# Patient Record
Sex: FEMALE | Race: WHITE | NOT HISPANIC OR LATINO | Employment: OTHER | URBAN - METROPOLITAN AREA
[De-identification: names, ages, dates, MRNs, and addresses within clinical notes are randomized per-mention and may not be internally consistent; named-entity substitution may affect disease eponyms.]

---

## 2017-12-26 ENCOUNTER — TELEPHONE (OUTPATIENT)
Dept: CARDIOLOGY | Facility: MEDICAL CENTER | Age: 62
End: 2017-12-26

## 2017-12-26 NOTE — TELEPHONE ENCOUNTER
Called patient regarding NP appt. W/. Patient reports she has seen Dr. Gallardo (cardiologist) @ [facility] for a surgical clearance, records request has been sent to [phone]. Patient also report she had an EKG done at the [facility], records request sent to [phone].

## 2018-01-03 ENCOUNTER — OFFICE VISIT (OUTPATIENT)
Dept: CARDIOLOGY | Facility: MEDICAL CENTER | Age: 63
End: 2018-01-03
Payer: MEDICAID

## 2018-01-03 VITALS
HEIGHT: 66 IN | BODY MASS INDEX: 35.29 KG/M2 | OXYGEN SATURATION: 92 % | DIASTOLIC BLOOD PRESSURE: 68 MMHG | WEIGHT: 219.6 LBS | HEART RATE: 88 BPM | SYSTOLIC BLOOD PRESSURE: 124 MMHG

## 2018-01-03 DIAGNOSIS — R06.02 SHORTNESS OF BREATH: ICD-10-CM

## 2018-01-03 DIAGNOSIS — I31.39 PERICARDIAL EFFUSION: ICD-10-CM

## 2018-01-03 DIAGNOSIS — R07.9 CHEST PAIN, UNSPECIFIED TYPE: ICD-10-CM

## 2018-01-03 DIAGNOSIS — R01.1 SYSTOLIC MURMUR: ICD-10-CM

## 2018-01-03 LAB — EKG IMPRESSION: NORMAL

## 2018-01-03 PROCEDURE — 93000 ELECTROCARDIOGRAM COMPLETE: CPT | Performed by: INTERNAL MEDICINE

## 2018-01-03 PROCEDURE — 99245 OFF/OP CONSLTJ NEW/EST HI 55: CPT | Mod: 25 | Performed by: INTERNAL MEDICINE

## 2018-01-03 RX ORDER — ALBUTEROL SULFATE 90 UG/1
AEROSOL, METERED RESPIRATORY (INHALATION)
Refills: 11 | COMMUNITY
Start: 2017-12-18 | End: 2018-06-28

## 2018-01-03 RX ORDER — MORPHINE SULFATE 15 MG/1
TABLET, FILM COATED, EXTENDED RELEASE ORAL
Refills: 0 | COMMUNITY
Start: 2017-12-09 | End: 2018-06-28

## 2018-01-03 RX ORDER — ROPINIROLE 4 MG/1
TABLET, FILM COATED ORAL
Refills: 3 | COMMUNITY
Start: 2017-12-10 | End: 2018-06-28

## 2018-01-03 RX ORDER — BISACODYL 5 MG
TABLET, DELAYED RELEASE (ENTERIC COATED) ORAL
Refills: 7 | COMMUNITY
Start: 2017-10-16 | End: 2018-06-28

## 2018-01-03 RX ORDER — LORAZEPAM 1 MG/1
TABLET ORAL
Refills: 3 | COMMUNITY
Start: 2017-12-20 | End: 2018-06-28

## 2018-01-03 RX ORDER — TOPIRAMATE 50 MG/1
TABLET, FILM COATED ORAL
Refills: 3 | COMMUNITY
Start: 2017-12-18 | End: 2018-06-28

## 2018-01-03 RX ORDER — MELOXICAM 15 MG/1
TABLET ORAL
Refills: 6 | COMMUNITY
Start: 2017-12-07 | End: 2018-06-28

## 2018-01-03 RX ORDER — OXYCODONE AND ACETAMINOPHEN 10; 325 MG/1; MG/1
TABLET ORAL
Refills: 0 | COMMUNITY
Start: 2017-12-12 | End: 2018-06-28

## 2018-01-03 RX ORDER — AMLODIPINE BESYLATE 10 MG/1
TABLET ORAL
Refills: 3 | COMMUNITY
Start: 2017-12-07 | End: 2018-06-28

## 2018-01-03 ASSESSMENT — ENCOUNTER SYMPTOMS
BRUISES/BLEEDS EASILY: 0
DEPRESSION: 0
BACK PAIN: 1
SHORTNESS OF BREATH: 1
FALLS: 0
ABDOMINAL PAIN: 0
ORTHOPNEA: 0
PALPITATIONS: 0
PND: 0
LOSS OF CONSCIOUSNESS: 0
DIZZINESS: 0

## 2018-01-03 NOTE — LETTER
[facility]   [address] [place] [address] [address]  [place] [address]  Phone: [phone]  Fax: [phone]              Ariella Rendon  1955    Encounter Date: 1/3/2018    Jessica Diaz M.D.          PROGRESS NOTE:  Subjective:   Ariella Rendon is a 62 y.o. Female with past medical history significant for hypertension, hyperlipidemia and COPD who was referred to cardiology clinic for management of pericardial effusion. It appears that she underwent a CT scan that showed an incidental finding of a pericardial effusion.We do not have the CT scan available for our review.    Patient reports having substernal/left sided chest pressure that usually occurs at rest and spontaneously resolves anywhere from 2 hours to 2 days later. She's had these symptoms for many years. Her last stress test was in 2016 which was unremarkable.    Reports having dyspnea mostly after trying to sit up from a supine position which she attributes to her chronic back pain.    Denies any palpitations or dizziness. No recent syncopal episodes.    Past Medical History:   Diagnosis Date   • Anemia     past history   • Arthritis     hands   • Breath shortness    • Cancer (CMS-HCC)     cervical, endometrial   • COPD (chronic obstructive pulmonary disease) (CMS-HCC)    • Dental disorder    • Heart burn     lumbar, colon   • Hiatus hernia syndrome    • Hypertension    • Indigestion    • Other specified disorder of intestines     constipation and diarrhea   • Other specified symptom associated with female genital organs 1970    cervical   • Psychiatric problem     depression     Past Surgical History:   Procedure Laterality Date   • LOW ANTERIOR RESECTION LAPAROSCOPIC  9/12/2014    Performed by Nakul Beltran M.D. at [facility] ORS   • GYN SURGERY  1970    hysterectomy   • GYN SURGERY  1970    scope x 4   • OTHER ABDOMINAL SURGERY      remove foreign object, age 7     History reviewed. No pertinent family  "history.  History   Smoking Status   • Current Every Day Smoker   • Packs/day: 0.50   • Years: 40.00   • Types: Cigarettes   Smokeless Tobacco   • Never Used     No alcohol use. Smokes marijuana daily.    Allergies   Allergen Reactions   • Other Environmental Rash     \"alloids in metal\"     Outpatient Encounter Prescriptions as of 1/3/2018   Medication Sig Dispense Refill   • BREO ELLIPTA 200-25 MCG/INH AEROSOL POWDER, BREATH ACTIVATED INL 1 PUFF PO QD  3   • vitamin D (CHOLECALCIFEROL) 1000 UNIT Tab TK 1 T PO ONCE A DAY  5   • amlodipine (NORVASC) 10 MG Tab TK 1 T PO QD  3   • bisacodyl EC (DULCOLAX) 5 MG Tablet Delayed Response TK 1 T PO  QD PRN  7   • lorazepam (ATIVAN) 1 MG Tab TK 1 T PO BID  3   • morphine ER (MS CONTIN) 15 MG Tab CR tablet TK 1 T PO Q 12 H  0   • oxycodone-acetaminophen (PERCOCET-10)  MG Tab TK 1 T PO QID  0   • ropinirole (REQUIP) 4 MG tablet TK 1 T PO QID PRN P  3   • topiramate (TOPAMAX) 50 MG tablet TK 1 T PO  QD  3   • meloxicam (MOBIC) 15 MG tablet TK 1 T PO QD  6   • VENTOLIN  (90 Base) MCG/ACT Aero Soln inhalation aerosol INL 1 PUFF PO Q 4 H PRF SOB  11   • aspirin EC (ECOTRIN) 81 MG Tablet Delayed Response Take 1 Tab by mouth every day. 30 Tab    • asa/apap/caffeine (EXCEDRIN) 250-250-65 MG TABS Take 2 Tabs by mouth every 6 hours as needed. Indications: Headache     • ALBUTEROL SULFATE INH Inhale 2 Puffs by mouth as needed.     • NS SOLN 60 mL with albuterol 2.5 mg/0.5 mL NEBU 5 mL 5 mg/hr by Nebulization route as needed.     • magnesium gluconate (MAG-G) 500 MG tablet Take 1,000 mg by mouth every day.     • fluoxetine (PROZAC) 40 MG capsule Take 40 mg by mouth every day.     • Cetirizine HCl (ZYRTEC PO) Take  by mouth every day.     • omeprazole (PRILOSEC) 40 MG capsule Take 40 mg by mouth every day.     • [DISCONTINUED] hydrocodone-acetaminophen (NORCO) 7.5-325 MG per tablet Take 1-2 Tabs by mouth every 6 hours as needed. 40 Tab 0   • [DISCONTINUED] potassium chloride " "SA (K-DUR) 20 MEQ TBCR Take 1 Tab by mouth 2 times a day. 60 Tab 11     No facility-administered encounter medications on file as of 1/3/2018.      Review of Systems   Constitutional: Negative for malaise/fatigue.   HENT: Negative.    Respiratory: Positive for shortness of breath.    Cardiovascular: Positive for chest pain. Negative for palpitations, orthopnea, leg swelling and PND.   Gastrointestinal: Negative for abdominal pain.   Musculoskeletal: Positive for back pain. Negative for falls.   Skin: Negative.    Neurological: Negative for dizziness and loss of consciousness.   Endo/Heme/Allergies: Does not bruise/bleed easily.   Psychiatric/Behavioral: Negative for depression.   All other systems reviewed and are negative.       Objective:   /68   Pulse 88   Ht 1.676 m (5' 6\")   Wt 99.6 kg (219 lb 9.6 oz)   SpO2 92%   BMI 35.44 kg/m²      Physical Exam   Constitutional: She is oriented to person, place, and time. No distress.   HENT:   Head: Normocephalic and atraumatic.   Eyes: Conjunctivae are normal.   Neck: Normal range of motion. Neck supple. No JVD present.   Cardiovascular: Normal rate and regular rhythm.  Exam reveals no gallop and no friction rub.    Murmur heard.   Crescendo decrescendo systolic murmur is present with a grade of 2/6   Pulmonary/Chest: Effort normal and breath sounds normal. No respiratory distress. She has no wheezes. She has no rales.   Abdominal: Soft. There is no tenderness.   Musculoskeletal: She exhibits no edema.   Neurological: She is alert and oriented to person, place, and time.   Skin: Skin is warm and dry. She is not diaphoretic.   Psychiatric: She has a normal mood and affect.   Nursing note and vitals reviewed.    EKG from today was reviewed and shows normal sinus rhythm, right axis deviation, sinus arrhythmia, nonspecific T-wave changes.    Myocardial perfusion study performed in April 2016 showed no significant ischemia. EF was normal.    Echocardiogram performed " in March 2016 showed a normal LV systolic function with an EF of 65-70%. Grade 1 diastolic dysfunction.    Assessment:     1. Pericardial effusion  EKG    Echocardiogram Comp w/o Cont   2. Shortness of breath     3. Systolic murmur  Echocardiogram Comp w/o Cont   4. Chest pain, unspecified type  NM-CARDIAC STRESS TEST       Medical Decision Making:  Today's Assessment / Status / Plan:     Pericardial effusion: Likely an incidental finding on a CT scan performed for abdominal pain. I do not have the CT scan results for my review. We will try to obtain the CT scan results. In the interim I have ordered an echocardiogram for further evaluation.    Systolic murmur: Likely secondary to aortic sclerosis based on exam. However we will evaluate on the echocardiogram as detailed above.    Chest discomfort: Atypical in nature. Patient appears to have a long-standing history of these symptoms. She has previously undergone stress testing which have been unremarkable. However an ongoing smoker with other cardiac risk factors, I will refer her for a repeat myocardial perfusion study for further ischemic evaluation. I have also advised the patient to start a baby aspirin every day for now.    Shortness of breath: Atypical in nature as well. Appears to be more related to her back pain. However echocardiogram and stress testing ordered as noted above.    Tobacco use: I have strongly advised the patient to consider quitting. We also discussed the benefits of quitting. Patient is not quite ready to quit at this time.    Return to clinic in 2 months or earlier if needed.    Thank you for allowing me to participate in the care of this patient. Please do not hesitate to contact me with any questions.    Jessica Diaz MD  Cardiologist  [facility]      PLEASE NOTE: This dictation was created using voice recognition software.         Brianne Marti M.D.  [address]  [address]  [facility] [address]  VIA  Facsimile: [phone]

## 2018-01-03 NOTE — PROGRESS NOTES
Subjective:   Ariella Rendon is a 62 y.o. Female with past medical history significant for hypertension, hyperlipidemia and COPD who was referred to cardiology clinic for management of pericardial effusion. It appears that she underwent a CT scan that showed an incidental finding of a pericardial effusion.We do not have the CT scan available for our review.    Patient reports having substernal/left sided chest pressure that usually occurs at rest and spontaneously resolves anywhere from 2 hours to 2 days later. She's had these symptoms for many years. Her last stress test was in 2016 which was unremarkable.    Reports having dyspnea mostly after trying to sit up from a supine position which she attributes to her chronic back pain.    Denies any palpitations or dizziness. No recent syncopal episodes.     Her blood pressure is usually well-controlled like it is today.    She is a smoker. Currently smoking up to three quarters of a pack a day and has been smoking for the past 40-45 years.     She reports being diagnosed with a murmur recently. Has never had a murmur before.    Past Medical History:   Diagnosis Date   • Anemia     past history   • Arthritis     hands   • Breath shortness    • Cancer (CMS-HCC)     cervical, endometrial   • COPD (chronic obstructive pulmonary disease) (CMS-HCC)    • Dental disorder    • Heart burn     lumbar, colon   • Hiatus hernia syndrome    • Hypertension    • Indigestion    • Other specified disorder of intestines     constipation and diarrhea   • Other specified symptom associated with female genital organs 1970    cervical   • Psychiatric problem     depression     Past Surgical History:   Procedure Laterality Date   • LOW ANTERIOR RESECTION LAPAROSCOPIC  9/12/2014    Performed by Nakul Beltran M.D. at [facility] ORS   • GYN SURGERY  1970    hysterectomy   • GYN SURGERY  1970    scope x 4   • OTHER ABDOMINAL SURGERY      remove foreign object, age 7     History reviewed.  "No pertinent family history.  History   Smoking Status   • Current Every Day Smoker   • Packs/day: 0.50   • Years: 40.00   • Types: Cigarettes   Smokeless Tobacco   • Never Used     No alcohol use. Smokes marijuana daily.    Allergies   Allergen Reactions   • Other Environmental Rash     \"alloids in metal\"     Outpatient Encounter Prescriptions as of 1/3/2018   Medication Sig Dispense Refill   • BREO ELLIPTA 200-25 MCG/INH AEROSOL POWDER, BREATH ACTIVATED INL 1 PUFF PO QD  3   • vitamin D (CHOLECALCIFEROL) 1000 UNIT Tab TK 1 T PO ONCE A DAY  5   • amlodipine (NORVASC) 10 MG Tab TK 1 T PO QD  3   • bisacodyl EC (DULCOLAX) 5 MG Tablet Delayed Response TK 1 T PO  QD PRN  7   • lorazepam (ATIVAN) 1 MG Tab TK 1 T PO BID  3   • morphine ER (MS CONTIN) 15 MG Tab CR tablet TK 1 T PO Q 12 H  0   • oxycodone-acetaminophen (PERCOCET-10)  MG Tab TK 1 T PO QID  0   • ropinirole (REQUIP) 4 MG tablet TK 1 T PO QID PRN P  3   • topiramate (TOPAMAX) 50 MG tablet TK 1 T PO  QD  3   • meloxicam (MOBIC) 15 MG tablet TK 1 T PO QD  6   • VENTOLIN  (90 Base) MCG/ACT Aero Soln inhalation aerosol INL 1 PUFF PO Q 4 H PRF SOB  11   • aspirin EC (ECOTRIN) 81 MG Tablet Delayed Response Take 1 Tab by mouth every day. 30 Tab    • asa/apap/caffeine (EXCEDRIN) 250-250-65 MG TABS Take 2 Tabs by mouth every 6 hours as needed. Indications: Headache     • ALBUTEROL SULFATE INH Inhale 2 Puffs by mouth as needed.     • NS SOLN 60 mL with albuterol 2.5 mg/0.5 mL NEBU 5 mL 5 mg/hr by Nebulization route as needed.     • magnesium gluconate (MAG-G) 500 MG tablet Take 1,000 mg by mouth every day.     • fluoxetine (PROZAC) 40 MG capsule Take 40 mg by mouth every day.     • Cetirizine HCl (ZYRTEC PO) Take  by mouth every day.     • omeprazole (PRILOSEC) 40 MG capsule Take 40 mg by mouth every day.     • [DISCONTINUED] hydrocodone-acetaminophen (NORCO) 7.5-325 MG per tablet Take 1-2 Tabs by mouth every 6 hours as needed. 40 Tab 0   • [DISCONTINUED] " "potassium chloride SA (K-DUR) 20 MEQ TBCR Take 1 Tab by mouth 2 times a day. 60 Tab 11     No facility-administered encounter medications on file as of 1/3/2018.      Review of Systems   Constitutional: Negative for malaise/fatigue.   HENT: Negative.    Respiratory: Positive for shortness of breath.    Cardiovascular: Positive for chest pain. Negative for palpitations, orthopnea, leg swelling and PND.   Gastrointestinal: Negative for abdominal pain.   Musculoskeletal: Positive for back pain. Negative for falls.   Skin: Negative.    Neurological: Negative for dizziness and loss of consciousness.   Endo/Heme/Allergies: Does not bruise/bleed easily.   Psychiatric/Behavioral: Negative for depression.   All other systems reviewed and are negative.       Objective:   /68   Pulse 88   Ht 1.676 m (5' 6\")   Wt 99.6 kg (219 lb 9.6 oz)   SpO2 92%   BMI 35.44 kg/m²     Physical Exam   Constitutional: She is oriented to person, place, and time. No distress.   HENT:   Head: Normocephalic and atraumatic.   Eyes: Conjunctivae are normal.   Neck: Normal range of motion. Neck supple. No JVD present.   Cardiovascular: Normal rate and regular rhythm.  Exam reveals no gallop and no friction rub.    Murmur heard.   Crescendo decrescendo systolic murmur is present with a grade of 2/6   Pulmonary/Chest: Effort normal and breath sounds normal. No respiratory distress. She has no wheezes. She has no rales.   Abdominal: Soft. There is no tenderness.   Musculoskeletal: She exhibits no edema.   Neurological: She is alert and oriented to person, place, and time.   Skin: Skin is warm and dry. She is not diaphoretic.   Psychiatric: She has a normal mood and affect.   Nursing note and vitals reviewed.    EKG from today was reviewed and shows normal sinus rhythm, right axis deviation, sinus arrhythmia, nonspecific T-wave changes.    Myocardial perfusion study performed in April 2016 showed no significant ischemia. EF was " normal.    Echocardiogram performed in March 2016 showed a normal LV systolic function with an EF of 65-70%. Grade 1 diastolic dysfunction.    Assessment:     1. Pericardial effusion  EKG    Echocardiogram Comp w/o Cont   2. Shortness of breath     3. Systolic murmur  Echocardiogram Comp w/o Cont   4. Chest pain, unspecified type  NM-CARDIAC STRESS TEST       Medical Decision Making:  Today's Assessment / Status / Plan:     Pericardial effusion: Likely an incidental finding on a CT scan performed for abdominal pain. I do not have the CT scan results for my review. We will try to obtain the CT scan results. In the interim I have ordered an echocardiogram for further evaluation.    Systolic murmur: Likely secondary to aortic sclerosis based on exam. However we will evaluate on the echocardiogram as detailed above.    Chest discomfort: Atypical in nature. Patient appears to have a long-standing history of these symptoms. She has previously undergone stress testing which have been unremarkable. However an ongoing smoker with other cardiac risk factors, I will refer her for a repeat myocardial perfusion study for further ischemic evaluation. I have also advised the patient to start a baby aspirin every day for now.    Shortness of breath: Atypical in nature as well. Appears to be more related to her back pain. However echocardiogram and stress testing ordered as noted above.    Tobacco use: I have strongly advised the patient to consider quitting. We also discussed the benefits of quitting. Patient is not quite ready to quit at this time.    Return to clinic in 2 months or earlier if needed.    Thank you for allowing me to participate in the care of this patient. Please do not hesitate to contact me with any questions.    Jessica Diaz MD  Cardiologist  [facility]      PLEASE NOTE: This dictation was created using voice recognition software.

## 2018-01-12 ENCOUNTER — APPOINTMENT (OUTPATIENT)
Dept: RADIOLOGY | Facility: MEDICAL CENTER | Age: 63
End: 2018-01-12
Attending: INTERNAL MEDICINE
Payer: MEDICAID

## 2018-01-19 ENCOUNTER — APPOINTMENT (OUTPATIENT)
Dept: CARDIOLOGY | Facility: MEDICAL CENTER | Age: 63
End: 2018-01-19
Attending: INTERNAL MEDICINE
Payer: MEDICAID

## 2018-02-02 ENCOUNTER — HOSPITAL ENCOUNTER (OUTPATIENT)
Dept: CARDIOLOGY | Facility: MEDICAL CENTER | Age: 63
End: 2018-02-02
Attending: INTERNAL MEDICINE
Payer: MEDICAID

## 2018-02-02 DIAGNOSIS — R01.1 SYSTOLIC MURMUR: ICD-10-CM

## 2018-02-02 DIAGNOSIS — I31.39 PERICARDIAL EFFUSION: ICD-10-CM

## 2018-02-02 PROCEDURE — 93306 TTE W/DOPPLER COMPLETE: CPT | Mod: 26 | Performed by: INTERNAL MEDICINE

## 2018-02-02 PROCEDURE — 93306 TTE W/DOPPLER COMPLETE: CPT

## 2018-02-03 LAB
LV EJECT FRACT  99904: 60
LV EJECT FRACT MOD 2C 99903: 53.76
LV EJECT FRACT MOD 4C 99902: 66.28
LV EJECT FRACT MOD BP 99901: 61.3

## 2018-02-09 ENCOUNTER — HOSPITAL ENCOUNTER (OUTPATIENT)
Dept: RADIOLOGY | Facility: MEDICAL CENTER | Age: 63
End: 2018-02-09
Attending: INTERNAL MEDICINE
Payer: MEDICAID

## 2018-02-09 DIAGNOSIS — R07.9 CHEST PAIN, UNSPECIFIED TYPE: ICD-10-CM

## 2018-02-09 PROCEDURE — A9502 TC99M TETROFOSMIN: HCPCS

## 2018-02-09 PROCEDURE — 700111 HCHG RX REV CODE 636 W/ 250 OVERRIDE (IP)

## 2018-02-09 RX ORDER — REGADENOSON 0.08 MG/ML
INJECTION, SOLUTION INTRAVENOUS
Status: COMPLETED
Start: 2018-02-09 | End: 2018-02-09

## 2018-02-09 RX ADMIN — REGADENOSON 0.4 MG: 0.08 INJECTION, SOLUTION INTRAVENOUS at 09:34

## 2018-03-09 ENCOUNTER — OFFICE VISIT (OUTPATIENT)
Dept: CARDIOLOGY | Facility: MEDICAL CENTER | Age: 63
End: 2018-03-09
Payer: MEDICAID

## 2018-03-09 VITALS
HEART RATE: 110 BPM | WEIGHT: 212 LBS | HEIGHT: 66 IN | OXYGEN SATURATION: 92 % | BODY MASS INDEX: 34.07 KG/M2 | SYSTOLIC BLOOD PRESSURE: 112 MMHG | DIASTOLIC BLOOD PRESSURE: 62 MMHG

## 2018-03-09 DIAGNOSIS — G47.33 OSA (OBSTRUCTIVE SLEEP APNEA): ICD-10-CM

## 2018-03-09 DIAGNOSIS — I10 ESSENTIAL HYPERTENSION: ICD-10-CM

## 2018-03-09 DIAGNOSIS — R07.9 CHEST PAIN, UNSPECIFIED TYPE: ICD-10-CM

## 2018-03-09 DIAGNOSIS — R01.1 SYSTOLIC MURMUR: ICD-10-CM

## 2018-03-09 DIAGNOSIS — I31.39 PERICARDIAL EFFUSION: ICD-10-CM

## 2018-03-09 DIAGNOSIS — F17.210 CIGARETTE SMOKER: ICD-10-CM

## 2018-03-09 PROCEDURE — 99406 BEHAV CHNG SMOKING 3-10 MIN: CPT | Performed by: INTERNAL MEDICINE

## 2018-03-09 PROCEDURE — 99214 OFFICE O/P EST MOD 30 MIN: CPT | Mod: 25 | Performed by: INTERNAL MEDICINE

## 2018-03-09 ASSESSMENT — ENCOUNTER SYMPTOMS
BACK PAIN: 1
ABDOMINAL PAIN: 0
FALLS: 0
DIZZINESS: 0
ORTHOPNEA: 0
SHORTNESS OF BREATH: 0
DEPRESSION: 0
BRUISES/BLEEDS EASILY: 0
PALPITATIONS: 0
PND: 0
LOSS OF CONSCIOUSNESS: 0

## 2018-03-09 NOTE — LETTER
[facility]   [address] [place] [address]  [place] [address]  Phone: [phone]  Fax: [phone]              Ariella Rendon  1955    Encounter Date: 3/9/2018    Jessica Diaz M.D.          PROGRESS NOTE:  Subjective:   Ariella Rendon is a 62 y.o. Female presenting to clinic for follow-up on pericardial effusion. Patient underwent a CT scan that showed an incidental finding of pericardial effusion. She denies any shortness of breath. No fluid overload symptoms.    She continues to have intermittent substernal chest pressure. She has a history of GERD and tried using Mylanta with one of the episodes with complete resolution of her symptoms.     Continues to smoke about half a pack a day.     Her blood pressure is usually well-controlled like it is today.    Her main concern today is fatigue. She has a history of sleep apnea for which she is only on nocturnal oxygen. She has previously been advised to be on CPAP but the patient does not like to use it.      Past Medical History:   Diagnosis Date   • Anemia     past history   • Arthritis     hands   • Breath shortness    • Cancer (CMS-HCC)     cervical, endometrial   • COPD (chronic obstructive pulmonary disease) (CMS-HCC)    • Dental disorder    • Heart burn     lumbar, colon   • Hiatus hernia syndrome    • Hypertension    • Indigestion    • Other specified disorder of intestines     constipation and diarrhea   • Other specified symptom associated with female genital organs 1970    cervical   • Psychiatric problem     depression     Past Surgical History:   Procedure Laterality Date   • LOW ANTERIOR RESECTION LAPAROSCOPIC  9/12/2014    Performed by Nakul Beltran M.D. at [facility] ORS   • GYN SURGERY  1970    hysterectomy   • GYN SURGERY  1970    scope x 4   • OTHER ABDOMINAL SURGERY      remove foreign object, age 7     History reviewed. No pertinent family history.  History   Smoking Status   • Current  "Every Day Smoker   • Packs/day: 0.50   • Years: 40.00   • Types: Cigarettes   Smokeless Tobacco   • Never Used     No alcohol use. Smokes marijuana daily.    Allergies   Allergen Reactions   • Other Environmental Rash     \"alloids in metal\"     Outpatient Encounter Prescriptions as of 3/9/2018   Medication Sig Dispense Refill   • BREO ELLIPTA 200-25 MCG/INH AEROSOL POWDER, BREATH ACTIVATED INL 1 PUFF PO QD  3   • vitamin D (CHOLECALCIFEROL) 1000 UNIT Tab TK 1 T PO ONCE A DAY  5   • amlodipine (NORVASC) 10 MG Tab TK 1 T PO QD  3   • bisacodyl EC (DULCOLAX) 5 MG Tablet Delayed Response TK 1 T PO  QD PRN  7   • morphine ER (MS CONTIN) 15 MG Tab CR tablet TK 1 T PO Q 12 H  0   • ropinirole (REQUIP) 4 MG tablet TK 1 T PO QID PRN P  3   • topiramate (TOPAMAX) 50 MG tablet TK 1 T PO  QD  3   • VENTOLIN  (90 Base) MCG/ACT Aero Soln inhalation aerosol INL 1 PUFF PO Q 4 H PRF SOB  11   • aspirin EC (ECOTRIN) 81 MG Tablet Delayed Response Take 1 Tab by mouth every day. 30 Tab    • asa/apap/caffeine (EXCEDRIN) 250-250-65 MG TABS Take 2 Tabs by mouth every 6 hours as needed. Indications: Headache     • NS SOLN 60 mL with albuterol 2.5 mg/0.5 mL NEBU 5 mL 5 mg/hr by Nebulization route as needed.     • magnesium gluconate (MAG-G) 500 MG tablet Take 1,000 mg by mouth every day.     • fluoxetine (PROZAC) 40 MG capsule Take 40 mg by mouth every day.     • Cetirizine HCl (ZYRTEC PO) Take  by mouth every day.     • omeprazole (PRILOSEC) 40 MG capsule Take 40 mg by mouth every day.     • lorazepam (ATIVAN) 1 MG Tab TK 1 T PO BID  3   • oxycodone-acetaminophen (PERCOCET-10)  MG Tab TK 1 T PO QID  0   • meloxicam (MOBIC) 15 MG tablet TK 1 T PO QD  6   • ALBUTEROL SULFATE INH Inhale 2 Puffs by mouth as needed.       No facility-administered encounter medications on file as of 3/9/2018.      Review of Systems   Constitutional: Positive for malaise/fatigue.   Respiratory: Negative for shortness of breath.    Cardiovascular: " "Positive for chest pain. Negative for palpitations, orthopnea, leg swelling and PND.   Gastrointestinal: Negative for abdominal pain.   Musculoskeletal: Positive for back pain. Negative for falls.   Neurological: Negative for dizziness and loss of consciousness.   Endo/Heme/Allergies: Does not bruise/bleed easily.   Psychiatric/Behavioral: Negative for depression.   All other systems reviewed and are negative.       Objective:   /62   Pulse (!) 110   Ht 1.676 m (5' 6\")   Wt 96.2 kg (212 lb)   SpO2 92%   BMI 34.22 kg/m²      Physical Exam   Constitutional: She is oriented to person, place, and time. No distress.   HENT:   Head: Normocephalic and atraumatic.   Eyes: Conjunctivae are normal.   Neck: Normal range of motion. Neck supple. No JVD present.   Cardiovascular: Normal rate and regular rhythm.  Exam reveals no gallop and no friction rub.    Murmur heard.   Crescendo decrescendo systolic murmur is present with a grade of 2/6   Pulmonary/Chest: Effort normal and breath sounds normal. No respiratory distress. She has no wheezes. She has no rales.   Abdominal: Soft. There is no tenderness.   Musculoskeletal: She exhibits no edema.   Neurological: She is alert and oriented to person, place, and time.   Skin: Skin is warm and dry. She is not diaphoretic.   Psychiatric: She has a normal mood and affect.   Nursing note and vitals reviewed.    Echocardiogram performed in February 2018. Images were personally reviewed and showed normal LV systolic function. No significant pericardial effusion noted. No significant valvular pathology noted.    Nuclear myocardial perfusion study performed in February 2018. Images and tracings were personally reviewed and showed no fixed or reversible defects.    Assessment:     1. Chest pain, unspecified type     2. Pericardial effusion     3. Systolic murmur     4. Essential hypertension     5. EMILY (obstructive sleep apnea)     6. Cigarette smoker         Medical Decision " Making:  Today's Assessment / Status / Plan:     Pericardial effusion: Was  an incidental finding on the CT scan. Echocardiogram did not show any significant pericardial effusion. No further workup is indicated at this time.    Systolic murmur: No significant valvular pathology noted on the echocardiogram. Patient was reassured about this.    Chest discomfort: Atypical in nature. Myocardial perfusion study as detailed above was low risk. I have extended the test results to the patient. Given her recent history, it appears that her symptoms are likely secondary to GERD. Should the patient started having increasing symptoms or symptoms of exertion, she will let us know.    Obstructive sleep apnea: Patient's only on nocturnal oxygen. Her fatigue is likely secondary to her untreated sleep apnea. I spent a few minutes during this appointment discussing the benefits of CPAP use. Patient however is not interested in using CPAP.    Tobacco use: Patient continues to smoke about half a pack a day. She takes about quitting but is not quite ready to quit at this time. I have encouraged her to consider quitting. I spent 4 minutes discussing smoking cessation.     Return to clinic in 6 months or earlier if needed.    Thank you for allowing me to participate in the care of this patient. Please do not hesitate to contact me with any questions.    Jessica Diaz MD  Cardiologist  [facility]      PLEASE NOTE: This dictation was created using voice recognition software.         Brianne Marti M.D.  [address] [address]  VIA Facsimile: [phone]

## 2018-06-28 ENCOUNTER — APPOINTMENT (OUTPATIENT)
Dept: RADIOLOGY | Facility: MEDICAL CENTER | Age: 63
DRG: 378 | End: 2018-06-28
Attending: EMERGENCY MEDICINE
Payer: MEDICAID

## 2018-06-28 ENCOUNTER — HOSPITAL ENCOUNTER (INPATIENT)
Facility: MEDICAL CENTER | Age: 63
LOS: 5 days | DRG: 378 | End: 2018-07-03
Attending: EMERGENCY MEDICINE | Admitting: FAMILY MEDICINE
Payer: MEDICAID

## 2018-06-28 DIAGNOSIS — J44.9 CHRONIC OBSTRUCTIVE PULMONARY DISEASE, UNSPECIFIED COPD TYPE (HCC): Chronic | ICD-10-CM

## 2018-06-28 DIAGNOSIS — D64.9 ANEMIA, UNSPECIFIED TYPE: ICD-10-CM

## 2018-06-28 DIAGNOSIS — K92.2 GASTROINTESTINAL HEMORRHAGE, UNSPECIFIED GASTROINTESTINAL HEMORRHAGE TYPE: ICD-10-CM

## 2018-06-28 DIAGNOSIS — G47.33 OSA (OBSTRUCTIVE SLEEP APNEA): ICD-10-CM

## 2018-06-28 PROBLEM — I51.89 DIASTOLIC DYSFUNCTION: Status: ACTIVE | Noted: 2018-06-28

## 2018-06-28 PROBLEM — D50.9 MICROCYTIC ANEMIA: Status: ACTIVE | Noted: 2018-06-28

## 2018-06-28 PROBLEM — R53.1 GENERALIZED WEAKNESS: Status: ACTIVE | Noted: 2018-06-28

## 2018-06-28 PROBLEM — R79.89 ELEVATED LFTS: Status: ACTIVE | Noted: 2018-06-28

## 2018-06-28 PROBLEM — D50.0 BLOOD LOSS ANEMIA: Status: ACTIVE | Noted: 2018-06-28

## 2018-06-28 PROBLEM — J96.10 CHRONIC RESPIRATORY FAILURE (HCC): Chronic | Status: ACTIVE | Noted: 2018-06-28

## 2018-06-28 LAB
ABO GROUP BLD: NORMAL
ALBUMIN SERPL BCP-MCNC: 3.3 G/DL (ref 3.2–4.9)
ALBUMIN/GLOB SERPL: 1.3 G/DL
ALP SERPL-CCNC: 119 U/L (ref 30–99)
ALT SERPL-CCNC: 243 U/L (ref 2–50)
ANION GAP SERPL CALC-SCNC: 7 MMOL/L (ref 0–11.9)
ANISOCYTOSIS BLD QL SMEAR: ABNORMAL
APTT PPP: 26.1 SEC (ref 24.7–36)
AST SERPL-CCNC: 294 U/L (ref 12–45)
BARCODED ABORH UBTYP: 6200
BARCODED PRD CODE UBPRD: NORMAL
BARCODED UNIT NUM UBUNT: NORMAL
BASOPHILS # BLD AUTO: 0.4 % (ref 0–1.8)
BASOPHILS # BLD: 0.03 K/UL (ref 0–0.12)
BILIRUB SERPL-MCNC: 0.6 MG/DL (ref 0.1–1.5)
BLD GP AB SCN SERPL QL: NORMAL
BUN SERPL-MCNC: 21 MG/DL (ref 8–22)
CALCIUM SERPL-MCNC: 7.9 MG/DL (ref 8.5–10.5)
CHLORIDE SERPL-SCNC: 104 MMOL/L (ref 96–112)
CO2 SERPL-SCNC: 26 MMOL/L (ref 20–33)
COMMENT 1642: NORMAL
COMPONENT R 8504R: NORMAL
CREAT SERPL-MCNC: 0.77 MG/DL (ref 0.5–1.4)
EOSINOPHIL # BLD AUTO: 0.08 K/UL (ref 0–0.51)
EOSINOPHIL NFR BLD: 1 % (ref 0–6.9)
ERYTHROCYTE [DISTWIDTH] IN BLOOD BY AUTOMATED COUNT: 55.3 FL (ref 35.9–50)
ERYTHROCYTE [DISTWIDTH] IN BLOOD BY AUTOMATED COUNT: 59.6 FL (ref 35.9–50)
FERRITIN SERPL-MCNC: 5.8 NG/ML (ref 10–291)
GLOBULIN SER CALC-MCNC: 2.6 G/DL (ref 1.9–3.5)
GLUCOSE SERPL-MCNC: 119 MG/DL (ref 65–99)
HCT VFR BLD AUTO: 13.6 % (ref 37–47)
HCT VFR BLD AUTO: 15.5 % (ref 37–47)
HGB BLD-MCNC: 3.4 G/DL (ref 12–16)
HGB BLD-MCNC: 4.4 G/DL (ref 12–16)
HGB BLD-MCNC: 5.4 G/DL (ref 12–16)
HGB RETIC QN AUTO: 15.4 PG/CELL (ref 29–35)
HYPOCHROMIA BLD QL SMEAR: ABNORMAL
IMM GRANULOCYTES # BLD AUTO: 0.05 K/UL (ref 0–0.11)
IMM GRANULOCYTES NFR BLD AUTO: 0.6 % (ref 0–0.9)
IMM RETICS NFR: 24.3 % (ref 9.3–17.4)
INR PPP: 1.29 (ref 0.87–1.13)
IRON SATN MFR SERPL: ABNORMAL % (ref 15–55)
IRON SERPL-MCNC: <10 UG/DL (ref 40–170)
LYMPHOCYTES # BLD AUTO: 1.04 K/UL (ref 1–4.8)
LYMPHOCYTES NFR BLD: 12.7 % (ref 22–41)
MCH RBC QN AUTO: 17.7 PG (ref 27–33)
MCH RBC QN AUTO: 20.2 PG (ref 27–33)
MCHC RBC AUTO-ENTMCNC: 25 G/DL (ref 33.6–35)
MCHC RBC AUTO-ENTMCNC: 27.7 G/DL (ref 33.6–35)
MCV RBC AUTO: 70.8 FL (ref 81.4–97.8)
MCV RBC AUTO: 72.8 FL (ref 81.4–97.8)
MICROCYTES BLD QL SMEAR: ABNORMAL
MONOCYTES # BLD AUTO: 0.56 K/UL (ref 0–0.85)
MONOCYTES NFR BLD AUTO: 6.8 % (ref 0–13.4)
MORPHOLOGY BLD-IMP: NORMAL
NEUTROPHILS # BLD AUTO: 6.46 K/UL (ref 2–7.15)
NEUTROPHILS NFR BLD: 78.5 % (ref 44–72)
NRBC # BLD AUTO: 0.07 K/UL
NRBC BLD-RTO: 0.9 /100 WBC
PLATELET # BLD AUTO: 171 K/UL (ref 164–446)
PLATELET # BLD AUTO: 183 K/UL (ref 164–446)
PLATELET BLD QL SMEAR: NORMAL
PMV BLD AUTO: 10.2 FL (ref 9–12.9)
PMV BLD AUTO: 10.6 FL (ref 9–12.9)
POLYCHROMASIA BLD QL SMEAR: NORMAL
POTASSIUM SERPL-SCNC: 4.4 MMOL/L (ref 3.6–5.5)
PRODUCT TYPE UPROD: NORMAL
PROT SERPL-MCNC: 5.9 G/DL (ref 6–8.2)
PROTHROMBIN TIME: 15.8 SEC (ref 12–14.6)
RBC # BLD AUTO: 1.92 M/UL (ref 4.2–5.4)
RBC # BLD AUTO: 2.13 M/UL (ref 4.2–5.4)
RBC BLD AUTO: PRESENT
RETICS # AUTO: 0.05 M/UL (ref 0.04–0.06)
RETICS/RBC NFR: 2.4 % (ref 0.8–2.1)
RH BLD: NORMAL
SODIUM SERPL-SCNC: 137 MMOL/L (ref 135–145)
TIBC SERPL-MCNC: 570 UG/DL (ref 250–450)
TSH SERPL DL<=0.005 MIU/L-ACNC: 1.76 UIU/ML (ref 0.38–5.33)
UNIT STATUS USTAT: NORMAL
WBC # BLD AUTO: 7.5 K/UL (ref 4.8–10.8)
WBC # BLD AUTO: 8.2 K/UL (ref 4.8–10.8)

## 2018-06-28 PROCEDURE — 86900 BLOOD TYPING SEROLOGIC ABO: CPT

## 2018-06-28 PROCEDURE — 85046 RETICYTE/HGB CONCENTRATE: CPT

## 2018-06-28 PROCEDURE — 30233N1 TRANSFUSION OF NONAUTOLOGOUS RED BLOOD CELLS INTO PERIPHERAL VEIN, PERCUTANEOUS APPROACH: ICD-10-PCS | Performed by: EMERGENCY MEDICINE

## 2018-06-28 PROCEDURE — 86923 COMPATIBILITY TEST ELECTRIC: CPT | Mod: 91

## 2018-06-28 PROCEDURE — 36415 COLL VENOUS BLD VENIPUNCTURE: CPT

## 2018-06-28 PROCEDURE — 83550 IRON BINDING TEST: CPT

## 2018-06-28 PROCEDURE — 85027 COMPLETE CBC AUTOMATED: CPT

## 2018-06-28 PROCEDURE — 99285 EMERGENCY DEPT VISIT HI MDM: CPT

## 2018-06-28 PROCEDURE — 80053 COMPREHEN METABOLIC PANEL: CPT

## 2018-06-28 PROCEDURE — 82728 ASSAY OF FERRITIN: CPT

## 2018-06-28 PROCEDURE — 84443 ASSAY THYROID STIM HORMONE: CPT

## 2018-06-28 PROCEDURE — 71045 X-RAY EXAM CHEST 1 VIEW: CPT

## 2018-06-28 PROCEDURE — 83036 HEMOGLOBIN GLYCOSYLATED A1C: CPT

## 2018-06-28 PROCEDURE — 86850 RBC ANTIBODY SCREEN: CPT

## 2018-06-28 PROCEDURE — 85025 COMPLETE CBC W/AUTO DIFF WBC: CPT

## 2018-06-28 PROCEDURE — 36430 TRANSFUSION BLD/BLD COMPNT: CPT

## 2018-06-28 PROCEDURE — 302128 INFUSION PUMP: Performed by: EMERGENCY MEDICINE

## 2018-06-28 PROCEDURE — P9016 RBC LEUKOCYTES REDUCED: HCPCS

## 2018-06-28 PROCEDURE — 83540 ASSAY OF IRON: CPT

## 2018-06-28 PROCEDURE — 85730 THROMBOPLASTIN TIME PARTIAL: CPT

## 2018-06-28 PROCEDURE — 85610 PROTHROMBIN TIME: CPT

## 2018-06-28 PROCEDURE — 700105 HCHG RX REV CODE 258: Performed by: FAMILY MEDICINE

## 2018-06-28 PROCEDURE — 85018 HEMOGLOBIN: CPT

## 2018-06-28 PROCEDURE — 86901 BLOOD TYPING SEROLOGIC RH(D): CPT

## 2018-06-28 PROCEDURE — 99223 1ST HOSP IP/OBS HIGH 75: CPT | Performed by: FAMILY MEDICINE

## 2018-06-28 PROCEDURE — 304561 HCHG STAT O2

## 2018-06-28 PROCEDURE — 94760 N-INVAS EAR/PLS OXIMETRY 1: CPT

## 2018-06-28 PROCEDURE — 770020 HCHG ROOM/CARE - TELE (206)

## 2018-06-28 RX ORDER — OXYCODONE HYDROCHLORIDE 15 MG/1
15 TABLET ORAL EVERY 8 HOURS
COMMUNITY
End: 2024-01-28

## 2018-06-28 RX ORDER — ALBUTEROL SULFATE 90 UG/1
2 AEROSOL, METERED RESPIRATORY (INHALATION) EVERY 6 HOURS PRN
Status: ON HOLD | COMMUNITY
End: 2021-04-27

## 2018-06-28 RX ORDER — ENALAPRILAT 1.25 MG/ML
1.25 INJECTION INTRAVENOUS EVERY 6 HOURS PRN
Status: DISCONTINUED | OUTPATIENT
Start: 2018-06-28 | End: 2018-07-03 | Stop reason: HOSPADM

## 2018-06-28 RX ORDER — LORAZEPAM 1 MG/1
1 TABLET ORAL 2 TIMES DAILY
COMMUNITY
End: 2018-12-14

## 2018-06-28 RX ORDER — MAGNESIUM GLUCONATE 27 MG(500)
500 TABLET ORAL 3 TIMES DAILY
Status: ON HOLD | COMMUNITY
End: 2019-12-09 | Stop reason: SDUPTHER

## 2018-06-28 RX ORDER — ROPINIROLE 4 MG/1
4 TABLET, FILM COATED ORAL EVERY 6 HOURS
COMMUNITY

## 2018-06-28 RX ORDER — PROMETHAZINE HYDROCHLORIDE 25 MG/1
12.5-25 TABLET ORAL EVERY 4 HOURS PRN
Status: DISCONTINUED | OUTPATIENT
Start: 2018-06-28 | End: 2018-07-03 | Stop reason: HOSPADM

## 2018-06-28 RX ORDER — ONDANSETRON 4 MG/1
4 TABLET, ORALLY DISINTEGRATING ORAL EVERY 4 HOURS PRN
Status: DISCONTINUED | OUTPATIENT
Start: 2018-06-28 | End: 2018-07-03 | Stop reason: HOSPADM

## 2018-06-28 RX ORDER — BISACODYL 10 MG
10 SUPPOSITORY, RECTAL RECTAL
Status: DISCONTINUED | OUTPATIENT
Start: 2018-06-28 | End: 2018-07-03 | Stop reason: HOSPADM

## 2018-06-28 RX ORDER — LANOLIN ALCOHOL/MO/W.PET/CERES
400 CREAM (GRAM) TOPICAL DAILY
Status: ON HOLD | COMMUNITY
End: 2019-10-25

## 2018-06-28 RX ORDER — POLYETHYLENE GLYCOL 3350 17 G/17G
1 POWDER, FOR SOLUTION ORAL
Status: DISCONTINUED | OUTPATIENT
Start: 2018-06-28 | End: 2018-07-03 | Stop reason: HOSPADM

## 2018-06-28 RX ORDER — FERROUS SULFATE 325(65) MG
325 TABLET ORAL 2 TIMES DAILY WITH MEALS
Status: DISCONTINUED | OUTPATIENT
Start: 2018-06-29 | End: 2018-07-03 | Stop reason: HOSPADM

## 2018-06-28 RX ORDER — AMLODIPINE BESYLATE 10 MG/1
10 TABLET ORAL DAILY
Status: ON HOLD | COMMUNITY
End: 2019-12-09

## 2018-06-28 RX ORDER — PROMETHAZINE HYDROCHLORIDE 25 MG/1
12.5-25 SUPPOSITORY RECTAL EVERY 4 HOURS PRN
Status: DISCONTINUED | OUTPATIENT
Start: 2018-06-28 | End: 2018-07-03 | Stop reason: HOSPADM

## 2018-06-28 RX ORDER — MELOXICAM 15 MG/1
15 TABLET ORAL DAILY
Status: ON HOLD | COMMUNITY
End: 2019-10-28

## 2018-06-28 RX ORDER — MORPHINE SULFATE 30 MG/1
30 TABLET, FILM COATED, EXTENDED RELEASE ORAL EVERY 8 HOURS
COMMUNITY
End: 2019-06-06

## 2018-06-28 RX ORDER — LANOLIN ALCOHOL/MO/W.PET/CERES
400 CREAM (GRAM) TOPICAL DAILY
Status: DISCONTINUED | OUTPATIENT
Start: 2018-06-29 | End: 2018-06-28

## 2018-06-28 RX ORDER — ONDANSETRON 2 MG/ML
4 INJECTION INTRAMUSCULAR; INTRAVENOUS EVERY 4 HOURS PRN
Status: DISCONTINUED | OUTPATIENT
Start: 2018-06-28 | End: 2018-07-03 | Stop reason: HOSPADM

## 2018-06-28 RX ORDER — SODIUM CHLORIDE 9 MG/ML
INJECTION, SOLUTION INTRAVENOUS CONTINUOUS
Status: DISPENSED | OUTPATIENT
Start: 2018-06-28 | End: 2018-06-29

## 2018-06-28 RX ORDER — ROPINIROLE 2 MG/1
4 TABLET, FILM COATED ORAL 4 TIMES DAILY
Status: DISCONTINUED | OUTPATIENT
Start: 2018-06-28 | End: 2018-07-03 | Stop reason: HOSPADM

## 2018-06-28 RX ORDER — AMOXICILLIN 250 MG
2 CAPSULE ORAL 2 TIMES DAILY
Status: DISCONTINUED | OUTPATIENT
Start: 2018-06-28 | End: 2018-07-03 | Stop reason: HOSPADM

## 2018-06-28 RX ORDER — CETIRIZINE HYDROCHLORIDE, PSEUDOEPHEDRINE HYDROCHLORIDE 5; 120 MG/1; MG/1
1 TABLET, FILM COATED, EXTENDED RELEASE ORAL DAILY
COMMUNITY
End: 2019-11-20

## 2018-06-28 RX ORDER — OMEPRAZOLE 20 MG/1
20 CAPSULE, DELAYED RELEASE ORAL DAILY
Status: ON HOLD | COMMUNITY
End: 2019-11-24 | Stop reason: SDUPTHER

## 2018-06-28 RX ORDER — ALBUTEROL SULFATE 90 UG/1
2 AEROSOL, METERED RESPIRATORY (INHALATION) EVERY 6 HOURS PRN
Status: DISCONTINUED | OUTPATIENT
Start: 2018-06-28 | End: 2018-07-03 | Stop reason: HOSPADM

## 2018-06-28 RX ADMIN — SODIUM CHLORIDE: 9 INJECTION, SOLUTION INTRAVENOUS at 20:46

## 2018-06-28 ASSESSMENT — COGNITIVE AND FUNCTIONAL STATUS - GENERAL
MOBILITY SCORE: 20
SUGGESTED CMS G CODE MODIFIER DAILY ACTIVITY: CJ
SUGGESTED CMS G CODE MODIFIER MOBILITY: CJ
EATING MEALS: A LOT
DAILY ACTIVITIY SCORE: 22
CLIMB 3 TO 5 STEPS WITH RAILING: A LITTLE
MOVING FROM LYING ON BACK TO SITTING ON SIDE OF FLAT BED: A LOT
TURNING FROM BACK TO SIDE WHILE IN FLAT BAD: A LITTLE

## 2018-06-28 ASSESSMENT — COPD QUESTIONNAIRES
IN THE PAST 12 MONTHS DO YOU DO LESS THAN YOU USED TO BECAUSE OF YOUR BREATHING PROBLEMS: DISAGREE/UNSURE
DURING THE PAST 4 WEEKS HOW MUCH DID YOU FEEL SHORT OF BREATH: MOST  OR ALL OF THE TIME
HAVE YOU SMOKED AT LEAST 100 CIGARETTES IN YOUR ENTIRE LIFE: YES
HAVE YOU SMOKED AT LEAST 100 CIGARETTES IN YOUR ENTIRE LIFE: YES
COPD SCREENING SCORE: 6
DURING THE PAST 4 WEEKS HOW MUCH DID YOU FEEL SHORT OF BREATH: MOST  OR ALL OF THE TIME
DO YOU EVER COUGH UP ANY MUCUS OR PHLEGM?: NO/ONLY WITH OCCASIONAL COLDS OR INFECTIONS

## 2018-06-28 ASSESSMENT — PATIENT HEALTH QUESTIONNAIRE - PHQ9
1. LITTLE INTEREST OR PLEASURE IN DOING THINGS: SEVERAL DAYS
7. TROUBLE CONCENTRATING ON THINGS, SUCH AS READING THE NEWSPAPER OR WATCHING TELEVISION: SEVERAL DAYS
3. TROUBLE FALLING OR STAYING ASLEEP OR SLEEPING TOO MUCH: SEVERAL DAYS
5. POOR APPETITE OR OVEREATING: SEVERAL DAYS
8. MOVING OR SPEAKING SO SLOWLY THAT OTHER PEOPLE COULD HAVE NOTICED. OR THE OPPOSITE, BEING SO FIGETY OR RESTLESS THAT YOU HAVE BEEN MOVING AROUND A LOT MORE THAN USUAL: NEARLY EVERY DAY
SUM OF ALL RESPONSES TO PHQ QUESTIONS 1-9: 12
6. FEELING BAD ABOUT YOURSELF - OR THAT YOU ARE A FAILURE OR HAVE LET YOURSELF OR YOUR FAMILY DOWN: SEVERAL DAYS
2. FEELING DOWN, DEPRESSED, IRRITABLE, OR HOPELESS: SEVERAL DAYS
4. FEELING TIRED OR HAVING LITTLE ENERGY: NEARLY EVERY DAY
SUM OF ALL RESPONSES TO PHQ9 QUESTIONS 1 AND 2: 2
9. THOUGHTS THAT YOU WOULD BE BETTER OFF DEAD, OR OF HURTING YOURSELF: NOT AT ALL

## 2018-06-28 ASSESSMENT — ENCOUNTER SYMPTOMS
BRUISES/BLEEDS EASILY: 0
HEARTBURN: 0
HEMOPTYSIS: 0
MYALGIAS: 0
PALPITATIONS: 0
NAUSEA: 0
DEPRESSION: 0
COUGH: 0
FEVER: 0
NECK PAIN: 0
DOUBLE VISION: 0
HEADACHES: 0
DIZZINESS: 1
CHILLS: 0
BLURRED VISION: 0
BLOOD IN STOOL: 1

## 2018-06-28 ASSESSMENT — LIFESTYLE VARIABLES
EVER_SMOKED: YES
ALCOHOL_USE: NO
EVER_SMOKED: YES

## 2018-06-28 ASSESSMENT — PAIN SCALES - GENERAL
PAINLEVEL_OUTOF10: 5
PAINLEVEL_OUTOF10: 0

## 2018-06-28 NOTE — ED NOTES
Updated patient on plan of care. Patient resting in bed. Side rails up. Call light in reach. No needs expressed. No questions. Vital signs stable.  PATIENT REMAINS W/O S/S TRANSFUSION REACTION.

## 2018-06-28 NOTE — ED TRIAGE NOTES
Chief Complaint   Patient presents with   • Sent by MD   • Abnormal Labs     Patient sent by MD for low H&H, states rectal bleeding. Patient on 4L NC at home. Charge RN aware of patient.

## 2018-06-28 NOTE — ED NOTES
Admitting MD at bedside  Updated patient on plan of care. Patient resting in bed. Side rails up. Call light in reach. No needs expressed. No questions. Vital signs stable.

## 2018-06-28 NOTE — ED PROVIDER NOTES
ED Provider Note    Scribed for Bryant Tobin M.D. by Kevon Glass. 6/28/2018  1:41 PM    Primary care provider: Brianne Marti M.D.  Means of arrival: walk in  History obtained from: patient  History limited by: none    CHIEF COMPLAINT  Chief Complaint   Patient presents with   • Sent by MD   • Abnormal Labs       HPI  Ariella Rendon is a 63 y.o. female who presents to the Emergency Department as a referral from her primary for evaluation of low H&H secondary to rectal bleeding and complaining of shortness of breath. Patient reports associated chest pain, fatigue. She states that magnesium administration has improved her ability to move her bowels. Patient reports a recent history of diverticulitis that has progressed to rectal bleeding over the last 6 weeks. She was been being evaluated by Dr. Marti (PCP) took blood samples yesterday. Patient was found to have a low H&H at 3.6 and was referred to the ED for evaluation. She reports that she has not had rectal bleeding in 3 days. Patient has been evaluated by GI, who she has not been happy with. She also reports a history of hemorrhoids. Patient denies abdominal pain.    REVIEW OF SYSTEMS  Pertinent positives include rectal bleeding, abnormal labs,chest pain, fatigue, shortness of breath. Pertinent negatives include no abdominal pain.  All other systems reviewed and negative.  C.    PAST MEDICAL HISTORY   has a past medical history of Anemia; Arthritis; Breath shortness; Cancer (CMS-[facility]) (HCC); COPD (chronic obstructive pulmonary disease) (CMS-[facility]) (HCC); Dental disorder; Heart burn; Hiatus hernia syndrome; Hypertension; Indigestion; Other specified disorder of intestines; Other specified symptom associated with female genital organs (1970); and Psychiatric problem.    SURGICAL HISTORY   has a past surgical history that includes other abdominal surgery; gyn surgery (1970); gyn surgery (1970); and low anterior resection laparoscopic (9/12/2014).    SOCIAL  HISTORY  Social History   Substance Use Topics   • Smoking status: Current Every Day Smoker     Packs/day: 0.50     Years: 40.00     Types: Cigarettes   • Smokeless tobacco: Never Used   • Alcohol use No      History   Drug Use   • Frequency: 7.0 times per week   • Types: Marijuana       FAMILY HISTORY  None noted    CURRENT MEDICATIONS  Current Outpatient Prescriptions:   •  BREO ELLIPTA 200-25 MCG/INH AEROSOL POWDER, BREATH ACTIVATED, INL 1 PUFF PO QD, Disp: , Rfl: 3  •  vitamin D (CHOLECALCIFEROL) 1000 UNIT Tab, TK 1 T PO ONCE A DAY, Disp: , Rfl: 5  •  amlodipine (NORVASC) 10 MG Tab, TK 1 T PO QD, Disp: , Rfl: 3  •  bisacodyl EC (DULCOLAX) 5 MG Tablet Delayed Response, TK 1 T PO  QD PRN, Disp: , Rfl: 7  •  lorazepam (ATIVAN) 1 MG Tab, TK 1 T PO BID, Disp: , Rfl: 3  •  morphine ER (MS CONTIN) 15 MG Tab CR tablet, TK 1 T PO Q 12 H, Disp: , Rfl: 0  •  oxycodone-acetaminophen (PERCOCET-10)  MG Tab, TK 1 T PO QID, Disp: , Rfl: 0  •  ropinirole (REQUIP) 4 MG tablet, TK 1 T PO QID PRN P, Disp: , Rfl: 3  •  topiramate (TOPAMAX) 50 MG tablet, TK 1 T PO  QD, Disp: , Rfl: 3  •  meloxicam (MOBIC) 15 MG tablet, TK 1 T PO QD, Disp: , Rfl: 6  •  VENTOLIN  (90 Base) MCG/ACT Aero Soln inhalation aerosol, INL 1 PUFF PO Q 4 H PRF SOB, Disp: , Rfl: 11  •  aspirin EC (ECOTRIN) 81 MG Tablet Delayed Response, Take 1 Tab by mouth every day., Disp: 30 Tab, Rfl:   •  asa/apap/caffeine (EXCEDRIN) 250-250-65 MG TABS, Take 2 Tabs by mouth every 6 hours as needed. Indications: Headache, Disp: , Rfl:   •  ALBUTEROL SULFATE INH, Inhale 2 Puffs by mouth as needed., Disp: , Rfl:   •  NS SOLN 60 mL with albuterol 2.5 mg/0.5 mL NEBU 5 mL, 5 mg/hr by Nebulization route as needed., Disp: , Rfl:   •  magnesium gluconate (MAG-G) 500 MG tablet, Take 1,000 mg by mouth every day., Disp: , Rfl:   •  fluoxetine (PROZAC) 40 MG capsule, Take 40 mg by mouth every day., Disp: , Rfl:   •  Cetirizine HCl (ZYRTEC PO), Take  by mouth every day., Disp:  ", Rfl:   •  omeprazole (PRILOSEC) 40 MG capsule, Take 40 mg by mouth every day., Disp: , Rfl:     ALLERGIES  Allergies   Allergen Reactions   • Other Environmental Rash     \"alloids in metal\"       PHYSICAL EXAM  VITAL SIGNS: /95   Pulse 98   Temp 37.3 °C (99.2 °F)   Resp 20   Ht 1.676 m (5' 6\")   Wt 96.6 kg (213 lb)   BMI 34.38 kg/m²     Constitutional: Well developed, Well nourished, Mild distress.   HENT: Normocephalic, Atraumatic, Bilateral external ears normal, Oropharynx moist, No oral exudates. Poor dentition.   Eyes: PERRLA, EOMI, Conjunctiva normal, No discharge.   Neck: No tenderness, Supple, No stridor.   Lymphatic: No lymphadenopathy noted.   Cardiovascular: Normal heart rate, Normal rhythm.   Thorax & Lungs: Clear to auscultation bilaterally, Hyperventilating. No respiratory distress, No wheezing, No crackles.   Abdomen: Soft, No tenderness, No masses, No pulsatile masses.   Skin: Warm, Dry, No erythema, No rash. Extremely pale  Extremities:, No edema No cyanosis.   Musculoskeletal: No tenderness to palpation or major deformities noted.  Intact distal pulses  Neurologic: Awake, alert. Moves all extremities spontaneously.  Psychiatric: Anxious, Judgment normal, Mood normal.     LABS  Labs Reviewed   CBC WITH DIFFERENTIAL - Abnormal; Notable for the following:        Result Value    RBC 1.92 (*)     Hemoglobin 3.4 (*)     Hematocrit 13.6 (*)     MCV 70.8 (*)     MCH 17.7 (*)     MCHC 25.0 (*)     RDW 55.3 (*)     Neutrophils-Polys 78.50 (*)     Lymphocytes 12.70 (*)     All other components within normal limits    Narrative:     Indicate which anticoagulants the patient is on:->UNKNOWN   COMP METABOLIC PANEL - Abnormal; Notable for the following:     Glucose 119 (*)     Calcium 7.9 (*)     AST(SGOT) 294 (*)     ALT(SGPT) 243 (*)     Alkaline Phosphatase 119 (*)     Total Protein 5.9 (*)     All other components within normal limits    Narrative:     Indicate which anticoagulants the patient is " on:->UNKNOWN   PROTHROMBIN TIME - Abnormal; Notable for the following:     PT 15.8 (*)     INR 1.29 (*)     All other components within normal limits    Narrative:     Indicate which anticoagulants the patient is on:->UNKNOWN   APTT    Narrative:     Indicate which anticoagulants the patient is on:->UNKNOWN   COD (ADULT)   ESTIMATED GFR    Narrative:     Indicate which anticoagulants the patient is on:->UNKNOWN   PERIPHERAL SMEAR REVIEW    Narrative:     Indicate which anticoagulants the patient is on:->UNKNOWN   PLATELET ESTIMATE    Narrative:     Indicate which anticoagulants the patient is on:->UNKNOWN   MORPHOLOGY    Narrative:     Indicate which anticoagulants the patient is on:->UNKNOWN   DIFFERENTIAL COMMENT    Narrative:     Indicate which anticoagulants the patient is on:->UNKNOWN   RELEASE RED BLOOD CELLS   TRANSFUSE RED BLOOD CELLS-NURSING COMMUNICATION   All labs reviewed by me.    RADIOLOGY  DX-CHEST-PORTABLE (1 VIEW)   Final Result         Diffuse interstitial prominence could relate to mild congestion.      Ill-defined bibasilar opacities, left more than right, atelectasis versus consolidation.      The radiologist's interpretation of all radiological studies have been reviewed by me.    COURSE & MEDICAL DECISION MAKING  Pertinent Labs & Imaging studies reviewed. (See chart for details)    1:41 PM - Patient seen and examined at bedside. She states that she does not want to have certain procedures performed but only explicitly states that she does not want a colonoscopy. I discussed the process of blood transfusion and informed her that it is an inpatient procedure. She is notably distraught about this admission. I counseled her on the severity of her condition and urged her to remain secondary to the real possibility of death. Ordered CBC with differential, CMP, APTT, PT/INR, COD to evaluate her symptoms. The differential diagnoses include but are not limited to: anemia    2:38 PM - Patient's  hemoglobin is 3.4.     I (Bryant Tobin M.D.) certify that I have explained to the patient or the patient’s legal representative, the following:    (i)     Explained the Blood Transfusion procedure to be undertaken;  (ii)    Explained alternative treatments and their general nature and risks; and  (iii)   Explained the risks, and extent of risk, to the Blood Transfusion procedure.  Risks included, but are not limited to the following:  mild allergic reactions, hemolytic reaction, and possible exposure to infectious agents such as hepatitis, cytomegalovirus, infectious mononucleosis, and acquired immune deficiency syndrome (AIDS)    I have explained all of the above and have answered all patient questions arising regarding the procedure to be taken, and I believe that the patient understands what I have explained.    Date 6/28/2018  Time of Consent 300  This form is electronically signed by Bryant Tobin        Decision Making:  Patient coming in secondary to extremely low blood level, her hemoglobin was 3.9, the patient is very tearful and anxious.  Patient does not want a colonoscopy, I have transfused the patient some blood, consented the patient for blood transfusion, discussed the case with the hospitalist for admission the hospital.    DISPOSITION:  Patient will be admitted to the hospitalist in critical condition.    FINAL IMPRESSION  1. Gastrointestinal hemorrhage, unspecified gastrointestinal hemorrhage type    2. Anemia, unspecified type    3.  Critical care time of 35 minutes     IKevon (Shanda), am scribing for, and in the presence of, Bryant Tobin M.D..    Electronically signed by: Kevon Glass (Shanda), 6/28/2018    Bryant PELAEZ M.D. personally performed the services described in this documentation, as scribed by Kevon Glass in my presence, and it is both accurate and complete.    The note accurately reflects work and decisions made by me.  Bryant Tobin   6/28/2018  4:32 PM

## 2018-06-28 NOTE — ED NOTES
Patient alert, oriented x3, sent in for HGB 3. Patient pale, cool, dusky, resp labored and tachypneic, on 4L NC for hypoxia. On cardiac monitor. VSS. Family at bedside x2

## 2018-06-29 LAB
ALBUMIN SERPL BCP-MCNC: 3.2 G/DL (ref 3.2–4.9)
ALBUMIN/GLOB SERPL: 1.1 G/DL
ALP SERPL-CCNC: 118 U/L (ref 30–99)
ALT SERPL-CCNC: 267 U/L (ref 2–50)
ANION GAP SERPL CALC-SCNC: 8 MMOL/L (ref 0–11.9)
AST SERPL-CCNC: 285 U/L (ref 12–45)
BASOPHILS # BLD AUTO: 0.7 % (ref 0–1.8)
BASOPHILS # BLD: 0.07 K/UL (ref 0–0.12)
BILIRUB SERPL-MCNC: 0.9 MG/DL (ref 0.1–1.5)
BUN SERPL-MCNC: 15 MG/DL (ref 8–22)
CALCIUM SERPL-MCNC: 8.2 MG/DL (ref 8.5–10.5)
CHLORIDE SERPL-SCNC: 106 MMOL/L (ref 96–112)
CO2 SERPL-SCNC: 24 MMOL/L (ref 20–33)
CREAT SERPL-MCNC: 0.79 MG/DL (ref 0.5–1.4)
EOSINOPHIL # BLD AUTO: 0.05 K/UL (ref 0–0.51)
EOSINOPHIL NFR BLD: 0.5 % (ref 0–6.9)
ERYTHROCYTE [DISTWIDTH] IN BLOOD BY AUTOMATED COUNT: 63.5 FL (ref 35.9–50)
EST. AVERAGE GLUCOSE BLD GHB EST-MCNC: 131 MG/DL
FERRITIN SERPL-MCNC: 7.8 NG/ML (ref 10–291)
GLOBULIN SER CALC-MCNC: 2.8 G/DL (ref 1.9–3.5)
GLUCOSE SERPL-MCNC: 120 MG/DL (ref 65–99)
HAV IGM SERPL QL IA: NEGATIVE
HBA1C MFR BLD: 6.2 % (ref 0–5.6)
HBV CORE IGM SER QL: NEGATIVE
HBV SURFACE AG SER QL: NEGATIVE
HCT VFR BLD AUTO: 21.9 % (ref 37–47)
HCT VFR BLD AUTO: 24.3 % (ref 37–47)
HCV AB SER QL: NEGATIVE
HGB BLD-MCNC: 6.4 G/DL (ref 12–16)
HGB BLD-MCNC: 7.5 G/DL (ref 12–16)
HGB RETIC QN AUTO: 17 PG/CELL (ref 29–35)
IMM GRANULOCYTES # BLD AUTO: 0.18 K/UL (ref 0–0.11)
IMM GRANULOCYTES NFR BLD AUTO: 1.9 % (ref 0–0.9)
IMM RETICS NFR: 5 % (ref 9.3–17.4)
IRON SATN MFR SERPL: 29 % (ref 15–55)
IRON SERPL-MCNC: 167 UG/DL (ref 40–170)
LYMPHOCYTES # BLD AUTO: 0.93 K/UL (ref 1–4.8)
LYMPHOCYTES NFR BLD: 9.9 % (ref 22–41)
MCH RBC QN AUTO: 22.2 PG (ref 27–33)
MCHC RBC AUTO-ENTMCNC: 29.2 G/DL (ref 33.6–35)
MCV RBC AUTO: 76 FL (ref 81.4–97.8)
MONOCYTES # BLD AUTO: 0.46 K/UL (ref 0–0.85)
MONOCYTES NFR BLD AUTO: 4.9 % (ref 0–13.4)
NEUTROPHILS # BLD AUTO: 7.73 K/UL (ref 2–7.15)
NEUTROPHILS NFR BLD: 82.1 % (ref 44–72)
NRBC # BLD AUTO: 0.1 K/UL
NRBC BLD-RTO: 1.1 /100 WBC
PLATELET # BLD AUTO: 135 K/UL (ref 164–446)
PMV BLD AUTO: 11 FL (ref 9–12.9)
POTASSIUM SERPL-SCNC: 4.5 MMOL/L (ref 3.6–5.5)
PROT SERPL-MCNC: 6 G/DL (ref 6–8.2)
RBC # BLD AUTO: 2.88 M/UL (ref 4.2–5.4)
RETICS # AUTO: 0.05 M/UL (ref 0.04–0.06)
RETICS/RBC NFR: 1.6 % (ref 0.8–2.1)
SODIUM SERPL-SCNC: 138 MMOL/L (ref 135–145)
TIBC SERPL-MCNC: 567 UG/DL (ref 250–450)
WBC # BLD AUTO: 9.4 K/UL (ref 4.8–10.8)

## 2018-06-29 PROCEDURE — 86923 COMPATIBILITY TEST ELECTRIC: CPT

## 2018-06-29 PROCEDURE — A9270 NON-COVERED ITEM OR SERVICE: HCPCS | Performed by: INTERNAL MEDICINE

## 2018-06-29 PROCEDURE — 700102 HCHG RX REV CODE 250 W/ 637 OVERRIDE(OP): Performed by: INTERNAL MEDICINE

## 2018-06-29 PROCEDURE — 700111 HCHG RX REV CODE 636 W/ 250 OVERRIDE (IP): Performed by: HOSPITALIST

## 2018-06-29 PROCEDURE — 85046 RETICYTE/HGB CONCENTRATE: CPT

## 2018-06-29 PROCEDURE — 80074 ACUTE HEPATITIS PANEL: CPT

## 2018-06-29 PROCEDURE — 86225 DNA ANTIBODY NATIVE: CPT

## 2018-06-29 PROCEDURE — 83540 ASSAY OF IRON: CPT

## 2018-06-29 PROCEDURE — 83550 IRON BINDING TEST: CPT

## 2018-06-29 PROCEDURE — G8978 MOBILITY CURRENT STATUS: HCPCS | Mod: CK

## 2018-06-29 PROCEDURE — 80053 COMPREHEN METABOLIC PANEL: CPT

## 2018-06-29 PROCEDURE — P9016 RBC LEUKOCYTES REDUCED: HCPCS

## 2018-06-29 PROCEDURE — 36430 TRANSFUSION BLD/BLD COMPNT: CPT

## 2018-06-29 PROCEDURE — 86038 ANTINUCLEAR ANTIBODIES: CPT

## 2018-06-29 PROCEDURE — A9270 NON-COVERED ITEM OR SERVICE: HCPCS | Performed by: FAMILY MEDICINE

## 2018-06-29 PROCEDURE — 700102 HCHG RX REV CODE 250 W/ 637 OVERRIDE(OP): Performed by: FAMILY MEDICINE

## 2018-06-29 PROCEDURE — 99232 SBSQ HOSP IP/OBS MODERATE 35: CPT | Performed by: HOSPITALIST

## 2018-06-29 PROCEDURE — 85014 HEMATOCRIT: CPT

## 2018-06-29 PROCEDURE — 82728 ASSAY OF FERRITIN: CPT

## 2018-06-29 PROCEDURE — 86235 NUCLEAR ANTIGEN ANTIBODY: CPT | Mod: 91

## 2018-06-29 PROCEDURE — 97162 PT EVAL MOD COMPLEX 30 MIN: CPT

## 2018-06-29 PROCEDURE — G8979 MOBILITY GOAL STATUS: HCPCS | Mod: CI

## 2018-06-29 PROCEDURE — 82390 ASSAY OF CERULOPLASMIN: CPT

## 2018-06-29 PROCEDURE — 99407 BEHAV CHNG SMOKING > 10 MIN: CPT

## 2018-06-29 PROCEDURE — 94760 N-INVAS EAR/PLS OXIMETRY 1: CPT

## 2018-06-29 PROCEDURE — 83516 IMMUNOASSAY NONANTIBODY: CPT

## 2018-06-29 PROCEDURE — 85025 COMPLETE CBC W/AUTO DIFF WBC: CPT

## 2018-06-29 PROCEDURE — 700105 HCHG RX REV CODE 258: Performed by: FAMILY MEDICINE

## 2018-06-29 PROCEDURE — 85018 HEMOGLOBIN: CPT

## 2018-06-29 PROCEDURE — 36415 COLL VENOUS BLD VENIPUNCTURE: CPT

## 2018-06-29 PROCEDURE — 770020 HCHG ROOM/CARE - TELE (206)

## 2018-06-29 RX ORDER — LORAZEPAM 1 MG/1
1 TABLET ORAL 2 TIMES DAILY
Status: DISCONTINUED | OUTPATIENT
Start: 2018-06-29 | End: 2018-07-03 | Stop reason: HOSPADM

## 2018-06-29 RX ORDER — OMEPRAZOLE 20 MG/1
20 CAPSULE, DELAYED RELEASE ORAL DAILY
Status: DISCONTINUED | OUTPATIENT
Start: 2018-06-29 | End: 2018-07-03 | Stop reason: HOSPADM

## 2018-06-29 RX ORDER — OXYCODONE HYDROCHLORIDE 5 MG/1
15 TABLET ORAL 4 TIMES DAILY PRN
Status: DISCONTINUED | OUTPATIENT
Start: 2018-06-29 | End: 2018-07-03 | Stop reason: HOSPADM

## 2018-06-29 RX ORDER — MORPHINE SULFATE 30 MG/1
30 TABLET, FILM COATED, EXTENDED RELEASE ORAL EVERY 8 HOURS
Status: DISCONTINUED | OUTPATIENT
Start: 2018-06-29 | End: 2018-07-03 | Stop reason: HOSPADM

## 2018-06-29 RX ORDER — FOLIC ACID 1 MG/1
1 TABLET ORAL DAILY
Status: DISCONTINUED | OUTPATIENT
Start: 2018-06-29 | End: 2018-07-03 | Stop reason: HOSPADM

## 2018-06-29 RX ORDER — UREA 10 %
1000 LOTION (ML) TOPICAL
Status: DISCONTINUED | OUTPATIENT
Start: 2018-06-29 | End: 2018-07-03 | Stop reason: HOSPADM

## 2018-06-29 RX ADMIN — OXYCODONE HYDROCHLORIDE 15 MG: 5 TABLET ORAL at 04:33

## 2018-06-29 RX ADMIN — Medication 1000 MG: at 04:32

## 2018-06-29 RX ADMIN — Medication 325 MG: at 17:39

## 2018-06-29 RX ADMIN — SODIUM CHLORIDE: 9 INJECTION, SOLUTION INTRAVENOUS at 09:01

## 2018-06-29 RX ADMIN — ROPINIROLE HYDROCHLORIDE 4 MG: 2 TABLET, FILM COATED ORAL at 04:31

## 2018-06-29 RX ADMIN — MORPHINE SULFATE 30 MG: 30 TABLET, EXTENDED RELEASE ORAL at 22:17

## 2018-06-29 RX ADMIN — ROPINIROLE HYDROCHLORIDE 4 MG: 2 TABLET, FILM COATED ORAL at 22:18

## 2018-06-29 RX ADMIN — SODIUM CHLORIDE: 9 INJECTION, SOLUTION INTRAVENOUS at 17:43

## 2018-06-29 RX ADMIN — LORAZEPAM 1 MG: 1 TABLET ORAL at 04:33

## 2018-06-29 RX ADMIN — FOLIC ACID 1 MG: 1 TABLET ORAL at 04:32

## 2018-06-29 RX ADMIN — LORATADINE, PSEUDOEPHEDRINE 1 TABLET: 5; 120 TABLET, EXTENDED RELEASE ORAL at 04:33

## 2018-06-29 RX ADMIN — IRON SUCROSE 200 MG: 20 INJECTION, SOLUTION INTRAVENOUS at 15:26

## 2018-06-29 RX ADMIN — MORPHINE SULFATE 30 MG: 30 TABLET, EXTENDED RELEASE ORAL at 04:33

## 2018-06-29 RX ADMIN — MORPHINE SULFATE 30 MG: 30 TABLET, EXTENDED RELEASE ORAL at 13:29

## 2018-06-29 RX ADMIN — Medication 325 MG: at 04:32

## 2018-06-29 RX ADMIN — MAGNESIUM HYDROXIDE 30 ML: 400 SUSPENSION ORAL at 15:33

## 2018-06-29 RX ADMIN — OMEPRAZOLE 20 MG: 20 CAPSULE, DELAYED RELEASE ORAL at 04:30

## 2018-06-29 RX ADMIN — LORAZEPAM 1 MG: 1 TABLET ORAL at 17:39

## 2018-06-29 RX ADMIN — STANDARDIZED SENNA CONCENTRATE AND DOCUSATE SODIUM 2 TABLET: 8.6; 5 TABLET, FILM COATED ORAL at 04:31

## 2018-06-29 RX ADMIN — OXYCODONE HYDROCHLORIDE 15 MG: 5 TABLET ORAL at 11:26

## 2018-06-29 RX ADMIN — ROPINIROLE HYDROCHLORIDE 4 MG: 2 TABLET, FILM COATED ORAL at 13:29

## 2018-06-29 RX ADMIN — ROPINIROLE HYDROCHLORIDE 4 MG: 2 TABLET, FILM COATED ORAL at 17:39

## 2018-06-29 ASSESSMENT — PAIN SCALES - GENERAL
PAINLEVEL_OUTOF10: 4
PAINLEVEL_OUTOF10: 4
PAINLEVEL_OUTOF10: 6
PAINLEVEL_OUTOF10: 5
PAINLEVEL_OUTOF10: 6
PAINLEVEL_OUTOF10: 7
PAINLEVEL_OUTOF10: 2

## 2018-06-29 ASSESSMENT — COGNITIVE AND FUNCTIONAL STATUS - GENERAL
SUGGESTED CMS G CODE MODIFIER MOBILITY: CK
TURNING FROM BACK TO SIDE WHILE IN FLAT BAD: A LITTLE
MOVING TO AND FROM BED TO CHAIR: A LITTLE
MOBILITY SCORE: 18
WALKING IN HOSPITAL ROOM: A LITTLE
MOVING FROM LYING ON BACK TO SITTING ON SIDE OF FLAT BED: A LITTLE
STANDING UP FROM CHAIR USING ARMS: A LITTLE
CLIMB 3 TO 5 STEPS WITH RAILING: A LITTLE

## 2018-06-29 ASSESSMENT — GAIT ASSESSMENTS
GAIT LEVEL OF ASSIST: CONTACT GUARD ASSIST
DISTANCE (FEET): 30
DEVIATION: DECREASED BASE OF SUPPORT;DECREASED HEEL STRIKE;DECREASED TOE OFF;OTHER (COMMENT)
ASSISTIVE DEVICE: OTHER (COMMENTS)

## 2018-06-29 ASSESSMENT — ENCOUNTER SYMPTOMS
NEUROLOGICAL NEGATIVE: 1
BACK PAIN: 1
EYES NEGATIVE: 1
BLOOD IN STOOL: 1
PSYCHIATRIC NEGATIVE: 1
CARDIOVASCULAR NEGATIVE: 1
RESPIRATORY NEGATIVE: 1

## 2018-06-29 NOTE — ASSESSMENT & PLAN NOTE
Severe  She reports intermittent melena and hematochezia over the past few months  H/H improved s/p prbd  Continue to follow and transfuse for hbg less 7  - GI consulted and s/p EGD/colo  - hemorrhoid mgmt per GI recs   - monitoring  - cont Iron supplementation

## 2018-06-29 NOTE — CARE PLAN
Problem: Respiratory:  Goal: Respiratory status will improve  Outcome: PROGRESSING AS EXPECTED  Patient will report any shortness of breath; patient reports improving breathing with each blood transfusion

## 2018-06-29 NOTE — ASSESSMENT & PLAN NOTE
Recent echocardiogram done in February 2018 showed normal ejection fraction with grade 1 diastolic dysfunction  No current clinical s/o exacerbation

## 2018-06-29 NOTE — THERAPY
"Physical Therapy Evaluation completed.   Bed Mobility:  Supine to Sit: Modified Independent  Transfers: Sit to Stand: Stand by Assist  Gait: Level Of Assist: Contact Guard Assist with IV pole push; would recommend use of FWW during initial recovery; see below; concerns are more cog/attention/lethargy currently       Plan of Care: Will benefit from Physical Therapy 3 times per week  Discharge Recommendations: Equipment: Will Continue to Assess for Equipment Needs. See below    Pt presents to PT for risk reduction for LOB and falling. She also presents with impaired endurance, gait, balance and general locomotion associated with recent deconditioning and medical co-morbidities. Noted that her current cognition/behavior(?) is exacerbating her risk for falls as she is tangential, lethargic and has difficulty maintaining eyes open during functional activities without consistent cueing. She will benefit from continued skilled acute PT while here. With current cognition and function would recommend continued skilled PT/placement prior to medical dc to home given current objective findings, PLOF, hx of frequent falls, co-morbidities, environmental barriers, and unclear ability of social supports to assist with current level needed. However anticipate with increased OOB activity as cognition and co-morbidities are optimally medically managed pt will progress with ambulatory activity. Currently would recommend use of FWW with CGA for ambulation. Will continue to visit.     See \"Rehab Therapy-Acute\" Patient Summary Report for complete documentation.     "

## 2018-06-29 NOTE — H&P
Hospital Medicine History and Physical    Date of Service  6/28/2018    Chief Complaint  Chief Complaint   Patient presents with   • Sent by MD   • Abnormal Labs       History of Presenting Illness  63 y.o. Female with past medical history of COPD and chronic respiratory failure, chronic GI bleed due to diverticular disease who came in after was advised by her PCP to go to the emergency room for abnormal blood work.  Patient stated that she has been having lower GI bleed which include bright and dark stools off and on over the last 6 weeks.  She admits to previous episodes of lower GI bleed which she attributed to diverticular disease with each episode did not last more than a week.  As her lower GI bleed persisted, she decided to go to her PCP.  Blood work showed hemoglobin of 3.6 mg/dL.  She was advised to go to the emergency room.  Denies any chest pain or shortness of breath.  Admits to generalized weakness and lightheadedness.  Primary Care Physician  Brianne Marti M.D.    Consultants  None    Code Status  Full    Review of Systems  Review of Systems   Constitutional: Positive for malaise/fatigue. Negative for chills and fever.   HENT: Negative for hearing loss and tinnitus.    Eyes: Negative for blurred vision and double vision.   Respiratory: Negative for cough and hemoptysis.    Cardiovascular: Negative for chest pain and palpitations.   Gastrointestinal: Positive for blood in stool and melena. Negative for heartburn and nausea.   Genitourinary: Negative for dysuria and urgency.   Musculoskeletal: Negative for myalgias and neck pain.   Skin: Negative for rash.   Neurological: Positive for dizziness. Negative for headaches.   Endo/Heme/Allergies: Does not bruise/bleed easily.   Psychiatric/Behavioral: Negative for depression and suicidal ideas.        Past Medical History  Past Medical History:   Diagnosis Date   • Other specified symptom associated with female genital organs 1970    cervical   • Anemia      "past history   • Arthritis     hands   • Breath shortness    • Cancer (HCC)     cervical, endometrial   • COPD (chronic obstructive pulmonary disease) (HCC)    • Dental disorder    • Diverticulitis    • Heart burn     lumbar, colon   • Hiatus hernia syndrome    • Hypertension    • Indigestion    • Other specified disorder of intestines     constipation and diarrhea   • Psychiatric problem     depression   • Spinal stenosis of lumbar region        Surgical History  Past Surgical History:   Procedure Laterality Date   • LOW ANTERIOR RESECTION LAPAROSCOPIC  2014    Performed by Nakul Beltran M.D. at SUR[facility]ERY [facility]   • GYN SURGERY      hysterectomy   • GYN SURGERY      scope x 4   • BOWEL RESECTION      generic cymbalta   • OTHER ABDOMINAL SURGERY      remove foreign object, age 7       Medications  No current facility-administered medications on file prior to encounter.      Current Outpatient Prescriptions on File Prior to Encounter   Medication Sig Dispense Refill   • asa/apap/caffeine (EXCEDRIN) 250-250-65 MG TABS Take 2 Tabs by mouth every 6 hours as needed. Indications: Headache         Family History  Family History   Problem Relation Age of Onset   • Scoliosis Mother    • Cancer Father    • Lung Disease Father    • Hypertension Brother        Social History  Social History   Substance Use Topics   • Smoking status: Current Every Day Smoker     Packs/day: 0.50     Years: 40.00     Types: Cigarettes   • Smokeless tobacco: Never Used   • Alcohol use No       Allergies  Allergies   Allergen Reactions   • Other Environmental Rash     \"alloids in metal\"        Physical Exam  Laboratory   Hemodynamics  Temp (24hrs), Av.9 °C (98.4 °F), Min:36.3 °C (97.3 °F), Max:37.3 °C (99.2 °F)   Temperature: 36.8 °C (98.3 °F)  Pulse  Av.7  Min: 75  Max: 105 Heart Rate (Monitored): 73  Blood Pressure: 118/58, NIBP: 116/53      Respiratory      Respiration: 20, Pulse Oximetry: 97 %, O2 Daily Delivery " Respiratory : Silicone Nasal Cannula     Work Of Breathing / Effort: Mild  RUL Breath Sounds: Diminished, RML Breath Sounds: Diminished, RLL Breath Sounds: Diminished, KENYON Breath Sounds: Diminished, LLL Breath Sounds: Diminished    Physical Exam   Constitutional: She is oriented to person, place, and time. She appears lethargic.   Looks very pale   HENT:   Head: Normocephalic and atraumatic.   Eyes: Pupils are equal, round, and reactive to light. No scleral icterus.   Neck: Normal range of motion. No JVD present. No tracheal deviation present.   Cardiovascular: Normal rate and regular rhythm.  Exam reveals no friction rub.    Murmur heard.  Pulmonary/Chest: Breath sounds normal. No respiratory distress. She has no wheezes.   Abdominal: Soft. She exhibits no distension. There is no tenderness.   Musculoskeletal: She exhibits no tenderness.   Neurological: She is oriented to person, place, and time. She appears lethargic.   Skin: Skin is dry. No erythema.   Psychiatric: She has a normal mood and affect. Her behavior is normal.       Recent Labs      06/28/18   1417  06/28/18   1800  06/28/18   2122   WBC  8.2  7.5   --    RBC  1.92*  2.13*   --    HEMOGLOBIN  3.4*  4.4*  5.4*   HEMATOCRIT  13.6*  15.5*   --    MCV  70.8*  72.8*   --    MCH  17.7*  20.2*   --    MCHC  25.0*  27.7*   --    RDW  55.3*  59.6*   --    PLATELETCT  183  171   --    MPV  10.2  10.6   --      Recent Labs      06/28/18   1417   SODIUM  137   POTASSIUM  4.4   CHLORIDE  104   CO2  26   GLUCOSE  119*   BUN  21   CREATININE  0.77   CALCIUM  7.9*     Recent Labs      06/28/18   1417   ALTSGPT  243*   ASTSGOT  294*   ALKPHOSPHAT  119*   TBILIRUBIN  0.6   GLUCOSE  119*     Recent Labs      06/28/18   1417   APTT  26.1   INR  1.29*             No results found for: TROPONINI  Urinalysis:  No results found for: SPECGRAVITY, GLUCOSEUR, KETONES, NITRITE, WBCURINE, RBCURINE, BACTERIA, EPITHELCELL     Imaging  Reviewed.   Assessment/Plan     I anticipate  this patient will require at least 2 midnight stay for appropriate medical management.    * Blood loss anemia- (present on admission)   Assessment & Plan    Severe.  Patient stated that she has been having bloody stools occasionally over the last 6 weeks.  She went to her PCP and blood work showed hemoglobin of 3.6 mg/dL.  Patient expressed her wishes clearly that she is not interested in pursuing any diagnostic procedures including endoscopy.  She only requesting blood transfusion.  Her blood pressure has been stable.  However she looks very pale and weak.  Will place the patient on fall precautions.  Will transfuse 3 units of PRBC.  Will check H&H closely.        Generalized weakness- (present on admission)   Assessment & Plan    Secondary to above.  On fall precautions.  PT/OT.        GI bleed- (present on admission)   Assessment & Plan    Patient stated that she had episodes of lower GI bleed which she attributed to diverticular disease in the past.  However, each episode did not last more than a week.  As mentioned above patient expressed her wishes to the ER physician, and to me informed of her daughter that she is not willing to pursue any diagnostic procedures including endoscopy (upper or lower) and she is only interested in blood transfusion.  Patient denies any suicidal ideation or intentions.  She understand the consequences of such a severe anemia and GI bleed which might resulted in death.  Despite extensive counseling from ER physician and from me, patient persisted with her decision.        Diastolic dysfunction- (present on admission)   Assessment & Plan    Recent echocardiogram done in February 2018 showed normal ejection fraction with grade 1 diastolic dysfunction.  Clinically euvolemic.  Denies any chest pain or shortness of breath.        Microcytic anemia- (present on admission)   Assessment & Plan    Iron studies showed iron deficiency anemia.  Started on supplements.        Elevated LFTs-  (present on admission)   Assessment & Plan    Likely due to liver hypoperfusion secondary to severe anemia.  Continue to monitor.  Consider liver ultrasound if continue to trend up.        Chronic respiratory failure (HCC)- (present on admission)   Assessment & Plan    Patient is on 2 L oxygen continuously at home.  Stable for now.        COPD (chronic obstructive pulmonary disease) (HCC)   Assessment & Plan    Clinically not in exacerbation.  Continue with breathing treatments nebs and DuoNeb's as needed.            VTE prophylaxis: SCDs

## 2018-06-29 NOTE — ED NOTES
Updated patient on plan of care. Patient resting in bed. Side rails up. Call light in reach. No needs expressed. No questions. Vital signs stable.

## 2018-06-29 NOTE — RESPIRATORY CARE
"COPD EDUCATION by COPD CLINICAL EDUCATOR  6/29/2018  at  12:34 PM by Noelle Cm     Patient interviewed by COPD education team.  Patient unable to participate in full program.  Short intervention has been conducted.  A comprehensive packet including information about COPD, treatments, and smoking cessation given.  Provided smoking cessation packet with \"Tips to Quit\" and flyer for \"Free Smoking Cessation Classes\". Provided \"Quit Card\" with the phone number to [employer] [facility].  "

## 2018-06-29 NOTE — PROGRESS NOTES
[facility]ist Progress Note    Date of Service: 2018    Chief Complaint  63 y.o. female admitted 2018 with profound anemia following 1-2 months of intermittent melena and hematochezia    Interval Problem Update  She denies any melena or hematochezia overnight  Admits to generalized weakness and iraheta, denies sob at rest  Denies cp  Reports chronic lower back pain at baseline, no associated numbness or tingling  After long discussion, patient has agreed to GI eval, she also agrees to ongoing blood transfusions      Consultants/Specialty  DHA    Disposition        Review of Systems   Constitutional: Positive for malaise/fatigue.   HENT: Negative.    Eyes: Negative.    Respiratory: Negative.    Cardiovascular: Negative.    Gastrointestinal: Positive for blood in stool and melena.   Genitourinary: Negative.    Musculoskeletal: Positive for back pain.   Skin: Negative.    Neurological: Negative.    Psychiatric/Behavioral: Negative.       Physical Exam  Laboratory/Imaging   Hemodynamics  Temp (24hrs), Av.9 °C (98.4 °F), Min:36.6 °C (97.9 °F), Max:37.2 °C (98.9 °F)   Temperature: 36.9 °C (98.4 °F)  Pulse  Av.4  Min: 75  Max: 105 Heart Rate (Monitored): 73  Blood Pressure: 110/66, NIBP: 116/53      Respiratory      Respiration: 16, Pulse Oximetry: 96 %, O2 Daily Delivery Respiratory : Silicone Nasal Cannula     Work Of Breathing / Effort: Mild  RUL Breath Sounds: Diminished, RML Breath Sounds: Diminished, RLL Breath Sounds: Diminished, KENYON Breath Sounds: Diminished, LLL Breath Sounds: Diminished    Fluids    Intake/Output Summary (Last 24 hours) at 18 1619  Last data filed at 18 1030   Gross per 24 hour   Intake           1124.5 ml   Output                0 ml   Net           1124.5 ml       Nutrition  Orders Placed This Encounter   Procedures   • Diet Order Hepatic     Standing Status:   Standing     Number of Occurrences:   1     Order Specific Question:   Diet:     Answer:   Hepatic [9]      Physical Exam    Recent Labs      06/28/18   1417  06/28/18   1800  06/28/18   2122  06/29/18   0539  06/29/18   1239   WBC  8.2  7.5   --   9.4   --    RBC  1.92*  2.13*   --   2.88*   --    HEMOGLOBIN  3.4*  4.4*  5.4*  6.4*  7.5*   HEMATOCRIT  13.6*  15.5*   --   21.9*  24.3*   MCV  70.8*  72.8*   --   76.0*   --    MCH  17.7*  20.2*   --   22.2*   --    MCHC  25.0*  27.7*   --   29.2*   --    RDW  55.3*  59.6*   --   63.5*   --    PLATELETCT  183  171   --   135*   --    MPV  10.2  10.6   --   11.0   --      Recent Labs      06/28/18   1417  06/29/18   0433   SODIUM  137  138   POTASSIUM  4.4  4.5   CHLORIDE  104  106   CO2  26  24   GLUCOSE  119*  120*   BUN  21  15   CREATININE  0.77  0.79   CALCIUM  7.9*  8.2*     Recent Labs      06/28/18   1417   APTT  26.1   INR  1.29*                  Assessment/Plan     * Blood loss anemia- (present on admission)   Assessment & Plan    Severe  She reports intermittent melena and hematochezia over the past few months  Tolerating transfusions  GI (DHA) to eval        Generalized weakness- (present on admission)   Assessment & Plan    Secondary profound anemia        GI bleed- (present on admission)   Assessment & Plan    H/o diverticular bleed and partial colectomy (Dr. Beltran)        Diastolic dysfunction- (present on admission)   Assessment & Plan    Recent echocardiogram done in February 2018 showed normal ejection fraction with grade 1 diastolic dysfunction  No current clinical s/o exacerbation        Microcytic anemia- (present on admission)   Assessment & Plan    Iron deficiency  Replacement started        Elevated LFTs- (present on admission)   Assessment & Plan    Likely due to liver hypoperfusion, following        Chronic respiratory failure (HCC)- (present on admission)   Assessment & Plan    On baseline 2L  following        COPD (chronic obstructive pulmonary disease) (HCC)   Assessment & Plan    No evidence of exacerbation  following          Quality-Core  Measures       Both arterial and venous

## 2018-06-29 NOTE — PROGRESS NOTES
Admission 2 RN skin assessment      Bilateral pannis redness; blanchable redness saccrum;    scab left posterioir wrist     Left ankle medial echymosis

## 2018-06-29 NOTE — ED NOTES
Med rec complete per pt at bedside with RX bottles  Bottles reviewed and returned   Allergies reviewed - NKDA  No ABX in last 30 days

## 2018-06-30 ENCOUNTER — APPOINTMENT (OUTPATIENT)
Dept: RADIOLOGY | Facility: MEDICAL CENTER | Age: 63
DRG: 378 | End: 2018-06-30
Attending: INTERNAL MEDICINE
Payer: MEDICAID

## 2018-06-30 LAB
ALBUMIN SERPL BCP-MCNC: 3.2 G/DL (ref 3.2–4.9)
ALBUMIN/GLOB SERPL: 1.1 G/DL
ALP SERPL-CCNC: 110 U/L (ref 30–99)
ALT SERPL-CCNC: 209 U/L (ref 2–50)
ANION GAP SERPL CALC-SCNC: 4 MMOL/L (ref 0–11.9)
ANISOCYTOSIS BLD QL SMEAR: ABNORMAL
AST SERPL-CCNC: 145 U/L (ref 12–45)
BASOPHILS # BLD AUTO: 0.5 % (ref 0–1.8)
BASOPHILS # BLD: 0.04 K/UL (ref 0–0.12)
BILIRUB SERPL-MCNC: 0.8 MG/DL (ref 0.1–1.5)
BUN SERPL-MCNC: 8 MG/DL (ref 8–22)
CALCIUM SERPL-MCNC: 8.3 MG/DL (ref 8.5–10.5)
CHLORIDE SERPL-SCNC: 107 MMOL/L (ref 96–112)
CO2 SERPL-SCNC: 28 MMOL/L (ref 20–33)
COMMENT 1642: NORMAL
CREAT SERPL-MCNC: 0.53 MG/DL (ref 0.5–1.4)
EOSINOPHIL # BLD AUTO: 0.13 K/UL (ref 0–0.51)
EOSINOPHIL NFR BLD: 1.5 % (ref 0–6.9)
ERYTHROCYTE [DISTWIDTH] IN BLOOD BY AUTOMATED COUNT: 66.4 FL (ref 35.9–50)
GLOBULIN SER CALC-MCNC: 3 G/DL (ref 1.9–3.5)
GLUCOSE SERPL-MCNC: 110 MG/DL (ref 65–99)
HCT VFR BLD AUTO: 25.1 % (ref 37–47)
HGB BLD-MCNC: 7.2 G/DL (ref 12–16)
HYPOCHROMIA BLD QL SMEAR: ABNORMAL
IMM GRANULOCYTES # BLD AUTO: 0.12 K/UL (ref 0–0.11)
IMM GRANULOCYTES NFR BLD AUTO: 1.4 % (ref 0–0.9)
LYMPHOCYTES # BLD AUTO: 0.86 K/UL (ref 1–4.8)
LYMPHOCYTES NFR BLD: 10.1 % (ref 22–41)
MCH RBC QN AUTO: 23 PG (ref 27–33)
MCHC RBC AUTO-ENTMCNC: 28.7 G/DL (ref 33.6–35)
MCV RBC AUTO: 80.2 FL (ref 81.4–97.8)
MICROCYTES BLD QL SMEAR: ABNORMAL
MONOCYTES # BLD AUTO: 0.51 K/UL (ref 0–0.85)
MONOCYTES NFR BLD AUTO: 6 % (ref 0–13.4)
MORPHOLOGY BLD-IMP: NORMAL
NEUTROPHILS # BLD AUTO: 6.85 K/UL (ref 2–7.15)
NEUTROPHILS NFR BLD: 80.5 % (ref 44–72)
NRBC # BLD AUTO: 0.04 K/UL
NRBC BLD-RTO: 0.5 /100 WBC
PLATELET # BLD AUTO: 104 K/UL (ref 164–446)
PLATELET BLD QL SMEAR: NORMAL
PMV BLD AUTO: 11.1 FL (ref 9–12.9)
POLYCHROMASIA BLD QL SMEAR: NORMAL
POTASSIUM SERPL-SCNC: 4.2 MMOL/L (ref 3.6–5.5)
PROT SERPL-MCNC: 6.2 G/DL (ref 6–8.2)
RBC # BLD AUTO: 3.13 M/UL (ref 4.2–5.4)
RBC BLD AUTO: PRESENT
SODIUM SERPL-SCNC: 139 MMOL/L (ref 135–145)
WBC # BLD AUTO: 8.5 K/UL (ref 4.8–10.8)

## 2018-06-30 PROCEDURE — 36415 COLL VENOUS BLD VENIPUNCTURE: CPT

## 2018-06-30 PROCEDURE — A9270 NON-COVERED ITEM OR SERVICE: HCPCS | Performed by: FAMILY MEDICINE

## 2018-06-30 PROCEDURE — 700102 HCHG RX REV CODE 250 W/ 637 OVERRIDE(OP): Performed by: FAMILY MEDICINE

## 2018-06-30 PROCEDURE — 700102 HCHG RX REV CODE 250 W/ 637 OVERRIDE(OP): Performed by: INTERNAL MEDICINE

## 2018-06-30 PROCEDURE — 80053 COMPREHEN METABOLIC PANEL: CPT

## 2018-06-30 PROCEDURE — A9270 NON-COVERED ITEM OR SERVICE: HCPCS | Performed by: INTERNAL MEDICINE

## 2018-06-30 PROCEDURE — 94760 N-INVAS EAR/PLS OXIMETRY 1: CPT

## 2018-06-30 PROCEDURE — 700111 HCHG RX REV CODE 636 W/ 250 OVERRIDE (IP): Performed by: HOSPITALIST

## 2018-06-30 PROCEDURE — 770020 HCHG ROOM/CARE - TELE (206)

## 2018-06-30 PROCEDURE — 76700 US EXAM ABDOM COMPLETE: CPT

## 2018-06-30 PROCEDURE — 99232 SBSQ HOSP IP/OBS MODERATE 35: CPT | Performed by: HOSPITALIST

## 2018-06-30 PROCEDURE — 85025 COMPLETE CBC W/AUTO DIFF WBC: CPT

## 2018-06-30 RX ADMIN — LORATADINE, PSEUDOEPHEDRINE 1 TABLET: 5; 120 TABLET, EXTENDED RELEASE ORAL at 05:42

## 2018-06-30 RX ADMIN — STANDARDIZED SENNA CONCENTRATE AND DOCUSATE SODIUM 2 TABLET: 8.6; 5 TABLET, FILM COATED ORAL at 05:44

## 2018-06-30 RX ADMIN — MORPHINE SULFATE 30 MG: 30 TABLET, EXTENDED RELEASE ORAL at 22:34

## 2018-06-30 RX ADMIN — IRON SUCROSE 200 MG: 20 INJECTION, SOLUTION INTRAVENOUS at 05:42

## 2018-06-30 RX ADMIN — ROPINIROLE HYDROCHLORIDE 4 MG: 2 TABLET, FILM COATED ORAL at 22:35

## 2018-06-30 RX ADMIN — OMEPRAZOLE 20 MG: 20 CAPSULE, DELAYED RELEASE ORAL at 05:43

## 2018-06-30 RX ADMIN — LORAZEPAM 1 MG: 1 TABLET ORAL at 17:11

## 2018-06-30 RX ADMIN — Medication 325 MG: at 05:40

## 2018-06-30 RX ADMIN — ROPINIROLE HYDROCHLORIDE 4 MG: 2 TABLET, FILM COATED ORAL at 14:18

## 2018-06-30 RX ADMIN — ROPINIROLE HYDROCHLORIDE 4 MG: 2 TABLET, FILM COATED ORAL at 17:11

## 2018-06-30 RX ADMIN — ROPINIROLE HYDROCHLORIDE 4 MG: 2 TABLET, FILM COATED ORAL at 05:44

## 2018-06-30 RX ADMIN — Medication 1000 MG: at 17:11

## 2018-06-30 RX ADMIN — FOLIC ACID 1 MG: 1 TABLET ORAL at 05:40

## 2018-06-30 RX ADMIN — LORAZEPAM 1 MG: 1 TABLET ORAL at 05:43

## 2018-06-30 RX ADMIN — Medication 325 MG: at 17:11

## 2018-06-30 RX ADMIN — OXYCODONE HYDROCHLORIDE 15 MG: 5 TABLET ORAL at 07:38

## 2018-06-30 RX ADMIN — Medication 1000 MG: at 05:43

## 2018-06-30 RX ADMIN — MORPHINE SULFATE 30 MG: 30 TABLET, EXTENDED RELEASE ORAL at 14:18

## 2018-06-30 RX ADMIN — MORPHINE SULFATE 30 MG: 30 TABLET, EXTENDED RELEASE ORAL at 05:43

## 2018-06-30 ASSESSMENT — ENCOUNTER SYMPTOMS
CARDIOVASCULAR NEGATIVE: 1
NEUROLOGICAL NEGATIVE: 1
PSYCHIATRIC NEGATIVE: 1
BLOOD IN STOOL: 1
RESPIRATORY NEGATIVE: 1
BACK PAIN: 1
EYES NEGATIVE: 1

## 2018-06-30 ASSESSMENT — PAIN SCALES - GENERAL
PAINLEVEL_OUTOF10: 5
PAINLEVEL_OUTOF10: 1
PAINLEVEL_OUTOF10: 1
PAINLEVEL_OUTOF10: 3
PAINLEVEL_OUTOF10: 6
PAINLEVEL_OUTOF10: 4

## 2018-06-30 NOTE — CONSULTS
DATE OF SERVICE:  06/29/2018    GASTROINTESTINAL CONSULTATION    REQUESTING PHYSICIAN:  Tea Huitron MD    REASON FOR CONSULTATION:  Anemia.    HISTORY OF PRESENT ILLNESS:  This is a 63-year-old female admitted yesterday   after her primary care physician referred her to the emergency room due to   abnormal blood work.  She was found to be anemic and was admitted to the   hospital where she has received blood transfusions.    The patient admits to history of anemia and GI bleeding in the past.  She   states for many years she has had intermittent bleeding manifested by blood in   the stool.  This can vary from tarry black blood to bright red bleeding.  It   is never excessive in volume.    She was evaluated previously by Dr. Castillo who apparently performed a   colonoscopy sometime in the past.  She does not recall the results.  She did   undergo a partial colectomy by Dr. Beltran in 2014 either for recurrent   diverticulitis or for diverticular bleeding.    She states she is eating normally without any nausea, vomiting, abdominal pain   or dyspepsia.  She does use Excedrin approximately twice a week for   migraines.    If her bowel pattern tends towards constipation, she takes a magnesium   supplement to maintain regularity.  She states her last bowel movement was   yesterday.  The last time she noted any blood in her stools was over 3 days   ago.    REVIEW OF SYSTEMS:  PSYCHIATRIC:  She denies anxiety or depression.  MUSCULOSKELETAL:  Positive for back pain.  DERMATOLOGIC:  No rash, pruritus.  GASTROINTESTINAL:  As above.  CARDIOVASCULAR:  Positive for dyspnea.  PULMONARY:  Positive for cough.  ALLERGY AND IMMUNOLOGY:  No hayfever.  ENDOCRINE:  Denies polyuria or polydipsia.  CONSTITUTIONAL:  Positive for fatigue.  GENITOURINARY:  Denies dysuria.    PAST MEDICAL HISTORY:  Diverticulitis, anemia, chronic back pain, cervical   cancer, O2-dependent COPD.    PAST SURGICAL HISTORY:  Laparoscopic partial colectomy,  "hysterectomy.    OUTPATIENT MEDICATIONS:  Excedrin and magnesium.    ALLERGIES:  ENVIRONMENTAL ALLERGIES ONLY.    SOCIAL HISTORY:  She smokes one-half pack per day cigarettes.  Denies alcohol   or illicit drug use.    FAMILY HISTORY:  Noncontributory.    PHYSICAL EXAMINATION:  VITAL SIGNS:  Her temperature is 36.8, pulse 74, respirations 18, /54,   O2 sat is 96% on 3 liters.  GENERAL APPEARANCE:  Obese female in no acute distress.  She is alert and   oriented x3.  HEENT:  Sclerae are anicteric.  Conjunctivae are pale.  Oropharynx is moist.  NECK:  Supple.  No adenopathy.  HEART:  Regular rate and rhythm.  LUNGS:  Show scattered rhonchi.  ABDOMEN:  Obese, soft, nontender with positive bowel sounds.  EXTREMITIES:  Show 1+ lower extremity edema.    LABORATORY DATA:  White count 9, hematocrit 21, MCV is 76, platelet count   171,000.  Sodium 138, potassium 4.5, chloride 106, bicarbonate 24, BUN 15,   creatinine 0.7, , , bilirubin 0.9, alkaline phosphatase 118.    INR is 1.2, ferritin is 7.8.    ASSESSMENT AND PLAN:  1.  A 63-year-old female with iron deficiency anemia.  Patient's history is   suggestive of chronic gastrointestinal blood loss.  The etiology of her blood   loss may be related to aspirin-induced ulcer disease, occult malignancy,   vascular malformations, less likely intermittent diverticular hemorrhage.    Plan:  Monitor hematocrit and transfuse as needed.  2.  Gastrointestinal bleeding.  Plan:  I have recommended endoscopy and   colonoscopy.  The patient states she would like to get \"other issues \"under   control before she proceeds with diagnostic procedures.  Therefore, I will   return in 48 hours to discuss this further with her.  3.  Abnormal liver enzymes.  On review of her medical records, she does not   have liver enzyme abnormalities in the past.  The pattern of abnormality is   consistent with hepatocellular disease.  Plan:   Check viral serology, JOYA,   AMA, ceruloplasmin " level as well as an abdominal ultrasound.  4.  Chronic obstructive pulmonary disease, O2 dependent.  5.  Chronic constipation.  Plan:  Would recommend osmotic laxative with   MiraLax once to twice daily.  6.  Chronic pain.       ____________________________________     VASYL SERRANO DO    PIS / NTS    DD:  06/29/2018 19:45:36  DT:  06/29/2018 20:27:19    D#:  [id number]  Job#:  [id number]

## 2018-06-30 NOTE — CARE PLAN
Problem: Safety  Goal: Will remain free from injury  Patient educated on importance of using the call light if in need of assistance. Patient verbalizes understanding. Bed locked in lowest position, non skid socks on, personal belongings and call light within reach, appropriate sign placed on door.    Problem: Knowledge Deficit  Goal: Knowledge of disease process/condition, treatment plan, diagnostic tests, and medications will improve  Patient updated on POC. All questions answered at this time.

## 2018-06-30 NOTE — PROGRESS NOTES
[facility]ist Progress Note    Date of Service: 2018    Chief Complaint  63 y.o. female admitted 2018 with profound anemia following 1-2 months of intermittent melena and hematochezia    Interval Problem Update  Chronic back pain at baseline  She reports sob and iraheta much improved after blood transfusions  Denies melena or hematochezia overnight, no abdominal pain  No cp      Consultants/Specialty  DHA    Disposition        Review of Systems   HENT: Negative.    Eyes: Negative.    Respiratory: Negative.    Cardiovascular: Negative.    Gastrointestinal: Positive for blood in stool and melena.   Genitourinary: Negative.    Musculoskeletal: Positive for back pain.   Skin: Negative.    Neurological: Negative.    Psychiatric/Behavioral: Negative.       Physical Exam  Laboratory/Imaging   Hemodynamics  Temp (24hrs), Av.7 °C (98.1 °F), Min:36.2 °C (97.2 °F), Max:37.2 °C (99 °F)   Temperature: 36.4 °C (97.5 °F)  Pulse  Av.1  Min: 70  Max: 105    Blood Pressure: 137/83      Respiratory      Respiration: 18, Pulse Oximetry: 93 %, O2 Daily Delivery Respiratory : Silicone Nasal Cannula     Work Of Breathing / Effort: Mild  RUL Breath Sounds: Diminished, RML Breath Sounds: Diminished, RLL Breath Sounds: Diminished, KENYON Breath Sounds: Diminished, LLL Breath Sounds: Diminished    Fluids    Intake/Output Summary (Last 24 hours) at 18 1450  Last data filed at 18 0100   Gross per 24 hour   Intake             2680 ml   Output                0 ml   Net             2680 ml       Nutrition  Orders Placed This Encounter   Procedures   • Diet Order Hepatic     Standing Status:   Standing     Number of Occurrences:   1     Order Specific Question:   Diet:     Answer:   Hepatic [9]     Physical Exam    Recent Labs      18   1800   18   0539  18   1239  18   0256   WBC  7.5   --   9.4   --   8.5   RBC  2.13*   --   2.88*   --   3.13*   HEMOGLOBIN  4.4*   < >  6.4*  7.5*  7.2*    HEMATOCRIT  15.5*   --   21.9*  24.3*  25.1*   MCV  72.8*   --   76.0*   --   80.2*   MCH  20.2*   --   22.2*   --   23.0*   MCHC  27.7*   --   29.2*   --   28.7*   RDW  59.6*   --   63.5*   --   66.4*   PLATELETCT  171   --   135*   --   104*   MPV  10.6   --   11.0   --   11.1    < > = values in this interval not displayed.     Recent Labs      06/28/18   1417  06/29/18   0433  06/30/18   0256   SODIUM  137  138  139   POTASSIUM  4.4  4.5  4.2   CHLORIDE  104  106  107   CO2  26  24  28   GLUCOSE  119*  120*  110*   BUN  21  15  8   CREATININE  0.77  0.79  0.53   CALCIUM  7.9*  8.2*  8.3*     Recent Labs      06/28/18   1417   APTT  26.1   INR  1.29*                  Assessment/Plan     * Blood loss anemia- (present on admission)   Assessment & Plan    Severe  She reports intermittent melena and hematochezia over the past few months  H/H much improved with transfusions  Continue to follow and transfuse for hbg less 7  GI following, pt declined scope yesterday - they will revisit tomorrow        Generalized weakness- (present on admission)   Assessment & Plan    Secondary profound anemia  Improved with transfusion        GI bleed- (present on admission)   Assessment & Plan    H/o diverticular bleed and partial colectomy (Dr. Beltran)        Diastolic dysfunction- (present on admission)   Assessment & Plan    Recent echocardiogram done in February 2018 showed normal ejection fraction with grade 1 diastolic dysfunction  No current clinical s/o exacerbation        Microcytic anemia- (present on admission)   Assessment & Plan    Iron deficiency  Replacement started        Elevated LFTs- (present on admission)   Assessment & Plan    Likely due to liver hypoperfusion, following        Chronic respiratory failure (HCC)- (present on admission)   Assessment & Plan    On baseline 2L  following        COPD (chronic obstructive pulmonary disease) (HCC)   Assessment & Plan    No evidence of exacerbation  Will continue to  follow          Quality-Core Measures

## 2018-06-30 NOTE — PROGRESS NOTES
Patient educated on importance of using the call light if in need of assistance. Patient verbalizes understanding. Bed locked in lowest position, non skid socks on, personal belongings and call light within reach, appropriate sign placed on door.

## 2018-07-01 PROBLEM — D69.6 THROMBOCYTOPENIA (HCC): Status: ACTIVE | Noted: 2018-07-01

## 2018-07-01 PROBLEM — R74.8 ABNORMAL LIVER ENZYMES: Status: ACTIVE | Noted: 2018-07-01

## 2018-07-01 LAB
BASOPHILS # BLD AUTO: 0.8 % (ref 0–1.8)
BASOPHILS # BLD: 0.07 K/UL (ref 0–0.12)
CERULOPLASMIN SERPL-MCNC: 44 MG/DL (ref 17–54)
EOSINOPHIL # BLD AUTO: 0.12 K/UL (ref 0–0.51)
EOSINOPHIL NFR BLD: 1.4 % (ref 0–6.9)
ERYTHROCYTE [DISTWIDTH] IN BLOOD BY AUTOMATED COUNT: 69.5 FL (ref 35.9–50)
HCT VFR BLD AUTO: 26.8 % (ref 37–47)
HGB BLD-MCNC: 7.4 G/DL (ref 12–16)
IMM GRANULOCYTES # BLD AUTO: 0.1 K/UL (ref 0–0.11)
IMM GRANULOCYTES NFR BLD AUTO: 1.2 % (ref 0–0.9)
LYMPHOCYTES # BLD AUTO: 0.92 K/UL (ref 1–4.8)
LYMPHOCYTES NFR BLD: 10.8 % (ref 22–41)
MCH RBC QN AUTO: 22.5 PG (ref 27–33)
MCHC RBC AUTO-ENTMCNC: 27.6 G/DL (ref 33.6–35)
MCV RBC AUTO: 81.5 FL (ref 81.4–97.8)
MONOCYTES # BLD AUTO: 0.43 K/UL (ref 0–0.85)
MONOCYTES NFR BLD AUTO: 5.1 % (ref 0–13.4)
NEUTROPHILS # BLD AUTO: 6.85 K/UL (ref 2–7.15)
NEUTROPHILS NFR BLD: 80.7 % (ref 44–72)
NRBC # BLD AUTO: 0.02 K/UL
NRBC BLD-RTO: 0.2 /100 WBC
PLATELET # BLD AUTO: 79 K/UL (ref 164–446)
PMV BLD AUTO: 10.9 FL (ref 9–12.9)
RBC # BLD AUTO: 3.29 M/UL (ref 4.2–5.4)
WBC # BLD AUTO: 8.5 K/UL (ref 4.8–10.8)

## 2018-07-01 PROCEDURE — 85025 COMPLETE CBC W/AUTO DIFF WBC: CPT

## 2018-07-01 PROCEDURE — 36415 COLL VENOUS BLD VENIPUNCTURE: CPT

## 2018-07-01 PROCEDURE — A9270 NON-COVERED ITEM OR SERVICE: HCPCS | Performed by: FAMILY MEDICINE

## 2018-07-01 PROCEDURE — 99232 SBSQ HOSP IP/OBS MODERATE 35: CPT | Performed by: HOSPITALIST

## 2018-07-01 PROCEDURE — A9270 NON-COVERED ITEM OR SERVICE: HCPCS | Performed by: INTERNAL MEDICINE

## 2018-07-01 PROCEDURE — 700102 HCHG RX REV CODE 250 W/ 637 OVERRIDE(OP): Performed by: INTERNAL MEDICINE

## 2018-07-01 PROCEDURE — 770020 HCHG ROOM/CARE - TELE (206)

## 2018-07-01 PROCEDURE — 94760 N-INVAS EAR/PLS OXIMETRY 1: CPT

## 2018-07-01 PROCEDURE — 700102 HCHG RX REV CODE 250 W/ 637 OVERRIDE(OP): Performed by: FAMILY MEDICINE

## 2018-07-01 PROCEDURE — 700111 HCHG RX REV CODE 636 W/ 250 OVERRIDE (IP): Performed by: HOSPITALIST

## 2018-07-01 RX ADMIN — LORATADINE, PSEUDOEPHEDRINE 1 TABLET: 5; 120 TABLET, EXTENDED RELEASE ORAL at 07:32

## 2018-07-01 RX ADMIN — MORPHINE SULFATE 30 MG: 30 TABLET, EXTENDED RELEASE ORAL at 21:11

## 2018-07-01 RX ADMIN — ROPINIROLE HYDROCHLORIDE 4 MG: 2 TABLET, FILM COATED ORAL at 21:11

## 2018-07-01 RX ADMIN — MORPHINE SULFATE 30 MG: 30 TABLET, EXTENDED RELEASE ORAL at 13:07

## 2018-07-01 RX ADMIN — OXYCODONE HYDROCHLORIDE 5 MG: 5 TABLET ORAL at 07:37

## 2018-07-01 RX ADMIN — OMEPRAZOLE 20 MG: 20 CAPSULE, DELAYED RELEASE ORAL at 06:03

## 2018-07-01 RX ADMIN — LORAZEPAM 1 MG: 1 TABLET ORAL at 06:02

## 2018-07-01 RX ADMIN — Medication 1000 MG: at 06:03

## 2018-07-01 RX ADMIN — IRON SUCROSE 200 MG: 20 INJECTION, SOLUTION INTRAVENOUS at 06:03

## 2018-07-01 RX ADMIN — STANDARDIZED SENNA CONCENTRATE AND DOCUSATE SODIUM 2 TABLET: 8.6; 5 TABLET, FILM COATED ORAL at 06:03

## 2018-07-01 RX ADMIN — Medication 325 MG: at 06:02

## 2018-07-01 RX ADMIN — ROPINIROLE HYDROCHLORIDE 4 MG: 2 TABLET, FILM COATED ORAL at 06:03

## 2018-07-01 RX ADMIN — FOLIC ACID 1 MG: 1 TABLET ORAL at 06:02

## 2018-07-01 RX ADMIN — ROPINIROLE HYDROCHLORIDE 4 MG: 2 TABLET, FILM COATED ORAL at 17:28

## 2018-07-01 RX ADMIN — LORAZEPAM 1 MG: 1 TABLET ORAL at 17:28

## 2018-07-01 RX ADMIN — Medication 325 MG: at 17:28

## 2018-07-01 RX ADMIN — STANDARDIZED SENNA CONCENTRATE AND DOCUSATE SODIUM 2 TABLET: 8.6; 5 TABLET, FILM COATED ORAL at 17:28

## 2018-07-01 RX ADMIN — MORPHINE SULFATE 30 MG: 30 TABLET, EXTENDED RELEASE ORAL at 06:03

## 2018-07-01 RX ADMIN — Medication 1000 MG: at 17:28

## 2018-07-01 RX ADMIN — ROPINIROLE HYDROCHLORIDE 4 MG: 2 TABLET, FILM COATED ORAL at 13:07

## 2018-07-01 ASSESSMENT — PAIN SCALES - GENERAL
PAINLEVEL_OUTOF10: 4
PAINLEVEL_OUTOF10: 2
PAINLEVEL_OUTOF10: 3

## 2018-07-01 ASSESSMENT — ENCOUNTER SYMPTOMS
EYES NEGATIVE: 1
BLOOD IN STOOL: 1
WEAKNESS: 1
VOMITING: 0
BACK PAIN: 1
NEUROLOGICAL NEGATIVE: 1
CHILLS: 0
RESPIRATORY NEGATIVE: 1
ABDOMINAL PAIN: 0
PSYCHIATRIC NEGATIVE: 1
FEVER: 0
CONSTIPATION: 1
NAUSEA: 0
CARDIOVASCULAR NEGATIVE: 1

## 2018-07-01 NOTE — PROGRESS NOTES
[facility]ist Progress Note    Date of Service: 2018    Chief Complaint  63 y.o. female admitted 2018 with profound anemia following 1-2 months of intermittent melena and hematochezia    Interval Problem Update  Pt reports that he chronic back pain is not bothering her today, no pain currently  No iraheta  Denies melena or hematochezia overnight  States she is now agreeable to egd and scope      Consultants/Specialty  DHA    Disposition        Review of Systems   HENT: Negative.    Eyes: Negative.    Respiratory: Negative.    Cardiovascular: Negative.    Gastrointestinal: Positive for blood in stool and melena.   Genitourinary: Negative.    Musculoskeletal: Positive for back pain.   Skin: Negative.    Neurological: Negative.    Psychiatric/Behavioral: Negative.       Physical Exam  Laboratory/Imaging   Hemodynamics  Temp (24hrs), Av.9 °C (98.4 °F), Min:36.3 °C (97.4 °F), Max:37.6 °C (99.6 °F)   Temperature: 36.8 °C (98.2 °F)  Pulse  Av.4  Min: 70  Max: 105    Blood Pressure: 113/58      Respiratory      Respiration: 16, Pulse Oximetry: 99 %, O2 Daily Delivery Respiratory : Silicone Nasal Cannula     Work Of Breathing / Effort: Mild  RUL Breath Sounds: Diminished, RML Breath Sounds: Diminished, RLL Breath Sounds: Diminished, KENYON Breath Sounds: Diminished, LLL Breath Sounds: Diminished    Fluids  No intake or output data in the 24 hours ending 18 1341    Nutrition  Orders Placed This Encounter   Procedures   • Diet Order Hepatic     Standing Status:   Standing     Number of Occurrences:   1     Order Specific Question:   Diet:     Answer:   Hepatic [9]     Physical Exam   Constitutional: She is oriented to person, place, and time. She appears well-developed and well-nourished. No distress.   HENT:   Head: Normocephalic and atraumatic.   Mouth/Throat: Oropharynx is clear and moist.   Eyes: Conjunctivae are normal.   Neck: Normal range of motion. Neck supple.   Cardiovascular: Normal rate,  normal heart sounds and intact distal pulses.  Exam reveals no gallop and no friction rub.    No murmur heard.  Pulmonary/Chest: Effort normal and breath sounds normal. No respiratory distress. She has no wheezes. She has no rales. She exhibits no tenderness.   Abdominal: Soft. Bowel sounds are normal. She exhibits no distension and no mass. There is no tenderness. There is no rebound and no guarding.   Musculoskeletal: Normal range of motion. She exhibits no edema or tenderness.   Neurological: She is alert and oriented to person, place, and time. She has normal reflexes. No cranial nerve deficit. She exhibits normal muscle tone. Coordination normal.   Skin: Skin is warm and dry. No rash noted. She is not diaphoretic. No erythema. No pallor.   Psychiatric: She has a normal mood and affect. Her behavior is normal. Judgment and thought content normal.   Nursing note and vitals reviewed.      Recent Labs      06/29/18   0539  06/29/18   1239  06/30/18   0256  07/01/18   0347   WBC  9.4   --   8.5  8.5   RBC  2.88*   --   3.13*  3.29*   HEMOGLOBIN  6.4*  7.5*  7.2*  7.4*   HEMATOCRIT  21.9*  24.3*  25.1*  26.8*   MCV  76.0*   --   80.2*  81.5   MCH  22.2*   --   23.0*  22.5*   MCHC  29.2*   --   28.7*  27.6*   RDW  63.5*   --   66.4*  69.5*   PLATELETCT  135*   --   104*  79*   MPV  11.0   --   11.1  10.9     Recent Labs      06/28/18   1417  06/29/18   0433  06/30/18   0256   SODIUM  137  138  139   POTASSIUM  4.4  4.5  4.2   CHLORIDE  104  106  107   CO2  26  24  28   GLUCOSE  119*  120*  110*   BUN  21  15  8   CREATININE  0.77  0.79  0.53   CALCIUM  7.9*  8.2*  8.3*     Recent Labs      06/28/18   1417   APTT  26.1   INR  1.29*                  Assessment/Plan     * Blood loss anemia- (present on admission)   Assessment & Plan    Severe  She reports intermittent melena and hematochezia over the past few months  H/H much improved with transfusions  Continue to follow and transfuse for hbg less 7  GI following  Egd  and colonoscopy pending, pt now agrees to procedures        Generalized weakness- (present on admission)   Assessment & Plan    Secondary profound anemia  Improved with transfusion        GI bleed- (present on admission)   Assessment & Plan    H/o diverticular bleed and partial colectomy (Dr. Beltran)        Diastolic dysfunction- (present on admission)   Assessment & Plan    Recent echocardiogram done in February 2018 showed normal ejection fraction with grade 1 diastolic dysfunction  No current clinical s/o exacerbation        Microcytic anemia- (present on admission)   Assessment & Plan    Iron deficiency  Continue replacement        Elevated LFTs- (present on admission)   Assessment & Plan    Likely due to liver hypoperfusion  imiproved        Chronic respiratory failure (HCC)- (present on admission)   Assessment & Plan    At baseline 2L  following        COPD (chronic obstructive pulmonary disease) (HCC)   Assessment & Plan    No evidence of exacerbation  following          Quality-Core Measures

## 2018-07-01 NOTE — PROGRESS NOTES
Gastroenterology Progress Note     Author: José Antonio Waitehzadeh   Date & Time Created: 7/1/2018 4:54 PM    Chief Complaint:  Anemia    Interval History:  Now agreeable to proceed with diagnostic procedures. Pt c/o ongoing back pain.     Review of Systems:  Review of Systems   Constitutional: Negative for chills and fever.   Gastrointestinal: Positive for constipation. Negative for abdominal pain, nausea and vomiting.   Neurological: Positive for weakness.       Physical Exam:  Physical Exam   Constitutional: She is oriented to person, place, and time. No distress.   Eyes: No scleral icterus.   Cardiovascular: Normal rate and regular rhythm.    Pulmonary/Chest: Effort normal and breath sounds normal.   Abdominal: Soft. Bowel sounds are normal.   Neurological: She is alert and oriented to person, place, and time.   Skin: Skin is warm and dry.   Nursing note and vitals reviewed.      Labs:        Invalid input(s): [id number]      Recent Labs      06/29/18   0433  06/30/18   0256   SODIUM  138  139   POTASSIUM  4.5  4.2   CHLORIDE  106  107   CO2  24  28   BUN  15  8   CREATININE  0.79  0.53   CALCIUM  8.2*  8.3*     Recent Labs      06/29/18   0433  06/30/18   0256   ALTSGPT  267*  209*   ASTSGOT  285*  145*   ALKPHOSPHAT  118*  110*   TBILIRUBIN  0.9  0.8   GLUCOSE  120*  110*     Recent Labs      06/29/18   0539  06/29/18   1239  06/30/18   0256  07/01/18   0347   RBC  2.88*   --   3.13*  3.29*   HEMOGLOBIN  6.4*  7.5*  7.2*  7.4*   HEMATOCRIT  21.9*  24.3*  25.1*  26.8*   PLATELETCT  135*   --   104*  79*   IRON   --   167   --    --    FERRITIN   --   7.8*   --    --    TOTIRONBC   --   567*   --    --      Recent Labs      06/29/18   0433  06/29/18   0539  06/30/18   0256  07/01/18   0347   WBC   --   9.4  8.5  8.5   NEUTSPOLYS   --   82.10*  80.50*  80.70*   LYMPHOCYTES   --   9.90*  10.10*  10.80*   MONOCYTES   --   4.90  6.00  5.10   EOSINOPHILS   --   0.50  1.50  1.40   BASOPHILS   --   0.70  0.50  0.80    ASTSGOT  285*   --   145*   --    ALTSGPT  267*   --   209*   --    ALKPHOSPHAT  118*   --   110*   --    TBILIRUBIN  0.9   --   0.8   --      Hemodynamics:  Temp (24hrs), Av °C (98.6 °F), Min:36.3 °C (97.4 °F), Max:37.6 °C (99.6 °F)  Temperature: 36.8 °C (98.2 °F)  Pulse  Av.4  Min: 70  Max: 105   Blood Pressure: 113/58     Respiratory:    Respiration: 16, Pulse Oximetry: 99 %, O2 Daily Delivery Respiratory : Silicone Nasal Cannula     Work Of Breathing / Effort: Mild  RUL Breath Sounds: Diminished, RML Breath Sounds: Diminished, RLL Breath Sounds: Diminished, KENYON Breath Sounds: Diminished, LLL Breath Sounds: Diminished  Fluids:  No intake or output data in the 24 hours ending 18 1654     GI/Nutrition:  Orders Placed This Encounter   Procedures   • Diet Order Hepatic     Standing Status:   Standing     Number of Occurrences:   1     Order Specific Question:   Diet:     Answer:   Hepatic [9]     Medical Decision Making, by Problem:  Active Hospital Problems    Diagnosis   • *Blood loss anemia    • COPD (chronic obstructive pulmonary disease)   • Thrombocytopenia: Mild.   • Abnormal liver enzymes: Improved. Component related to NAFLD.       Plan:  Await additional liver labs.    Clear liquid diet tomorrow. EGD/colonoscopy Tuesday.     Quality-Core Measures   Reviewed items::  Radiology images reviewed, Labs reviewed and Medications reviewed

## 2018-07-01 NOTE — PROGRESS NOTES
GI at bedside. Planned for clear liquid diet tomorrow, EGD and colonoscopy Tuesday. Patient aware and agreeable.

## 2018-07-01 NOTE — CARE PLAN
Problem: Safety  Goal: Will remain free from injury  Patient educated on importance of using the call light if in need of assistance. Patient verbalizes understanding. Bed locked in lowest position, non skid socks on, personal belongings and call light within reach, appropriate sign placed on door.    Problem: Bowel/Gastric:  Goal: Normal bowel function is maintained or improved  Patient given stool softeners to prevent constipation.

## 2018-07-01 NOTE — PROGRESS NOTES
Received report from nightshift RN, assumed care of patient. Patient is A&O x 4, bed alarm on. Patient educated on importance of calling if in need of assistance. Verbalizes understanding. Patient with chronic pain at this time, recently medicated, will recheck. Patient updated on plan of care, voices no concerns at this time. Will continue to monitor for safety and comfort.

## 2018-07-02 PROBLEM — K59.00 CONSTIPATION: Status: ACTIVE | Noted: 2018-07-02

## 2018-07-02 LAB
BASOPHILS # BLD AUTO: 1.2 % (ref 0–1.8)
BASOPHILS # BLD: 0.1 K/UL (ref 0–0.12)
EOSINOPHIL # BLD AUTO: 0.15 K/UL (ref 0–0.51)
EOSINOPHIL NFR BLD: 1.8 % (ref 0–6.9)
ERYTHROCYTE [DISTWIDTH] IN BLOOD BY AUTOMATED COUNT: 72.6 FL (ref 35.9–50)
HCT VFR BLD AUTO: 26 % (ref 37–47)
HGB BLD-MCNC: 7.4 G/DL (ref 12–16)
IMM GRANULOCYTES # BLD AUTO: 0.09 K/UL (ref 0–0.11)
IMM GRANULOCYTES NFR BLD AUTO: 1.1 % (ref 0–0.9)
LYMPHOCYTES # BLD AUTO: 1.14 K/UL (ref 1–4.8)
LYMPHOCYTES NFR BLD: 13.9 % (ref 22–41)
MCH RBC QN AUTO: 23.6 PG (ref 27–33)
MCHC RBC AUTO-ENTMCNC: 28.5 G/DL (ref 33.6–35)
MCV RBC AUTO: 82.8 FL (ref 81.4–97.8)
MITOCHONDRIA M2 IGG SER-ACNC: 12.5 UNITS (ref 0–20)
MONOCYTES # BLD AUTO: 0.49 K/UL (ref 0–0.85)
MONOCYTES NFR BLD AUTO: 6 % (ref 0–13.4)
MORPHOLOGY BLD-IMP: NORMAL
NEUTROPHILS NFR BLD: 76 % (ref 44–72)
NRBC # BLD AUTO: 0 K/UL
NRBC BLD-RTO: 0 /100 WBC
NUCLEAR IGG SER QL IA: NORMAL
PLATELET # BLD AUTO: 59 K/UL (ref 164–446)
RBC # BLD AUTO: 3.14 M/UL (ref 4.2–5.4)
WBC # BLD AUTO: 8.2 K/UL (ref 4.8–10.8)

## 2018-07-02 PROCEDURE — 700102 HCHG RX REV CODE 250 W/ 637 OVERRIDE(OP): Performed by: FAMILY MEDICINE

## 2018-07-02 PROCEDURE — 700102 HCHG RX REV CODE 250 W/ 637 OVERRIDE(OP): Performed by: INTERNAL MEDICINE

## 2018-07-02 PROCEDURE — 36415 COLL VENOUS BLD VENIPUNCTURE: CPT

## 2018-07-02 PROCEDURE — 85025 COMPLETE CBC W/AUTO DIFF WBC: CPT

## 2018-07-02 PROCEDURE — A9270 NON-COVERED ITEM OR SERVICE: HCPCS | Performed by: INTERNAL MEDICINE

## 2018-07-02 PROCEDURE — 99232 SBSQ HOSP IP/OBS MODERATE 35: CPT | Performed by: HOSPITALIST

## 2018-07-02 PROCEDURE — 770020 HCHG ROOM/CARE - TELE (206)

## 2018-07-02 PROCEDURE — A9270 NON-COVERED ITEM OR SERVICE: HCPCS | Performed by: FAMILY MEDICINE

## 2018-07-02 PROCEDURE — 700111 HCHG RX REV CODE 636 W/ 250 OVERRIDE (IP): Performed by: HOSPITALIST

## 2018-07-02 RX ORDER — PEG-3350, SODIUM SULFATE, SODIUM CHLORIDE, POTASSIUM CHLORIDE, SODIUM ASCORBATE AND ASCORBIC ACID 7.5-2.691G
100 KIT ORAL 2 TIMES DAILY
Status: COMPLETED | OUTPATIENT
Start: 2018-07-02 | End: 2018-07-03

## 2018-07-02 RX ADMIN — MORPHINE SULFATE 30 MG: 30 TABLET, EXTENDED RELEASE ORAL at 05:28

## 2018-07-02 RX ADMIN — OMEPRAZOLE 20 MG: 20 CAPSULE, DELAYED RELEASE ORAL at 05:29

## 2018-07-02 RX ADMIN — LORAZEPAM 1 MG: 1 TABLET ORAL at 17:43

## 2018-07-02 RX ADMIN — Medication 1000 MG: at 05:28

## 2018-07-02 RX ADMIN — ROPINIROLE HYDROCHLORIDE 4 MG: 2 TABLET, FILM COATED ORAL at 05:28

## 2018-07-02 RX ADMIN — STANDARDIZED SENNA CONCENTRATE AND DOCUSATE SODIUM 2 TABLET: 8.6; 5 TABLET, FILM COATED ORAL at 17:43

## 2018-07-02 RX ADMIN — Medication 1000 MG: at 17:47

## 2018-07-02 RX ADMIN — OXYCODONE HYDROCHLORIDE 15 MG: 5 TABLET ORAL at 17:43

## 2018-07-02 RX ADMIN — OXYCODONE HYDROCHLORIDE 15 MG: 5 TABLET ORAL at 08:43

## 2018-07-02 RX ADMIN — ROPINIROLE HYDROCHLORIDE 4 MG: 2 TABLET, FILM COATED ORAL at 14:36

## 2018-07-02 RX ADMIN — LORATADINE, PSEUDOEPHEDRINE 1 TABLET: 5; 120 TABLET, EXTENDED RELEASE ORAL at 05:29

## 2018-07-02 RX ADMIN — Medication 325 MG: at 07:30

## 2018-07-02 RX ADMIN — FOLIC ACID 1 MG: 1 TABLET ORAL at 05:29

## 2018-07-02 RX ADMIN — Medication 325 MG: at 17:43

## 2018-07-02 RX ADMIN — STANDARDIZED SENNA CONCENTRATE AND DOCUSATE SODIUM 2 TABLET: 8.6; 5 TABLET, FILM COATED ORAL at 05:28

## 2018-07-02 RX ADMIN — MORPHINE SULFATE 30 MG: 30 TABLET, EXTENDED RELEASE ORAL at 14:36

## 2018-07-02 RX ADMIN — ROPINIROLE HYDROCHLORIDE 4 MG: 2 TABLET, FILM COATED ORAL at 21:09

## 2018-07-02 RX ADMIN — IRON SUCROSE 200 MG: 20 INJECTION, SOLUTION INTRAVENOUS at 05:30

## 2018-07-02 RX ADMIN — MORPHINE SULFATE 30 MG: 30 TABLET, EXTENDED RELEASE ORAL at 21:09

## 2018-07-02 RX ADMIN — LORAZEPAM 1 MG: 1 TABLET ORAL at 05:29

## 2018-07-02 RX ADMIN — POLYETHYLENE GLYCOL 3350, SODIUM SULFATE, SODIUM CHLORIDE, POTASSIUM CHLORIDE, ASCORBIC ACID, SODIUM ASCORBATE 100 G: KIT at 21:09

## 2018-07-02 ASSESSMENT — ENCOUNTER SYMPTOMS
BLOOD IN STOOL: 1
RESPIRATORY NEGATIVE: 1
BACK PAIN: 1
PSYCHIATRIC NEGATIVE: 1
WEAKNESS: 1
FEVER: 0
ABDOMINAL PAIN: 0
NAUSEA: 0
EYES NEGATIVE: 1
CARDIOVASCULAR NEGATIVE: 1
VOMITING: 0
CONSTIPATION: 1
NEUROLOGICAL NEGATIVE: 1
CHILLS: 0

## 2018-07-02 ASSESSMENT — PAIN SCALES - GENERAL
PAINLEVEL_OUTOF10: 8
PAINLEVEL_OUTOF10: 6
PAINLEVEL_OUTOF10: 5
PAINLEVEL_OUTOF10: 8
PAINLEVEL_OUTOF10: 5
PAINLEVEL_OUTOF10: 2
PAINLEVEL_OUTOF10: 2
PAINLEVEL_OUTOF10: 6

## 2018-07-02 NOTE — PROGRESS NOTES
Gastroenterology Progress Note     Author: Jonathan Barlow   Date & Time Created: 2018 2:34 PM    Chief Complaint:  Anemia    Interval History:  Now agreeable to proceed with diagnostic procedures. Pt c/o ongoing back pain.     Review of Systems:  Review of Systems   Constitutional: Negative for chills and fever.   Gastrointestinal: Positive for constipation. Negative for abdominal pain, nausea and vomiting.   Neurological: Positive for weakness.       Physical Exam:  Physical Exam   Constitutional: She is oriented to person, place, and time. No distress.   Eyes: No scleral icterus.   Cardiovascular: Normal rate and regular rhythm.    Pulmonary/Chest: Effort normal and breath sounds normal.   Abdominal: Soft. Bowel sounds are normal.   Neurological: She is alert and oriented to person, place, and time.   Skin: Skin is warm and dry.   Nursing note and vitals reviewed.      Labs:        Invalid input(s): [id number]      Recent Labs      18   025   SODIUM  139   POTASSIUM  4.2   CHLORIDE  107   CO2  28   BUN  8   CREATININE  0.53   CALCIUM  8.3*     Recent Labs      18   0256   ALTSGPT  209*   ASTSGOT  145*   ALKPHOSPHAT  110*   TBILIRUBIN  0.8   GLUCOSE  110*     Recent Labs      18   0256  18   0347  18   0401   RBC  3.13*  3.29*  3.14*   HEMOGLOBIN  7.2*  7.4*  7.4*   HEMATOCRIT  25.1*  26.8*  26.0*   PLATELETCT  104*  79*  59*     Recent Labs      18   0256  18   0347  18   0401   WBC  8.5  8.5  8.2   NEUTSPOLYS  80.50*  80.70*  76.00*   LYMPHOCYTES  10.10*  10.80*  13.90*   MONOCYTES  6.00  5.10  6.00   EOSINOPHILS  1.50  1.40  1.80   BASOPHILS  0.50  0.80  1.20   ASTSGOT  145*   --    --    ALTSGPT  209*   --    --    ALKPHOSPHAT  110*   --    --    TBILIRUBIN  0.8   --    --      Hemodynamics:  Temp (24hrs), Av.6 °C (97.9 °F), Min:36.3 °C (97.3 °F), Max:37.1 °C (98.8 °F)  Temperature: 36.3 °C (97.3 °F)  Pulse  Av.6  Min: 70  Max: 105   Blood  Pressure: 119/71     Respiratory:    Respiration: 20, Pulse Oximetry: 97 %     Work Of Breathing / Effort: (P) Mild  RUL Breath Sounds: (P) Diminished, RML Breath Sounds: (P) Diminished, RLL Breath Sounds: (P) Diminished, KENYON Breath Sounds: (P) Diminished, LLL Breath Sounds: (P) Diminished  Fluids:  No intake or output data in the 24 hours ending 07/01/18 1654     GI/Nutrition:  Orders Placed This Encounter   Procedures   • Diet Order Clear Liquid     Standing Status:   Standing     Number of Occurrences:   1     Order Specific Question:   Diet:     Answer:   Clear Liquid [10]     Medical Decision Making, by Problem:  Active Hospital Problems    Diagnosis   • *Blood loss anemia    • COPD (chronic obstructive pulmonary disease)   • Thrombocytopenia: Mild.   • Abnormal liver enzymes: Improved. Component related to NAFLD.       Plan:  Await additional liver labs.  All questions annswered  Clear liquid diet tomorrow. EGD/colonoscopy Tomorrow.     Quality-Core Measures   Reviewed items::  Radiology images reviewed, Labs reviewed and Medications reviewed

## 2018-07-02 NOTE — PROGRESS NOTES
Received report from night staff. Assumed pt care with OCTAVIA Weeks . Pain level 8/10, medicated appropriately. AOX4. POC discussed. Call light within reach, bed in lowest position, and personal items accessible.

## 2018-07-02 NOTE — PROGRESS NOTES
[facility]    Date of Service: 2018    Chief Complaint  63 y.o. female admitted 2018 with profound anemia following 1-2 months of intermittent melena and hematochezia    Interval Problem Update  No bm in 3 days  + flatus  Denies abdominal pain  Chronic back pain at baseline  No cp, denies sob, ros otherwise negative      Consultants/Specialty  DHA    Disposition  EGD/ colonoscopy tuesday      Review of Systems   HENT: Negative.    Eyes: Negative.    Respiratory: Negative.    Cardiovascular: Negative.    Gastrointestinal: Positive for blood in stool, constipation and melena.   Genitourinary: Negative.    Musculoskeletal: Positive for back pain.   Skin: Negative.    Neurological: Negative.    Psychiatric/Behavioral: Negative.       Physical Exam  Laboratory/Imaging   Hemodynamics  Temp (24hrs), Av.7 °C (98.1 °F), Min:36.5 °C (97.7 °F), Max:37.1 °C (98.8 °F)   Temperature: 36.5 °C (97.7 °F)  Pulse  Av.4  Min: 70  Max: 105    Blood Pressure: 125/70      Respiratory      Respiration: 20, Pulse Oximetry: 95 %     Work Of Breathing / Effort: (P) Mild  RUL Breath Sounds: (P) Diminished, RML Breath Sounds: (P) Diminished, RLL Breath Sounds: (P) Diminished, KENYON Breath Sounds: (P) Diminished, LLL Breath Sounds: (P) Diminished    Fluids  No intake or output data in the 24 hours ending 18 1127    Nutrition  Orders Placed This Encounter   Procedures   • Diet Order Clear Liquid     Standing Status:   Standing     Number of Occurrences:   1     Order Specific Question:   Diet:     Answer:   Clear Liquid [10]     Physical Exam   Constitutional: She is oriented to person, place, and time. She appears well-developed and well-nourished. No distress.   HENT:   Head: Normocephalic and atraumatic.   Mouth/Throat: Oropharynx is clear and moist.   Eyes: Conjunctivae are normal.   Neck: Normal range of motion. Neck supple.   Cardiovascular: Normal rate, normal heart sounds and intact distal  pulses.  Exam reveals no gallop and no friction rub.    No murmur heard.  Pulmonary/Chest: Effort normal and breath sounds normal. No respiratory distress. She has no wheezes. She has no rales. She exhibits no tenderness.   Abdominal: Soft. Bowel sounds are normal. She exhibits no distension and no mass. There is no tenderness. There is no rebound and no guarding.   Musculoskeletal: Normal range of motion. She exhibits no edema or tenderness.   Neurological: She is alert and oriented to person, place, and time. She has normal reflexes. No cranial nerve deficit. She exhibits normal muscle tone. Coordination normal.   Skin: Skin is warm and dry. No rash noted. She is not diaphoretic. No erythema. No pallor.   Psychiatric: She has a normal mood and affect. Her behavior is normal. Judgment and thought content normal.   Nursing note and vitals reviewed.      Recent Labs      06/30/18   0256  07/01/18   0347  07/02/18   0401   WBC  8.5  8.5  8.2   RBC  3.13*  3.29*  3.14*   HEMOGLOBIN  7.2*  7.4*  7.4*   HEMATOCRIT  25.1*  26.8*  26.0*   MCV  80.2*  81.5  82.8   MCH  23.0*  22.5*  23.6*   MCHC  28.7*  27.6*  28.5*   RDW  66.4*  69.5*  72.6*   PLATELETCT  104*  79*  59*   MPV  11.1  10.9   --      Recent Labs      06/30/18   0256   SODIUM  139   POTASSIUM  4.2   CHLORIDE  107   CO2  28   GLUCOSE  110*   BUN  8   CREATININE  0.53   CALCIUM  8.3*                      Assessment/Plan     * Blood loss anemia- (present on admission)   Assessment & Plan    Severe  She reports intermittent melena and hematochezia over the past few months  H/H improved s/p prbd  Continue to follow and transfuse for hbg less 7  GI following, colonoscopy and edg planned for tomorrow        Generalized weakness- (present on admission)   Assessment & Plan    Secondary profound anemia  Improved with transfusion        GI bleed- (present on admission)   Assessment & Plan    H/o diverticular bleed and partial colectomy (Dr. Beltran)        Thrombocytopenia  (HCC)- (present on admission)   Assessment & Plan    Splenomegaly likely contributing  following        Diastolic dysfunction- (present on admission)   Assessment & Plan    Recent echocardiogram done in February 2018 showed normal ejection fraction with grade 1 diastolic dysfunction  No current clinical s/o exacerbation        Microcytic anemia- (present on admission)   Assessment & Plan    Iron deficiency  Continue replacement        Elevated LFTs- (present on admission)   Assessment & Plan    Likely due to liver hypoperfusion and PAREDES  improved        Chronic respiratory failure (HCC)- (present on admission)   Assessment & Plan    At baseline 2L  following        COPD (chronic obstructive pulmonary disease) (HCC)   Assessment & Plan    No evidence of exacerbation  following          Quality-Core Measures

## 2018-07-02 NOTE — PROGRESS NOTES
Received report from day shift RN. Assumed care for pt. Pt sitting up in bed and is comfortable. Pt belongings within reach. Bed low and locked.

## 2018-07-03 VITALS
BODY MASS INDEX: 36.28 KG/M2 | HEIGHT: 66 IN | SYSTOLIC BLOOD PRESSURE: 112 MMHG | DIASTOLIC BLOOD PRESSURE: 71 MMHG | TEMPERATURE: 99.5 F | HEART RATE: 111 BPM | WEIGHT: 225.75 LBS | RESPIRATION RATE: 18 BRPM | OXYGEN SATURATION: 91 %

## 2018-07-03 LAB
BASOPHILS # BLD AUTO: 1.6 % (ref 0–1.8)
BASOPHILS # BLD: 0.14 K/UL (ref 0–0.12)
EOSINOPHIL # BLD AUTO: 0.22 K/UL (ref 0–0.51)
EOSINOPHIL NFR BLD: 2.5 % (ref 0–6.9)
ERYTHROCYTE [DISTWIDTH] IN BLOOD BY AUTOMATED COUNT: 74.7 FL (ref 35.9–50)
HCT VFR BLD AUTO: 28.2 % (ref 37–47)
HGB BLD-MCNC: 7.7 G/DL (ref 12–16)
IMM GRANULOCYTES # BLD AUTO: 0.14 K/UL (ref 0–0.11)
IMM GRANULOCYTES NFR BLD AUTO: 1.6 % (ref 0–0.9)
LYMPHOCYTES # BLD AUTO: 1.02 K/UL (ref 1–4.8)
LYMPHOCYTES NFR BLD: 11.5 % (ref 22–41)
MCH RBC QN AUTO: 23.1 PG (ref 27–33)
MCHC RBC AUTO-ENTMCNC: 27.3 G/DL (ref 33.6–35)
MCV RBC AUTO: 84.4 FL (ref 81.4–97.8)
MONOCYTES # BLD AUTO: 0.56 K/UL (ref 0–0.85)
MONOCYTES NFR BLD AUTO: 6.3 % (ref 0–13.4)
NEUTROPHILS # BLD AUTO: 6.78 K/UL (ref 2–7.15)
NEUTROPHILS NFR BLD: 76.5 % (ref 44–72)
NRBC # BLD AUTO: 0 K/UL
NRBC BLD-RTO: 0 /100 WBC
PLATELET # BLD AUTO: 51 K/UL (ref 164–446)
RBC # BLD AUTO: 3.34 M/UL (ref 4.2–5.4)
WBC # BLD AUTO: 8.9 K/UL (ref 4.8–10.8)

## 2018-07-03 PROCEDURE — 160036 HCHG PACU - EA ADDL 30 MINS PHASE I: Performed by: INTERNAL MEDICINE

## 2018-07-03 PROCEDURE — 700111 HCHG RX REV CODE 636 W/ 250 OVERRIDE (IP)

## 2018-07-03 PROCEDURE — 160048 HCHG OR STATISTICAL LEVEL 1-5: Performed by: INTERNAL MEDICINE

## 2018-07-03 PROCEDURE — 99239 HOSP IP/OBS DSCHRG MGMT >30: CPT | Performed by: INTERNAL MEDICINE

## 2018-07-03 PROCEDURE — A9270 NON-COVERED ITEM OR SERVICE: HCPCS | Performed by: INTERNAL MEDICINE

## 2018-07-03 PROCEDURE — 160009 HCHG ANES TIME/MIN: Performed by: INTERNAL MEDICINE

## 2018-07-03 PROCEDURE — 160029 HCHG SURGERY MINUTES - 1ST 30 MINS LEVEL 4: Performed by: INTERNAL MEDICINE

## 2018-07-03 PROCEDURE — 700101 HCHG RX REV CODE 250

## 2018-07-03 PROCEDURE — A9270 NON-COVERED ITEM OR SERVICE: HCPCS | Performed by: FAMILY MEDICINE

## 2018-07-03 PROCEDURE — 0DJD8ZZ INSPECTION OF LOWER INTESTINAL TRACT, VIA NATURAL OR ARTIFICIAL OPENING ENDOSCOPIC: ICD-10-PCS | Performed by: INTERNAL MEDICINE

## 2018-07-03 PROCEDURE — 700102 HCHG RX REV CODE 250 W/ 637 OVERRIDE(OP): Performed by: INTERNAL MEDICINE

## 2018-07-03 PROCEDURE — 0DJ08ZZ INSPECTION OF UPPER INTESTINAL TRACT, VIA NATURAL OR ARTIFICIAL OPENING ENDOSCOPIC: ICD-10-PCS | Performed by: INTERNAL MEDICINE

## 2018-07-03 PROCEDURE — 36415 COLL VENOUS BLD VENIPUNCTURE: CPT

## 2018-07-03 PROCEDURE — 700102 HCHG RX REV CODE 250 W/ 637 OVERRIDE(OP): Performed by: FAMILY MEDICINE

## 2018-07-03 PROCEDURE — 700111 HCHG RX REV CODE 636 W/ 250 OVERRIDE (IP): Performed by: HOSPITALIST

## 2018-07-03 PROCEDURE — 160035 HCHG PACU - 1ST 60 MINS PHASE I: Performed by: INTERNAL MEDICINE

## 2018-07-03 PROCEDURE — 160002 HCHG RECOVERY MINUTES (STAT): Performed by: INTERNAL MEDICINE

## 2018-07-03 PROCEDURE — 85025 COMPLETE CBC W/AUTO DIFF WBC: CPT

## 2018-07-03 PROCEDURE — 500066 HCHG BITE BLOCK, ECT: Performed by: INTERNAL MEDICINE

## 2018-07-03 RX ORDER — FERROUS SULFATE 325(65) MG
325 TABLET ORAL 2 TIMES DAILY WITH MEALS
Qty: 30 TAB | Refills: 0 | Status: SHIPPED | OUTPATIENT
Start: 2018-07-03 | End: 2019-04-26

## 2018-07-03 RX ORDER — LIDOCAINE HYDROCHLORIDE 10 MG/ML
INJECTION, SOLUTION INFILTRATION; PERINEURAL
Status: COMPLETED
Start: 2018-07-03 | End: 2018-07-03

## 2018-07-03 RX ORDER — MIDAZOLAM HYDROCHLORIDE 1 MG/ML
INJECTION INTRAMUSCULAR; INTRAVENOUS
Status: DISCONTINUED
Start: 2018-07-03 | End: 2018-07-03 | Stop reason: HOSPADM

## 2018-07-03 RX ADMIN — IRON SUCROSE 200 MG: 20 INJECTION, SOLUTION INTRAVENOUS at 05:27

## 2018-07-03 RX ADMIN — OXYCODONE HYDROCHLORIDE 15 MG: 5 TABLET ORAL at 01:11

## 2018-07-03 RX ADMIN — MORPHINE SULFATE 30 MG: 30 TABLET, EXTENDED RELEASE ORAL at 13:47

## 2018-07-03 RX ADMIN — Medication 325 MG: at 05:28

## 2018-07-03 RX ADMIN — LORAZEPAM 1 MG: 1 TABLET ORAL at 05:28

## 2018-07-03 RX ADMIN — MORPHINE SULFATE 30 MG: 30 TABLET, EXTENDED RELEASE ORAL at 05:28

## 2018-07-03 RX ADMIN — LORATADINE, PSEUDOEPHEDRINE 1 TABLET: 5; 120 TABLET, EXTENDED RELEASE ORAL at 05:28

## 2018-07-03 RX ADMIN — ROPINIROLE HYDROCHLORIDE 4 MG: 2 TABLET, FILM COATED ORAL at 05:28

## 2018-07-03 RX ADMIN — LIDOCAINE HYDROCHLORIDE: 10 INJECTION, SOLUTION INFILTRATION; PERINEURAL at 09:00

## 2018-07-03 RX ADMIN — ROPINIROLE HYDROCHLORIDE 4 MG: 2 TABLET, FILM COATED ORAL at 13:47

## 2018-07-03 RX ADMIN — FOLIC ACID 1 MG: 1 TABLET ORAL at 05:28

## 2018-07-03 RX ADMIN — ROPINIROLE HYDROCHLORIDE 4 MG: 2 TABLET, FILM COATED ORAL at 01:11

## 2018-07-03 RX ADMIN — Medication 1000 MG: at 05:28

## 2018-07-03 RX ADMIN — OMEPRAZOLE 20 MG: 20 CAPSULE, DELAYED RELEASE ORAL at 05:29

## 2018-07-03 RX ADMIN — POLYETHYLENE GLYCOL 3350, SODIUM SULFATE, SODIUM CHLORIDE, POTASSIUM CHLORIDE, ASCORBIC ACID, SODIUM ASCORBATE 100 G: KIT at 05:33

## 2018-07-03 RX ADMIN — OXYCODONE HYDROCHLORIDE 15 MG: 5 TABLET ORAL at 08:42

## 2018-07-03 ASSESSMENT — ENCOUNTER SYMPTOMS
BACK PAIN: 0
NEUROLOGICAL NEGATIVE: 1
PSYCHIATRIC NEGATIVE: 1
CARDIOVASCULAR NEGATIVE: 1
BLOOD IN STOOL: 0
CONSTIPATION: 0
EYES NEGATIVE: 1
RESPIRATORY NEGATIVE: 1

## 2018-07-03 ASSESSMENT — PAIN SCALES - GENERAL
PAINLEVEL_OUTOF10: 0
PAINLEVEL_OUTOF10: 8

## 2018-07-03 NOTE — OR SURGEON
Immediate Post OP Note      PreOp Diagnosis: anemia and rectal bleeding    PostOp Diagnosis:    1/ normal EGD   2/ surgical anastomosis in the sigmoid    3/ diverticula in the sigmoid   4/ otherwise normal colon   5/ normal ileum    Procedure(s):  COLONOSCOPY - Wound Class: Clean Contaminated  GASTROSCOPY-ENDO - Wound Class: Clean Contaminated    Surgeon(s):  Cain Castillo M.D.    Anesthesiologist/Type of Anesthesia:  Anesthesiologist: Devon Subramanian M.D./MAGDALENA    Surgical Staff:  Circulator: Marie Meneses  Endoscopy Technician: Leigh Bergman    Specimens removed if any:  * No specimens in log *    Estimated Blood Loss: none    Findings:    EGD   Prior to the procedure the patient was informed of the risk and benefits of the procedure all question were answered.  The patient was sedated and once adequate sedation was achieved the bite block was placed. They were placed in the left lateral decubitus positron.   Using the standard Olympus gastroscope it was introduced through the oral pharynx and passed into the esophagus.  Intubation of the esophagus was achieved easily and the entire esophagus appeared normal.  The GEJ 38 cm,it appeared normal.  Intubation of the gastric cavity was performed and insufflation was achieved allowing for a panoramic view of the entire gastric mucosa.  No abnormalities were appreciated. Advancement into the antrum allowed for visualization of the antrum and pylorus both of these structures appeared normal.  Intubation of the pylorus allowed for visualization of the duodenal bulb along with the second and third portion, all these structures appeared normal.  No mucosal abnormalities were appreciated.  Extubation of the pylorus allowed for retroflexion to be performed in the antrum to visualize the cardia, fundus and body, which also appeared normal.    Colonoscopy   Prior to the procedure the patient was informed of the risk and benefits of the procedure all question were  answered.  The patient was placed in the left lateral decubitus position.  After appropriate sedation was achieved a digital rectal exam was performed, It was none tender no internal or external hemorrhoids noted.  Introduction of the standard Olympus colonoscope was performed and passed to the cecum identified by the appendiceal orifice,ileal cecal valve and the cecal cap.  The ICV was intubated and the ileum appeared normal.  Extubation of the ileocecal valve was performed allowing for gradual withdrawal of the colonoscope. This allowed for evaluation of the ascending, transverse, descending colon mucosa which appeared normal.  The sigmoid appeared normal there is a surgical anastomosis appreciated at 25 cm which appeared intake, the rest of the colon appeared normal.  Retroflexion was performed in the rectum to evaluate the rectal anal verge there are internal hemorrhoids noted grade 2, no signs of active bleeding noted.  The colonoscope was the straightened and the excess air was removed.  The patient tolerated the procedure well.    Complications  None    Recommendations  For the hemorrhoids sitz baths epsom salt baths prep H as needed along with good rectal hygiene   Follow up at [facility] as o/p on discharge  [phone]  Advance diet as tolerated    7/3/2018 10:31 AM Cain Castillo M.D.

## 2018-07-03 NOTE — PROGRESS NOTES
[facility] Progress Note    Date of Service: 7/3/2018    Chief Complaint  63 y.o. female admitted 2018 with profound anemia following 1-2 months of intermittent melena and hematochezia    Interval Problem Update  Pt is s/p EGD/Colonoscopy and denies any abdominal pain or any other GI complaints.       Consultants/Specialty  DHA    Disposition  SNF      Review of Systems   HENT: Negative.    Eyes: Negative.    Respiratory: Negative.    Cardiovascular: Negative.    Gastrointestinal: Negative for blood in stool, constipation and melena.   Genitourinary: Negative.    Musculoskeletal: Negative for back pain.   Skin: Negative.    Neurological: Negative.    Psychiatric/Behavioral: Negative.       Physical Exam  Laboratory/Imaging   Hemodynamics  Temp (24hrs), Av.8 °C (98.2 °F), Min:36.4 °C (97.6 °F), Max:37.4 °C (99.4 °F)   Temperature: 37.4 °C (99.3 °F)  Pulse  Av.4  Min: 68  Max: 106 Heart Rate (Monitored): 87  Blood Pressure: 125/72, NIBP: 128/73      Respiratory      Respiration: (!) 21, Pulse Oximetry: 91 %        RUL Breath Sounds: Diminished, RML Breath Sounds: Diminished, RLL Breath Sounds: Diminished, KENYON Breath Sounds: Diminished, LLL Breath Sounds: Diminished    Fluids    Intake/Output Summary (Last 24 hours) at 18 1415  Last data filed at 18 1115   Gross per 24 hour   Intake              100 ml   Output                0 ml   Net              100 ml       Nutrition  Orders Placed This Encounter   Procedures   • DIET NPO     Standing Status:   Standing     Number of Occurrences:   8     Order Specific Question:   Type:     Answer:   At Midnight [5]     Order Specific Question:   Restrict to:     Answer:   Sips with Medications [3]     Physical Exam   Constitutional: She is oriented to person, place, and time. She appears well-developed and well-nourished. No distress.   HENT:   Head: Normocephalic and atraumatic.   Mouth/Throat: Oropharynx is clear and moist.   Eyes: Conjunctivae  are normal.   Neck: Normal range of motion. Neck supple.   Cardiovascular: Normal rate, normal heart sounds and intact distal pulses.  Exam reveals no gallop and no friction rub.    No murmur heard.  Pulmonary/Chest: Effort normal and breath sounds normal. No respiratory distress. She has no wheezes. She has no rales. She exhibits no tenderness.   Abdominal: Soft. Bowel sounds are normal. She exhibits no distension and no mass. There is no tenderness. There is no rebound and no guarding.   Musculoskeletal: Normal range of motion. She exhibits no edema or tenderness.   Neurological: She is alert and oriented to person, place, and time. She has normal reflexes. No cranial nerve deficit. She exhibits normal muscle tone. Coordination normal.   Skin: Skin is warm and dry. No rash noted. She is not diaphoretic. No erythema. No pallor.   Psychiatric: She has a normal mood and affect. Her behavior is normal. Judgment and thought content normal.   Nursing note and vitals reviewed.      Recent Labs      07/01/18   0347  07/02/18   0401  07/03/18   0324   WBC  8.5  8.2  8.9   RBC  3.29*  3.14*  3.34*   HEMOGLOBIN  7.4*  7.4*  7.7*   HEMATOCRIT  26.8*  26.0*  28.2*   MCV  81.5  82.8  84.4   MCH  22.5*  23.6*  23.1*   MCHC  27.6*  28.5*  27.3*   RDW  69.5*  72.6*  74.7*   PLATELETCT  79*  59*  51*   MPV  10.9   --    --                           Assessment/Plan     * Blood loss anemia- (present on admission)   Assessment & Plan    Severe  She reports intermittent melena and hematochezia over the past few months  H/H improved s/p prbd  Continue to follow and transfuse for hbg less 7  - GI consulted and s/p EGD/colo  - hemorrhoid mgmt per GI recs   - monitoring  - cont Iron supplementation        Generalized weakness- (present on admission)   Assessment & Plan    Secondary profound anemia  Improved with transfusion        GI bleed- (present on admission)   Assessment & Plan    H/o diverticular bleed and partial colectomy (  S[facility])        Constipation   Assessment & Plan    Acute on chronic; resolved with bowel prep        Thrombocytopenia (HCC)- (present on admission)   Assessment & Plan    Splenomegaly likely contributing  following        Diastolic dysfunction- (present on admission)   Assessment & Plan    Recent echocardiogram done in February 2018 showed normal ejection fraction with grade 1 diastolic dysfunction  No current clinical s/o exacerbation        Microcytic anemia- (present on admission)   Assessment & Plan    Iron deficiency  Continue replacement        Elevated LFTs- (present on admission)   Assessment & Plan    Likely due to liver hypoperfusion and PAREDES  improved        Chronic respiratory failure (HCC)- (present on admission)   Assessment & Plan    At baseline 2L  following        COPD (chronic obstructive pulmonary disease) (HCC)   Assessment & Plan    No evidence of exacerbation  following          Quality-Core Measures

## 2018-07-03 NOTE — THERAPY
PT treatment attempted. Pt currently in diagnostic procedure. Will attempt back as able and as appropriate.

## 2018-07-03 NOTE — DISCHARGE SUMMARY
Discharge Summary    CHIEF COMPLAINT ON ADMISSION  Chief Complaint   Patient presents with   • Sent by MD   • Abnormal Labs       Reason for Admission  Sent by MD     Admission Date  6/28/2018    CODE STATUS  Full Code    HPI & HOSPITAL COURSE  This is a 63 y.o. female here with symptomatic anemia with hx of diverticular bleed and partial colectomy in the past.  She received up to 3U PRBC transfusion but initially was against any sort of diagnostic procedures.  With transfusions, generalized weakness and lightheadedness significantly improved.  Patient finally agreed to GI evaluation.  GI performed EGD and Colonoscopy which revealed no acute bleeding sites, normal lumen with surgical anastamoses in sigmoid.  Patient was found to have external hemorrhoids but non bleeding.  Hgb remained stable during hospital course and did not require any more transfusions.  PT/OT evaluation was completed due to weakness.  Per their evaluation, SNF for PT/OT services was recommended.  Patient declined SNF as well as home health care.  Patient has history of spinal stenosis involving lumbar spine and wanted to discuss this with her NSG, Dr. Lemons.  At this time, patient is medically stable.        Therefore, she is discharged in fair and stable condition to home with close outpatient follow-up.    The patient met 2-midnight criteria for an inpatient stay at the time of discharge.    Discharge Date  7/3/2018    FOLLOW UP ITEMS POST DISCHARGE  NONE    DISCHARGE DIAGNOSES  Principal Problem:    Blood loss anemia POA: Yes  Active Problems:    GI bleed POA: Yes    Generalized weakness POA: Yes    COPD (chronic obstructive pulmonary disease) (HCC) (Chronic) POA: No    Chronic respiratory failure (HCC) (Chronic) POA: Yes    Elevated LFTs POA: Yes    Microcytic anemia POA: Yes    Diastolic dysfunction POA: Yes    Thrombocytopenia (HCC) POA: Yes    Abnormal liver enzymes POA: Yes    Constipation POA: Unknown  Resolved Problems:    * No  resolved hospital problems. *      FOLLOW UP  Future Appointments  Date Time Provider Department Center   9/12/2018 1:20 PM Jessica Diaz M.D. [facility] None     No follow-up provider specified.    MEDICATIONS ON DISCHARGE     Medication List      START taking these medications      Instructions   ferrous sulfate 325 (65 Fe) MG tablet   Take 1 Tab by mouth 2 times a day, with meals.  Dose:  325 mg        CONTINUE taking these medications      Instructions   albuterol 108 (90 Base) MCG/ACT Aers inhalation aerosol   Inhale 2 Puffs by mouth every 6 hours as needed for Shortness of Breath.  Dose:  2 Puff     amLODIPine 10 MG Tabs  Commonly known as:  NORVASC   Take 10 mg by mouth every day.  Dose:  10 mg     asa/apap/caffeine 250-250-65 MG Tabs  Commonly known as:  EXCEDRIN   Take 2 Tabs by mouth every 6 hours as needed. Indications: Headache  Dose:  2 Tab     ascorbic acid 500 MG/5ML syrup  Commonly known as:  VITAMIN C   Take 100 mg by mouth every day.  Dose:  100 mg     BREO ELLIPTA 200-25 MCG/INH Aepb  Generic drug:  Fluticasone Furoate-Vilanterol   Inhale 1 Puff by mouth every day.  Dose:  1 Puff     folic acid 400 MCG tablet  Commonly known as:  FOLVITE   Take 400 mcg by mouth every day.  Dose:  400 mcg     LORazepam 1 MG Tabs  Commonly known as:  ATIVAN   Take 1 mg by mouth 2 Times a Day.  Dose:  1 mg     magnesium gluconate 500 MG tablet  Commonly known as:  MAG-G   Take 1,000 mg by mouth 2 Times a Day.  Dose:  1000 mg     meloxicam 15 MG tablet  Commonly known as:  MOBIC   Take 15 mg by mouth every day.  Dose:  15 mg     MS CONTIN 30 MG Tbcr tablet  Generic drug:  morphine ER   Take 30 mg by mouth every 8 hours.  Dose:  30 mg     omeprazole 20 MG delayed-release capsule  Commonly known as:  PRILOSEC   Take 20 mg by mouth every day.  Dose:  20 mg     oxycodone 15 MG immediate release tablet  Commonly known as:  OXY-IR   Take 15 mg by mouth 4 times a day as needed for Severe Pain.  Dose:  15 mg     REQUIP 4 MG  "tablet  Generic drug:  ropinirole   Take 4 mg by mouth 4 times a day.  Dose:  4 mg     VITAMIN B-12 SL   Place 5,000 Units under tongue every day. Liquid  Dose:  5000 Units     vitamin D 1000 UNIT Tabs  Commonly known as:  cholecalciferol   Take 1,000 Units by mouth every day.  Dose:  1000 Units     ZYRTEC-D ALLERGY & CONGESTION 5-120 MG per tablet  Generic drug:  cetirizine-psuedoephedrine   Take 1 Tab by mouth every day.  Dose:  1 Tab            Allergies  Allergies   Allergen Reactions   • Other Environmental Rash     \"alloids in metal\"       DIET  Orders Placed This Encounter   Procedures   • DIET NPO     Standing Status:   Standing     Number of Occurrences:   8     Order Specific Question:   Type:     Answer:   At Midnight [5]     Order Specific Question:   Restrict to:     Answer:   Sips with Medications [3]       ACTIVITY  As tolerated.  Weight bearing as tolerated    CONSULTATIONS  GI    PROCEDURES  EGD  Colonoscopy    LABORATORY  Lab Results   Component Value Date    SODIUM 139 06/30/2018    POTASSIUM 4.2 06/30/2018    CHLORIDE 107 06/30/2018    CO2 28 06/30/2018    GLUCOSE 110 (H) 06/30/2018    BUN 8 06/30/2018    CREATININE 0.53 06/30/2018        Lab Results   Component Value Date    WBC 8.9 07/03/2018    HEMOGLOBIN 7.7 (L) 07/03/2018    HEMATOCRIT 28.2 (L) 07/03/2018    PLATELETCT 51 (L) 07/03/2018        Total time of the discharge process exceeds 44 minutes.  "

## 2018-07-04 NOTE — PROGRESS NOTES
Patient discharged after hearing that she going to be discharged from the doctor. Patient did not wait to get her discharge paperwork. Notified charge RN and advised to fill a [facility] report. IV had been removed and tele monitor collected. Evening  will be paged.

## 2018-07-04 NOTE — DISCHARGE INSTRUCTIONS
Discharge Instructions    Discharged to home by car with relative. Discharged via walking, hospital escort: Refused.  Special equipment needed: Not Applicable    Be sure to schedule a follow-up appointment with your primary care doctor or any specialists as instructed.     Discharge Plan:   Diet Plan: Discussed  Activity Level: Discussed  Smoking Cessation Offered: Patient Counseled  Confirmed Follow up Appointment: Appointment Scheduled  Confirmed Symptoms Management: Discussed  Medication Reconciliation Updated: Yes  Pneumococcal Vaccine Administered/Refused: Not given - Patient refused pneumococcal vaccine  Influenza Vaccine Indication: Patient Refuses    I understand that a diet low in cholesterol, fat, and sodium is recommended for good health. Unless I have been given specific instructions below for another diet, I accept this instruction as my diet prescription.   Other diet: Cardiac diet low fat, low salt    Special Instructions: Follow up with your primary doctor    · Is patient discharged on Warfarin / Coumadin?   No     Depression / Suicide Risk    As you are discharged from this [facility] facility, it is important to learn how to keep safe from harming yourself.    Recognize the warning signs:  · Abrupt changes in personality, positive or negative- including increase in energy   · Giving away possessions  · Change in eating patterns- significant weight changes-  positive or negative  · Change in sleeping patterns- unable to sleep or sleeping all the time   · Unwillingness or inability to communicate  · Depression  · Unusual sadness, discouragement and loneliness  · Talk of wanting to die  · Neglect of personal appearance   · Rebelliousness- reckless behavior  · Withdrawal from people/activities they love  · Confusion- inability to concentrate     If you or a loved one observes any of these behaviors or has concerns about self-harm, here's what you can do:  · Talk about it- your feelings and reasons  for harming yourself  · Remove any means that you might use to hurt yourself (examples: pills, rope, extension cords, firearm)  · Get professional help from the community (Mental Health, Substance Abuse, psychological counseling)  · Do not be alone:Call your Safe Contact- someone whom you trust who will be there for you.  · Call your local CRISIS HOTLINE 860-2030 or 054-261-7263  · Call your local Children's Mobile Crisis Response Team [place] (867) 894-2432 or wwwSignature  · Call the toll free National Suicide Prevention Hotlines   · National Suicide Prevention Lifeline 747-638-KRZS (0426)  · National Hope Line Network 800-SUICIDE (174-7524)          Atrial Fibrillation  Atrial fibrillation is a type of irregular or rapid heartbeat (arrhythmia). In atrial fibrillation, the heart quivers continuously in a chaotic pattern. This occurs when parts of the heart receive disorganized signals that make the heart unable to pump blood normally. This can increase the risk for stroke, heart failure, and other heart-related conditions. There are different types of atrial fibrillation, including:  · Paroxysmal atrial fibrillation. This type starts suddenly, and it usually stops on its own shortly after it starts.  · Persistent atrial fibrillation. This type often lasts longer than a week. It may stop on its own or with treatment.  · Long-lasting persistent atrial fibrillation. This type lasts longer than 12 months.  · Permanent atrial fibrillation. This type does not go away.  Talk with your health care provider to learn about the type of atrial fibrillation that you have.  What are the causes?  This condition is caused by some heart-related conditions or procedures, including:  · A heart attack.  · Coronary artery disease.  · Heart failure.  · Heart valve conditions.  · High blood pressure.  · Inflammation of the sac that surrounds the heart (pericarditis).  · Heart surgery.  · Certain heart rhythm disorders,  such as Diehl-Parkinson-White syndrome.  Other causes include:  · Pneumonia.  · Obstructive sleep apnea.  · Blockage of an artery in the lungs (pulmonary embolism, or PE).  · Lung cancer.  · Chronic lung disease.  · Thyroid problems, especially if the thyroid is overactive (hyperthyroidism).  · Caffeine.  · Excessive alcohol use or illegal drug use.  · Use of some medicines, including certain decongestants and diet pills.  Sometimes, the cause cannot be found.  What increases the risk?  This condition is more likely to develop in:  · People who are older in age.  · People who smoke.  · People who have diabetes mellitus.  · People who are overweight (obese).  · Athletes who exercise vigorously.  What are the signs or symptoms?  Symptoms of this condition include:  · A feeling that your heart is beating rapidly or irregularly.  · A feeling of discomfort or pain in your chest.  · Shortness of breath.  · Sudden light-headedness or weakness.  · Getting tired easily during exercise.  In some cases, there are no symptoms.  How is this diagnosed?  Your health care provider may be able to detect atrial fibrillation when taking your pulse. If detected, this condition may be diagnosed with:  · An electrocardiogram (ECG).  · A Holter monitor test that records your heartbeat patterns over a 24-hour period.  · Transthoracic echocardiogram (TTE) to evaluate how blood flows through your heart.  · Transesophageal echocardiogram (JERMAN) to view more detailed images of your heart.  · A stress test.  · Imaging tests, such as a CT scan or chest X-ray.  · Blood tests.  How is this treated?  The main goals of treatment are to prevent blood clots from forming and to keep your heart beating at a normal rate and rhythm. The type of treatment that you receive depends on many factors, such as your underlying medical conditions and how you feel when you are experiencing atrial fibrillation.  This condition may be treated with:  · Medicine to slow  down the heart rate, bring the heart’s rhythm back to normal, or prevent clots from forming.  · Electrical cardioversion. This is a procedure that resets your heart’s rhythm by delivering a controlled, low-energy shock to the heart through your skin.  · Different types of ablation, such as catheter ablation, catheter ablation with pacemaker, or surgical ablation. These procedures destroy the heart tissues that send abnormal signals. When the pacemaker is used, it is placed under your skin to help your heart beat in a regular rhythm.  Follow these instructions at home:  · Take over-the counter and prescription medicines only as told by your health care provider.  · If your health care provider prescribed a blood-thinning medicine (anticoagulant), take it exactly as told. Taking too much blood-thinning medicine can cause bleeding. If you do not take enough blood-thinning medicine, you will not have the protection that you need against stroke and other problems.  · Do not use tobacco products, including cigarettes, chewing tobacco, and e-cigarettes. If you need help quitting, ask your health care provider.  · If you have obstructive sleep apnea, manage your condition as told by your health care provider.  · Do not drink alcohol.  · Do not drink beverages that contain caffeine, such as coffee, soda, and tea.  · Maintain a healthy weight. Do not use diet pills unless your health care provider approves. Diet pills may make heart problems worse.  · Follow diet instructions as told by your health care provider.  · Exercise regularly as told by your health care provider.  · Keep all follow-up visits as told by your health care provider. This is important.  How is this prevented?  · Avoid drinking beverages that contain caffeine or alcohol.  · Avoid certain medicines, especially medicines that are used for breathing problems.  · Avoid certain herbs and herbal medicines, such as those that contain ephedra or ginseng.  · Do not  use illegal drugs, such as cocaine and amphetamines.  · Do not smoke.  · Manage your high blood pressure.  Contact a health care provider if:  · You notice a change in the rate, rhythm, or strength of your heartbeat.  · You are taking an anticoagulant and you notice increased bruising.  · You tire more easily when you exercise or exert yourself.  Get help right away if:  · You have chest pain, abdominal pain, sweating, or weakness.  · You feel nauseous.  · You notice blood in your vomit, bowel movement, or urine.  · You have shortness of breath.  · You suddenly have swollen feet and ankles.  · You feel dizzy.  · You have sudden weakness or numbness of the face, arm, or leg, especially on one side of the body.  · You have trouble speaking, trouble understanding, or both (aphasia).  · Your face or your eyelid droops on one side.  These symptoms may represent a serious problem that is an emergency. Do not wait to see if the symptoms will go away. Get medical help right away. Call your local emergency services (911 in the [place] Do not drive yourself to the hospital.   This information is not intended to replace advice given to you by your health care provider. Make sure you discuss any questions you have with your health care provider.  Document Released: 12/18/2006 Document Revised: 04/26/2017 Document Reviewed: 04/13/2016  Elsevier Interactive Patient Education © 2017 [employer]

## 2018-07-10 NOTE — PROGRESS NOTES
PAT note: PAT call made 07/10/2018 at 1042. Spoke with  Ariella. Pt stated she needs to cancel appt. due to recent hospitalization for GI bleed. Pt states she has been trying to contact  office to make them aware.

## 2018-07-11 ENCOUNTER — HOSPITAL ENCOUNTER (OUTPATIENT)
Dept: PAIN MANAGEMENT | Facility: REHABILITATION | Age: 63
End: 2018-07-11
Attending: PHYSICAL MEDICINE & REHABILITATION
Payer: MEDICAID

## 2018-07-16 NOTE — ADDENDUM NOTE
Encounter addended by: Maddi Leon R.N. on: 7/16/2018  8:03 AM<BR>    Actions taken: Flowsheet accepted

## 2018-12-14 ENCOUNTER — OFFICE VISIT (OUTPATIENT)
Dept: CARDIOLOGY | Facility: MEDICAL CENTER | Age: 63
End: 2018-12-14
Payer: MEDICAID

## 2018-12-14 VITALS
WEIGHT: 182 LBS | OXYGEN SATURATION: 90 % | HEIGHT: 66 IN | BODY MASS INDEX: 29.25 KG/M2 | SYSTOLIC BLOOD PRESSURE: 110 MMHG | HEART RATE: 90 BPM | DIASTOLIC BLOOD PRESSURE: 62 MMHG

## 2018-12-14 DIAGNOSIS — R07.9 CHEST PAIN, UNSPECIFIED TYPE: ICD-10-CM

## 2018-12-14 DIAGNOSIS — G47.33 OSA (OBSTRUCTIVE SLEEP APNEA): ICD-10-CM

## 2018-12-14 DIAGNOSIS — I10 ESSENTIAL HYPERTENSION: ICD-10-CM

## 2018-12-14 DIAGNOSIS — F17.210 CIGARETTE SMOKER: ICD-10-CM

## 2018-12-14 PROCEDURE — 99214 OFFICE O/P EST MOD 30 MIN: CPT | Performed by: INTERNAL MEDICINE

## 2018-12-14 RX ORDER — DOXEPIN HYDROCHLORIDE 10 MG/1
20 CAPSULE ORAL
COMMUNITY
End: 2020-11-30

## 2018-12-14 RX ORDER — HYDROCHLOROTHIAZIDE 12.5 MG/1
25 TABLET ORAL DAILY
COMMUNITY
Start: 2018-12-12 | End: 2021-04-20

## 2018-12-14 ASSESSMENT — ENCOUNTER SYMPTOMS
DEPRESSION: 0
PND: 0
DIZZINESS: 0
FALLS: 0
ORTHOPNEA: 0
LOSS OF CONSCIOUSNESS: 0
HEARTBURN: 1
SHORTNESS OF BREATH: 0
PALPITATIONS: 0
ABDOMINAL PAIN: 0

## 2018-12-14 NOTE — PROGRESS NOTES
Subjective:   Ariella Rendon is a 63-year-old female presenting to clinic for follow-up on chest discomfort.    Pertinent history:  Hypertension  Obstructive sleep apnea, on nocturnal oxygen  COPD    Patient has continued to have intermittent substernal nonradiating chest discomfort.  She is now using Mylanta which completely resolves her symptoms.    Her blood pressure is usually well controlled, like today.  She continues to smoke on a daily basis about 5-6 cigarettes a day.    In June 2018, she was admitted with symptomatic anemia and needed 3 units of packed red blood cells.  She underwent a GI workup at that time that was unremarkable.  She is planning to follow-up with the GI doctor in early 2019.    She is also planning to undergo back surgery on the same time.      Past Medical History:   Diagnosis Date   • Anemia     past history   • Arthritis     hands   • Breath shortness    • Cancer (HCC)     cervical, endometrial   • COPD (chronic obstructive pulmonary disease) (HCC)    • Dental disorder    • Diverticulitis    • Heart burn     lumbar, colon   • Hiatus hernia syndrome    • Hypertension    • Indigestion    • Other specified disorder of intestines     constipation and diarrhea   • Other specified symptom associated with female genital organs 1970    cervical   • Psychiatric problem     depression   • Spinal stenosis of lumbar region      Past Surgical History:   Procedure Laterality Date   • COLONOSCOPY N/A 7/3/2018    Procedure: COLONOSCOPY;  Surgeon: Cain Castillo M.D.;  Location: [facility];  Service: Gastroenterology   • GASTROSCOPY-ENDO N/A 7/3/2018    Procedure: GASTROSCOPY-ENDO;  Surgeon: Cain Castillo M.D.;  Location: [facility];  Service: Gastroenterology   • LOW ANTERIOR RESECTION LAPAROSCOPIC  9/12/2014    Performed by Nakul Beltran M.D. at SURGERY [facility]   • GYN SURGERY  1970    hysterectomy   • GYN SURGERY  1970    scope x 4   • BOWEL RESECTION    "   generic cymbalta   • OTHER ABDOMINAL SURGERY      remove foreign object, age 7     Family History   Problem Relation Age of Onset   • Scoliosis Mother    • Cancer Father    • Lung Disease Father    • Hypertension Brother      History   Smoking Status   • Current Every Day Smoker   • Packs/day: 0.50   • Years: 40.00   • Types: Cigarettes   Smokeless Tobacco   • Never Used     No alcohol use. Smokes marijuana daily.    Allergies   Allergen Reactions   • Other Environmental Rash     \"alloids in metal\"     Outpatient Encounter Prescriptions as of 12/14/2018   Medication Sig Dispense Refill   • hydroCHLOROthiazide (HYDRODIURIL) 12.5 MG tablet      • doxepin (SINEQUAN) 10 MG Cap Take 10 mg by mouth every evening.     • ferrous sulfate 325 (65 Fe) MG tablet Take 1 Tab by mouth 2 times a day, with meals. 30 Tab 0   • amLODIPine (NORVASC) 10 MG Tab Take 10 mg by mouth every day.     • Cyanocobalamin (VITAMIN B-12 SL) Place 5,000 Units under tongue every day. Liquid     • Fluticasone Furoate-Vilanterol (BREO ELLIPTA) 200-25 MCG/INH AEROSOL POWDER, BREATH ACTIVATED Inhale 1 Puff by mouth every day.     • cetirizine-psuedoephedrine (ZYRTEC-D ALLERGY & CONGESTION) 5-120 MG per tablet Take 1 Tab by mouth every day.     • folic acid (FOLVITE) 400 MCG tablet Take 400 mcg by mouth every day.     • morphine ER (MS CONTIN) 30 MG Tab CR tablet Take 30 mg by mouth every 8 hours.     • meloxicam (MOBIC) 15 MG tablet Take 15 mg by mouth every day.     • omeprazole (PRILOSEC) 20 MG delayed-release capsule Take 20 mg by mouth every day.     • ropinirole (REQUIP) 4 MG tablet Take 4 mg by mouth 4 times a day.     • ascorbic acid (VITAMIN C) 500 MG/5ML syrup Take 100 mg by mouth every day.     • magnesium gluconate (MAG-G) 500 MG tablet Take 1,000 mg by mouth 2 Times a Day.     • asa/apap/caffeine (EXCEDRIN) 250-250-65 MG TABS Take 2 Tabs by mouth every 6 hours as needed. Indications: Headache     • albuterol 108 (90 Base) MCG/ACT Aero Soln " "inhalation aerosol Inhale 2 Puffs by mouth every 6 hours as needed for Shortness of Breath.     • oxycodone (OXY-IR) 15 MG immediate release tablet Take 15 mg by mouth 4 times a day as needed for Severe Pain.     • vitamin D (CHOLECALCIFEROL) 1000 UNIT Tab Take 1,000 Units by mouth every day.     • [DISCONTINUED] LORazepam (ATIVAN) 1 MG Tab Take 1 mg by mouth 2 Times a Day.       No facility-administered encounter medications on file as of 12/14/2018.      Review of Systems   Constitutional: Negative for malaise/fatigue.   Respiratory: Negative for shortness of breath.    Cardiovascular: Positive for chest pain. Negative for palpitations, orthopnea, leg swelling and PND.   Gastrointestinal: Positive for heartburn. Negative for abdominal pain.   Musculoskeletal: Negative for falls.   Neurological: Negative for dizziness and loss of consciousness.   Psychiatric/Behavioral: Negative for depression.   All other systems reviewed and are negative.       Objective:   /62 (BP Location: Left arm, Patient Position: Sitting, BP Cuff Size: Adult)   Pulse 90   Ht 1.676 m (5' 6\")   Wt 82.6 kg (182 lb)   SpO2 90%   BMI 29.38 kg/m²     Physical Exam   Constitutional: She is oriented to person, place, and time. She appears well-developed and well-nourished. No distress.   HENT:   Head: Normocephalic and atraumatic.   Eyes: Conjunctivae are normal.   Neck: Normal range of motion. Neck supple. No JVD present.   Cardiovascular: Normal rate, regular rhythm and normal heart sounds.  Exam reveals no gallop and no friction rub.    No murmur heard.  Pulmonary/Chest: Effort normal and breath sounds normal. No respiratory distress. She has no wheezes. She has no rales.   Abdominal: Soft. There is no tenderness.   Musculoskeletal: She exhibits no edema.   Neurological: She is alert and oriented to person, place, and time.   Skin: Skin is warm and dry. She is not diaphoretic.   Psychiatric: She has a normal mood and affect.   Nursing " note and vitals reviewed.    Echocardiogram performed in February 2018 showed normal LV systolic function. No significant pericardial effusion noted. No significant valvular pathology noted.    Nuclear myocardial perfusion study performed in February 2018 showed no fixed or reversible defects.    Assessment:     1. EMILY (obstructive sleep apnea)     2. Essential hypertension     3. Cigarette smoker     4. Chest pain, unspecified type         Medical Decision Making:  Today's Assessment / Status / Plan:     Chest discomfort: Atypical in nature likely secondary to GERD.  Continue antacids as needed.  May need to consider PPI if symptoms worsen.    Hypertension: Blood pressure is at goal.  Continue hydrochlorothiazide and amlodipine at current dose.    Obstructive sleep apnea: Patient continues to refuse a CPAP.  She only wants nocturnal oxygen.    Tobacco use: Patient continues to smoke on a daily basis.  She is not interested in quitting at this time but is thinking about it.  I spent 4 minutes discussing smoking cessation.    Return to clinic in 3 months or earlier if needed.    Thank you for allowing me to participate in the care of this patient. Please do not hesitate to contact me with any questions.    Jessica Diaz MD  Cardiologist  [facility]      PLEASE NOTE: This dictation was created using voice recognition software.

## 2018-12-14 NOTE — LETTER
[facility]   [address], [facility]  [place] [address]  Phone: [phone]  Fax: [phone]              Ariella Rendon  1955    Encounter Date: 12/14/2018    Jessica Diaz M.D.          PROGRESS NOTE:  Subjective:   Ariella Rendon is a 63-year-old female presenting to clinic for follow-up on chest discomfort.    Pertinent history:  Hypertension  Obstructive sleep apnea, on nocturnal oxygen  COPD    Patient has continued to have intermittent substernal nonradiating chest discomfort.  She is now using Mylanta which completely resolves her symptoms.    Her blood pressure is usually well controlled, like today.  She continues to smoke on a daily basis about 5-6 cigarettes a day.    In June 2018, she was admitted with symptomatic anemia and needed 3 units of packed red blood cells.  She underwent a GI workup at that time that was unremarkable.  She is planning to follow-up with the GI doctor in early 2019.    She is also planning to undergo back surgery on the same time.      Past Medical History:   Diagnosis Date   • Anemia     past history   • Arthritis     hands   • Breath shortness    • Cancer (HCC)     cervical, endometrial   • COPD (chronic obstructive pulmonary disease) (HCC)    • Dental disorder    • Diverticulitis    • Heart burn     lumbar, colon   • Hiatus hernia syndrome    • Hypertension    • Indigestion    • Other specified disorder of intestines     constipation and diarrhea   • Other specified symptom associated with female genital organs 1970    cervical   • Psychiatric problem     depression   • Spinal stenosis of lumbar region      Past Surgical History:   Procedure Laterality Date   • COLONOSCOPY N/A 7/3/2018    Procedure: COLONOSCOPY;  Surgeon: Cain Castillo M.D.;  Location: SURGERY [facility];  Service: Gastroenterology   • GASTROSCOPY-ENDO N/A 7/3/2018    Procedure: GASTROSCOPY-ENDO;  Surgeon: Cain Castillo M.D.;  Location:  "SURGERY [facility];  Service: Gastroenterology   • LOW ANTERIOR RESECTION LAPAROSCOPIC  9/12/2014    Performed by Nakul Beltran M.D. at SURGERY [facility]   • GYN SURGERY  1970    hysterectomy   • GYN SURGERY  1970    scope x 4   • BOWEL RESECTION      generic cymbalta   • OTHER ABDOMINAL SURGERY      remove foreign object, age 7     Family History   Problem Relation Age of Onset   • Scoliosis Mother    • Cancer Father    • Lung Disease Father    • Hypertension Brother      History   Smoking Status   • Current Every Day Smoker   • Packs/day: 0.50   • Years: 40.00   • Types: Cigarettes   Smokeless Tobacco   • Never Used     No alcohol use. Smokes marijuana daily.    Allergies   Allergen Reactions   • Other Environmental Rash     \"alloids in metal\"     Outpatient Encounter Prescriptions as of 12/14/2018   Medication Sig Dispense Refill   • hydroCHLOROthiazide (HYDRODIURIL) 12.5 MG tablet      • doxepin (SINEQUAN) 10 MG Cap Take 10 mg by mouth every evening.     • ferrous sulfate 325 (65 Fe) MG tablet Take 1 Tab by mouth 2 times a day, with meals. 30 Tab 0   • amLODIPine (NORVASC) 10 MG Tab Take 10 mg by mouth every day.     • Cyanocobalamin (VITAMIN B-12 SL) Place 5,000 Units under tongue every day. Liquid     • Fluticasone Furoate-Vilanterol (BREO ELLIPTA) 200-25 MCG/INH AEROSOL POWDER, BREATH ACTIVATED Inhale 1 Puff by mouth every day.     • cetirizine-psuedoephedrine (ZYRTEC-D ALLERGY & CONGESTION) 5-120 MG per tablet Take 1 Tab by mouth every day.     • folic acid (FOLVITE) 400 MCG tablet Take 400 mcg by mouth every day.     • morphine ER (MS CONTIN) 30 MG Tab CR tablet Take 30 mg by mouth every 8 hours.     • meloxicam (MOBIC) 15 MG tablet Take 15 mg by mouth every day.     • omeprazole (PRILOSEC) 20 MG delayed-release capsule Take 20 mg by mouth every day.     • ropinirole (REQUIP) 4 MG tablet Take 4 mg by mouth 4 times a day.     • ascorbic acid (VITAMIN C) 500 MG/5ML syrup Take 100 mg by mouth every " "day.     • magnesium gluconate (MAG-G) 500 MG tablet Take 1,000 mg by mouth 2 Times a Day.     • asa/apap/caffeine (EXCEDRIN) 250-250-65 MG TABS Take 2 Tabs by mouth every 6 hours as needed. Indications: Headache     • albuterol 108 (90 Base) MCG/ACT Aero Soln inhalation aerosol Inhale 2 Puffs by mouth every 6 hours as needed for Shortness of Breath.     • oxycodone (OXY-IR) 15 MG immediate release tablet Take 15 mg by mouth 4 times a day as needed for Severe Pain.     • vitamin D (CHOLECALCIFEROL) 1000 UNIT Tab Take 1,000 Units by mouth every day.     • [DISCONTINUED] LORazepam (ATIVAN) 1 MG Tab Take 1 mg by mouth 2 Times a Day.       No facility-administered encounter medications on file as of 12/14/2018.      Review of Systems   Constitutional: Negative for malaise/fatigue.   Respiratory: Negative for shortness of breath.    Cardiovascular: Positive for chest pain. Negative for palpitations, orthopnea, leg swelling and PND.   Gastrointestinal: Positive for heartburn. Negative for abdominal pain.   Musculoskeletal: Negative for falls.   Neurological: Negative for dizziness and loss of consciousness.   Psychiatric/Behavioral: Negative for depression.   All other systems reviewed and are negative.       Objective:   /62 (BP Location: Left arm, Patient Position: Sitting, BP Cuff Size: Adult)   Pulse 90   Ht 1.676 m (5' 6\")   Wt 82.6 kg (182 lb)   SpO2 90%   BMI 29.38 kg/m²      Physical Exam   Constitutional: She is oriented to person, place, and time. She appears well-developed and well-nourished. No distress.   HENT:   Head: Normocephalic and atraumatic.   Eyes: Conjunctivae are normal.   Neck: Normal range of motion. Neck supple. No JVD present.   Cardiovascular: Normal rate, regular rhythm and normal heart sounds.  Exam reveals no gallop and no friction rub.    No murmur heard.  Pulmonary/Chest: Effort normal and breath sounds normal. No respiratory distress. She has no wheezes. She has no rales.   "   Abdominal: Soft. There is no tenderness.   Musculoskeletal: She exhibits no edema.   Neurological: She is alert and oriented to person, place, and time.   Skin: Skin is warm and dry. She is not diaphoretic.   Psychiatric: She has a normal mood and affect.   Nursing note and vitals reviewed.    Echocardiogram performed in February 2018 showed normal LV systolic function. No significant pericardial effusion noted. No significant valvular pathology noted.    Nuclear myocardial perfusion study performed in February 2018 showed no fixed or reversible defects.    Assessment:     1. EMILY (obstructive sleep apnea)     2. Essential hypertension     3. Cigarette smoker     4. Chest pain, unspecified type         Medical Decision Making:  Today's Assessment / Status / Plan:     Chest discomfort: Atypical in nature likely secondary to GERD.  Continue antacids as needed.  May need to consider PPI if symptoms worsen.    Hypertension: Blood pressure is at goal.  Continue hydrochlorothiazide and amlodipine at current dose.    Obstructive sleep apnea: Patient continues to refuse a CPAP.  She only wants nocturnal oxygen.    Tobacco use: Patient continues to smoke on a daily basis.  She is not interested in quitting at this time but is thinking about it.  I spent 4 minutes discussing smoking cessation.    Return to clinic in 3 months or earlier if needed.    Thank you for allowing me to participate in the care of this patient. Please do not hesitate to contact me with any questions.    Jessica Diaz MD  Cardiologist  [facility]      PLEASE NOTE: This dictation was created using voice recognition software.         Brianne Marti M.D.  [address] [place] [id number]  VIA Facsimile: [phone]

## 2019-04-17 ENCOUNTER — TELEPHONE (OUTPATIENT)
Dept: CARDIOLOGY | Facility: MEDICAL CENTER | Age: 64
End: 2019-04-17

## 2019-04-17 NOTE — TELEPHONE ENCOUNTER
Called patient in regards to any recent bloodwork than what we have on file. Patient had bloodwork done in February @[facility], labs requested and sent to scan. Patient states she is trying to get cardiac clearance for Back surgery informed patient we will be doing an EKG day of visit. Patient will have office send a clearance form if necessary.

## 2019-04-26 ENCOUNTER — OFFICE VISIT (OUTPATIENT)
Dept: CARDIOLOGY | Facility: MEDICAL CENTER | Age: 64
End: 2019-04-26
Payer: MEDICAID

## 2019-04-26 VITALS
SYSTOLIC BLOOD PRESSURE: 90 MMHG | WEIGHT: 183 LBS | HEART RATE: 80 BPM | DIASTOLIC BLOOD PRESSURE: 52 MMHG | OXYGEN SATURATION: 90 % | BODY MASS INDEX: 29.41 KG/M2 | HEIGHT: 66 IN

## 2019-04-26 DIAGNOSIS — Z01.810 PRE-OPERATIVE CARDIOVASCULAR EXAMINATION: ICD-10-CM

## 2019-04-26 DIAGNOSIS — I10 ESSENTIAL HYPERTENSION: ICD-10-CM

## 2019-04-26 DIAGNOSIS — Z13.220 SCREENING FOR CHOLESTEROL LEVEL: ICD-10-CM

## 2019-04-26 DIAGNOSIS — Z79.899 ENCOUNTER FOR LONG-TERM (CURRENT) USE OF HIGH-RISK MEDICATION: ICD-10-CM

## 2019-04-26 LAB — EKG IMPRESSION: NORMAL

## 2019-04-26 PROCEDURE — 99215 OFFICE O/P EST HI 40 MIN: CPT | Performed by: INTERNAL MEDICINE

## 2019-04-26 PROCEDURE — 93000 ELECTROCARDIOGRAM COMPLETE: CPT | Performed by: INTERNAL MEDICINE

## 2019-04-26 RX ORDER — CETIRIZINE HYDROCHLORIDE 10 MG/1
TABLET ORAL
Refills: 11 | COMMUNITY
Start: 2019-04-12 | End: 2019-04-26

## 2019-04-26 RX ORDER — MULTIVIT WITH MINERALS/LUTEIN
1000 TABLET ORAL DAILY
Status: ON HOLD | COMMUNITY
End: 2021-01-28

## 2019-04-26 RX ORDER — AMOXICILLIN AND CLAVULANATE POTASSIUM 875; 125 MG/1; MG/1
TABLET, FILM COATED ORAL
Refills: 0 | COMMUNITY
Start: 2019-03-15 | End: 2019-04-26

## 2019-04-26 RX ORDER — BUPROPION HYDROCHLORIDE 150 MG/1
150 TABLET, EXTENDED RELEASE ORAL DAILY
Refills: 3 | Status: ON HOLD | COMMUNITY
Start: 2019-04-22 | End: 2019-10-25

## 2019-04-26 RX ORDER — LIDOCAINE 4% 4 G/100G
1 CREAM TOPICAL PRN
Refills: 2 | COMMUNITY
Start: 2019-04-16 | End: 2019-11-20

## 2019-04-26 RX ORDER — DULOXETIN HYDROCHLORIDE 30 MG/1
CAPSULE, DELAYED RELEASE ORAL
Refills: 2 | COMMUNITY
Start: 2019-04-23 | End: 2019-06-06

## 2019-04-26 RX ORDER — METHYLPREDNISOLONE 4 MG/1
TABLET ORAL
Refills: 0 | COMMUNITY
Start: 2019-01-28 | End: 2019-04-26

## 2019-04-26 RX ORDER — ASCORBIC ACID 250 MG
TABLET,CHEWABLE ORAL
Refills: 5 | COMMUNITY
Start: 2019-04-22 | End: 2019-04-26

## 2019-04-26 ASSESSMENT — ENCOUNTER SYMPTOMS
SHORTNESS OF BREATH: 0
DEPRESSION: 0
PND: 0
PALPITATIONS: 0
ORTHOPNEA: 0
ABDOMINAL PAIN: 0
LOSS OF CONSCIOUSNESS: 0
FALLS: 0
DIZZINESS: 0

## 2019-04-26 NOTE — PROGRESS NOTES
Subjective:   Ariella Rendon is a 63-year-old female presenting to clinic for perioperative risk stratification.    Pertinent history:  Hypertension  Obstructive sleep apnea, on nocturnal oxygen.  Patient has refused CPAP.  COPD  History of GI bleed in June 2018    Patient is scheduled to undergo lumbar surgery in mid June.  She quit smoking on March 22, 2019.  Since then she reports that her dyspnea has significantly improved.  She denies any chest discomfort but is only able to walk about 10 feet without having to stop to rest because of her chronic back pain.  Her blood pressures have been well controlled, like today.  She has had small amount of blood in her stool when she has a rectal tear.    Past Medical History:   Diagnosis Date   • Anemia     past history   • Arthritis     hands   • Breath shortness    • Cancer (HCC)     cervical, endometrial   • COPD (chronic obstructive pulmonary disease) (HCC)    • Dental disorder    • Diverticulitis    • Heart burn     lumbar, colon   • Hiatus hernia syndrome    • Hypertension    • Indigestion    • Other specified disorder of intestines     constipation and diarrhea   • Other specified symptom associated with female genital organs 1970    cervical   • Psychiatric problem     depression   • Spinal stenosis of lumbar region      Past Surgical History:   Procedure Laterality Date   • COLONOSCOPY N/A 7/3/2018    Procedure: COLONOSCOPY;  Surgeon: Cain Castillo M.D.;  Location: [facility];  Service: Gastroenterology   • GASTROSCOPY-ENDO N/A 7/3/2018    Procedure: GASTROSCOPY-ENDO;  Surgeon: Cain Castillo M.D.;  Location: [facility];  Service: Gastroenterology   • LOW ANTERIOR RESECTION LAPAROSCOPIC  9/12/2014    Performed by Nakul Beltran M.D. at SURGERY [facility]   • GYN SURGERY  1970    hysterectomy   • GYN SURGERY  1970    scope x 4   • BOWEL RESECTION      generic cymbalta   • OTHER ABDOMINAL SURGERY      remove foreign object,  "age 7     Family History   Problem Relation Age of Onset   • Scoliosis Mother    • Cancer Father    • Lung Disease Father    • Hypertension Brother      History   Smoking Status   • Former Smoker   • Packs/day: 0.50   • Years: 40.00   • Types: Cigarettes   • Quit date: 3/22/2019   Smokeless Tobacco   • Never Used     No alcohol use. Smokes marijuana daily.  No change from prior encounter dated 12/14/2018.    Allergies   Allergen Reactions   • Other Environmental Rash     \"alloids in metal\"     Outpatient Encounter Prescriptions as of 4/26/2019   Medication Sig Dispense Refill   • ANECREAM 4 % Cream PILO AA QID  2   • DULoxetine (CYMBALTA) 30 MG Cap DR Particles TK 1 C PO QD  2   • buPROPion SR (WELLBUTRIN-SR) 150 MG TABLET SR 12 HR sustained-release tablet TK 1 T PO D FOR 3 DAYS THEN TK 1 T PO BID FOR 30 DAYS  3   • vitamin E (VITAMIN E) 1000 UNIT Cap Take 1 Cap by mouth every day.     • hydroCHLOROthiazide (HYDRODIURIL) 12.5 MG tablet      • doxepin (SINEQUAN) 10 MG Cap Take 10 mg by mouth every evening.     • albuterol 108 (90 Base) MCG/ACT Aero Soln inhalation aerosol Inhale 2 Puffs by mouth every 6 hours as needed for Shortness of Breath.     • amLODIPine (NORVASC) 10 MG Tab Take 10 mg by mouth every day.     • cetirizine-psuedoephedrine (ZYRTEC-D ALLERGY & CONGESTION) 5-120 MG per tablet Take 1 Tab by mouth every day.     • folic acid (FOLVITE) 400 MCG tablet Take 400 mcg by mouth every day.     • morphine ER (MS CONTIN) 30 MG Tab CR tablet Take 30 mg by mouth every 8 hours.     • meloxicam (MOBIC) 15 MG tablet Take 15 mg by mouth every day.     • omeprazole (PRILOSEC) 20 MG delayed-release capsule Take 20 mg by mouth every day.     • oxycodone (OXY-IR) 15 MG immediate release tablet Take 15 mg by mouth 4 times a day as needed for Severe Pain.     • ropinirole (REQUIP) 4 MG tablet Take 4 mg by mouth 4 times a day.     • ascorbic acid (VITAMIN C) 500 MG/5ML syrup Take 100 mg by mouth every day.     • magnesium " "gluconate (MAG-G) 500 MG tablet Take 1,000 mg by mouth 2 Times a Day.     • asa/apap/caffeine (EXCEDRIN) 250-250-65 MG TABS Take 2 Tabs by mouth every 6 hours as needed. Indications: Headache     • [DISCONTINUED] MethylPREDNISolone (MEDROL DOSEPAK) 4 MG Tablet Therapy Pack TK UTD  0   • [DISCONTINUED] cetirizine (ZYRTEC) 10 MG Tab TK 1 T PO QD  11   • [DISCONTINUED] Ascorbic Acid (VITAMIN C) 250 MG Chew Tab CSW 1 T PO BID  5   • [DISCONTINUED] amoxicillin-clavulanate (AUGMENTIN) 875-125 MG Tab TK 1 T PO Q 12 H FOR 10 DAYS  0   • [DISCONTINUED] ALBUTEROL SULFATE HFA INH INL 2 PFS PO Q 6 H PRN  6   • [DISCONTINUED] ferrous sulfate 325 (65 Fe) MG tablet Take 1 Tab by mouth 2 times a day, with meals. (Patient not taking: Reported on 4/26/2019) 30 Tab 0   • [DISCONTINUED] Cyanocobalamin (VITAMIN B-12 SL) Place 5,000 Units under tongue every day. Liquid     • [DISCONTINUED] Fluticasone Furoate-Vilanterol (BREO ELLIPTA) 200-25 MCG/INH AEROSOL POWDER, BREATH ACTIVATED Inhale 1 Puff by mouth every day.     • [DISCONTINUED] vitamin D (CHOLECALCIFEROL) 1000 UNIT Tab Take 1,000 Units by mouth every day.       No facility-administered encounter medications on file as of 4/26/2019.      Review of Systems   Constitutional: Negative for malaise/fatigue.   Respiratory: Negative for shortness of breath.    Cardiovascular: Negative for chest pain, palpitations, orthopnea, leg swelling and PND.   Gastrointestinal: Negative for abdominal pain.   Musculoskeletal: Negative for falls.   Neurological: Negative for dizziness and loss of consciousness.   Psychiatric/Behavioral: Negative for depression.   All other systems reviewed and are negative.       Objective:   BP (!) 90/52 (BP Location: Left arm, Patient Position: Sitting, BP Cuff Size: Adult)   Pulse 80   Ht 1.676 m (5' 6\")   Wt 83 kg (183 lb)   SpO2 90%   BMI 29.54 kg/m²     Physical Exam   Constitutional: She is oriented to person, place, and time. She appears well-developed " and well-nourished. No distress.   HENT:   Head: Normocephalic and atraumatic.   Eyes: Conjunctivae are normal.   Neck: Normal range of motion. Neck supple. No JVD present.   Cardiovascular: Normal rate, regular rhythm and normal heart sounds.  Exam reveals no gallop and no friction rub.    No murmur heard.  Pulmonary/Chest: Effort normal and breath sounds normal. No respiratory distress. She has no wheezes. She has no rales.   Abdominal: Soft. There is no tenderness.   Musculoskeletal: She exhibits no edema.   Neurological: She is alert and oriented to person, place, and time.   Skin: Skin is warm and dry. She is not diaphoretic.   Psychiatric: She has a normal mood and affect.   Nursing note and vitals reviewed.    Echocardiogram performed in February 2018 showed normal LV systolic function. No significant pericardial effusion noted. No significant valvular pathology noted.    Assessment:     1. Essential hypertension  EKG    Basic Metabolic Panel   2. Pre-operative cardiovascular examination  NM-CARDIAC STRESS TEST   3. Screening for cholesterol level  Lipid Profile   4. Encounter for long-term (current) use of high-risk medication         Medical Decision Making:  Today's Assessment / Status / Plan:     Perioperative risk stratification: Patient is scheduled to undergo a moderate risk surgery.  She is not able to perform even 4 METs.  She has a history of having dyspnea with exertion which has improved since she has quit smoking.  I would recommend proceeding with a pharmacologic myocardial perfusion study for ischemic evaluation before her planned procedure.  Patient is agreeable.  Test ordered today.    Hypertension: Blood pressure is at goal.  Continue hydrochlorothiazide and amlodipine.  Chem-7 today to evaluate renal function.    Tobacco use: Patient is quit smoking.  She was congratulated and encouraged to continue abstaining.    Return to clinic in 1 year or earlier if needed.    Thank you for allowing me  to participate in the care of this patient. Please do not hesitate to contact me with any questions.    Jessica Diaz MD  Cardiologist  [facility]      PLEASE NOTE: This dictation was created using voice recognition software.

## 2019-04-26 NOTE — LETTER
[facility]   [address] [place] [address]  [place] [address]  Phone: [phone]  Fax: [phone]              Ariella Rendon  1955    Encounter Date: 4/26/2019    Jessica Diaz M.D.          PROGRESS NOTE:  Subjective:   Ariella Rendon is a 63-year-old female presenting to clinic for perioperative risk stratification.    Pertinent history:  Hypertension  Obstructive sleep apnea, on nocturnal oxygen.  Patient has refused CPAP.  COPD  History of GI bleed in June 2018    Patient is scheduled to undergo lumbar surgery in mid June.  She quit smoking on March 22, 2019.  Since then she reports that her dyspnea has significantly improved.  She denies any chest discomfort but is only able to walk about 10 feet without having to stop to rest because of her chronic back pain.  Her blood pressures have been well controlled, like today.  She has had small amount of blood in her stool when she has a rectal tear.    Past Medical History:   Diagnosis Date   • Anemia     past history   • Arthritis     hands   • Breath shortness    • Cancer (HCC)     cervical, endometrial   • COPD (chronic obstructive pulmonary disease) (HCC)    • Dental disorder    • Diverticulitis    • Heart burn     lumbar, colon   • Hiatus hernia syndrome    • Hypertension    • Indigestion    • Other specified disorder of intestines     constipation and diarrhea   • Other specified symptom associated with female genital organs 1970    cervical   • Psychiatric problem     depression   • Spinal stenosis of lumbar region      Past Surgical History:   Procedure Laterality Date   • COLONOSCOPY N/A 7/3/2018    Procedure: COLONOSCOPY;  Surgeon: Cain Castillo M.D.;  Location: SURGERY [facility];  Service: Gastroenterology   • GASTROSCOPY-ENDO N/A 7/3/2018    Procedure: GASTROSCOPY-ENDO;  Surgeon: Cain Castillo M.D.;  Location: SURGERY [facility];  Service: Gastroenterology   • LOW ANTERIOR RESECTION  "LAPAROSCOPIC  9/12/2014    Performed by Nakul Beltran M.D. at SURGERY [facility]   • GYN SURGERY  1970    hysterectomy   • GYN SURGERY  1970    scope x 4   • BOWEL RESECTION      generic cymbalta   • OTHER ABDOMINAL SURGERY      remove foreign object, age 7     Family History   Problem Relation Age of Onset   • Scoliosis Mother    • Cancer Father    • Lung Disease Father    • Hypertension Brother      History   Smoking Status   • Former Smoker   • Packs/day: 0.50   • Years: 40.00   • Types: Cigarettes   • Quit date: 3/22/2019   Smokeless Tobacco   • Never Used     No alcohol use. Smokes marijuana daily.    Allergies   Allergen Reactions   • Other Environmental Rash     \"alloids in metal\"     Outpatient Encounter Prescriptions as of 4/26/2019   Medication Sig Dispense Refill   • ANECREAM 4 % Cream PILO AA QID  2   • DULoxetine (CYMBALTA) 30 MG Cap DR Particles TK 1 C PO QD  2   • buPROPion SR (WELLBUTRIN-SR) 150 MG TABLET SR 12 HR sustained-release tablet TK 1 T PO D FOR 3 DAYS THEN TK 1 T PO BID FOR 30 DAYS  3   • vitamin E (VITAMIN E) 1000 UNIT Cap Take 1 Cap by mouth every day.     • hydroCHLOROthiazide (HYDRODIURIL) 12.5 MG tablet      • doxepin (SINEQUAN) 10 MG Cap Take 10 mg by mouth every evening.     • albuterol 108 (90 Base) MCG/ACT Aero Soln inhalation aerosol Inhale 2 Puffs by mouth every 6 hours as needed for Shortness of Breath.     • amLODIPine (NORVASC) 10 MG Tab Take 10 mg by mouth every day.     • cetirizine-psuedoephedrine (ZYRTEC-D ALLERGY & CONGESTION) 5-120 MG per tablet Take 1 Tab by mouth every day.     • folic acid (FOLVITE) 400 MCG tablet Take 400 mcg by mouth every day.     • morphine ER (MS CONTIN) 30 MG Tab CR tablet Take 30 mg by mouth every 8 hours.     • meloxicam (MOBIC) 15 MG tablet Take 15 mg by mouth every day.     • omeprazole (PRILOSEC) 20 MG delayed-release capsule Take 20 mg by mouth every day.     • oxycodone (OXY-IR) 15 MG immediate release tablet Take 15 mg by mouth 4 " times a day as needed for Severe Pain.     • ropinirole (REQUIP) 4 MG tablet Take 4 mg by mouth 4 times a day.     • ascorbic acid (VITAMIN C) 500 MG/5ML syrup Take 100 mg by mouth every day.     • magnesium gluconate (MAG-G) 500 MG tablet Take 1,000 mg by mouth 2 Times a Day.     • asa/apap/caffeine (EXCEDRIN) 250-250-65 MG TABS Take 2 Tabs by mouth every 6 hours as needed. Indications: Headache     • [DISCONTINUED] MethylPREDNISolone (MEDROL DOSEPAK) 4 MG Tablet Therapy Pack TK UTD  0   • [DISCONTINUED] cetirizine (ZYRTEC) 10 MG Tab TK 1 T PO QD  11   • [DISCONTINUED] Ascorbic Acid (VITAMIN C) 250 MG Chew Tab CSW 1 T PO BID  5   • [DISCONTINUED] amoxicillin-clavulanate (AUGMENTIN) 875-125 MG Tab TK 1 T PO Q 12 H FOR 10 DAYS  0   • [DISCONTINUED] ALBUTEROL SULFATE HFA INH INL 2 PFS PO Q 6 H PRN  6   • [DISCONTINUED] ferrous sulfate 325 (65 Fe) MG tablet Take 1 Tab by mouth 2 times a day, with meals. (Patient not taking: Reported on 4/26/2019) 30 Tab 0   • [DISCONTINUED] Cyanocobalamin (VITAMIN B-12 SL) Place 5,000 Units under tongue every day. Liquid     • [DISCONTINUED] Fluticasone Furoate-Vilanterol (BREO ELLIPTA) 200-25 MCG/INH AEROSOL POWDER, BREATH ACTIVATED Inhale 1 Puff by mouth every day.     • [DISCONTINUED] vitamin D (CHOLECALCIFEROL) 1000 UNIT Tab Take 1,000 Units by mouth every day.       No facility-administered encounter medications on file as of 4/26/2019.      Review of Systems   Constitutional: Negative for malaise/fatigue.   Respiratory: Negative for shortness of breath.    Cardiovascular: Negative for chest pain, palpitations, orthopnea, leg swelling and PND.   Gastrointestinal: Negative for abdominal pain.   Musculoskeletal: Negative for falls.   Neurological: Negative for dizziness and loss of consciousness.   Psychiatric/Behavioral: Negative for depression.   All other systems reviewed and are negative.       Objective:   BP (!) 90/52 (BP Location: Left arm, Patient Position: Sitting, BP Cuff  "Size: Adult)   Pulse 80   Ht 1.676 m (5' 6\")   Wt 83 kg (183 lb)   SpO2 90%   BMI 29.54 kg/m²      Physical Exam   Constitutional: She is oriented to person, place, and time. She appears well-developed and well-nourished. No distress.   HENT:   Head: Normocephalic and atraumatic.   Eyes: Conjunctivae are normal.   Neck: Normal range of motion. Neck supple. No JVD present.   Cardiovascular: Normal rate, regular rhythm and normal heart sounds.  Exam reveals no gallop and no friction rub.    No murmur heard.  Pulmonary/Chest: Effort normal and breath sounds normal. No respiratory distress. She has no wheezes. She has no rales.   Abdominal: Soft. There is no tenderness.   Musculoskeletal: She exhibits no edema.   Neurological: She is alert and oriented to person, place, and time.   Skin: Skin is warm and dry. She is not diaphoretic.   Psychiatric: She has a normal mood and affect.   Nursing note and vitals reviewed.    Echocardiogram performed in February 2018 showed normal LV systolic function. No significant pericardial effusion noted. No significant valvular pathology noted.    Assessment:     1. Essential hypertension  EKG    Basic Metabolic Panel   2. Pre-operative cardiovascular examination  NM-CARDIAC STRESS TEST   3. Screening for cholesterol level  Lipid Profile   4. Encounter for long-term (current) use of high-risk medication         Medical Decision Making:  Today's Assessment / Status / Plan:     Perioperative risk stratification: Patient is scheduled to undergo a moderate risk surgery.  She is not able to perform even 4 METs.  She has a history of having dyspnea with exertion which has improved since she has quit smoking.  I would recommend proceeding with a pharmacologic myocardial perfusion study for ischemic evaluation before her planned procedure.  Patient is agreeable.  Test ordered today.    Hypertension: Blood pressure is at goal.  Continue hydrochlorothiazide and amlodipine.  Chem-7 today to " evaluate renal function.    Tobacco use: Patient is quit smoking.  She was congratulated and encouraged to continue abstaining.    Return to clinic in 1 year or earlier if needed.    Thank you for allowing me to participate in the care of this patient. Please do not hesitate to contact me with any questions.    Jessica Diaz MD  Cardiologist  [facility]      PLEASE NOTE: This dictation was created using voice recognition software.         Brianne Marti M.D.  [address] [place] [id number]  VIA Facsimile: [phone]

## 2019-05-10 DIAGNOSIS — Z13.220 SCREENING FOR CHOLESTEROL LEVEL: ICD-10-CM

## 2019-05-10 DIAGNOSIS — I10 ESSENTIAL HYPERTENSION: ICD-10-CM

## 2019-05-13 ENCOUNTER — TELEPHONE (OUTPATIENT)
Dept: CARDIOLOGY | Facility: MEDICAL CENTER | Age: 64
End: 2019-05-13

## 2019-05-13 NOTE — TELEPHONE ENCOUNTER
Lipid Profile   Order: [id number]   Status:  Final result   Visible to patient:  No (Inaccessible in MyChart) Dx:  Screening for cholesterol level   Notes recorded by Zita Pascal R.N. on 5/13/2019 at 11:03 AM PDT  Pt was notified & dietary modifications were discussed in detail.  ------    Notes recorded by Jessica Diaz M.D. on 5/10/2019 at 5:04 PM PDT  Labs reviewed.  LDL not quite at goal.  Dietary modification recommended.    Thank you  AA  ------    Notes recorded by Fredis Zepeda R.N. on 5/10/2019 at 10:10 AM PDT  No FV

## 2019-05-17 ENCOUNTER — HOSPITAL ENCOUNTER (OUTPATIENT)
Dept: RADIOLOGY | Facility: MEDICAL CENTER | Age: 64
End: 2019-05-17
Attending: INTERNAL MEDICINE
Payer: MEDICAID

## 2019-05-17 DIAGNOSIS — Z01.810 PRE-OPERATIVE CARDIOVASCULAR EXAMINATION: ICD-10-CM

## 2019-05-17 PROCEDURE — A9502 TC99M TETROFOSMIN: HCPCS

## 2019-05-17 PROCEDURE — 700111 HCHG RX REV CODE 636 W/ 250 OVERRIDE (IP)

## 2019-05-17 RX ORDER — REGADENOSON 0.08 MG/ML
INJECTION, SOLUTION INTRAVENOUS
Status: COMPLETED
Start: 2019-05-17 | End: 2019-05-17

## 2019-05-17 RX ADMIN — REGADENOSON 0.4 MG: 0.08 INJECTION, SOLUTION INTRAVENOUS at 10:14

## 2019-05-24 ENCOUNTER — HOSPITAL ENCOUNTER (OUTPATIENT)
Facility: MEDICAL CENTER | Age: 64
End: 2019-05-24
Attending: NEUROLOGICAL SURGERY | Admitting: NEUROLOGICAL SURGERY
Payer: MEDICAID

## 2019-05-30 PROCEDURE — 93018 CV STRESS TEST I&R ONLY: CPT | Performed by: INTERNAL MEDICINE

## 2019-05-30 PROCEDURE — 78452 HT MUSCLE IMAGE SPECT MULT: CPT | Mod: 26 | Performed by: INTERNAL MEDICINE

## 2019-06-06 ENCOUNTER — HOSPITAL ENCOUNTER (OUTPATIENT)
Dept: RADIOLOGY | Facility: MEDICAL CENTER | Age: 64
End: 2019-06-06
Attending: NEUROLOGICAL SURGERY | Admitting: NEUROLOGICAL SURGERY
Payer: MEDICAID

## 2019-06-06 VITALS — WEIGHT: 190.04 LBS | HEIGHT: 66 IN | BODY MASS INDEX: 30.54 KG/M2

## 2019-06-06 DIAGNOSIS — Z01.812 PRE-OPERATIVE LABORATORY EXAMINATION: ICD-10-CM

## 2019-06-06 DIAGNOSIS — Z01.811 PRE-OPERATIVE RESPIRATORY EXAMINATION: ICD-10-CM

## 2019-06-06 DIAGNOSIS — Z01.810 PRE-OPERATIVE CARDIOVASCULAR EXAMINATION: ICD-10-CM

## 2019-06-06 LAB
ANION GAP SERPL CALC-SCNC: 9 MMOL/L (ref 0–11.9)
APTT PPP: 27.1 SEC (ref 24.7–36)
BASOPHILS # BLD AUTO: 1.1 % (ref 0–1.8)
BASOPHILS # BLD: 0.06 K/UL (ref 0–0.12)
BUN SERPL-MCNC: 20 MG/DL (ref 8–22)
CALCIUM SERPL-MCNC: 9.5 MG/DL (ref 8.5–10.5)
CHLORIDE SERPL-SCNC: 104 MMOL/L (ref 96–112)
CO2 SERPL-SCNC: 27 MMOL/L (ref 20–33)
CREAT SERPL-MCNC: 0.6 MG/DL (ref 0.5–1.4)
EKG IMPRESSION: NORMAL
EOSINOPHIL # BLD AUTO: 0.15 K/UL (ref 0–0.51)
EOSINOPHIL NFR BLD: 2.8 % (ref 0–6.9)
ERYTHROCYTE [DISTWIDTH] IN BLOOD BY AUTOMATED COUNT: 46.6 FL (ref 35.9–50)
GLUCOSE SERPL-MCNC: 105 MG/DL (ref 65–99)
HCT VFR BLD AUTO: 42 % (ref 37–47)
HGB BLD-MCNC: 14.1 G/DL (ref 12–16)
IMM GRANULOCYTES # BLD AUTO: 0.01 K/UL (ref 0–0.11)
IMM GRANULOCYTES NFR BLD AUTO: 0.2 % (ref 0–0.9)
INR PPP: 0.83 (ref 0.87–1.13)
LYMPHOCYTES # BLD AUTO: 1.44 K/UL (ref 1–4.8)
LYMPHOCYTES NFR BLD: 27.2 % (ref 22–41)
MCH RBC QN AUTO: 32 PG (ref 27–33)
MCHC RBC AUTO-ENTMCNC: 33.6 G/DL (ref 33.6–35)
MCV RBC AUTO: 95.2 FL (ref 81.4–97.8)
MONOCYTES # BLD AUTO: 0.39 K/UL (ref 0–0.85)
MONOCYTES NFR BLD AUTO: 7.4 % (ref 0–13.4)
NEUTROPHILS # BLD AUTO: 3.25 K/UL (ref 2–7.15)
NEUTROPHILS NFR BLD: 61.3 % (ref 44–72)
NRBC # BLD AUTO: 0 K/UL
NRBC BLD-RTO: 0 /100 WBC
PLATELET # BLD AUTO: 195 K/UL (ref 164–446)
PMV BLD AUTO: 11 FL (ref 9–12.9)
POTASSIUM SERPL-SCNC: 4.3 MMOL/L (ref 3.6–5.5)
PROTHROMBIN TIME: 11.5 SEC (ref 12–14.6)
RBC # BLD AUTO: 4.41 M/UL (ref 4.2–5.4)
SODIUM SERPL-SCNC: 140 MMOL/L (ref 135–145)
WBC # BLD AUTO: 5.3 K/UL (ref 4.8–10.8)

## 2019-06-06 PROCEDURE — 85610 PROTHROMBIN TIME: CPT

## 2019-06-06 PROCEDURE — 80048 BASIC METABOLIC PNL TOTAL CA: CPT

## 2019-06-06 PROCEDURE — 93010 ELECTROCARDIOGRAM REPORT: CPT | Performed by: INTERNAL MEDICINE

## 2019-06-06 PROCEDURE — 36415 COLL VENOUS BLD VENIPUNCTURE: CPT

## 2019-06-06 PROCEDURE — 85730 THROMBOPLASTIN TIME PARTIAL: CPT

## 2019-06-06 PROCEDURE — 85025 COMPLETE CBC W/AUTO DIFF WBC: CPT

## 2019-06-06 PROCEDURE — 72110 X-RAY EXAM L-2 SPINE 4/>VWS: CPT

## 2019-06-06 PROCEDURE — 71045 X-RAY EXAM CHEST 1 VIEW: CPT

## 2019-06-06 PROCEDURE — 93005 ELECTROCARDIOGRAM TRACING: CPT

## 2019-06-06 RX ORDER — MORPHINE SULFATE 15 MG/1
15 TABLET ORAL
COMMUNITY
End: 2019-06-19 | Stop reason: HOSPADM

## 2019-06-06 RX ORDER — MORPHINE SULFATE 30 MG/1
30 TABLET ORAL
COMMUNITY
End: 2019-06-19 | Stop reason: HOSPADM

## 2019-06-10 ENCOUNTER — TELEPHONE (OUTPATIENT)
Dept: CARDIOLOGY | Facility: MEDICAL CENTER | Age: 64
End: 2019-06-10

## 2019-06-10 NOTE — TELEPHONE ENCOUNTER
Called pt with results. Pt states understanding. Clearance letter created and faxed to Dr. Lemons at [phone]. Confirmation received.

## 2019-06-10 NOTE — TELEPHONE ENCOUNTER
----- Message from Jessica Diaz M.D. sent at 6/7/2019  5:27 PM PDT -----  Reviewed nuclear study. No reversible defects noted.  Okay to proceed with planned procedure.  No changes for now  Thank you  AA

## 2019-06-10 NOTE — TELEPHONE ENCOUNTER
Received clearance request from [facility] for pt to have a laminectomy with Dr. Lemons. Form completed and faxed back to [phone] as requested. Confirmation received. Form sent for scanning.

## 2019-10-11 ENCOUNTER — HOSPITAL ENCOUNTER (OUTPATIENT)
Dept: RADIOLOGY | Facility: MEDICAL CENTER | Age: 64
DRG: 460 | End: 2019-10-11
Attending: NEUROLOGICAL SURGERY | Admitting: NEUROLOGICAL SURGERY
Payer: MEDICAID

## 2019-10-11 DIAGNOSIS — Z01.812 PRE-OPERATIVE LABORATORY EXAMINATION: ICD-10-CM

## 2019-10-11 DIAGNOSIS — Z01.811 PRE-OPERATIVE RESPIRATORY EXAMINATION: ICD-10-CM

## 2019-10-11 LAB
ANION GAP SERPL CALC-SCNC: 13 MMOL/L (ref 0–11.9)
APTT PPP: 27.6 SEC (ref 24.7–36)
BUN SERPL-MCNC: 21 MG/DL (ref 8–22)
CALCIUM SERPL-MCNC: 9.6 MG/DL (ref 8.5–10.5)
CHLORIDE SERPL-SCNC: 102 MMOL/L (ref 96–112)
CO2 SERPL-SCNC: 25 MMOL/L (ref 20–33)
CREAT SERPL-MCNC: 0.72 MG/DL (ref 0.5–1.4)
GLUCOSE SERPL-MCNC: 103 MG/DL (ref 65–99)
POTASSIUM SERPL-SCNC: 4.3 MMOL/L (ref 3.6–5.5)
SODIUM SERPL-SCNC: 140 MMOL/L (ref 135–145)

## 2019-10-11 PROCEDURE — 36415 COLL VENOUS BLD VENIPUNCTURE: CPT

## 2019-10-11 PROCEDURE — 85730 THROMBOPLASTIN TIME PARTIAL: CPT

## 2019-10-11 PROCEDURE — 80048 BASIC METABOLIC PNL TOTAL CA: CPT

## 2019-10-11 PROCEDURE — 71045 X-RAY EXAM CHEST 1 VIEW: CPT

## 2019-10-11 RX ORDER — POLYETHYLENE GLYCOL 3350 17 G/17G
17 POWDER, FOR SOLUTION ORAL DAILY
Status: ON HOLD | COMMUNITY
End: 2021-04-27

## 2019-10-11 RX ORDER — IPRATROPIUM BROMIDE AND ALBUTEROL SULFATE 2.5; .5 MG/3ML; MG/3ML
3 SOLUTION RESPIRATORY (INHALATION) EVERY 6 HOURS PRN
COMMUNITY
End: 2019-12-03

## 2019-10-23 ENCOUNTER — HOSPITAL ENCOUNTER (OUTPATIENT)
Dept: RADIOLOGY | Facility: MEDICAL CENTER | Age: 64
End: 2019-10-23

## 2019-10-25 ENCOUNTER — HOSPITAL ENCOUNTER (INPATIENT)
Facility: MEDICAL CENTER | Age: 64
LOS: 3 days | DRG: 460 | End: 2019-10-28
Attending: NEUROLOGICAL SURGERY | Admitting: NEUROLOGICAL SURGERY
Payer: MEDICAID

## 2019-10-25 ENCOUNTER — APPOINTMENT (OUTPATIENT)
Dept: RADIOLOGY | Facility: MEDICAL CENTER | Age: 64
DRG: 460 | End: 2019-10-25
Attending: NEUROLOGICAL SURGERY
Payer: MEDICAID

## 2019-10-25 ENCOUNTER — ANESTHESIA (OUTPATIENT)
Dept: SURGERY | Facility: MEDICAL CENTER | Age: 64
DRG: 460 | End: 2019-10-25
Payer: MEDICAID

## 2019-10-25 ENCOUNTER — ANESTHESIA EVENT (OUTPATIENT)
Dept: SURGERY | Facility: MEDICAL CENTER | Age: 64
DRG: 460 | End: 2019-10-25
Payer: MEDICAID

## 2019-10-25 PROBLEM — D72.829 LEUKOCYTOSIS: Status: ACTIVE | Noted: 2019-10-25

## 2019-10-25 PROBLEM — K21.9 GERD (GASTROESOPHAGEAL REFLUX DISEASE): Status: ACTIVE | Noted: 2019-10-25

## 2019-10-25 PROBLEM — Z98.890 STATUS POST LUMBAR SPINE SURGERY FOR DECOMPRESSION OF SPINAL CORD: Status: ACTIVE | Noted: 2019-10-25

## 2019-10-25 PROBLEM — G25.81 RESTLESS LEG SYNDROME: Status: ACTIVE | Noted: 2019-10-25

## 2019-10-25 LAB
ABO GROUP BLD: NORMAL
ALBUMIN SERPL BCP-MCNC: 3.9 G/DL (ref 3.2–4.9)
ALBUMIN/GLOB SERPL: 1.3 G/DL
ALP SERPL-CCNC: 102 U/L (ref 30–99)
ALT SERPL-CCNC: 13 U/L (ref 2–50)
ANION GAP SERPL CALC-SCNC: 8 MMOL/L (ref 0–11.9)
AST SERPL-CCNC: 20 U/L (ref 12–45)
BASOPHILS # BLD AUTO: 0.3 % (ref 0–1.8)
BASOPHILS # BLD: 0.05 K/UL (ref 0–0.12)
BILIRUB SERPL-MCNC: 0.2 MG/DL (ref 0.1–1.5)
BLD GP AB SCN SERPL QL: NORMAL
BUN SERPL-MCNC: 17 MG/DL (ref 8–22)
CALCIUM SERPL-MCNC: 9.1 MG/DL (ref 8.5–10.5)
CHLORIDE SERPL-SCNC: 107 MMOL/L (ref 96–112)
CO2 SERPL-SCNC: 23 MMOL/L (ref 20–33)
CREAT SERPL-MCNC: 0.79 MG/DL (ref 0.5–1.4)
EOSINOPHIL # BLD AUTO: 0 K/UL (ref 0–0.51)
EOSINOPHIL NFR BLD: 0 % (ref 0–6.9)
ERYTHROCYTE [DISTWIDTH] IN BLOOD BY AUTOMATED COUNT: 45.6 FL (ref 35.9–50)
GLOBULIN SER CALC-MCNC: 2.9 G/DL (ref 1.9–3.5)
GLUCOSE SERPL-MCNC: 129 MG/DL (ref 65–99)
HCT VFR BLD AUTO: 38.7 % (ref 37–47)
HGB BLD-MCNC: 11.9 G/DL (ref 12–16)
IMM GRANULOCYTES # BLD AUTO: 0.06 K/UL (ref 0–0.11)
IMM GRANULOCYTES NFR BLD AUTO: 0.4 % (ref 0–0.9)
LYMPHOCYTES # BLD AUTO: 0.53 K/UL (ref 1–4.8)
LYMPHOCYTES NFR BLD: 3.4 % (ref 22–41)
MAGNESIUM SERPL-MCNC: 1.9 MG/DL (ref 1.5–2.5)
MCH RBC QN AUTO: 28.1 PG (ref 27–33)
MCHC RBC AUTO-ENTMCNC: 30.7 G/DL (ref 33.6–35)
MCV RBC AUTO: 91.3 FL (ref 81.4–97.8)
MONOCYTES # BLD AUTO: 0.74 K/UL (ref 0–0.85)
MONOCYTES NFR BLD AUTO: 4.7 % (ref 0–13.4)
NEUTROPHILS # BLD AUTO: 14.28 K/UL (ref 2–7.15)
NEUTROPHILS NFR BLD: 91.2 % (ref 44–72)
NRBC # BLD AUTO: 0 K/UL
NRBC BLD-RTO: 0 /100 WBC
PHOSPHATE SERPL-MCNC: 2.4 MG/DL (ref 2.5–4.5)
PLATELET # BLD AUTO: 262 K/UL (ref 164–446)
PMV BLD AUTO: 11.2 FL (ref 9–12.9)
POTASSIUM SERPL-SCNC: 4.5 MMOL/L (ref 3.6–5.5)
PROT SERPL-MCNC: 6.8 G/DL (ref 6–8.2)
RBC # BLD AUTO: 4.24 M/UL (ref 4.2–5.4)
RH BLD: NORMAL
SODIUM SERPL-SCNC: 138 MMOL/L (ref 135–145)
WBC # BLD AUTO: 15.7 K/UL (ref 4.8–10.8)

## 2019-10-25 PROCEDURE — 86901 BLOOD TYPING SEROLOGIC RH(D): CPT

## 2019-10-25 PROCEDURE — A9270 NON-COVERED ITEM OR SERVICE: HCPCS | Performed by: NEUROLOGICAL SURGERY

## 2019-10-25 PROCEDURE — 86900 BLOOD TYPING SEROLOGIC ABO: CPT

## 2019-10-25 PROCEDURE — 72100 X-RAY EXAM L-S SPINE 2/3 VWS: CPT

## 2019-10-25 PROCEDURE — 160048 HCHG OR STATISTICAL LEVEL 1-5: Performed by: NEUROLOGICAL SURGERY

## 2019-10-25 PROCEDURE — 700101 HCHG RX REV CODE 250: Performed by: ANESTHESIOLOGY

## 2019-10-25 PROCEDURE — 80053 COMPREHEN METABOLIC PANEL: CPT

## 2019-10-25 PROCEDURE — 700102 HCHG RX REV CODE 250 W/ 637 OVERRIDE(OP): Performed by: ANESTHESIOLOGY

## 2019-10-25 PROCEDURE — 160002 HCHG RECOVERY MINUTES (STAT): Performed by: NEUROLOGICAL SURGERY

## 2019-10-25 PROCEDURE — 160031 HCHG SURGERY MINUTES - 1ST 30 MINS LEVEL 5: Performed by: NEUROLOGICAL SURGERY

## 2019-10-25 PROCEDURE — 700102 HCHG RX REV CODE 250 W/ 637 OVERRIDE(OP): Performed by: INTERNAL MEDICINE

## 2019-10-25 PROCEDURE — 110454 HCHG SHELL REV 250: Performed by: NEUROLOGICAL SURGERY

## 2019-10-25 PROCEDURE — 160042 HCHG SURGERY MINUTES - EA ADDL 1 MIN LEVEL 5: Performed by: NEUROLOGICAL SURGERY

## 2019-10-25 PROCEDURE — 07DR0ZZ EXTRACTION OF ILIAC BONE MARROW, OPEN APPROACH: ICD-10-PCS | Performed by: NEUROLOGICAL SURGERY

## 2019-10-25 PROCEDURE — 700111 HCHG RX REV CODE 636 W/ 250 OVERRIDE (IP)

## 2019-10-25 PROCEDURE — 501838 HCHG SUTURE GENERAL: Performed by: NEUROLOGICAL SURGERY

## 2019-10-25 PROCEDURE — 700102 HCHG RX REV CODE 250 W/ 637 OVERRIDE(OP): Performed by: NEUROLOGICAL SURGERY

## 2019-10-25 PROCEDURE — 500444 HCHG HEMOSTAT, SURGICEL 2X3: Performed by: NEUROLOGICAL SURGERY

## 2019-10-25 PROCEDURE — 700111 HCHG RX REV CODE 636 W/ 250 OVERRIDE (IP): Performed by: NEUROLOGICAL SURGERY

## 2019-10-25 PROCEDURE — 700101 HCHG RX REV CODE 250: Performed by: NEUROLOGICAL SURGERY

## 2019-10-25 PROCEDURE — 99254 IP/OBS CNSLTJ NEW/EST MOD 60: CPT | Performed by: INTERNAL MEDICINE

## 2019-10-25 PROCEDURE — 95925 SOMATOSENSORY TESTING: CPT | Performed by: NEUROLOGICAL SURGERY

## 2019-10-25 PROCEDURE — 0SG30A0 FUSION OF LUMBOSACRAL JOINT WITH INTERBODY FUSION DEVICE, ANTERIOR APPROACH, ANTERIOR COLUMN, OPEN APPROACH: ICD-10-PCS | Performed by: NEUROLOGICAL SURGERY

## 2019-10-25 PROCEDURE — 500512 HCHG ENDO PEANUT: Performed by: NEUROLOGICAL SURGERY

## 2019-10-25 PROCEDURE — 160036 HCHG PACU - EA ADDL 30 MINS PHASE I: Performed by: NEUROLOGICAL SURGERY

## 2019-10-25 PROCEDURE — 700105 HCHG RX REV CODE 258: Performed by: NEUROLOGICAL SURGERY

## 2019-10-25 PROCEDURE — 0ST40ZZ RESECTION OF LUMBOSACRAL DISC, OPEN APPROACH: ICD-10-PCS | Performed by: NEUROLOGICAL SURGERY

## 2019-10-25 PROCEDURE — 4A11X4G MONITORING OF PERIPHERAL NERVOUS ELECTRICAL ACTIVITY, INTRAOPERATIVE, EXTERNAL APPROACH: ICD-10-PCS | Performed by: NEUROLOGICAL SURGERY

## 2019-10-25 PROCEDURE — 502240 HCHG MISC OR SUPPLY RC 0272: Performed by: NEUROLOGICAL SURGERY

## 2019-10-25 PROCEDURE — 501445 HCHG STAPLER, SKIN DISP: Performed by: NEUROLOGICAL SURGERY

## 2019-10-25 PROCEDURE — A9270 NON-COVERED ITEM OR SERVICE: HCPCS | Performed by: INTERNAL MEDICINE

## 2019-10-25 PROCEDURE — 502000 HCHG MISC OR IMPLANTS RC 0278: Performed by: NEUROLOGICAL SURGERY

## 2019-10-25 PROCEDURE — 95861 NEEDLE EMG 2 EXTREMITIES: CPT | Performed by: NEUROLOGICAL SURGERY

## 2019-10-25 PROCEDURE — A9270 NON-COVERED ITEM OR SERVICE: HCPCS | Performed by: ANESTHESIOLOGY

## 2019-10-25 PROCEDURE — 86850 RBC ANTIBODY SCREEN: CPT

## 2019-10-25 PROCEDURE — 84100 ASSAY OF PHOSPHORUS: CPT

## 2019-10-25 PROCEDURE — 700111 HCHG RX REV CODE 636 W/ 250 OVERRIDE (IP): Performed by: ANESTHESIOLOGY

## 2019-10-25 PROCEDURE — 160035 HCHG PACU - 1ST 60 MINS PHASE I: Performed by: NEUROLOGICAL SURGERY

## 2019-10-25 PROCEDURE — 700106 HCHG RX REV CODE 271: Performed by: NEUROLOGICAL SURGERY

## 2019-10-25 PROCEDURE — 0SG00A0 FUSION OF LUMBAR VERTEBRAL JOINT WITH INTERBODY FUSION DEVICE, ANTERIOR APPROACH, ANTERIOR COLUMN, OPEN APPROACH: ICD-10-PCS | Performed by: NEUROLOGICAL SURGERY

## 2019-10-25 PROCEDURE — 74018 RADEX ABDOMEN 1 VIEW: CPT

## 2019-10-25 PROCEDURE — 0ST20ZZ RESECTION OF LUMBAR VERTEBRAL DISC, OPEN APPROACH: ICD-10-PCS | Performed by: NEUROLOGICAL SURGERY

## 2019-10-25 PROCEDURE — 700112 HCHG RX REV CODE 229: Performed by: NEUROLOGICAL SURGERY

## 2019-10-25 PROCEDURE — 160009 HCHG ANES TIME/MIN: Performed by: NEUROLOGICAL SURGERY

## 2019-10-25 PROCEDURE — 95940 IONM IN OPERATNG ROOM 15 MIN: CPT | Performed by: NEUROLOGICAL SURGERY

## 2019-10-25 PROCEDURE — 83735 ASSAY OF MAGNESIUM: CPT

## 2019-10-25 PROCEDURE — 85025 COMPLETE CBC W/AUTO DIFF WBC: CPT

## 2019-10-25 PROCEDURE — 36415 COLL VENOUS BLD VENIPUNCTURE: CPT

## 2019-10-25 PROCEDURE — 95937 NEUROMUSCULAR JUNCTION TEST: CPT | Performed by: NEUROLOGICAL SURGERY

## 2019-10-25 PROCEDURE — 770001 HCHG ROOM/CARE - MED/SURG/GYN PRIV*

## 2019-10-25 RX ORDER — HYDROMORPHONE HYDROCHLORIDE 1 MG/ML
0.2 INJECTION, SOLUTION INTRAMUSCULAR; INTRAVENOUS; SUBCUTANEOUS
Status: DISCONTINUED | OUTPATIENT
Start: 2019-10-25 | End: 2019-10-25 | Stop reason: HOSPADM

## 2019-10-25 RX ORDER — PSEUDOEPHEDRINE HCL 120 MG/1
120 TABLET, FILM COATED, EXTENDED RELEASE ORAL DAILY
Status: DISCONTINUED | OUTPATIENT
Start: 2019-10-26 | End: 2019-10-28 | Stop reason: HOSPADM

## 2019-10-25 RX ORDER — DEXAMETHASONE SODIUM PHOSPHATE 4 MG/ML
INJECTION, SOLUTION INTRA-ARTICULAR; INTRALESIONAL; INTRAMUSCULAR; INTRAVENOUS; SOFT TISSUE PRN
Status: DISCONTINUED | OUTPATIENT
Start: 2019-10-25 | End: 2019-10-25 | Stop reason: SURG

## 2019-10-25 RX ORDER — LIDOCAINE HYDROCHLORIDE 10 MG/ML
INJECTION, SOLUTION EPIDURAL; INFILTRATION; INTRACAUDAL; PERINEURAL
Status: COMPLETED
Start: 2019-10-25 | End: 2019-10-25

## 2019-10-25 RX ORDER — LABETALOL HYDROCHLORIDE 5 MG/ML
10 INJECTION, SOLUTION INTRAVENOUS
Status: DISCONTINUED | OUTPATIENT
Start: 2019-10-25 | End: 2019-10-28 | Stop reason: HOSPADM

## 2019-10-25 RX ORDER — IPRATROPIUM BROMIDE AND ALBUTEROL SULFATE 2.5; .5 MG/3ML; MG/3ML
3 SOLUTION RESPIRATORY (INHALATION) EVERY 6 HOURS PRN
Status: DISCONTINUED | OUTPATIENT
Start: 2019-10-25 | End: 2019-10-28 | Stop reason: HOSPADM

## 2019-10-25 RX ORDER — HYDROMORPHONE HYDROCHLORIDE 1 MG/ML
0.4 INJECTION, SOLUTION INTRAMUSCULAR; INTRAVENOUS; SUBCUTANEOUS
Status: DISCONTINUED | OUTPATIENT
Start: 2019-10-25 | End: 2019-10-25 | Stop reason: HOSPADM

## 2019-10-25 RX ORDER — PROCHLORPERAZINE EDISYLATE 5 MG/ML
5-10 INJECTION INTRAMUSCULAR; INTRAVENOUS EVERY 4 HOURS PRN
Status: DISCONTINUED | OUTPATIENT
Start: 2019-10-25 | End: 2019-10-28 | Stop reason: HOSPADM

## 2019-10-25 RX ORDER — ONDANSETRON 2 MG/ML
INJECTION INTRAMUSCULAR; INTRAVENOUS PRN
Status: DISCONTINUED | OUTPATIENT
Start: 2019-10-25 | End: 2019-10-25 | Stop reason: SURG

## 2019-10-25 RX ORDER — ONDANSETRON 2 MG/ML
4 INJECTION INTRAMUSCULAR; INTRAVENOUS
Status: DISCONTINUED | OUTPATIENT
Start: 2019-10-25 | End: 2019-10-25 | Stop reason: HOSPADM

## 2019-10-25 RX ORDER — DOXEPIN HYDROCHLORIDE 10 MG/1
20 CAPSULE ORAL NIGHTLY
Status: DISCONTINUED | OUTPATIENT
Start: 2019-10-25 | End: 2019-10-28 | Stop reason: HOSPADM

## 2019-10-25 RX ORDER — ONDANSETRON 4 MG/1
4 TABLET, ORALLY DISINTEGRATING ORAL EVERY 4 HOURS PRN
Status: DISCONTINUED | OUTPATIENT
Start: 2019-10-25 | End: 2019-10-25

## 2019-10-25 RX ORDER — PROMETHAZINE HYDROCHLORIDE 25 MG/1
12.5-25 TABLET ORAL EVERY 4 HOURS PRN
Status: DISCONTINUED | OUTPATIENT
Start: 2019-10-25 | End: 2019-10-25

## 2019-10-25 RX ORDER — CEFAZOLIN SODIUM 1 G/3ML
INJECTION, POWDER, FOR SOLUTION INTRAMUSCULAR; INTRAVENOUS PRN
Status: DISCONTINUED | OUTPATIENT
Start: 2019-10-25 | End: 2019-10-25 | Stop reason: SURG

## 2019-10-25 RX ORDER — LIDOCAINE HYDROCHLORIDE 40 MG/ML
SOLUTION TOPICAL PRN
Status: DISCONTINUED | OUTPATIENT
Start: 2019-10-25 | End: 2019-10-25 | Stop reason: SURG

## 2019-10-25 RX ORDER — CEFAZOLIN SODIUM 2 G/100ML
2 INJECTION, SOLUTION INTRAVENOUS EVERY 8 HOURS
Status: COMPLETED | OUTPATIENT
Start: 2019-10-25 | End: 2019-10-26

## 2019-10-25 RX ORDER — DOCUSATE SODIUM 100 MG/1
100 CAPSULE, LIQUID FILLED ORAL 2 TIMES DAILY
Status: DISCONTINUED | OUTPATIENT
Start: 2019-10-25 | End: 2019-10-28 | Stop reason: HOSPADM

## 2019-10-25 RX ORDER — ONDANSETRON 4 MG/1
4 TABLET, ORALLY DISINTEGRATING ORAL EVERY 4 HOURS PRN
Status: DISCONTINUED | OUTPATIENT
Start: 2019-10-25 | End: 2019-10-28 | Stop reason: HOSPADM

## 2019-10-25 RX ORDER — OXYCODONE HCL 5 MG/5 ML
10 SOLUTION, ORAL ORAL
Status: COMPLETED | OUTPATIENT
Start: 2019-10-25 | End: 2019-10-25

## 2019-10-25 RX ORDER — PROMETHAZINE HYDROCHLORIDE 25 MG/1
12.5-25 SUPPOSITORY RECTAL EVERY 4 HOURS PRN
Status: DISCONTINUED | OUTPATIENT
Start: 2019-10-25 | End: 2019-10-28 | Stop reason: HOSPADM

## 2019-10-25 RX ORDER — DIPHENHYDRAMINE HYDROCHLORIDE 50 MG/ML
25 INJECTION INTRAMUSCULAR; INTRAVENOUS EVERY 6 HOURS PRN
Status: DISCONTINUED | OUTPATIENT
Start: 2019-10-25 | End: 2019-10-28 | Stop reason: HOSPADM

## 2019-10-25 RX ORDER — CETIRIZINE HYDROCHLORIDE, PSEUDOEPHEDRINE HYDROCHLORIDE 5; 120 MG/1; MG/1
1 TABLET, FILM COATED, EXTENDED RELEASE ORAL DAILY
Status: DISCONTINUED | OUTPATIENT
Start: 2019-10-25 | End: 2019-10-25

## 2019-10-25 RX ORDER — DIPHENHYDRAMINE HCL 25 MG
25 TABLET ORAL EVERY 6 HOURS PRN
Status: DISCONTINUED | OUTPATIENT
Start: 2019-10-25 | End: 2019-10-28 | Stop reason: HOSPADM

## 2019-10-25 RX ORDER — POLYETHYLENE GLYCOL 3350 17 G/17G
1 POWDER, FOR SOLUTION ORAL 2 TIMES DAILY PRN
Status: DISCONTINUED | OUTPATIENT
Start: 2019-10-25 | End: 2019-10-25

## 2019-10-25 RX ORDER — KETAMINE HYDROCHLORIDE 50 MG/ML
INJECTION, SOLUTION INTRAMUSCULAR; INTRAVENOUS PRN
Status: DISCONTINUED | OUTPATIENT
Start: 2019-10-25 | End: 2019-10-25 | Stop reason: SURG

## 2019-10-25 RX ORDER — HYDROCHLOROTHIAZIDE 12.5 MG/1
12.5 TABLET ORAL DAILY
Status: DISCONTINUED | OUTPATIENT
Start: 2019-10-25 | End: 2019-10-28 | Stop reason: HOSPADM

## 2019-10-25 RX ORDER — DIAZEPAM 2 MG/1
5 TABLET ORAL EVERY 6 HOURS PRN
Status: DISCONTINUED | OUTPATIENT
Start: 2019-10-25 | End: 2019-10-28 | Stop reason: HOSPADM

## 2019-10-25 RX ORDER — AMLODIPINE BESYLATE 10 MG/1
10 TABLET ORAL DAILY
Status: DISCONTINUED | OUTPATIENT
Start: 2019-10-25 | End: 2019-10-28 | Stop reason: HOSPADM

## 2019-10-25 RX ORDER — POLYETHYLENE GLYCOL 3350 17 G/17G
1 POWDER, FOR SOLUTION ORAL
Status: DISCONTINUED | OUTPATIENT
Start: 2019-10-25 | End: 2019-10-25

## 2019-10-25 RX ORDER — ONDANSETRON 2 MG/ML
4 INJECTION INTRAMUSCULAR; INTRAVENOUS EVERY 4 HOURS PRN
Status: DISCONTINUED | OUTPATIENT
Start: 2019-10-25 | End: 2019-10-25

## 2019-10-25 RX ORDER — ASCORBIC ACID 250 MG
500 TABLET,CHEWABLE ORAL DAILY
Refills: 5 | Status: ON HOLD | COMMUNITY
End: 2021-01-26

## 2019-10-25 RX ORDER — CETIRIZINE HYDROCHLORIDE 10 MG/1
5 TABLET ORAL DAILY
Status: DISCONTINUED | OUTPATIENT
Start: 2019-10-26 | End: 2019-10-28 | Stop reason: HOSPADM

## 2019-10-25 RX ORDER — PROMETHAZINE HYDROCHLORIDE 25 MG/1
12.5-25 TABLET ORAL EVERY 4 HOURS PRN
Status: DISCONTINUED | OUTPATIENT
Start: 2019-10-25 | End: 2019-10-28 | Stop reason: HOSPADM

## 2019-10-25 RX ORDER — PROMETHAZINE HYDROCHLORIDE 25 MG/1
12.5-25 SUPPOSITORY RECTAL EVERY 4 HOURS PRN
Status: DISCONTINUED | OUTPATIENT
Start: 2019-10-25 | End: 2019-10-25

## 2019-10-25 RX ORDER — AMOXICILLIN 250 MG
1 CAPSULE ORAL NIGHTLY
Status: DISCONTINUED | OUTPATIENT
Start: 2019-10-25 | End: 2019-10-25

## 2019-10-25 RX ORDER — ALBUTEROL SULFATE 90 UG/1
2 AEROSOL, METERED RESPIRATORY (INHALATION) EVERY 6 HOURS PRN
Status: DISCONTINUED | OUTPATIENT
Start: 2019-10-25 | End: 2019-10-28 | Stop reason: HOSPADM

## 2019-10-25 RX ORDER — MAGNESIUM HYDROXIDE 1200 MG/15ML
LIQUID ORAL
Status: COMPLETED | OUTPATIENT
Start: 2019-10-25 | End: 2019-10-25

## 2019-10-25 RX ORDER — LABETALOL HYDROCHLORIDE 5 MG/ML
5 INJECTION, SOLUTION INTRAVENOUS
Status: DISCONTINUED | OUTPATIENT
Start: 2019-10-25 | End: 2019-10-25 | Stop reason: HOSPADM

## 2019-10-25 RX ORDER — SODIUM CHLORIDE, SODIUM LACTATE, POTASSIUM CHLORIDE, CALCIUM CHLORIDE 600; 310; 30; 20 MG/100ML; MG/100ML; MG/100ML; MG/100ML
INJECTION, SOLUTION INTRAVENOUS CONTINUOUS
Status: ACTIVE | OUTPATIENT
Start: 2019-10-25 | End: 2019-10-25

## 2019-10-25 RX ORDER — HYDRALAZINE HYDROCHLORIDE 20 MG/ML
10 INJECTION INTRAMUSCULAR; INTRAVENOUS
Status: DISCONTINUED | OUTPATIENT
Start: 2019-10-25 | End: 2019-10-28 | Stop reason: HOSPADM

## 2019-10-25 RX ORDER — MORPHINE SULFATE 4 MG/ML
4 INJECTION, SOLUTION INTRAMUSCULAR; INTRAVENOUS
Status: DISCONTINUED | OUTPATIENT
Start: 2019-10-25 | End: 2019-10-28 | Stop reason: HOSPADM

## 2019-10-25 RX ORDER — DIPHENHYDRAMINE HYDROCHLORIDE 50 MG/ML
12.5 INJECTION INTRAMUSCULAR; INTRAVENOUS
Status: DISCONTINUED | OUTPATIENT
Start: 2019-10-25 | End: 2019-10-25 | Stop reason: HOSPADM

## 2019-10-25 RX ORDER — UREA 10 %
1000 LOTION (ML) TOPICAL 2 TIMES DAILY
Status: DISCONTINUED | OUTPATIENT
Start: 2019-10-25 | End: 2019-10-28 | Stop reason: HOSPADM

## 2019-10-25 RX ORDER — AMOXICILLIN 250 MG
1 CAPSULE ORAL
Status: DISCONTINUED | OUTPATIENT
Start: 2019-10-25 | End: 2019-10-28 | Stop reason: HOSPADM

## 2019-10-25 RX ORDER — ACETAMINOPHEN 325 MG/1
650 TABLET ORAL EVERY 6 HOURS PRN
Status: DISCONTINUED | OUTPATIENT
Start: 2019-10-25 | End: 2019-10-28 | Stop reason: HOSPADM

## 2019-10-25 RX ORDER — OXYCODONE HYDROCHLORIDE 5 MG/1
15 TABLET ORAL 4 TIMES DAILY PRN
Status: DISCONTINUED | OUTPATIENT
Start: 2019-10-25 | End: 2019-10-28 | Stop reason: HOSPADM

## 2019-10-25 RX ORDER — BISACODYL 10 MG
10 SUPPOSITORY, RECTAL RECTAL
Status: DISCONTINUED | OUTPATIENT
Start: 2019-10-25 | End: 2019-10-28 | Stop reason: HOSPADM

## 2019-10-25 RX ORDER — ROCURONIUM BROMIDE 10 MG/ML
INJECTION, SOLUTION INTRAVENOUS PRN
Status: DISCONTINUED | OUTPATIENT
Start: 2019-10-25 | End: 2019-10-25 | Stop reason: SURG

## 2019-10-25 RX ORDER — LIDOCAINE HYDROCHLORIDE 20 MG/ML
INJECTION, SOLUTION EPIDURAL; INFILTRATION; INTRACAUDAL; PERINEURAL PRN
Status: DISCONTINUED | OUTPATIENT
Start: 2019-10-25 | End: 2019-10-25 | Stop reason: SURG

## 2019-10-25 RX ORDER — OMEPRAZOLE 20 MG/1
20 CAPSULE, DELAYED RELEASE ORAL DAILY
Status: DISCONTINUED | OUTPATIENT
Start: 2019-10-25 | End: 2019-10-28 | Stop reason: HOSPADM

## 2019-10-25 RX ORDER — MIDAZOLAM HYDROCHLORIDE 1 MG/ML
1 INJECTION INTRAMUSCULAR; INTRAVENOUS
Status: DISCONTINUED | OUTPATIENT
Start: 2019-10-25 | End: 2019-10-25 | Stop reason: HOSPADM

## 2019-10-25 RX ORDER — PHENYLEPHRINE HYDROCHLORIDE 10 MG/ML
INJECTION, SOLUTION INTRAMUSCULAR; INTRAVENOUS; SUBCUTANEOUS PRN
Status: DISCONTINUED | OUTPATIENT
Start: 2019-10-25 | End: 2019-10-25 | Stop reason: SURG

## 2019-10-25 RX ORDER — PROCHLORPERAZINE EDISYLATE 5 MG/ML
5-10 INJECTION INTRAMUSCULAR; INTRAVENOUS EVERY 4 HOURS PRN
Status: DISCONTINUED | OUTPATIENT
Start: 2019-10-25 | End: 2019-10-25

## 2019-10-25 RX ORDER — CEFAZOLIN SODIUM 1 G/3ML
INJECTION, POWDER, FOR SOLUTION INTRAMUSCULAR; INTRAVENOUS
Status: DISCONTINUED | OUTPATIENT
Start: 2019-10-25 | End: 2019-10-25 | Stop reason: HOSPADM

## 2019-10-25 RX ORDER — HYDROMORPHONE HYDROCHLORIDE 1 MG/ML
0.1 INJECTION, SOLUTION INTRAMUSCULAR; INTRAVENOUS; SUBCUTANEOUS
Status: DISCONTINUED | OUTPATIENT
Start: 2019-10-25 | End: 2019-10-25 | Stop reason: HOSPADM

## 2019-10-25 RX ORDER — ZOLPIDEM TARTRATE 5 MG/1
5 TABLET ORAL
Status: DISCONTINUED | OUTPATIENT
Start: 2019-10-26 | End: 2019-10-28 | Stop reason: HOSPADM

## 2019-10-25 RX ORDER — POLYETHYLENE GLYCOL 3350 17 G/17G
1 POWDER, FOR SOLUTION ORAL DAILY
Status: DISCONTINUED | OUTPATIENT
Start: 2019-10-25 | End: 2019-10-28 | Stop reason: HOSPADM

## 2019-10-25 RX ORDER — OXYCODONE HCL 5 MG/5 ML
5 SOLUTION, ORAL ORAL
Status: COMPLETED | OUTPATIENT
Start: 2019-10-25 | End: 2019-10-25

## 2019-10-25 RX ORDER — SODIUM CHLORIDE, SODIUM LACTATE, POTASSIUM CHLORIDE, AND CALCIUM CHLORIDE .6; .31; .03; .02 G/100ML; G/100ML; G/100ML; G/100ML
IRRIGANT IRRIGATION
Status: DISCONTINUED | OUTPATIENT
Start: 2019-10-25 | End: 2019-10-25 | Stop reason: HOSPADM

## 2019-10-25 RX ORDER — MEPERIDINE HYDROCHLORIDE 25 MG/ML
6.25 INJECTION INTRAMUSCULAR; INTRAVENOUS; SUBCUTANEOUS
Status: DISCONTINUED | OUTPATIENT
Start: 2019-10-25 | End: 2019-10-25 | Stop reason: HOSPADM

## 2019-10-25 RX ORDER — SODIUM CHLORIDE, SODIUM LACTATE, POTASSIUM CHLORIDE, CALCIUM CHLORIDE 600; 310; 30; 20 MG/100ML; MG/100ML; MG/100ML; MG/100ML
INJECTION, SOLUTION INTRAVENOUS CONTINUOUS
Status: DISCONTINUED | OUTPATIENT
Start: 2019-10-25 | End: 2019-10-25 | Stop reason: HOSPADM

## 2019-10-25 RX ORDER — CALCIUM CHLORIDE 100 MG/ML
INJECTION INTRAVENOUS; INTRAVENTRICULAR
Status: DISCONTINUED | OUTPATIENT
Start: 2019-10-25 | End: 2019-10-25 | Stop reason: HOSPADM

## 2019-10-25 RX ORDER — HALOPERIDOL 5 MG/ML
1 INJECTION INTRAMUSCULAR
Status: DISCONTINUED | OUTPATIENT
Start: 2019-10-25 | End: 2019-10-25 | Stop reason: HOSPADM

## 2019-10-25 RX ORDER — ROPINIROLE 2 MG/1
4 TABLET, FILM COATED ORAL 4 TIMES DAILY
Status: DISCONTINUED | OUTPATIENT
Start: 2019-10-25 | End: 2019-10-26

## 2019-10-25 RX ORDER — BISACODYL 10 MG
10 SUPPOSITORY, RECTAL RECTAL
Status: DISCONTINUED | OUTPATIENT
Start: 2019-10-25 | End: 2019-10-25

## 2019-10-25 RX ORDER — MIDAZOLAM HYDROCHLORIDE 1 MG/ML
INJECTION INTRAMUSCULAR; INTRAVENOUS PRN
Status: DISCONTINUED | OUTPATIENT
Start: 2019-10-25 | End: 2019-10-25 | Stop reason: SURG

## 2019-10-25 RX ORDER — OXYCODONE HYDROCHLORIDE 5 MG/1
5 TABLET ORAL
Status: DISCONTINUED | OUTPATIENT
Start: 2019-10-25 | End: 2019-10-28 | Stop reason: HOSPADM

## 2019-10-25 RX ORDER — SODIUM CHLORIDE AND POTASSIUM CHLORIDE 150; 900 MG/100ML; MG/100ML
INJECTION, SOLUTION INTRAVENOUS CONTINUOUS
Status: DISCONTINUED | OUTPATIENT
Start: 2019-10-25 | End: 2019-10-27

## 2019-10-25 RX ORDER — ACETAMINOPHEN 500 MG
1000 TABLET ORAL ONCE
Status: COMPLETED | OUTPATIENT
Start: 2019-10-25 | End: 2019-10-25

## 2019-10-25 RX ORDER — ENEMA 19; 7 G/133ML; G/133ML
1 ENEMA RECTAL
Status: DISCONTINUED | OUTPATIENT
Start: 2019-10-25 | End: 2019-10-28 | Stop reason: HOSPADM

## 2019-10-25 RX ORDER — AMOXICILLIN 250 MG
2 CAPSULE ORAL 2 TIMES DAILY
Status: DISCONTINUED | OUTPATIENT
Start: 2019-10-25 | End: 2019-10-25

## 2019-10-25 RX ORDER — OXYCODONE HYDROCHLORIDE 10 MG/1
10 TABLET ORAL
Status: DISCONTINUED | OUTPATIENT
Start: 2019-10-25 | End: 2019-10-28 | Stop reason: HOSPADM

## 2019-10-25 RX ORDER — ONDANSETRON 2 MG/ML
4 INJECTION INTRAMUSCULAR; INTRAVENOUS EVERY 4 HOURS PRN
Status: DISCONTINUED | OUTPATIENT
Start: 2019-10-25 | End: 2019-10-28 | Stop reason: HOSPADM

## 2019-10-25 RX ORDER — GABAPENTIN 300 MG/1
300 CAPSULE ORAL ONCE
Status: COMPLETED | OUTPATIENT
Start: 2019-10-25 | End: 2019-10-25

## 2019-10-25 RX ADMIN — POLYETHYLENE GLYCOL 3350 1 PACKET: 17 POWDER, FOR SOLUTION ORAL at 18:14

## 2019-10-25 RX ADMIN — ROCURONIUM BROMIDE 30 MG: 10 INJECTION, SOLUTION INTRAVENOUS at 12:34

## 2019-10-25 RX ADMIN — PROPOFOL 150 MG: 10 INJECTION, EMULSION INTRAVENOUS at 11:35

## 2019-10-25 RX ADMIN — DEXAMETHASONE SODIUM PHOSPHATE 8 MG: 4 INJECTION, SOLUTION INTRA-ARTICULAR; INTRALESIONAL; INTRAMUSCULAR; INTRAVENOUS; SOFT TISSUE at 12:09

## 2019-10-25 RX ADMIN — SODIUM CHLORIDE, POTASSIUM CHLORIDE, SODIUM LACTATE AND CALCIUM CHLORIDE: 600; 310; 30; 20 INJECTION, SOLUTION INTRAVENOUS at 10:48

## 2019-10-25 RX ADMIN — OMEPRAZOLE 20 MG: 20 CAPSULE, DELAYED RELEASE ORAL at 18:14

## 2019-10-25 RX ADMIN — FENTANYL CITRATE 50 MCG: 50 INJECTION INTRAMUSCULAR; INTRAVENOUS at 15:02

## 2019-10-25 RX ADMIN — SODIUM CHLORIDE, POTASSIUM CHLORIDE, SODIUM LACTATE AND CALCIUM CHLORIDE: 600; 310; 30; 20 INJECTION, SOLUTION INTRAVENOUS at 14:16

## 2019-10-25 RX ADMIN — HYDROCHLOROTHIAZIDE 12.5 MG: 12.5 TABLET ORAL at 20:39

## 2019-10-25 RX ADMIN — HYDROMORPHONE HYDROCHLORIDE 0.2 MG: 1 INJECTION, SOLUTION INTRAMUSCULAR; INTRAVENOUS; SUBCUTANEOUS at 16:28

## 2019-10-25 RX ADMIN — FENTANYL CITRATE 50 MCG: 50 INJECTION INTRAMUSCULAR; INTRAVENOUS at 14:57

## 2019-10-25 RX ADMIN — DOXEPIN HYDROCHLORIDE 20 MG: 10 CAPSULE ORAL at 20:40

## 2019-10-25 RX ADMIN — PHENYLEPHRINE HYDROCHLORIDE 200 MCG: 10 INJECTION INTRAVENOUS at 12:54

## 2019-10-25 RX ADMIN — LIDOCAINE HYDROCHLORIDE 0.5 ML: 10 INJECTION, SOLUTION EPIDURAL; INFILTRATION; INTRACAUDAL at 10:48

## 2019-10-25 RX ADMIN — PHENYLEPHRINE HYDROCHLORIDE 200 MCG: 10 INJECTION INTRAVENOUS at 11:43

## 2019-10-25 RX ADMIN — GABAPENTIN 300 MG: 300 CAPSULE ORAL at 10:22

## 2019-10-25 RX ADMIN — MIDAZOLAM HYDROCHLORIDE 1 MG: 1 INJECTION, SOLUTION INTRAMUSCULAR; INTRAVENOUS at 15:30

## 2019-10-25 RX ADMIN — PHENYLEPHRINE HYDROCHLORIDE 200 MCG: 10 INJECTION INTRAVENOUS at 11:56

## 2019-10-25 RX ADMIN — PHENYLEPHRINE HYDROCHLORIDE 100 MCG: 10 INJECTION INTRAVENOUS at 11:40

## 2019-10-25 RX ADMIN — OXYCODONE HYDROCHLORIDE 10 MG: 10 TABLET ORAL at 18:13

## 2019-10-25 RX ADMIN — AMLODIPINE BESYLATE 10 MG: 10 TABLET ORAL at 20:41

## 2019-10-25 RX ADMIN — ROCURONIUM BROMIDE 30 MG: 10 INJECTION, SOLUTION INTRAVENOUS at 13:28

## 2019-10-25 RX ADMIN — ROCURONIUM BROMIDE 20 MG: 10 INJECTION, SOLUTION INTRAVENOUS at 14:00

## 2019-10-25 RX ADMIN — KETAMINE HYDROCHLORIDE 50 MG: 50 INJECTION, SOLUTION INTRAMUSCULAR; INTRAVENOUS at 12:00

## 2019-10-25 RX ADMIN — SUGAMMADEX 200 MG: 100 INJECTION, SOLUTION INTRAVENOUS at 14:16

## 2019-10-25 RX ADMIN — Medication 0.5 ML: at 10:48

## 2019-10-25 RX ADMIN — PHENYLEPHRINE HYDROCHLORIDE 200 MCG: 10 INJECTION INTRAVENOUS at 13:25

## 2019-10-25 RX ADMIN — SODIUM CHLORIDE, POTASSIUM CHLORIDE, SODIUM LACTATE AND CALCIUM CHLORIDE: 600; 310; 30; 20 INJECTION, SOLUTION INTRAVENOUS at 12:19

## 2019-10-25 RX ADMIN — PROPOFOL 150 MCG/KG/MIN: 10 INJECTION, EMULSION INTRAVENOUS at 11:44

## 2019-10-25 RX ADMIN — LIDOCAINE HYDROCHLORIDE 4 ML: 40 SOLUTION TOPICAL at 11:36

## 2019-10-25 RX ADMIN — OXYCODONE HYDROCHLORIDE 10 MG: 5 SOLUTION ORAL at 15:02

## 2019-10-25 RX ADMIN — PHENYLEPHRINE HYDROCHLORIDE 200 MCG: 10 INJECTION INTRAVENOUS at 12:29

## 2019-10-25 RX ADMIN — DOCUSATE SODIUM 100 MG: 100 CAPSULE, LIQUID FILLED ORAL at 18:14

## 2019-10-25 RX ADMIN — Medication 1000 MG: at 20:40

## 2019-10-25 RX ADMIN — PHENYLEPHRINE HYDROCHLORIDE 200 MCG: 10 INJECTION INTRAVENOUS at 12:38

## 2019-10-25 RX ADMIN — HYDROMORPHONE HYDROCHLORIDE 0.4 MG: 1 INJECTION, SOLUTION INTRAMUSCULAR; INTRAVENOUS; SUBCUTANEOUS at 15:25

## 2019-10-25 RX ADMIN — SUFENTANIL CITRATE 15 MCG: 50 INJECTION EPIDURAL; INTRAVENOUS at 11:34

## 2019-10-25 RX ADMIN — Medication: at 18:47

## 2019-10-25 RX ADMIN — SUFENTANIL CITRATE 0.15 MCG/KG/HR: 50 INJECTION EPIDURAL; INTRAVENOUS at 11:46

## 2019-10-25 RX ADMIN — ACETAMINOPHEN 1000 MG: 500 TABLET ORAL at 10:22

## 2019-10-25 RX ADMIN — MIDAZOLAM 2 MG: 1 INJECTION INTRAMUSCULAR; INTRAVENOUS at 11:25

## 2019-10-25 RX ADMIN — ROPINIROLE HYDROCHLORIDE 4 MG: 2 TABLET, FILM COATED ORAL at 20:40

## 2019-10-25 RX ADMIN — LIDOCAINE HYDROCHLORIDE 3 ML: 20 INJECTION, SOLUTION EPIDURAL; INFILTRATION; INTRACAUDAL at 11:34

## 2019-10-25 RX ADMIN — POTASSIUM CHLORIDE AND SODIUM CHLORIDE: 900; 150 INJECTION, SOLUTION INTRAVENOUS at 18:26

## 2019-10-25 RX ADMIN — CEFAZOLIN 2 G: 330 INJECTION, POWDER, FOR SOLUTION INTRAMUSCULAR; INTRAVENOUS at 11:40

## 2019-10-25 RX ADMIN — CEFAZOLIN SODIUM 2 G: 2 INJECTION, SOLUTION INTRAVENOUS at 20:37

## 2019-10-25 RX ADMIN — HYDROMORPHONE HYDROCHLORIDE 0.4 MG: 1 INJECTION, SOLUTION INTRAMUSCULAR; INTRAVENOUS; SUBCUTANEOUS at 15:10

## 2019-10-25 RX ADMIN — ROCURONIUM BROMIDE 50 MG: 10 INJECTION, SOLUTION INTRAVENOUS at 11:35

## 2019-10-25 RX ADMIN — ONDANSETRON 4 MG: 2 INJECTION INTRAMUSCULAR; INTRAVENOUS at 14:08

## 2019-10-25 SDOH — HEALTH STABILITY: MENTAL HEALTH: HOW OFTEN DO YOU HAVE 6 OR MORE DRINKS ON ONE OCCASION?: NEVER

## 2019-10-25 SDOH — HEALTH STABILITY: MENTAL HEALTH: HOW OFTEN DO YOU HAVE A DRINK CONTAINING ALCOHOL?: NEVER

## 2019-10-25 SDOH — HEALTH STABILITY: MENTAL HEALTH: HOW MANY STANDARD DRINKS CONTAINING ALCOHOL DO YOU HAVE ON A TYPICAL DAY?: 1 OR 2

## 2019-10-25 ASSESSMENT — LIFESTYLE VARIABLES
HAVE YOU EVER FELT YOU SHOULD CUT DOWN ON YOUR DRINKING: NO
ALCOHOL_USE: NO
EVER FELT BAD OR GUILTY ABOUT YOUR DRINKING: NO
EVER_SMOKED: YES
TOTAL SCORE: 0
EVER HAD A DRINK FIRST THING IN THE MORNING TO STEADY YOUR NERVES TO GET RID OF A HANGOVER: NO
AVERAGE NUMBER OF DAYS PER WEEK YOU HAVE A DRINK CONTAINING ALCOHOL: 0
ON A TYPICAL DAY WHEN YOU DRINK ALCOHOL HOW MANY DRINKS DO YOU HAVE: 0
TOTAL SCORE: 0
CONSUMPTION TOTAL: NEGATIVE
HAVE PEOPLE ANNOYED YOU BY CRITICIZING YOUR DRINKING: NO
SUBSTANCE_ABUSE: 0
TOTAL SCORE: 0
EVER_SMOKED: YES
HOW MANY TIMES IN THE PAST YEAR HAVE YOU HAD 5 OR MORE DRINKS IN A DAY: 0

## 2019-10-25 ASSESSMENT — ENCOUNTER SYMPTOMS
COUGH: 0
SPEECH CHANGE: 0
NERVOUS/ANXIOUS: 0
CHILLS: 0
HEMOPTYSIS: 0
PHOTOPHOBIA: 0
FLANK PAIN: 0
FEVER: 0
BACK PAIN: 1
HALLUCINATIONS: 0
HEARTBURN: 0
BLURRED VISION: 0
FOCAL WEAKNESS: 0
POLYDIPSIA: 0
NECK PAIN: 0
SPUTUM PRODUCTION: 0
BRUISES/BLEEDS EASILY: 0
WEIGHT LOSS: 0
VOMITING: 0
DOUBLE VISION: 0
HEADACHES: 0
TREMORS: 0
PALPITATIONS: 0
ORTHOPNEA: 0
NAUSEA: 0

## 2019-10-25 ASSESSMENT — PATIENT HEALTH QUESTIONNAIRE - PHQ9
SUM OF ALL RESPONSES TO PHQ9 QUESTIONS 1 AND 2: 0
2. FEELING DOWN, DEPRESSED, IRRITABLE, OR HOPELESS: NOT AT ALL
1. LITTLE INTEREST OR PLEASURE IN DOING THINGS: NOT AT ALL

## 2019-10-25 ASSESSMENT — COGNITIVE AND FUNCTIONAL STATUS - GENERAL
TURNING FROM BACK TO SIDE WHILE IN FLAT BAD: A LITTLE
CLIMB 3 TO 5 STEPS WITH RAILING: A LOT
MOVING TO AND FROM BED TO CHAIR: A LITTLE
MOVING FROM LYING ON BACK TO SITTING ON SIDE OF FLAT BED: A LITTLE
WALKING IN HOSPITAL ROOM: A LITTLE
STANDING UP FROM CHAIR USING ARMS: A LOT
DAILY ACTIVITIY SCORE: 23
SUGGESTED CMS G CODE MODIFIER DAILY ACTIVITY: CI
SUGGESTED CMS G CODE MODIFIER MOBILITY: CK
MOBILITY SCORE: 16
HELP NEEDED FOR BATHING: A LITTLE

## 2019-10-25 ASSESSMENT — PAIN SCALES - GENERAL: PAIN_LEVEL: 6

## 2019-10-25 ASSESSMENT — COPD QUESTIONNAIRES
DURING THE PAST 4 WEEKS HOW MUCH DID YOU FEEL SHORT OF BREATH: NONE/LITTLE OF THE TIME
HAVE YOU SMOKED AT LEAST 100 CIGARETTES IN YOUR ENTIRE LIFE: YES
DO YOU EVER COUGH UP ANY MUCUS OR PHLEGM?: NO/ONLY WITH OCCASIONAL COLDS OR INFECTIONS
COPD SCREENING SCORE: 4

## 2019-10-25 NOTE — CONSULTS
"Hospital Medicine Consultation    Date of Service  10/25/2019    Referring Physician  Umair Lemons M.D.    Consulting Physician  Fahad Graves M.D.    Reason for Consultation  Management of chronic medical problems    History of Presenting Illness  64 y.o. female who presented 10/25/2019 with with history of COPD oxygen dependent, dyslipidemia, hypertension, psychiatric disorder, diverticulitis, spinal stenosis, was admitted by Dr. Lemons/neurosurgery  For routine surgery, fusion of lumbar spine, anterior approach L4 S1 with plate placement and discectomy  He requested consult from hospitalist service for management of chronic medical problems.  Patient uneventfully undergone surgery, currently resting in her bed.  She does complain of generalized muscle cramps, also claims \" restless leg syndrome in the whole body\"  Physical exam significant for Hemovac over anterior abdominal midline surgical incision.  Currently lower back pain is controlled with Dilaudid pump.  Patient is on 3 L oxygen, which is her baseline.  Cardoso catheter was placed.    Review of Systems  Review of Systems   Constitutional: Negative for chills, fever and weight loss.   HENT: Negative for ear pain, hearing loss and tinnitus.    Eyes: Negative for blurred vision, double vision and photophobia.   Respiratory: Negative for cough, hemoptysis and sputum production.    Cardiovascular: Negative for chest pain, palpitations and orthopnea.   Gastrointestinal: Negative for heartburn, nausea and vomiting.   Genitourinary: Negative for dysuria, flank pain, frequency and hematuria.   Musculoskeletal: Positive for back pain. Negative for joint pain and neck pain.   Skin: Negative for itching and rash.   Neurological: Negative for tremors, speech change, focal weakness and headaches.   Endo/Heme/Allergies: Negative for environmental allergies and polydipsia. Does not bruise/bleed easily.   Psychiatric/Behavioral: Negative for hallucinations and " substance abuse. The patient is not nervous/anxious.        Past Medical History   has a past medical history of Anemia, Anesthesia (06/06/2019), Arthritis, Breath shortness, Cancer (HCC), COPD (chronic obstructive pulmonary disease) (HCC), Dental disorder, Diverticulitis, Heart burn, Hiatus hernia syndrome, High cholesterol, Hypertension, Indigestion, Other specified disorder of intestines, Other specified symptom associated with female genital organs (1970), Oxygen dependent, Psychiatric problem, and Spinal stenosis of lumbar region.    Surgical History   has a past surgical history that includes gyn surgery (1970); gyn surgery (1970); low anterior resection laparoscopic (9/12/2014); other abdominal surgery; bowel resection; colonoscopy (N/A, 7/3/2018); and gastroscopy-endo (N/A, 7/3/2018).    Family History  family history includes Cancer in her father; Hypertension in her brother; Lung Disease in her father; Scoliosis in her mother.    Social History   reports that she quit smoking many months ago. Her smoking use included cigarettes. She has a 25.00 pack-year smoking history. She has never used smokeless tobacco. She reports that she has current or past drug history. Drugs: Marijuana and Inhaled. Frequency: 7.00 times per week. She reports that she does not drink alcohol.    Medications  Prior to Admission Medications   Prescriptions Last Dose Informant Patient Reported? Taking?   ANECREAM 4 % Cream 10/22/2019 Patient Yes No   Sig: Apply 1 Application to affected area(s) as needed.   Ascorbic Acid (VITAMIN C) 250 MG Chew Tab > week at am Patient Yes No   Sig: CSW 1 T PO BID   albuterol 108 (90 Base) MCG/ACT Aero Soln inhalation aerosol 10/24/2019 at afternoon Patient Yes No   Sig: Inhale 2 Puffs by mouth every 6 hours as needed for Shortness of Breath.   amLODIPine (NORVASC) 10 MG Tab 10/24/2019 at 0800 Patient Yes No   Sig: Take 10 mg by mouth every day.   asa/apap/caffeine (EXCEDRIN) 250-250-65 MG TABS  "10/19/2019 at am Patient Yes No   Sig: Take 2 Tabs by mouth every 6 hours as needed. Indications: Headache   cetirizine-psuedoephedrine (ZYRTEC-D ALLERGY & CONGESTION) 5-120 MG per tablet 10/24/2019 at 0800 Patient Yes No   Sig: Take 1 Tab by mouth every day.   doxepin (SINEQUAN) 10 MG Cap 10/24/2019 at 2030 Patient Yes No   Sig: Take 20 mg by mouth every evening.   hydroCHLOROthiazide (HYDRODIURIL) 12.5 MG tablet 10/25/2019 at am Patient Yes No   Sig: Take 12.5 mg by mouth every day.   ipratropium-albuterol (DUONEB) 0.5-2.5 (3) MG/3ML nebulizer solution 10/25/2019 at 0730 Patient Yes No   Sig: 3 mL by Nebulization route every 6 hours as needed.   magnesium gluconate (MAG-G) 500 MG tablet 10/24/2019 at 2030 Patient Yes No   Sig: Take 1,000 mg by mouth 2 Times a Day.   meloxicam (MOBIC) 15 MG tablet 10/15/2019 at am Patient Yes No   Sig: Take 15 mg by mouth every day.   omeprazole (PRILOSEC) 20 MG delayed-release capsule 10/24/2019 at 0800 Patient Yes No   Sig: Take 20 mg by mouth every day.   oxycodone (OXY-IR) 15 MG immediate release tablet 10/25/2019 at 0730 Patient Yes No   Sig: Take 15 mg by mouth 4 times a day as needed for Severe Pain.   polyethylene glycol/lytes (MIRALAX) Pack 10/24/2019 at 0800 Patient Yes No   Sig: Take 17 g by mouth every day.   ropinirole (REQUIP) 4 MG tablet 10/25/2019 at 0730 Patient Yes No   Sig: Take 4 mg by mouth 4 times a day.   vitamin E (VITAMIN E) 1000 UNIT Cap 10/25/2019 at am Patient Yes No   Sig: Take 1 Cap by mouth every day.      Facility-Administered Medications: None       Allergies  Allergies   Allergen Reactions   • Other Environmental Rash     \"alloids in metal\"       Physical Exam  Temp:  [36.3 °C (97.4 °F)-36.9 °C (98.4 °F)] 36.9 °C (98.4 °F)  Pulse:  [85-89] 85  Resp:  [14-18] 14  BP: (157-172)/(86-93) 157/93  SpO2:  [93 %-97 %] 97 %    Physical Exam   Constitutional: She is oriented to person, place, and time. She appears well-developed and well-nourished.   HENT: "   Head: Normocephalic and atraumatic.   3 L nasal cannula   Eyes: Pupils are equal, round, and reactive to light. EOM are normal.   Neck: Normal range of motion. Neck supple.   Cardiovascular: Normal rate, regular rhythm and normal heart sounds.   Pulmonary/Chest: Effort normal and breath sounds normal.   Abdominal: Soft. Bowel sounds are normal. There is tenderness. There is no rigidity, no rebound and no guarding.   Midline incision covered with sterile dressing and Hemovac   Genitourinary:   Genitourinary Comments: Cardoso catheter in place, straw-colored clear urine   Musculoskeletal: Normal range of motion.   Neurological: She is alert and oriented to person, place, and time.   Skin: Skin is warm and dry.   Psychiatric: She has a normal mood and affect.   Nursing note and vitals reviewed.      Fluids  Date 10/25/19 0700 - 10/26/19 0659   Shift 0781-1074 5271-3713 8680-6751 24 Hour Total   INTAKE   I.V. 2000   2000   Shift Total 2000   2000   OUTPUT   Urine 150   150   Blood 200   200   Shift Total 350   350   Weight (kg) 93.5 93.5 93.5 93.5       Laboratory                          Imaging  DX-PORTABLE FLUORO > 1 HOUR   Final Result      Intraoperative evaluation of lumbar spine surgery.                  INTERPRETING LOCATION:  [address] , [facility], [address]      DX-LUMBAR SPINE-2 OR 3 VIEWS   Final Result      Intraoperative evaluation of lumbar spine surgery.                  INTERPRETING LOCATION:  [address] , [place], [address]      [id number] VIEW   Final Result      No radiopaque surgical instrument or sponge marker identified.          Assessment/Plan  GERD (gastroesophageal reflux disease)  Assessment & Plan  Stable.  Continue PPI    Leukocytosis  Assessment & Plan  Probably reactive, related to surgery today.  Monitor for fever  .  Repeat CBC    Restless leg syndrome  Assessment & Plan  Continue ropinirole    Status post lumbar spine surgery for decompression of spinal cord  Assessment & Plan  Management  per neurosurgery  PT OT evaluation  DVT prophylaxis to start when appropriate  Mobilization  Remove Cardoso catheter  Pain control, currently Dilaudid pump  Bowel regimen    EMILY (obstructive sleep apnea)- (present on admission)  Assessment & Plan  Continue with nocturnal CPAP    Hypertension  Assessment & Plan  Continue home regimen.  Hydralazine as needed IV  Monitor blood pressure    Chronic respiratory failure (HCC)- (present on admission)  Assessment & Plan  Continue with supplemental oxygen.  Currently at baseline, 3 L    COPD (chronic obstructive pulmonary disease) (HCC)- (present on admission)  Assessment & Plan  Not in exacerbation.  Continue home inhalers.  RT protocol and supplemental oxygen

## 2019-10-25 NOTE — OP REPORT
DATE OF SERVICE:  10/25/2019    PREOPERATIVE DIAGNOSES:.  Severe loss of disk height at L5-S1 with L4-L5,   L5-S1 stenosis and foraminal stenosis with right greater than left disk   herniations with prior back surgery and failed back syndrome.    POSTOPERATIVE DIAGNOSES:.  Severe loss of disk height at L5-S1 with L4-L5,   L5-S1 stenosis and foraminal stenosis with right greater than left disk   herniations with prior back surgery and failed back syndrome.    PRINCIPAL PROCEDURE PERFORMED:  Open L4-L5, L5-S1 anterior lumbar interbody   fusion with placement of 2 [employer] interbody cages and placement of 2   distinct non-integrated anterior lumbar plates at L4-L5 and L5-S1.  [employer] cages and [employer] plates were used.    SURGEON:  Umair Lemons MD    The cosurgeon for the initial arthrodesis is Dr. Sunil Silva.  The   assistant surgeon is Dr. Sunil Silva for the other codes.  The   approaching closure surgeon is Dr. Sunil Silva.    ANESTHESIA:  The procedure was performed under general anesthesia.    ANESTHESIA:  Deondre Leyva MD    COMPLICATIONS:  There are no complications.     ESTIMATED BLOOD LOSS:  Less than 100 mL.    FINDINGS:  Include a nice increase in foraminal height with nice placement of   hardware and stable neuro monitoring signals.  Please note that a 22 modifier   was added given the patient's BMI of 33.27.  She is 5 feet 6 and weighs 206   pounds and 2.1 ounces.  This required extra time and expertise given the depth   of the incision.    For IV fluids, urine output, estimated blood loss, please see the anesthesia   record.    DISPOSITION:  Patient will be extubated and brought to the recovery room.    CLINICAL HISTORY:  The patient is a 64-year-old female.  The patient has a   history of chronic pain syndrome.  The patient had a prior back surgery by Dr. Patle.  The patient presents with right greater than left shooting leg   radiculopathy and horrible  back pain.  Ultimately, I had worked her up with   the discogram, which was positive at L4-L5 and L5-S1.  We discussed risks,   benefits, and options.  We discussed realistic expectations.  The patient was   most interested in proceeding with surgery.  She was consented for a 360 at   L4-L5 and L5-S1.  We discussed that we may just do the anterior portion today.    The patient understood and signed a written informed consent.    DESCRIPTION OF PROCEDURE:  She was brought to the operating room and placed   under general anesthesia.  Needle electrodes were placed for baseline neuro   monitoring signals.  Remote monitoring was performed by neuro monitoring   associates.  The abdomen was prepped and draped in the usual sterile fashion.    Dr. Sunil Silva made a midline incision just to the left of the midline.    He will dictate the approach separately.  L5-S1 had been exposed.    Intraoperative fluoroscopy demonstrated the correct level.  Dr. Sunil Silva had retractors in place.  The patient was so deep that the longest   retractors were still shorter than what would have been ideal.  This required   extra time and expertise in order to angle the retractors so that we can get   deeper retraction.  An annulotomy was made.  A very thorough diskectomy was   made.  The disk was essentially bone-on-bone.  I very carefully perform the   diskectomy without disrupting the endplates.  After I had removed the   cartilaginous endplates, I used blunt serial distraction to open up the facet   joints.  Ultimately, I was very pleased to insert a 13 mm in height 8-degree   lordotic titanium interbody cage packed with Solum IV mixed with bone marrow   aspirate.  The left ASIS was cannulated by Dr. Sunil Silva earlier in the   case and 60 mL of bone marrow aspirate were pulled.  Next, I secured a [employer] L5-S1 anterior lumbar plate by using four 24 mm screws.  The locking   mechanism was engaged.  Next,  attention was paid to L4-L5.  This required   extra time and expertise because the vasculature was quite stuck down.  We   were able to get a nice window.  An annulotomy was made.  A very thorough   diskectomy and cartilaginous endplate preparation was achieved.  After a very   thorough diskectomy and cartilaginous endplate preparation, I inserted a 13 mm   titanium interbody cage packed with Solum IV and bone marrow aspirate.  This   was tapped into place.  Next, I secured a [employer] anterior lumbar   plate.  Four 24 mm screws were used and locking mechanism was engaged.  Please   note that total of 2 interbody cages was used.  Please note that a total of 2   anterior lumbar plates were used.  A total of eight 24 mm screws were used.    AP and lateral fluoroscopy demonstrated perfect placement of hardware.    Because I was able to achieve a nice indirect decompression and because the   patient was exposed to anesthesia, I decided not to perform the posterior   portion at this time.  I would like to keep an eye on her and see how she   does.  If the patient does well, we do not have to do the posterior portion.    If the patient loses some of her indirect decompression or if the patient is   still symptomatic, I will image her and consider taking her back for the   posterior portion.  The closure will be dictated separately by Dr. Sunil Silva.  Dr. Sunil Silva was going to place a Prevena.  The patient   will be extubated and brought to the recovery room.  Please note that a 22   modifier was added given the extra time and expertise required given the   patient's habitus.       ____________________________________     MD BECKI ESCAMILLA / JEFFERSON    DD:  10/25/2019 14:35:48  DT:  10/25/2019 15:19:43    D#:  [id number]  Job#:  [id number]

## 2019-10-25 NOTE — ANESTHESIA PREPROCEDURE EVALUATION
Lumbar DDD    Relevant Problems   ANESTHESIA   (+) EMILY (obstructive sleep apnea)      PULMONARY   (+) COPD (chronic obstructive pulmonary disease) (HCC)      CARDIAC   (+) Essential hypertension      Other   (+) Diastolic dysfunction   On O2 3L/m at night  Chronic pain    MPI 5/2019 negative for ischemia    Physical Exam    Airway   Mallampati: II  TM distance: >3 FB  Neck ROM: full       Cardiovascular - normal exam  Rhythm: regular  Rate: normal  (-) murmur     Dental - normal exam  (+) upper dentures         Pulmonary - normal exam  Breath sounds clear to auscultation     Abdominal    Neurological - normal exam               Anesthesia Plan    ASA 3   ASA physical status 3 criteria: COPD    Plan - general       Airway plan will be ETT        Induction: intravenous    Postoperative Plan: Postoperative administration of opioids is intended.    Pertinent diagnostic labs and testing reviewed    Informed Consent:    Anesthetic plan and risks discussed with patient.    Use of blood products discussed with: patient whom consented to blood products.

## 2019-10-25 NOTE — OR NURSING
Oral airway removed. Loose tooth intact. Pt does not know about wound. Pt states she has been bed ridden since March.

## 2019-10-25 NOTE — OR NURSING
attempts to contact daughter. Not answering phone. Voice mail not set up.  reports that daughter has left waiting area. Room number given to her. daughter . daughter said she will return later.

## 2019-10-25 NOTE — OP REPORT
DATE OF SERVICE:  10/25/2019    OPERATING SURGEON:  Sunil Silva MD    COSURGEON:  Umair Lemons MD    PROCEDURES PERFORMED:  Anterior midline retroperitoneal approach for L4-L5 and   L5-S1 diskectomies, cage, fusions, and platings.    ANESTHESIA:  General.    ESTIMATED BLOOD LOSS:  Less than 100 mL    PREOPERATIVE DIAGNOSIS:  Multilevel degenerative disk disease.    POSTOPERATIVE DIAGNOSIS:  Multilevel degenerative disk disease.    SUMMARY:  This is a 64-year-old obese female.  She has chronic back pain and   radiculopathy related to collapse at L4-L5 and L5-S1.  The patient has had   previous procedures, which have failed, and at this time is scheduled for   anterior and posterior fusions.    She has had a previous upper midline incision and truncal obesity ____ 22 for   her procedure.  The risks have been explained.    DESCRIPTION OF PROCEDURE:  The patient was taken to the operating room and   satisfactory general anesthesia was induced via endotracheal intubation.  The   patient was prepped and draped.  Midline incision was made from above the   umbilicus to the top of the thick pannus.  Incision was carried down through 6   inches of fat with exposure of the left rectus sheath.  This was scored and   the left retrorectus space was entered.  Retroperitoneal space was entered   with more superior fascia was scored in order to facilitate exposure of the   retroperitoneal contents including left ureter brought to the patient's right   and self-retaining retractors were placed in the depths of the wound.    Exposure was problematic owing to her truncal obesity, but the exposure for   the L5-S1 disk space was made by mobilizing soft tissue structures overlying   the sacral promontory with bipolar cautery.    Exposure for L4-L5 was obtained by mobilizing the left common iliac artery and   vein and ligating 2 iliolumbar veins.    The appropriate levels were identified in the AP and lateral  projections.    Please see the dictation of Dr. Kulwant Sprague for the diskectomies, cage,   fusions, and platings.    X-rays were negative for retained foreign bodies and the patient was closed   with running #1 looped PDS sutures in the fascia and staples on the skin.  A   Prevena was then placed.  The patient was sent to the recovery room in good   condition.  No specimens were sent to pathology.       ____________________________________     KARAN PEREZ MD    TER / NTS    DD:  10/25/2019 14:25:20  DT:  10/25/2019 14:36:07    D#:  [id number]  Job#:  [id number]    cc: KULWANT SPRAGUE MD

## 2019-10-25 NOTE — OR NURSING
Arrival to pacu . Reports rec'd. Anesthesia Dr Leyva reports that pt has lose bottom right tooth. Bedside inspection shows tooth intact.     Pt has left lower abdomen ' puncture wound' per OR nurse. Area appears bruised.

## 2019-10-25 NOTE — OR SURGEON
Immediate Post OP Note    PreOp Diagnosis: L4,S1 DDD, + L4,5/L5,S1 foraminal stenosis, R>L disc herniations, prior back surgery, failed back syndrome    PostOp Diagnosis: same    Procedure(s):  FUSION, SPINE, LUMBAR, ANTERIOR APPROACH- L4-S1 ALIF WITH PLATE - Wound Class: Clean  FUSION, SPINE, LUMBAR, PLIF- L4-S1  LAMINECTOMY, SPINE, LUMBAR, WITH DISCECTOMY- REDO    Surgeon(s):  DARIUS Tyler M.D.    Anesthesiologist/Type of Anesthesia:  Anesthesiologist: Deondre Leyva M.D./General    Surgical Staff:  Cell Saver : Kary Covington  Circulator: Andrade Lyle RESTELLE; Sri Nichols R.N.  Relief Circulator: Mray Becerra R.N.  Scrub Person: Saranya Dillon; g [facility] Surgical Tech/Unit  Tstrwn  Radiology Technologist: George Frost    Specimens removed if any:  * No specimens in log *    Estimated Blood Loss: < 100 cc    Findings: + nice increase in foraminal height, nice placement of hardware, stable neuromonitoring signals    Complications: none        10/25/2019 2:22 PM Umair Lemons M.D.

## 2019-10-25 NOTE — ANESTHESIA TIME REPORT
Anesthesia Start and Stop Event Times     Date Time Event    10/25/2019 1053 Ready for Procedure     1124 Anesthesia Start     1438 Anesthesia Stop        Responsible Staff  10/25/19    Name Role Begin End    Deondre Leyva M.D. Anesth 1124 1438        Preop Diagnosis (Free Text):  Pre-op Diagnosis     LUMBAR DDD, LUMBAR HNP W/O MYELOPATHY, LUMBAR RADICULOPATHY        Preop Diagnosis (Codes):    Post op Diagnosis  DDD (degenerative disc disease), lumbar      Premium Reason  Non-Premium    Comments:

## 2019-10-25 NOTE — PROGRESS NOTES
Patient in pre-op, assessment completed, patient and daughter Marilu  updated on plan of care, all questions answered, no further needs at this time, call light within reach.

## 2019-10-25 NOTE — ANESTHESIA QCDR
2019 [facility] Clinical Data Registry (for Quality Improvement)     Postoperative nausea/vomiting risk protocol (Adult = 18 yrs and Pediatric 3-17 yrs)- (430 and 463)  General inhalation anesthetic (NOT TIVA) with PONV risk factors: Yes  Provision of anti-emetic therapy with at least 2 different classes of agents: Yes   Patient DID NOT receive anti-emetic therapy and reason is documented in Medical Record:  N/A    Multimodal Pain Management- (AQI59)  Patient undergoing Elective Surgery (i.e. Outpatient, or ASC, or Prescheduled Surgery prior to Hospital Admission): Yes  Use of Multimodal Pain Management, two or more drugs and/or interventions, NOT including systemic opioids: Yes   Exception: Documented allergy to multiple classes of analgesics:  N/A    PACU assessment of acute postoperative pain prior to Anesthesia Care End- Applies to Patients Age = 18- (ABG7)  Initial PACU pain score is which of the following: < 7/10  Patient unable to report pain score: N/A    Post-anesthetic transfer of care checklist/protocol to PACU/ICU- (426 and 427)  Upon conclusion of case, patient transferred to which of the following locations: PACU/Non-ICU  Use of transfer checklist/protocol: Yes  Exclusion: Service Performed in Patient Hospital Room (and thus did not require transfer): N/A    PACU Reintubation- (AQI31)  General anesthesia requiring endotracheal intubation (ETT) along with subsequent extubation in OR or PACU: Yes  Required reintubation in the PACU: No   Extubation was a planned trial documented in the medical record prior to removal of the original airway device:  N/A    Unplanned admission to ICU related to anesthesia service up through end of PACU care- (MD51)  Unplanned admission to ICU (not initially anticipated at anesthesia start time): No

## 2019-10-25 NOTE — ANESTHESIA PROCEDURE NOTES
Airway  Date/Time: 10/25/2019 11:36 AM  Performed by: Deondre Leyva M.D.  Authorized by: Deondre Leyva M.D.     Location:  OR  Urgency:  Elective  Difficult Airway: No    Indications for Airway Management:  Anesthesia  Spontaneous Ventilation: absent    Sedation Level:  Deep  Preoxygenated: Yes    Patient Position:  Sniffing  Final Airway Type:  Endotracheal airway  Final Endotracheal Airway:  ETT  Cuffed: Yes    Technique Used for Successful ETT Placement:  Direct laryngoscopy  Insertion Site:  Oral  Blade Type:  Valerie  Laryngoscope Blade/Videolaryngoscope Blade Size:  3  ETT Size (mm):  7.0  Measured from:  Teeth  ETT to Teeth (cm):  21  Placement Verified by: auscultation and capnometry    Cormack-Lehane Classification:  Grade I - full view of glottis  Number of Attempts at Approach:  1   Atraumatic DLx1

## 2019-10-25 NOTE — OR NURSING
Xray taken to verify no instruments lift in patient abdomen. Surgeon Dr Silva read xray and confirmed clear.

## 2019-10-26 LAB
ALBUMIN SERPL BCP-MCNC: 3.9 G/DL (ref 3.2–4.9)
ALBUMIN/GLOB SERPL: 1.4 G/DL
ALP SERPL-CCNC: 101 U/L (ref 30–99)
ALT SERPL-CCNC: 14 U/L (ref 2–50)
ANION GAP SERPL CALC-SCNC: 9 MMOL/L (ref 0–11.9)
AST SERPL-CCNC: 23 U/L (ref 12–45)
BASOPHILS # BLD AUTO: 0.4 % (ref 0–1.8)
BASOPHILS # BLD: 0.06 K/UL (ref 0–0.12)
BILIRUB SERPL-MCNC: 0.4 MG/DL (ref 0.1–1.5)
BUN SERPL-MCNC: 16 MG/DL (ref 8–22)
CALCIUM SERPL-MCNC: 8.9 MG/DL (ref 8.5–10.5)
CHLORIDE SERPL-SCNC: 106 MMOL/L (ref 96–112)
CO2 SERPL-SCNC: 24 MMOL/L (ref 20–33)
CREAT SERPL-MCNC: 0.76 MG/DL (ref 0.5–1.4)
EOSINOPHIL # BLD AUTO: 0 K/UL (ref 0–0.51)
EOSINOPHIL NFR BLD: 0 % (ref 0–6.9)
ERYTHROCYTE [DISTWIDTH] IN BLOOD BY AUTOMATED COUNT: 45.9 FL (ref 35.9–50)
GLOBULIN SER CALC-MCNC: 2.8 G/DL (ref 1.9–3.5)
GLUCOSE SERPL-MCNC: 118 MG/DL (ref 65–99)
HCT VFR BLD AUTO: 38.6 % (ref 37–47)
HGB BLD-MCNC: 12 G/DL (ref 12–16)
IMM GRANULOCYTES # BLD AUTO: 0.07 K/UL (ref 0–0.11)
IMM GRANULOCYTES NFR BLD AUTO: 0.5 % (ref 0–0.9)
LYMPHOCYTES # BLD AUTO: 0.92 K/UL (ref 1–4.8)
LYMPHOCYTES NFR BLD: 6 % (ref 22–41)
MCH RBC QN AUTO: 28.2 PG (ref 27–33)
MCHC RBC AUTO-ENTMCNC: 31.1 G/DL (ref 33.6–35)
MCV RBC AUTO: 90.8 FL (ref 81.4–97.8)
MONOCYTES # BLD AUTO: 0.96 K/UL (ref 0–0.85)
MONOCYTES NFR BLD AUTO: 6.2 % (ref 0–13.4)
NEUTROPHILS # BLD AUTO: 13.37 K/UL (ref 2–7.15)
NEUTROPHILS NFR BLD: 86.9 % (ref 44–72)
NRBC # BLD AUTO: 0 K/UL
NRBC BLD-RTO: 0 /100 WBC
PLATELET # BLD AUTO: 272 K/UL (ref 164–446)
PMV BLD AUTO: 11.3 FL (ref 9–12.9)
POTASSIUM SERPL-SCNC: 4.6 MMOL/L (ref 3.6–5.5)
PROT SERPL-MCNC: 6.7 G/DL (ref 6–8.2)
RBC # BLD AUTO: 4.25 M/UL (ref 4.2–5.4)
SODIUM SERPL-SCNC: 139 MMOL/L (ref 135–145)
WBC # BLD AUTO: 15.4 K/UL (ref 4.8–10.8)

## 2019-10-26 PROCEDURE — 99232 SBSQ HOSP IP/OBS MODERATE 35: CPT | Performed by: INTERNAL MEDICINE

## 2019-10-26 PROCEDURE — 3E02340 INTRODUCTION OF INFLUENZA VACCINE INTO MUSCLE, PERCUTANEOUS APPROACH: ICD-10-PCS | Performed by: NEUROLOGICAL SURGERY

## 2019-10-26 PROCEDURE — 700102 HCHG RX REV CODE 250 W/ 637 OVERRIDE(OP): Performed by: NEUROLOGICAL SURGERY

## 2019-10-26 PROCEDURE — A9270 NON-COVERED ITEM OR SERVICE: HCPCS | Performed by: NEUROLOGICAL SURGERY

## 2019-10-26 PROCEDURE — 700111 HCHG RX REV CODE 636 W/ 250 OVERRIDE (IP): Performed by: NEUROLOGICAL SURGERY

## 2019-10-26 PROCEDURE — 97165 OT EVAL LOW COMPLEX 30 MIN: CPT

## 2019-10-26 PROCEDURE — 85025 COMPLETE CBC W/AUTO DIFF WBC: CPT

## 2019-10-26 PROCEDURE — 700101 HCHG RX REV CODE 250: Performed by: NEUROLOGICAL SURGERY

## 2019-10-26 PROCEDURE — 90471 IMMUNIZATION ADMIN: CPT

## 2019-10-26 PROCEDURE — 700112 HCHG RX REV CODE 229: Performed by: NEUROLOGICAL SURGERY

## 2019-10-26 PROCEDURE — 700102 HCHG RX REV CODE 250 W/ 637 OVERRIDE(OP): Performed by: INTERNAL MEDICINE

## 2019-10-26 PROCEDURE — 97535 SELF CARE MNGMENT TRAINING: CPT

## 2019-10-26 PROCEDURE — 97162 PT EVAL MOD COMPLEX 30 MIN: CPT

## 2019-10-26 PROCEDURE — 700111 HCHG RX REV CODE 636 W/ 250 OVERRIDE (IP): Performed by: NURSE PRACTITIONER

## 2019-10-26 PROCEDURE — A9270 NON-COVERED ITEM OR SERVICE: HCPCS | Performed by: INTERNAL MEDICINE

## 2019-10-26 PROCEDURE — 770001 HCHG ROOM/CARE - MED/SURG/GYN PRIV*

## 2019-10-26 PROCEDURE — 80053 COMPREHEN METABOLIC PANEL: CPT

## 2019-10-26 PROCEDURE — 36415 COLL VENOUS BLD VENIPUNCTURE: CPT

## 2019-10-26 PROCEDURE — 90686 IIV4 VACC NO PRSV 0.5 ML IM: CPT | Performed by: NEUROLOGICAL SURGERY

## 2019-10-26 RX ORDER — ROPINIROLE 2 MG/1
4 TABLET, FILM COATED ORAL EVERY 6 HOURS
Status: DISCONTINUED | OUTPATIENT
Start: 2019-10-26 | End: 2019-10-28 | Stop reason: HOSPADM

## 2019-10-26 RX ORDER — HEPARIN SODIUM 5000 [USP'U]/ML
5000 INJECTION, SOLUTION INTRAVENOUS; SUBCUTANEOUS EVERY 8 HOURS
Status: DISCONTINUED | OUTPATIENT
Start: 2019-10-26 | End: 2019-10-28 | Stop reason: HOSPADM

## 2019-10-26 RX ADMIN — ROPINIROLE HYDROCHLORIDE 4 MG: 2 TABLET, FILM COATED ORAL at 12:22

## 2019-10-26 RX ADMIN — OXYCODONE HYDROCHLORIDE 15 MG: 5 TABLET ORAL at 01:35

## 2019-10-26 RX ADMIN — DOCUSATE SODIUM 100 MG: 100 CAPSULE, LIQUID FILLED ORAL at 04:43

## 2019-10-26 RX ADMIN — POTASSIUM CHLORIDE AND SODIUM CHLORIDE: 900; 150 INJECTION, SOLUTION INTRAVENOUS at 17:40

## 2019-10-26 RX ADMIN — CEFAZOLIN SODIUM 2 G: 2 INJECTION, SOLUTION INTRAVENOUS at 04:41

## 2019-10-26 RX ADMIN — Medication 1000 MG: at 04:43

## 2019-10-26 RX ADMIN — INFLUENZA A VIRUS A/BRISBANE/02/2018 IVR-190 (H1N1) ANTIGEN (FORMALDEHYDE INACTIVATED), INFLUENZA A VIRUS A/KANSAS/14/2017 X-327 (H3N2) ANTIGEN (FORMALDEHYDE INACTIVATED), INFLUENZA B VIRUS B/PHUKET/3073/2013 ANTIGEN (FORMALDEHYDE INACTIVATED), AND INFLUENZA B VIRUS B/MARYLAND/15/2016 BX-69A ANTIGEN (FORMALDEHYDE INACTIVATED) 0.5 ML: 15; 15; 15; 15 INJECTION, SUSPENSION INTRAMUSCULAR at 12:29

## 2019-10-26 RX ADMIN — OXYCODONE HYDROCHLORIDE 15 MG: 5 TABLET ORAL at 13:45

## 2019-10-26 RX ADMIN — CETIRIZINE HYDROCHLORIDE 5 MG: 10 TABLET, FILM COATED ORAL at 04:43

## 2019-10-26 RX ADMIN — OXYCODONE HYDROCHLORIDE 15 MG: 5 TABLET ORAL at 07:43

## 2019-10-26 RX ADMIN — ROPINIROLE HYDROCHLORIDE 4 MG: 2 TABLET, FILM COATED ORAL at 17:38

## 2019-10-26 RX ADMIN — ROPINIROLE HYDROCHLORIDE 4 MG: 2 TABLET, FILM COATED ORAL at 07:43

## 2019-10-26 RX ADMIN — PSEUDOEPHEDRINE HYDROCHLORIDE 120 MG: 120 TABLET, FILM COATED, EXTENDED RELEASE ORAL at 04:43

## 2019-10-26 RX ADMIN — DOCUSATE SODIUM 100 MG: 100 CAPSULE, LIQUID FILLED ORAL at 17:38

## 2019-10-26 RX ADMIN — DOXEPIN HYDROCHLORIDE 20 MG: 10 CAPSULE ORAL at 21:23

## 2019-10-26 RX ADMIN — DIAZEPAM 5 MG: 2 TABLET ORAL at 02:22

## 2019-10-26 RX ADMIN — HEPARIN SODIUM 5000 UNITS: 5000 INJECTION, SOLUTION INTRAVENOUS; SUBCUTANEOUS at 21:24

## 2019-10-26 RX ADMIN — Medication 1000 MG: at 17:38

## 2019-10-26 RX ADMIN — Medication: at 16:30

## 2019-10-26 RX ADMIN — HEPARIN SODIUM 5000 UNITS: 5000 INJECTION, SOLUTION INTRAVENOUS; SUBCUTANEOUS at 13:45

## 2019-10-26 RX ADMIN — OXYCODONE HYDROCHLORIDE 10 MG: 10 TABLET ORAL at 19:48

## 2019-10-26 ASSESSMENT — COGNITIVE AND FUNCTIONAL STATUS - GENERAL
HELP NEEDED FOR BATHING: A LITTLE
STANDING UP FROM CHAIR USING ARMS: A LITTLE
WALKING IN HOSPITAL ROOM: A LITTLE
CLIMB 3 TO 5 STEPS WITH RAILING: A LOT
DRESSING REGULAR LOWER BODY CLOTHING: A LOT
SUGGESTED CMS G CODE MODIFIER MOBILITY: CL
MOVING FROM LYING ON BACK TO SITTING ON SIDE OF FLAT BED: UNABLE
MOBILITY SCORE: 11
DAILY ACTIVITIY SCORE: 20
SUGGESTED CMS G CODE MODIFIER DAILY ACTIVITY: CJ
TURNING FROM BACK TO SIDE WHILE IN FLAT BAD: UNABLE
MOVING TO AND FROM BED TO CHAIR: UNABLE
TOILETING: A LITTLE

## 2019-10-26 ASSESSMENT — ACTIVITIES OF DAILY LIVING (ADL): TOILETING: INDEPENDENT

## 2019-10-26 ASSESSMENT — GAIT ASSESSMENTS
DEVIATION: ANTALGIC;DECREASED BASE OF SUPPORT;BRADYKINETIC;DECREASED HEEL STRIKE;DECREASED TOE OFF
DISTANCE (FEET): 20
ASSISTIVE DEVICE: FRONT WHEEL WALKER
GAIT LEVEL OF ASSIST: SUPERVISED

## 2019-10-26 ASSESSMENT — ENCOUNTER SYMPTOMS
DIZZINESS: 0
MYALGIAS: 1
PALPITATIONS: 0
DIARRHEA: 0
DOUBLE VISION: 0
HEADACHES: 0
CHILLS: 0
BLURRED VISION: 0
BACK PAIN: 1
FEVER: 0
SENSORY CHANGE: 1
VOMITING: 0
FOCAL WEAKNESS: 1
NAUSEA: 1
TREMORS: 0
COUGH: 0
SPEECH CHANGE: 0
TINGLING: 1
SHORTNESS OF BREATH: 0
CONSTIPATION: 0
ABDOMINAL PAIN: 0

## 2019-10-26 NOTE — OR NURSING
Report to Anuradha RN    prevena to mid abdomen. Intact.  VSS -160's systolic. HR 70-80's O2 3L oxymask--pt breathing via mouth.     Reporting burning pain at surgical site. Medicated w/ fentanyl 100 mcgs iv, dilaudid 1 mg iv oxycodone 10 mgs PO , versed 1 mg iv. Reporting surgical pain is tolerable.     Cardoso 1100 u/o. 2500 cc IVF in.  20 g in right hand and 20 g left forearm.    Left lower abdominal bruising/ wound. Chipped tooth right lower jaw.dentures in pink cup on chart. Belongings taken by daughter per pt.     Pt tells nurse that she has been bed ridden since March.  Generalized weakness.

## 2019-10-26 NOTE — THERAPY
"Physical Therapy Evaluation completed.   Bed Mobility:  Supine to Sit: Moderate Assist  Transfers: Sit to Stand: Minimal Assist  Gait: Level Of Assist: Supervised with Front-Wheel Walker       Plan of Care: Will benefit from Physical Therapy 5 times per week  Discharge Recommendations: Equipment: No Equipment Needed. Post-acute therapy: Recommend post-acute placement for additional therapy services prior to discharge home. Patient can tolerate post-acute therapies at a 5x/week frequency.     Ms. Rendon is a 64 y.o. female s/p fusion of L4-S1 and laminectomy with discectomy redo. Patient expressed pain in the abdomen throughout physical therapy evaluation. Patient was educated on lifting restrictions and spinal precautions, pt verbalized understand. Patient required mod assist for bed mobility, supine to sit using log roll technique. Sitting edge of bed, patient was able to scoot self at SPV. LE strength and sensation were assessed in sitting. Strength was decreased bilateral and sensation was dimished to light touch on the R LE. Patient required min assist for sit to stand transfer though able to ambulate with FWW at supervision. Demonstrated significantly decreased leodan during gait and repeatedly made comments about pain. Patient was able to ambulate 20 ft before requesting to return to bed due to pain. Patient attempted to sit up in chair for breakfast though reported while sitting she was unable to scoot back in the chair and wanted to return to bed. Pt required min assist to return to bed, sit to supine. PT will continue to work with patient to address bed mobility, transfers, and normalizing gait prior to DC. Recommend post-acute placement at this time due to decreased functional mobility, though will continue to assess for possible DC home with family asssitance. Recommend nursing continue to mobilize with patient.     See \"Rehab Therapy-Acute\" Patient Summary Report for complete documentation.     "

## 2019-10-26 NOTE — PROGRESS NOTES
[facility] Medicine Daily Progress Note    Date of Service  10/26/2019    Chief Complaint  64 y.o. female admitted 10/25/2019 with back pain.    Hospital Course    H/O chronic pain syndrome, COPD, HTN, prior back surgery.  Admitted for elective L4-S1 fusion.  Monitoring respiratory status and pain control post-op.       Interval Problem Update  10/26:  POD #1.  Continues to have post-op pain controlled with PCA.  Cardoso removed.  Ambulating with PT.  Denies shortness of breath.  Does complain to intermittent nausea with pain.     Consultants/Specialty  NSG    Code Status  FULL    Disposition  TBD.    Review of Systems  Review of Systems   Constitutional: Positive for malaise/fatigue. Negative for chills and fever.   HENT: Negative for congestion.    Eyes: Negative for blurred vision and double vision.   Respiratory: Negative for cough and shortness of breath.    Cardiovascular: Negative for chest pain, palpitations and leg swelling.   Gastrointestinal: Positive for nausea. Negative for abdominal pain, constipation, diarrhea and vomiting.   Genitourinary: Negative for dysuria.   Musculoskeletal: Positive for back pain and myalgias.   Skin: Negative for itching and rash.   Neurological: Positive for tingling (chronic to RLE with improvement post op.), sensory change (chronic to RLE with mild improvement post-op) and focal weakness. Negative for dizziness, tremors, speech change and headaches.        Physical Exam  Temp:  [36.4 °C (97.6 °F)-37.2 °C (99 °F)] 36.4 °C (97.6 °F)  Pulse:  [70-96] 91  Resp:  [11-22] 18  BP: (104-172)/(52-97) 109/52  SpO2:  [93 %-98 %] 94 %    Physical Exam   Constitutional: She is oriented to person, place, and time. She appears well-developed. No distress.   Morbidly obese.    HENT:   Head: Normocephalic and atraumatic.   Mouth/Throat: No oropharyngeal exudate.   Eyes: Conjunctivae are normal. Right eye exhibits no discharge. Left eye exhibits no discharge.   Neck: Normal range of motion. No  tracheal deviation present.   Cardiovascular: Normal rate and regular rhythm.   No murmur heard.  Pulmonary/Chest: Effort normal and breath sounds normal. No stridor. No respiratory distress. She has no wheezes. She has no rales.   Abdominal: Soft. Bowel sounds are normal. She exhibits no distension. There is no tenderness. There is no rebound.   Musculoskeletal: She exhibits no edema.   BLE mobility limited by pain.    Neurological: She is alert and oriented to person, place, and time. No cranial nerve deficit.   Numbness and tingling of RLE with mild improvement after surgery  5/5 strength in all extremities.    Skin: Skin is warm and dry. She is not diaphoretic. No erythema.   Surgical wound to lower back with dressing C/D/I.    Psychiatric: She has a normal mood and affect. Her behavior is normal. Judgment and thought content normal.   Nursing note and vitals reviewed.      Fluids    Intake/Output Summary (Last 24 hours) at 10/26/2019 1027  Last data filed at 10/26/2019 0730  Gross per 24 hour   Intake 2037.1 ml   Output 3650 ml   Net -1612.9 ml       Laboratory  Recent Labs     10/25/19  2138 10/26/19  0131   WBC 15.7* 15.4*   RBC 4.24 4.25   HEMOGLOBIN 11.9* 12.0   HEMATOCRIT 38.7 38.6   MCV 91.3 90.8   MCH 28.1 28.2   MCHC 30.7* 31.1*   RDW 45.6 45.9   PLATELETCT 262 272   MPV 11.2 11.3     Recent Labs     10/25/19  2138 10/26/19  0131   SODIUM 138 139   POTASSIUM 4.5 4.6   CHLORIDE 107 106   CO2 23 24   GLUCOSE 129* 118*   BUN 17 16   CREATININE 0.79 0.76   CALCIUM 9.1 8.9                   Imaging  DX-PORTABLE FLUORO > 1 HOUR   Final Result      Intraoperative evaluation of lumbar spine surgery.                  INTERPRETING LOCATION:  [address] [facility], [address]      DX-LUMBAR SPINE-2 OR 3 VIEWS   Final Result      Intraoperative evaluation of lumbar spine surgery.                  INTERPRETING LOCATION:  [address] [facility], [id number]      RS-SOLICTU-2 VIEW   Final Result      No radiopaque surgical  instrument or sponge marker identified.           Assessment/Plan  GERD (gastroesophageal reflux disease)  Assessment & Plan  Stable.  Continue PPI    Leukocytosis  Assessment & Plan  Probably reactive, related to surgery today.  Afebrile  Follow cbc.     Restless leg syndrome  Assessment & Plan  Continue ropinirole    Status post lumbar spine surgery for decompression of spinal cord  Assessment & Plan  Management per neurosurgery  PT OT following.   DVT prophylaxis started.  Mobilization  Pain control, currently Dilaudid pump  Bowel regimen    EMILY (obstructive sleep apnea)- (present on admission)  Assessment & Plan  Continue with nocturnal CPAP    Hypertension  Assessment & Plan  Controlled.  Continue home regimen.  Hydralazine as needed IV  Monitor blood pressure    Chronic respiratory failure (HCC)- (present on admission)  Assessment & Plan  Continue with supplemental oxygen.  Wean as tolerated.  Uses nocturnal o2 at home.     COPD (chronic obstructive pulmonary disease) (HCC)- (present on admission)  Assessment & Plan  Not in exacerbation.  Continue home inhalers.  RT protocol and supplemental oxygen         VTE prophylaxis: heparin.

## 2019-10-26 NOTE — ANESTHESIA POSTPROCEDURE EVALUATION
"      Patient: Ariella Rendon    Procedure Summary     Date:  10/25/19 Room / Location:  [facility] OR 07 / SURGERY [facility]    Anesthesia Start:  1124 Anesthesia Stop:  1438    Procedure:  FUSION, SPINE, LUMBAR, ANTERIOR APPROACH- L4-S1 ALIF WITH PLATE (N/A Abdomen) Diagnosis:  (LUMBAR DDD, LUMBAR HNP W/O MYELOPATHY, LUMBAR RADICULOPATHY)    Surgeon:  Umair Lemons M.D. Responsible Provider:  Deondre Leyva M.D.    Anesthesia Type:  general ASA Status:  3          Final Anesthesia Type: general  Last vitals  BP   Blood Pressure: 157/97, NIBP: 132/76    Temp   36.6 °C (97.9 °F)    Pulse   Pulse: 87   Resp   18    SpO2   96 %      Anesthesia Post Evaluation    Patient location during evaluation: PACU  Patient participation: complete - patient participated  Level of consciousness: awake and alert  Pain score: 6    Airway patency: patent  Anesthetic complications: no  Cardiovascular status: hemodynamically stable  Respiratory status: acceptable  Hydration status: euvolemic    PONV: none      I evaluated the cracked tooth in PACU and the portion I saw that was loose is now gone.  I discussed this with the patient and she claims that she felt a small piece of \"dirt/sand\" in her mouth and spit it out while in recovery.  It sounds like this was the small portion of her tooth that was loose.  The PACU RN was unaware she spit out her tooth while in PACU.         Nurse Pain Score: 6 (NPRS)        "

## 2019-10-26 NOTE — PROGRESS NOTES
Neurosurgery Progress Note    Subjective:  Patient seen and examined, NAD.  Ambulating at this time with a walker and physical therapy.  States that her previous right leg numbness is improved.  She also feels that her previous back pain has improved however she has surgical pain at this time.    Exam:  AOx3, NAD  Brian, SLTI x4  Antigravity strength in all 4 extremities  CN III-XII grossly intact         BP  Min: 104/82  Max: 172/87  Pulse  Av.6  Min: 70  Max: 96  Resp  Av.2  Min: 11  Max: 22  Temp  Av.8 °C (98.2 °F)  Min: 36.3 °C (97.4 °F)  Max: 37.2 °C (99 °F)  SpO2  Av.5 %  Min: 93 %  Max: 98 %    No data recorded    Recent Labs     10/25/19  2138 10/26/19  0131   WBC 15.7* 15.4*   RBC 4.24 4.25   HEMOGLOBIN 11.9* 12.0   HEMATOCRIT 38.7 38.6   MCV 91.3 90.8   MCH 28.1 28.2   MCHC 30.7* 31.1*   RDW 45.6 45.9   PLATELETCT 262 272   MPV 11.2 11.3     Recent Labs     10/25/19  2138 10/26/19  0131   SODIUM 138 139   POTASSIUM 4.5 4.6   CHLORIDE 107 106   CO2 23 24   GLUCOSE 129* 118*   BUN 17 16   CREATININE 0.79 0.76   CALCIUM 9.1 8.9               Intake/Output       10/25/19 0700 - 10/26/19 0659 10/26/19 0700 - 10/27/19 0659      1900-0659 Total 1900-0659 Total       Intake    I.V.  2000  36.1  -- 36.1    PCA End of Shift Total Volume (ml) --  36.1 -- 36.1    Volume (mL) (lactated ringers infusion) 2000 -- -- --    Total Intake 2000 36.1 -- 36.1       Output    Urine  1250  -- 1250  2200  -- 2200    Urine 150 -- 150 -- -- --    Output (mL) (Urethral Catheter Latex;Temperature probe 16 Fr.) 1100 -- 1100 2200 -- 2200    Blood  200  -- 200  --  -- --    Est. Blood Loss 200 -- 200 -- -- --    Total Output 1450 -- 1450 2200 -- 2200       Net I/O     550 1 551 -2163.9 -- -2163.9            Intake/Output Summary (Last 24 hours) at 10/26/2019 0848  Last data filed at 10/26/2019 0730  Gross per 24 hour   Intake 2037.1 ml   Output 3650 ml   Net -1612.9 ml             • ROPINIRole  4 mg Q6HRS   • Influenza Vaccine Quad pf  0.5 mL Once   • acetaminophen  650 mg Q6HRS PRN   • oxyCODONE immediate-release  5 mg Q3HRS PRN    And   • oxyCODONE immediate-release  10 mg Q3HRS PRN    And   • morphine injection  4 mg Q3HRS PRN   • albuterol  2 Puff Q6HRS PRN   • amLODIPine  10 mg DAILY   • asa/apap/caffeine  2 Tab Q6HRS PRN   • doxepin  20 mg Nightly   • hydroCHLOROthiazide  12.5 mg DAILY   • ipratropium-albuterol  3 mL Q6HRS PRN   • magnesium gluconate  1,000 mg BID   • omeprazole  20 mg DAILY   • oxycodone  15 mg 4X/DAY PRN   • polyethylene glycol/lytes  1 Packet DAILY   • Pharmacy Consult Request  1 Each PHARMACY TO DOSE   • MD ALERT...DO NOT ADMINISTER NSAIDS or ASPIRIN unless ORDERED By Neurosurgery  1 Each PRN   • docusate sodium  100 mg BID   • senna-docusate  1 Tab Q24HRS PRN   • magnesium hydroxide  30 mL QDAY PRN   • bisacodyl  10 mg Q24HRS PRN   • fleet  1 Each Once PRN   • Respiratory Care per Protocol   Continuous RT   • 0.9 % NaCl with KCl 20 mEq 1,000 mL   Continuous   • enoxaparin  30 mg Q12HRS   • HYDROmorphone   Continuous   • diphenhydrAMINE  25 mg Q6HRS PRN    Or   • diphenhydrAMINE  25 mg Q6HRS PRN   • ondansetron  4 mg Q4HRS PRN   • ondansetron  4 mg Q4HRS PRN   • promethazine  12.5-25 mg Q4HRS PRN   • promethazine  12.5-25 mg Q4HRS PRN   • prochlorperazine  5-10 mg Q4HRS PRN   • diazePAM  5 mg Q6HRS PRN   • zolpidem  5 mg HS PRN - MR X 1   • labetalol  10 mg Q HOUR PRN   • hydrALAZINE  10 mg Q HOUR PRN   • benzocaine-menthol  1 Lozenge Q2HRS PRN   • cetirizine  5 mg DAILY    And   • pseudoephedrine SR  120 mg DAILY       Assessment and Plan:  Hospital day # 2  POD# 1 ALIF L4-L5, L5-S1  Chemical prophylactic DVT therapy: Yes - Heparin 5000 units/q 8hrs  Start date/time: Can start today    Patient doing well.  PT/OT.  SCDs/IS/heparin subcu DVT prophylaxis.  Pain control.  Plan discharge home or rehab in 1 to 2 days.  Bowel regimen.

## 2019-10-26 NOTE — THERAPY
"Occupational Therapy Evaluation completed.   Functional Status:  Min/mod A with ADLs and txfs, primary limiting factor is abdominal pn    Plan of Care: Will benefit from Occupational Therapy 3 times per week  Discharge Recommendations:  Equipment: Will Continue to Assess for Equipment Needs. Post-acute therapy Discharge to home with home health for additional skilled therapy services.    See \"Rehab Therapy-Acute\" Patient Summary Report for complete documentation.    "

## 2019-10-26 NOTE — RESPIRATORY CARE
----- Message from Edinson Hunter MD sent at 11/16/2017 11:49 AM CST -----  Patient informed of EMBx that shows PD adenocarcinoma. Will need BP and PALND at time of RALH/BSO.   COPD EDUCATION by COPD CLINICAL EDUCATOR  10/26/2019  at  4:33 PM by Reena Elizabeth     Patient interviewed by COPD education team.  Patient unable to participate in full program.  Short intervention has been conducted.  A comprehensive packet including information about COPD and home treatments. Pt is compliant with spacer. Pt quit smoking in March 2019.

## 2019-10-26 NOTE — PROGRESS NOTES
Pt is A&Ox 4  Ambulated: Yes  Up with 1x assist, steady gait.   Voiding: Yes (Cardoso in place)  Last BM: 10/25  Pain: Controlled

## 2019-10-27 ENCOUNTER — APPOINTMENT (OUTPATIENT)
Dept: RADIOLOGY | Facility: MEDICAL CENTER | Age: 64
DRG: 460 | End: 2019-10-27
Attending: INTERNAL MEDICINE
Payer: MEDICAID

## 2019-10-27 PROBLEM — R00.0 SINUS TACHYCARDIA: Status: ACTIVE | Noted: 2019-10-27

## 2019-10-27 PROBLEM — D72.829 LEUKOCYTOSIS: Status: RESOLVED | Noted: 2019-10-25 | Resolved: 2019-10-27

## 2019-10-27 LAB
ANION GAP SERPL CALC-SCNC: 7 MMOL/L (ref 0–11.9)
BUN SERPL-MCNC: 14 MG/DL (ref 8–22)
CALCIUM SERPL-MCNC: 8.8 MG/DL (ref 8.5–10.5)
CHLORIDE SERPL-SCNC: 105 MMOL/L (ref 96–112)
CO2 SERPL-SCNC: 25 MMOL/L (ref 20–33)
CREAT SERPL-MCNC: 0.61 MG/DL (ref 0.5–1.4)
D DIMER PPP IA.FEU-MCNC: 1.44 UG/ML (FEU) (ref 0–0.5)
ERYTHROCYTE [DISTWIDTH] IN BLOOD BY AUTOMATED COUNT: 47.2 FL (ref 35.9–50)
GLUCOSE SERPL-MCNC: 138 MG/DL (ref 65–99)
HCT VFR BLD AUTO: 35.8 % (ref 37–47)
HGB BLD-MCNC: 10.9 G/DL (ref 12–16)
MCH RBC QN AUTO: 28.2 PG (ref 27–33)
MCHC RBC AUTO-ENTMCNC: 30.4 G/DL (ref 33.6–35)
MCV RBC AUTO: 92.5 FL (ref 81.4–97.8)
PLATELET # BLD AUTO: 211 K/UL (ref 164–446)
PMV BLD AUTO: 11.4 FL (ref 9–12.9)
POTASSIUM SERPL-SCNC: 3.9 MMOL/L (ref 3.6–5.5)
RBC # BLD AUTO: 3.87 M/UL (ref 4.2–5.4)
SODIUM SERPL-SCNC: 137 MMOL/L (ref 135–145)
WBC # BLD AUTO: 10.8 K/UL (ref 4.8–10.8)

## 2019-10-27 PROCEDURE — A9270 NON-COVERED ITEM OR SERVICE: HCPCS | Performed by: NEUROLOGICAL SURGERY

## 2019-10-27 PROCEDURE — 700101 HCHG RX REV CODE 250: Performed by: NEUROLOGICAL SURGERY

## 2019-10-27 PROCEDURE — A9270 NON-COVERED ITEM OR SERVICE: HCPCS | Performed by: INTERNAL MEDICINE

## 2019-10-27 PROCEDURE — 700102 HCHG RX REV CODE 250 W/ 637 OVERRIDE(OP): Performed by: NEUROLOGICAL SURGERY

## 2019-10-27 PROCEDURE — 85379 FIBRIN DEGRADATION QUANT: CPT

## 2019-10-27 PROCEDURE — 93005 ELECTROCARDIOGRAM TRACING: CPT | Performed by: INTERNAL MEDICINE

## 2019-10-27 PROCEDURE — 36415 COLL VENOUS BLD VENIPUNCTURE: CPT

## 2019-10-27 PROCEDURE — 94760 N-INVAS EAR/PLS OXIMETRY 1: CPT

## 2019-10-27 PROCEDURE — 700112 HCHG RX REV CODE 229: Performed by: NEUROLOGICAL SURGERY

## 2019-10-27 PROCEDURE — 700111 HCHG RX REV CODE 636 W/ 250 OVERRIDE (IP): Performed by: NURSE PRACTITIONER

## 2019-10-27 PROCEDURE — 700102 HCHG RX REV CODE 250 W/ 637 OVERRIDE(OP): Performed by: INTERNAL MEDICINE

## 2019-10-27 PROCEDURE — 80048 BASIC METABOLIC PNL TOTAL CA: CPT

## 2019-10-27 PROCEDURE — 770001 HCHG ROOM/CARE - MED/SURG/GYN PRIV*

## 2019-10-27 PROCEDURE — 99233 SBSQ HOSP IP/OBS HIGH 50: CPT | Performed by: INTERNAL MEDICINE

## 2019-10-27 PROCEDURE — 85027 COMPLETE CBC AUTOMATED: CPT

## 2019-10-27 RX ORDER — BISACODYL 5 MG
10 TABLET, DELAYED RELEASE (ENTERIC COATED) ORAL ONCE
Status: COMPLETED | OUTPATIENT
Start: 2019-10-27 | End: 2019-10-27

## 2019-10-27 RX ADMIN — Medication 1000 MG: at 04:36

## 2019-10-27 RX ADMIN — DOCUSATE SODIUM 100 MG: 100 CAPSULE, LIQUID FILLED ORAL at 16:47

## 2019-10-27 RX ADMIN — HEPARIN SODIUM 5000 UNITS: 5000 INJECTION, SOLUTION INTRAVENOUS; SUBCUTANEOUS at 21:00

## 2019-10-27 RX ADMIN — ROPINIROLE HYDROCHLORIDE 4 MG: 2 TABLET, FILM COATED ORAL at 04:35

## 2019-10-27 RX ADMIN — OMEPRAZOLE 20 MG: 20 CAPSULE, DELAYED RELEASE ORAL at 04:36

## 2019-10-27 RX ADMIN — OXYCODONE HYDROCHLORIDE 15 MG: 5 TABLET ORAL at 16:46

## 2019-10-27 RX ADMIN — DIAZEPAM 5 MG: 2 TABLET ORAL at 19:47

## 2019-10-27 RX ADMIN — DOXEPIN HYDROCHLORIDE 20 MG: 10 CAPSULE ORAL at 19:47

## 2019-10-27 RX ADMIN — ROPINIROLE HYDROCHLORIDE 4 MG: 2 TABLET, FILM COATED ORAL at 23:09

## 2019-10-27 RX ADMIN — POTASSIUM CHLORIDE AND SODIUM CHLORIDE: 900; 150 INJECTION, SOLUTION INTRAVENOUS at 06:28

## 2019-10-27 RX ADMIN — CETIRIZINE HYDROCHLORIDE 5 MG: 10 TABLET, FILM COATED ORAL at 04:37

## 2019-10-27 RX ADMIN — HYDROCHLOROTHIAZIDE 12.5 MG: 12.5 TABLET ORAL at 04:36

## 2019-10-27 RX ADMIN — ROPINIROLE HYDROCHLORIDE 4 MG: 2 TABLET, FILM COATED ORAL at 16:50

## 2019-10-27 RX ADMIN — OXYCODONE HYDROCHLORIDE 15 MG: 5 TABLET ORAL at 10:39

## 2019-10-27 RX ADMIN — AMLODIPINE BESYLATE 10 MG: 10 TABLET ORAL at 04:37

## 2019-10-27 RX ADMIN — DOCUSATE SODIUM 100 MG: 100 CAPSULE, LIQUID FILLED ORAL at 04:36

## 2019-10-27 RX ADMIN — HEPARIN SODIUM 5000 UNITS: 5000 INJECTION, SOLUTION INTRAVENOUS; SUBCUTANEOUS at 13:18

## 2019-10-27 RX ADMIN — ROPINIROLE HYDROCHLORIDE 4 MG: 2 TABLET, FILM COATED ORAL at 13:18

## 2019-10-27 RX ADMIN — OXYCODONE HYDROCHLORIDE 10 MG: 10 TABLET ORAL at 04:36

## 2019-10-27 RX ADMIN — BISACODYL 10 MG: 5 TABLET, COATED ORAL at 10:39

## 2019-10-27 RX ADMIN — PSEUDOEPHEDRINE HYDROCHLORIDE 120 MG: 120 TABLET, FILM COATED, EXTENDED RELEASE ORAL at 04:36

## 2019-10-27 RX ADMIN — Medication 1000 MG: at 16:46

## 2019-10-27 RX ADMIN — OXYCODONE HYDROCHLORIDE 10 MG: 10 TABLET ORAL at 23:10

## 2019-10-27 RX ADMIN — OXYCODONE HYDROCHLORIDE 10 MG: 10 TABLET ORAL at 19:47

## 2019-10-27 RX ADMIN — DIAZEPAM 5 MG: 2 TABLET ORAL at 13:18

## 2019-10-27 RX ADMIN — HEPARIN SODIUM 5000 UNITS: 5000 INJECTION, SOLUTION INTRAVENOUS; SUBCUTANEOUS at 04:36

## 2019-10-27 RX ADMIN — POLYETHYLENE GLYCOL 3350 1 PACKET: 17 POWDER, FOR SOLUTION ORAL at 04:38

## 2019-10-27 RX ADMIN — ROPINIROLE HYDROCHLORIDE 4 MG: 2 TABLET, FILM COATED ORAL at 00:20

## 2019-10-27 RX ADMIN — ZOLPIDEM TARTRATE 5 MG: 5 TABLET ORAL at 21:00

## 2019-10-27 ASSESSMENT — ENCOUNTER SYMPTOMS
MYALGIAS: 1
ABDOMINAL PAIN: 0
FEVER: 0
FOCAL WEAKNESS: 1
BLURRED VISION: 0
SHORTNESS OF BREATH: 0
HEADACHES: 0
WHEEZING: 0
CONSTIPATION: 1
TREMORS: 0
TINGLING: 1
SENSORY CHANGE: 1
SPEECH CHANGE: 0
DIZZINESS: 0
BACK PAIN: 1
CHILLS: 0
COUGH: 0
PALPITATIONS: 0
VOMITING: 0
NAUSEA: 0

## 2019-10-27 ASSESSMENT — PATIENT HEALTH QUESTIONNAIRE - PHQ9
2. FEELING DOWN, DEPRESSED, IRRITABLE, OR HOPELESS: NOT AT ALL
SUM OF ALL RESPONSES TO PHQ9 QUESTIONS 1 AND 2: 0
1. LITTLE INTEREST OR PLEASURE IN DOING THINGS: NOT AT ALL

## 2019-10-27 NOTE — PROGRESS NOTES
PIV will not flush fast enough to tolerate dye for CT. Pt brought back to room. Called VAT team- no answer. Called ICUs to see if any RN can place US PIV- waiting for call back.

## 2019-10-27 NOTE — PROGRESS NOTES
RN MOBILITY NOTE     Surgery patient?: y  Date of surgery: 10/25  Ambulated 50 ft on day of surgery? (N/A if today is not date of surgery): na   Number of times ambulated 50 feet or greater today: 3+  Patient has been up to chair, edge of bed or HOB 90 degrees for all meals?: y  Goal met? (goal is ambulating at least 50 feet 2 times on day shift, one time on night shift): y  If patient did not meet mobility goal, why?: na

## 2019-10-27 NOTE — PROGRESS NOTES
[facility] Medicine Daily Progress Note    Date of Service  10/27/2019    Chief Complaint  64 y.o. female admitted 10/25/2019 with back pain.    Hospital Course    H/O chronic pain syndrome, COPD, HTN, prior back surgery.  Admitted for elective L4-S1 fusion.  Monitoring respiratory status and pain control post-op.       Interval Problem Update  10/26:  POD #1.  Continues to have post-op pain controlled with PCA.  Cardoso removed.  Ambulating with PT.  Denies shortness of breath.  Does complain to intermittent nausea with pain.   10/27: Tachy cardiac.  She denies chest pain, shortness of breath.  She says she does not feel wheezy today.  EKG shows sinus tachycardia.  Her pain is well controlled, she is agreeable to stopping PCA pump.  Constipated, she wants to take an oral bisacodyl today.    Consultants/Specialty  NSG    Code Status  FULL    Disposition  She does not want SNF    Review of Systems  Review of Systems   Constitutional: Negative for chills, fever and malaise/fatigue.   HENT: Negative for congestion.    Eyes: Negative for blurred vision.   Respiratory: Negative for cough, shortness of breath and wheezing.    Cardiovascular: Negative for chest pain, palpitations and leg swelling.   Gastrointestinal: Positive for constipation. Negative for abdominal pain, nausea and vomiting.   Genitourinary: Negative for dysuria.   Musculoskeletal: Positive for back pain and myalgias.   Skin: Negative for rash.   Neurological: Positive for tingling (chronic to RLE with improvement post op.), sensory change (chronic to RLE with mild improvement post-op) and focal weakness. Negative for dizziness, tremors, speech change and headaches.        Physical Exam  Temp:  [36.2 °C (97.1 °F)-37 °C (98.6 °F)] 36.9 °C (98.4 °F)  Pulse:  [] 96  Resp:  [16-18] 18  BP: (100-126)/(54-83) 125/74  SpO2:  [92 %-97 %] 95 %    Physical Exam   Constitutional: She is oriented to person, place, and time. She appears well-developed. No distress.    HENT:   Head: Normocephalic.   Mouth/Throat: No oropharyngeal exudate.   Eyes: Conjunctivae are normal. Right eye exhibits no discharge. Left eye exhibits no discharge.   Neck: Normal range of motion.   Cardiovascular: Normal rate and regular rhythm.   Pulmonary/Chest: Effort normal. No respiratory distress. She has no wheezes. She has no rales. She exhibits no tenderness.   Abdominal: Soft. Bowel sounds are normal. She exhibits no distension. There is no tenderness.   Musculoskeletal: She exhibits no edema.   BLE mobility limited by pain.    Neurological: She is alert and oriented to person, place, and time.   Numbness and tingling of RLE with mild improvement after surgery  5/5 strength in all extremities.    Skin: Skin is warm and dry. She is not diaphoretic. No erythema.   Surgical wound to lower back with dressing C/D/I.    Psychiatric: She has a normal mood and affect. Her behavior is normal. Judgment and thought content normal.   Nursing note and vitals reviewed.      Fluids    Intake/Output Summary (Last 24 hours) at 10/27/2019 1329  Last data filed at 10/27/2019 0800  Gross per 24 hour   Intake 621.3 ml   Output --   Net 621.3 ml       Laboratory  Recent Labs     10/25/19  2138 10/26/19  0131 10/27/19  0141   WBC 15.7* 15.4* 10.8   RBC 4.24 4.25 3.87*   HEMOGLOBIN 11.9* 12.0 10.9*   HEMATOCRIT 38.7 38.6 35.8*   MCV 91.3 90.8 92.5   MCH 28.1 28.2 28.2   MCHC 30.7* 31.1* 30.4*   RDW 45.6 45.9 47.2   PLATELETCT 262 272 211   MPV 11.2 11.3 11.4     Recent Labs     10/25/19  2138 10/26/19  0131 10/27/19  0141   SODIUM 138 139 137   POTASSIUM 4.5 4.6 3.9   CHLORIDE 107 106 105   CO2 23 24 25   GLUCOSE 129* 118* 138*   BUN 17 16 14   CREATININE 0.79 0.76 0.61   CALCIUM 9.1 8.9 8.8                   Imaging  DX-PORTABLE FLUORO > 1 HOUR   Final Result      Intraoperative evaluation of lumbar spine surgery.                  INTERPRETING LOCATION:  [address] [place] [address] [place], [address]      DX-LUMBAR SPINE-2 OR 3 VIEWS    Final Result      Intraoperative evaluation of lumbar spine surgery.                  INTERPRETING LOCATION:  [address], [place], [address]      [id number] VIEW   Final Result      No radiopaque surgical instrument or sponge marker identified.      CT-CTA CHEST PULMONARY ARTERY W/ RECONS    (Results Pending)        Assessment/Plan  Sinus tachycardia  Assessment & Plan  New overnight.  No change in symptoms or labs.  Anemia is stable.  Check d-dimer, if positive will order CT chest to rule out PE.    GERD (gastroesophageal reflux disease)  Assessment & Plan  Stable.  Continue PPI    Restless leg syndrome  Assessment & Plan  Continue ropinirole    Status post lumbar spine surgery for decompression of spinal cord  Assessment & Plan  Management per neurosurgery  PT OT following.   DVT prophylaxis started.  Mobilization  Pain control.  Stop PCA.  Oxycodone as needed.  Bowel regimen    EMILY (obstructive sleep apnea)- (present on admission)  Assessment & Plan  Continue with nocturnal CPAP    Hypertension  Assessment & Plan  Controlled.  Continue home regimen.  Hydralazine as needed IV  Monitor blood pressure    Chronic respiratory failure (HCC)- (present on admission)  Assessment & Plan  Continue with supplemental oxygen.  Wean as tolerated.  Uses nocturnal o2 at home.     COPD (chronic obstructive pulmonary disease) (HCC)- (present on admission)  Assessment & Plan  Not in exacerbation.  Continue home inhalers.  RT protocol and supplemental oxygen       VTE prophylaxis: heparin.

## 2019-10-27 NOTE — PROGRESS NOTES
Neurosurgery Progress Note    Subjective:  Patient seen and examined, NAD.  Patient feels better today her back pain is improved.  Right lower extremity numbness and tingling is also improved.  She feels better overall.  No bowel movement still.    Exam:  AOx3, NAD  Brian, SLTI x4  Good strength in all 4, antigravity in all 4 extremities.  Incision clean dry and intact.  CN III-XII grossly intact         BP  Min: 100/54  Max: 126/83  Pulse  Av.5  Min: 87  Max: 120  Resp  Av.7  Min: 16  Max: 18  Temp  Av.5 °C (97.7 °F)  Min: 36.1 °C (97 °F)  Max: 37 °C (98.6 °F)  SpO2  Av.8 %  Min: 92 %  Max: 97 %    No data recorded    Recent Labs     10/25/19  2138 10/26/19  0131 10/27/19  0141   WBC 15.7* 15.4* 10.8   RBC 4.24 4.25 3.87*   HEMOGLOBIN 11.9* 12.0 10.9*   HEMATOCRIT 38.7 38.6 35.8*   MCV 91.3 90.8 92.5   MCH 28.1 28.2 28.2   MCHC 30.7* 31.1* 30.4*   RDW 45.6 45.9 47.2   PLATELETCT 262 272 211   MPV 11.2 11.3 11.4     Recent Labs     10/25/19  2138 10/26/19  0131 10/27/19  014   SODIUM 138 139 137   POTASSIUM 4.5 4.6 3.9   CHLORIDE 107 106 105   CO2 23 24 25   GLUCOSE 129* 118* 138*   BUN 17 16 14   CREATININE 0.79 0.76 0.61   CALCIUM 9.1 8.9 8.8               Intake/Output       10/26/19 0700 - 10/27/19 0659 10/27/19 0700 - 10/28/19 0659       Total  Total       Intake    P.O.  --  -- --  240  -- 240    P.O. -- -- -- 240 -- 240    I.V.  1052.9  25.2 1078.1  24.1  -- 24.1    PCA End of Shift Total Volume (ml) 36.1 25.2 61.3 24.1 -- 24.1    Volume (mL) (0.9 % NaCl with KCl 20 mEq infusion) 1016.8 -- 1016.8 -- -- --    Total Intake 1052.9 25.2 1078.1 264.1 -- 264.1       Output    Urine  0  -- 220  --  -- --    Number of Times Voided 2 x 1 x 3 x 1 x -- 1 x    Output (mL) ([REMOVED] Urethral Catheter Latex;Temperature probe 16 Fr.)  --  -- -- --    Total Output 2200 --  -- -- --       Net I/O     -1147.2 25.2 -1122 264.1 -- 264.1             Intake/Output Summary (Last 24 hours) at 10/27/2019 1056  Last data filed at 10/27/2019 0800  Gross per 24 hour   Intake 974.05 ml   Output --   Net 974.05 ml            • ROPINIRole  4 mg Q6HRS   • heparin  5,000 Units Q8HRS   • acetaminophen  650 mg Q6HRS PRN   • oxyCODONE immediate-release  5 mg Q3HRS PRN    And   • oxyCODONE immediate-release  10 mg Q3HRS PRN    And   • morphine injection  4 mg Q3HRS PRN   • albuterol  2 Puff Q6HRS PRN   • amLODIPine  10 mg DAILY   • asa/apap/caffeine  2 Tab Q6HRS PRN   • doxepin  20 mg Nightly   • hydroCHLOROthiazide  12.5 mg DAILY   • ipratropium-albuterol  3 mL Q6HRS PRN   • magnesium gluconate  1,000 mg BID   • omeprazole  20 mg DAILY   • oxycodone  15 mg 4X/DAY PRN   • polyethylene glycol/lytes  1 Packet DAILY   • Pharmacy Consult Request  1 Each PHARMACY TO DOSE   • MD ALERT...DO NOT ADMINISTER NSAIDS or ASPIRIN unless ORDERED By Neurosurgery  1 Each PRN   • docusate sodium  100 mg BID   • senna-docusate  1 Tab Q24HRS PRN   • magnesium hydroxide  30 mL QDAY PRN   • bisacodyl  10 mg Q24HRS PRN   • fleet  1 Each Once PRN   • Respiratory Care per Protocol   Continuous RT   • diphenhydrAMINE  25 mg Q6HRS PRN    Or   • diphenhydrAMINE  25 mg Q6HRS PRN   • ondansetron  4 mg Q4HRS PRN   • ondansetron  4 mg Q4HRS PRN   • promethazine  12.5-25 mg Q4HRS PRN   • promethazine  12.5-25 mg Q4HRS PRN   • prochlorperazine  5-10 mg Q4HRS PRN   • diazePAM  5 mg Q6HRS PRN   • zolpidem  5 mg HS PRN - MR X 1   • labetalol  10 mg Q HOUR PRN   • hydrALAZINE  10 mg Q HOUR PRN   • benzocaine-menthol  1 Lozenge Q2HRS PRN   • cetirizine  5 mg DAILY    And   • pseudoephedrine SR  120 mg DAILY       Assessment and Plan:  Hospital day # 3  POD# 2 ALIF L4-L5, L5-S1  Chemical prophylactic DVT therapy: Yes - Heparin 5000 units/q 8hrs  Start date/time:  Continue     Patient doing well.  PT/OT.  SCDs/IS/heparin subcu DVT prophylaxis.  Pain control.  Plan discharge home or rehab in 1 to 2 days.   Awaiting bowel movements.  Bowel regimen.  Most likely discharge tomorrow.

## 2019-10-27 NOTE — PROGRESS NOTES
Pt is A&Ox 4  Ambulated: Yes  Up 1x assist w/ fww, steady gait.   Voiding: Yes (frequency and urgency)  Last BM: 10/25  Pain: Controlled with PCA and oxycodone. Marked improvement over last night, aeb reduced oxycodone to 10 mg dose last two times.

## 2019-10-27 NOTE — PROGRESS NOTES
Pt aaox4. VERONICA 5/5. Up w/ SBA, steady gait with FWW. Pt c/o abd pain. PCA stopped. Oxy and valium given with +results. +BS, good appetite. Voiding w/o difficulty. Prevena in place. Dressing CDI. Tachycardic- Denies chest pain or SOB. Pt using IS. Reviewed poc with pt-verbalized understanding. CT scan pending. Call light in reach.

## 2019-10-27 NOTE — PROGRESS NOTES
RN MOBILITY NOTE     Surgery patient?: y  Date of surgery: 10/25  Ambulated 50 ft on day of surgery? (N/A if today is not date of surgery): y   Number of times ambulated 50 feet or greater today: 1  Patient has been up to chair, edge of bed or HOB 90 degrees for all meals?: y  Goal met? (goal is ambulating at least 50 feet 2 times on day shift, one time on night shift): y  If patient did not meet mobility goal, why?: na

## 2019-10-27 NOTE — CARE PLAN
Problem: Knowledge Deficit  Goal: Knowledge of the prescribed therapeutic regimen will improve  Outcome: PROGRESSING AS EXPECTED   POC discussed- pt verbalized understanding.     Problem: Pain Management  Goal: Pain level will decrease to patient's comfort goal  Outcome: PROGRESSING AS EXPECTED   Oxy and valium given PRN with +results. Educated pt on importance of pain control- pt verbalized understanding.

## 2019-10-27 NOTE — ASSESSMENT & PLAN NOTE
Unknown etiology  D-dimer positive  Unable to get IV access for CTA  Pending echo and venous duplex.  Respiratory status stable.

## 2019-10-28 ENCOUNTER — APPOINTMENT (OUTPATIENT)
Dept: RADIOLOGY | Facility: MEDICAL CENTER | Age: 64
DRG: 460 | End: 2019-10-28
Attending: INTERNAL MEDICINE
Payer: MEDICAID

## 2019-10-28 ENCOUNTER — HOSPITAL ENCOUNTER (INPATIENT)
Facility: MEDICAL CENTER | Age: 64
LOS: 1 days | DRG: 460 | End: 2019-10-28
Attending: NEUROLOGICAL SURGERY | Admitting: NEUROLOGICAL SURGERY
Payer: MEDICAID

## 2019-10-28 ENCOUNTER — APPOINTMENT (OUTPATIENT)
Dept: CARDIOLOGY | Facility: MEDICAL CENTER | Age: 64
DRG: 460 | End: 2019-10-28
Attending: INTERNAL MEDICINE
Payer: MEDICAID

## 2019-10-28 VITALS
BODY MASS INDEX: 33.16 KG/M2 | HEIGHT: 66 IN | DIASTOLIC BLOOD PRESSURE: 60 MMHG | SYSTOLIC BLOOD PRESSURE: 94 MMHG | RESPIRATION RATE: 16 BRPM | HEART RATE: 73 BPM | TEMPERATURE: 98.1 F | WEIGHT: 206.35 LBS | OXYGEN SATURATION: 94 %

## 2019-10-28 LAB
ANION GAP SERPL CALC-SCNC: 6 MMOL/L (ref 0–11.9)
BASOPHILS # BLD AUTO: 0.5 % (ref 0–1.8)
BASOPHILS # BLD: 0.05 K/UL (ref 0–0.12)
BUN SERPL-MCNC: 12 MG/DL (ref 8–22)
CALCIUM SERPL-MCNC: 9.3 MG/DL (ref 8.5–10.5)
CHLORIDE SERPL-SCNC: 100 MMOL/L (ref 96–112)
CO2 SERPL-SCNC: 29 MMOL/L (ref 20–33)
CREAT SERPL-MCNC: 0.6 MG/DL (ref 0.5–1.4)
EKG IMPRESSION: NORMAL
EOSINOPHIL # BLD AUTO: 0.06 K/UL (ref 0–0.51)
EOSINOPHIL NFR BLD: 0.6 % (ref 0–6.9)
ERYTHROCYTE [DISTWIDTH] IN BLOOD BY AUTOMATED COUNT: 45.3 FL (ref 35.9–50)
GLUCOSE SERPL-MCNC: 119 MG/DL (ref 65–99)
HCT VFR BLD AUTO: 36.2 % (ref 37–47)
HGB BLD-MCNC: 11 G/DL (ref 12–16)
IMM GRANULOCYTES # BLD AUTO: 0.05 K/UL (ref 0–0.11)
IMM GRANULOCYTES NFR BLD AUTO: 0.5 % (ref 0–0.9)
LV EJECT FRACT  99904: 65
LYMPHOCYTES # BLD AUTO: 1.05 K/UL (ref 1–4.8)
LYMPHOCYTES NFR BLD: 10.9 % (ref 22–41)
MCH RBC QN AUTO: 27.8 PG (ref 27–33)
MCHC RBC AUTO-ENTMCNC: 30.4 G/DL (ref 33.6–35)
MCV RBC AUTO: 91.6 FL (ref 81.4–97.8)
MONOCYTES # BLD AUTO: 0.76 K/UL (ref 0–0.85)
MONOCYTES NFR BLD AUTO: 7.9 % (ref 0–13.4)
NEUTROPHILS # BLD AUTO: 7.67 K/UL (ref 2–7.15)
NEUTROPHILS NFR BLD: 79.6 % (ref 44–72)
NRBC # BLD AUTO: 0 K/UL
NRBC BLD-RTO: 0 /100 WBC
PLATELET # BLD AUTO: 222 K/UL (ref 164–446)
PMV BLD AUTO: 11.4 FL (ref 9–12.9)
POTASSIUM SERPL-SCNC: 3.8 MMOL/L (ref 3.6–5.5)
RBC # BLD AUTO: 3.95 M/UL (ref 4.2–5.4)
SODIUM SERPL-SCNC: 135 MMOL/L (ref 135–145)
TSH SERPL DL<=0.005 MIU/L-ACNC: 0.91 UIU/ML (ref 0.38–5.33)
WBC # BLD AUTO: 9.6 K/UL (ref 4.8–10.8)

## 2019-10-28 PROCEDURE — A9270 NON-COVERED ITEM OR SERVICE: HCPCS | Performed by: NEUROLOGICAL SURGERY

## 2019-10-28 PROCEDURE — 93325 DOPPLER ECHO COLOR FLOW MAPG: CPT | Mod: 26 | Performed by: INTERNAL MEDICINE

## 2019-10-28 PROCEDURE — 93325 DOPPLER ECHO COLOR FLOW MAPG: CPT

## 2019-10-28 PROCEDURE — 97535 SELF CARE MNGMENT TRAINING: CPT

## 2019-10-28 PROCEDURE — 93321 DOPPLER ECHO F-UP/LMTD STD: CPT | Mod: 26 | Performed by: INTERNAL MEDICINE

## 2019-10-28 PROCEDURE — A9270 NON-COVERED ITEM OR SERVICE: HCPCS | Performed by: INTERNAL MEDICINE

## 2019-10-28 PROCEDURE — 97530 THERAPEUTIC ACTIVITIES: CPT

## 2019-10-28 PROCEDURE — 700111 HCHG RX REV CODE 636 W/ 250 OVERRIDE (IP): Performed by: NURSE PRACTITIONER

## 2019-10-28 PROCEDURE — 71275 CT ANGIOGRAPHY CHEST: CPT

## 2019-10-28 PROCEDURE — 84443 ASSAY THYROID STIM HORMONE: CPT

## 2019-10-28 PROCEDURE — 93010 ELECTROCARDIOGRAM REPORT: CPT | Performed by: INTERNAL MEDICINE

## 2019-10-28 PROCEDURE — 80048 BASIC METABOLIC PNL TOTAL CA: CPT

## 2019-10-28 PROCEDURE — 770001 HCHG ROOM/CARE - MED/SURG/GYN PRIV*

## 2019-10-28 PROCEDURE — 97116 GAIT TRAINING THERAPY: CPT

## 2019-10-28 PROCEDURE — 93308 TTE F-UP OR LMTD: CPT | Mod: 26 | Performed by: INTERNAL MEDICINE

## 2019-10-28 PROCEDURE — 36415 COLL VENOUS BLD VENIPUNCTURE: CPT

## 2019-10-28 PROCEDURE — 85025 COMPLETE CBC W/AUTO DIFF WBC: CPT

## 2019-10-28 PROCEDURE — 700117 HCHG RX CONTRAST REV CODE 255: Performed by: INTERNAL MEDICINE

## 2019-10-28 PROCEDURE — 700102 HCHG RX REV CODE 250 W/ 637 OVERRIDE(OP): Performed by: INTERNAL MEDICINE

## 2019-10-28 PROCEDURE — 700102 HCHG RX REV CODE 250 W/ 637 OVERRIDE(OP): Performed by: NEUROLOGICAL SURGERY

## 2019-10-28 PROCEDURE — 700112 HCHG RX REV CODE 229: Performed by: NEUROLOGICAL SURGERY

## 2019-10-28 PROCEDURE — 93970 EXTREMITY STUDY: CPT

## 2019-10-28 PROCEDURE — 99232 SBSQ HOSP IP/OBS MODERATE 35: CPT | Performed by: INTERNAL MEDICINE

## 2019-10-28 RX ORDER — SODIUM CHLORIDE 9 MG/ML
1000 INJECTION, SOLUTION INTRAVENOUS ONCE
Status: DISCONTINUED | OUTPATIENT
Start: 2019-10-28 | End: 2019-10-28 | Stop reason: HOSPADM

## 2019-10-28 RX ADMIN — AMLODIPINE BESYLATE 10 MG: 10 TABLET ORAL at 04:05

## 2019-10-28 RX ADMIN — ROPINIROLE HYDROCHLORIDE 4 MG: 2 TABLET, FILM COATED ORAL at 17:27

## 2019-10-28 RX ADMIN — ROPINIROLE HYDROCHLORIDE 4 MG: 2 TABLET, FILM COATED ORAL at 12:20

## 2019-10-28 RX ADMIN — HYDROCHLOROTHIAZIDE 12.5 MG: 12.5 TABLET ORAL at 04:06

## 2019-10-28 RX ADMIN — HEPARIN SODIUM 5000 UNITS: 5000 INJECTION, SOLUTION INTRAVENOUS; SUBCUTANEOUS at 12:20

## 2019-10-28 RX ADMIN — OXYCODONE HYDROCHLORIDE 10 MG: 10 TABLET ORAL at 04:04

## 2019-10-28 RX ADMIN — DOXEPIN HYDROCHLORIDE 20 MG: 10 CAPSULE ORAL at 20:21

## 2019-10-28 RX ADMIN — MAGNESIUM HYDROXIDE 30 ML: 400 SUSPENSION ORAL at 07:53

## 2019-10-28 RX ADMIN — OXYCODONE HYDROCHLORIDE 15 MG: 5 TABLET ORAL at 19:11

## 2019-10-28 RX ADMIN — HEPARIN SODIUM 5000 UNITS: 5000 INJECTION, SOLUTION INTRAVENOUS; SUBCUTANEOUS at 04:05

## 2019-10-28 RX ADMIN — CETIRIZINE HYDROCHLORIDE 5 MG: 10 TABLET, FILM COATED ORAL at 04:05

## 2019-10-28 RX ADMIN — DOCUSATE SODIUM 100 MG: 100 CAPSULE, LIQUID FILLED ORAL at 04:05

## 2019-10-28 RX ADMIN — OXYCODONE HYDROCHLORIDE 10 MG: 10 TABLET ORAL at 20:21

## 2019-10-28 RX ADMIN — OXYCODONE HYDROCHLORIDE 15 MG: 5 TABLET ORAL at 07:53

## 2019-10-28 RX ADMIN — OMEPRAZOLE 20 MG: 20 CAPSULE, DELAYED RELEASE ORAL at 04:04

## 2019-10-28 RX ADMIN — OXYCODONE HYDROCHLORIDE 15 MG: 5 TABLET ORAL at 15:53

## 2019-10-28 RX ADMIN — DIAZEPAM 5 MG: 2 TABLET ORAL at 10:35

## 2019-10-28 RX ADMIN — POLYETHYLENE GLYCOL 3350 1 PACKET: 17 POWDER, FOR SOLUTION ORAL at 04:05

## 2019-10-28 RX ADMIN — ROPINIROLE HYDROCHLORIDE 4 MG: 2 TABLET, FILM COATED ORAL at 04:05

## 2019-10-28 RX ADMIN — DIAZEPAM 5 MG: 2 TABLET ORAL at 20:21

## 2019-10-28 RX ADMIN — IOHEXOL 55 ML: 350 INJECTION, SOLUTION INTRAVENOUS at 19:47

## 2019-10-28 RX ADMIN — Medication 1000 MG: at 04:06

## 2019-10-28 RX ADMIN — PSEUDOEPHEDRINE HYDROCHLORIDE 120 MG: 120 TABLET, FILM COATED, EXTENDED RELEASE ORAL at 04:06

## 2019-10-28 RX ADMIN — OXYCODONE HYDROCHLORIDE 10 MG: 10 TABLET ORAL at 10:36

## 2019-10-28 RX ADMIN — OXYCODONE HYDROCHLORIDE 15 MG: 5 TABLET ORAL at 01:16

## 2019-10-28 ASSESSMENT — ENCOUNTER SYMPTOMS
SORE THROAT: 0
BACK PAIN: 1
DIARRHEA: 0
FEVER: 0
DIZZINESS: 0
SHORTNESS OF BREATH: 0
SENSORY CHANGE: 1
NAUSEA: 0
FOCAL WEAKNESS: 1
COUGH: 0
MYALGIAS: 1
ABDOMINAL PAIN: 0
DOUBLE VISION: 0
DIAPHORESIS: 0
VOMITING: 0
TINGLING: 1
CHILLS: 0
HEADACHES: 0
CONSTIPATION: 1
TREMORS: 0
BLURRED VISION: 0

## 2019-10-28 ASSESSMENT — COGNITIVE AND FUNCTIONAL STATUS - GENERAL
MOVING TO AND FROM BED TO CHAIR: A LITTLE
SUGGESTED CMS G CODE MODIFIER DAILY ACTIVITY: CJ
TOILETING: A LITTLE
MOVING FROM LYING ON BACK TO SITTING ON SIDE OF FLAT BED: A LITTLE
MOBILITY SCORE: 18
STANDING UP FROM CHAIR USING ARMS: A LITTLE
HELP NEEDED FOR BATHING: A LITTLE
DAILY ACTIVITIY SCORE: 21
SUGGESTED CMS G CODE MODIFIER MOBILITY: CK
CLIMB 3 TO 5 STEPS WITH RAILING: A LITTLE
WALKING IN HOSPITAL ROOM: A LITTLE
DRESSING REGULAR LOWER BODY CLOTHING: A LITTLE
TURNING FROM BACK TO SIDE WHILE IN FLAT BAD: A LITTLE

## 2019-10-28 ASSESSMENT — GAIT ASSESSMENTS
DEVIATION: BRADYKINETIC
GAIT LEVEL OF ASSIST: SUPERVISED
ASSISTIVE DEVICE: FRONT WHEEL WALKER
DISTANCE (FEET): 100

## 2019-10-28 NOTE — PROGRESS NOTES
[facility] Medicine Daily Progress Note    Date of Service  10/28/2019    Chief Complaint  64 y.o. female admitted 10/25/2019 with back pain.    Hospital Course    H/O chronic pain syndrome, COPD, HTN, prior back surgery.  Admitted for elective L4-S1 fusion.  Monitoring respiratory status and pain control post-op.       Interval Problem Update  10/26:  POD #1.  Continues to have post-op pain controlled with PCA.  Cardoso removed.  Ambulating with PT.  Denies shortness of breath.  Does complain to intermittent nausea with pain.   10/27: Tachy cardiac.  She denies chest pain, shortness of breath.  She says she does not feel wheezy today.  EKG shows sinus tachycardia.  Her pain is well controlled, she is agreeable to stopping PCA pump.  Constipated, she wants to take an oral bisacodyl today.  10/28:  Continues to be tachycardic.  D-dimer positive.  Respiratory status stable.  Denies shortness of breath.  Unable to get IV access and patient refusing further sticks in order to get CTA.  Will obtain echo and venous duplex.      Consultants/Specialty  NSG    Code Status  FULL    Disposition  Pending discharge home.  Plan to discharge if echo and venous duplex are normal.     Review of Systems  Review of Systems   Constitutional: Negative for chills, diaphoresis, fever and malaise/fatigue.   HENT: Negative for congestion and sore throat.    Eyes: Negative for blurred vision and double vision.   Respiratory: Negative for cough and shortness of breath.    Cardiovascular: Negative for chest pain and leg swelling.   Gastrointestinal: Positive for constipation. Negative for abdominal pain, diarrhea, nausea and vomiting.   Genitourinary: Negative for dysuria and hematuria.   Musculoskeletal: Positive for back pain and myalgias.   Skin: Negative for itching and rash.   Neurological: Positive for tingling (chronic to RLE with improvement post op.), sensory change (chronic to RLE with mild improvement post-op) and focal weakness.  Negative for dizziness, tremors and headaches.        Physical Exam  Temp:  [36.6 °C (97.8 °F)-37.2 °C (98.9 °F)] 36.6 °C (97.8 °F)  Pulse:  [101-125] 101  Resp:  [16-18] 16  BP: ()/(66-84) 132/73  SpO2:  [92 %-97 %] 94 %    Physical Exam   Constitutional: She is oriented to person, place, and time. She appears well-developed and well-nourished.   HENT:   Head: Normocephalic and atraumatic.   Eyes: Conjunctivae are normal. No scleral icterus.   Neck: Normal range of motion. No tracheal deviation present.   Cardiovascular: Normal rate and regular rhythm.   No murmur heard.  Pulmonary/Chest: Effort normal and breath sounds normal. No stridor. No respiratory distress. She has no wheezes.   Abdominal: Soft. Bowel sounds are normal. She exhibits no distension. There is no tenderness. There is no rebound.   Musculoskeletal: She exhibits no edema.   BLE mobility limited by pain.    Neurological: She is alert and oriented to person, place, and time. No cranial nerve deficit.   Numbness and tingling of RLE with mild improvement after surgery  5/5 strength in all extremities.    Skin: Skin is warm and dry. No rash noted. She is not diaphoretic. No erythema.   Surgical wound to lower back with dressing C/D/I.    Psychiatric: She has a normal mood and affect. Her behavior is normal. Judgment and thought content normal.   Nursing note and vitals reviewed.      Fluids    Intake/Output Summary (Last 24 hours) at 10/28/2019 1240  Last data filed at 10/28/2019 0804  Gross per 24 hour   Intake 720 ml   Output --   Net 720 ml       Laboratory  Recent Labs     10/26/19  0131 10/27/19  0141 10/28/19  0557   WBC 15.4* 10.8 9.6   RBC 4.25 3.87* 3.95*   HEMOGLOBIN 12.0 10.9* 11.0*   HEMATOCRIT 38.6 35.8* 36.2*   MCV 90.8 92.5 91.6   MCH 28.2 28.2 27.8   MCHC 31.1* 30.4* 30.4*   RDW 45.9 47.2 45.3   PLATELETCT 272 211 222   MPV 11.3 11.4 11.4     Recent Labs     10/26/19  0131 10/27/19  0141 10/28/19  0557   SODIUM 139 137 135    POTASSIUM 4.6 3.9 3.8   CHLORIDE 106 105 100   CO2 24 25 29   GLUCOSE 118* 138* 119*   BUN 16 14 12   CREATININE 0.76 0.61 0.60   CALCIUM 8.9 8.8 9.3                   Imaging  DX-PORTABLE FLUORO > 1 HOUR   Final Result      Intraoperative evaluation of lumbar spine surgery.                  INTERPRETING LOCATION:  [address], [place], [address]      DX-LUMBAR SPINE-2 OR 3 VIEWS   Final Result      Intraoperative evaluation of lumbar spine surgery.                  INTERPRETING LOCATION:  [address], [place], [address]      [id number] VIEW   Final Result      No radiopaque surgical instrument or sponge marker identified.      CT-CTA CHEST PULMONARY ARTERY W/ RECONS    (Results Pending)   IR-US GUIDED PIV    (Results Pending)   NM-LUNG PERFUSION IMAGING    (Results Pending)   US-EXTREMITY VENOUS LOWER BILAT    (Results Pending)   EC-ECHOCARDIOGRAM LTD W/O CONT    (Results Pending)        Assessment/Plan  * Status post lumbar spine surgery for decompression of spinal cord  Assessment & Plan  Management per neurosurgery  PT OT following.   Mobilization  Pain controlled  Bowel regimen    Sinus tachycardia  Assessment & Plan  Unknown etiology  D-dimer positive  Unable to get IV access for CTA  Pending echo and venous duplex.  Respiratory status stable.     GERD (gastroesophageal reflux disease)  Assessment & Plan  Stable.  Continue PPI    Restless leg syndrome  Assessment & Plan  Continue ropinirole    EMILY (obstructive sleep apnea)- (present on admission)  Assessment & Plan  Continue with nocturnal CPAP    Hypertension  Assessment & Plan  Controlled.  Continue home regimen.  Hydralazine as needed IV  Monitor blood pressure    Chronic respiratory failure (HCC)- (present on admission)  Assessment & Plan  Continue with supplemental oxygen.  Wean as tolerated.  Uses nocturnal o2 at home.     COPD (chronic obstructive pulmonary disease) (HCC)- (present on admission)  Assessment & Plan  Not in exacerbation.  Continue home  inhalers.  RT protocol and supplemental oxygen       VTE prophylaxis: heparin.

## 2019-10-28 NOTE — PROGRESS NOTES
Pt c/o severe pain. Oxy given. Pt declines to take any IV pain meds. Will wait until next Oxy dose. US PIV placed. Pt placed on transport list to go to CT

## 2019-10-28 NOTE — CARE PLAN
Problem: Communication  Goal: The ability to communicate needs accurately and effectively will improve  Outcome: PROGRESSING AS EXPECTED  Intervention: Educate patient and significant other/support system about the plan of care, procedures, treatments, medications and allow for questions  Flowsheets (Taken 10/28/2019 1148)  Pt & Family Have Been Educated on Methods Available to Report Concerns Related to Care, Treatment, Services, and Patient Safety Issues: Yes  Note:   Plan of care discussed with patient and Dr. Chung at bedside. Pt agreeable to IV for CT scan. US of BLE and echo ordered. No DC today.      Problem: Pain Management  Goal: Pain level will decrease to patient's comfort goal  Outcome: PROGRESSING AS EXPECTED  Intervention: Follow pain managment plan developed in collaboration with patient and Interdisciplinary Team  Note:   Pt states pain is very well managed on pain regimen.

## 2019-10-28 NOTE — PROGRESS NOTES
1130 VQ scan is down indefinitely. Unsure when it will be working as parts needed to be ordered. Pt no longer has IV access. Pt is agreeable to have Alessio from PICC team look w/ US guided for IV access. Pt very anxious and tearful about having another IV placed. Confirmed with  CT that for this test a 20g in the AC is required. Hospitalist and neurosurgery aware of situation. Pt will not be dc'd until CT scan completed. US BLE and echo ordered.     1242 20g IV access obtained by ultrasound. CT notified to schedule scan.     1600 Pt on CT for tentative 1730

## 2019-10-28 NOTE — PROGRESS NOTES
Pt returned from CT.  Unable to complete as U/S guided IV would not flush fast enough for the dye.  IV is a very slow flush, still usable for the floor.  Pt is distraught about IV situation.  Pt mentioned that CT told her about a nuclear med breathable test for PE and is interested in that.  Pt asking for an IV to be placed in her foot or ankle.

## 2019-10-28 NOTE — PROGRESS NOTES
RN MOBILITY NOTE     Surgery patient?: yes  Date of surgery: 10/28/19  Ambulated 50 ft on day of surgery? (N/A if today is not date of surgery): n/a  Number of times ambulated 50 feet or greater today: 4  Patient has been up to chair, edge of bed or HOB 90 degrees for all meals?: yes  Goal met? (goal is ambulating at least 50 feet 2 times on day shift, one time on night shift): yes  If patient did not meet mobility goal, why?: n/a

## 2019-10-28 NOTE — DISCHARGE INSTRUCTIONS
Discharge Instructions    Discharged to home by car with relative. Discharged via wheelchair, hospital escort: Yes.  Special equipment needed: Wound VAC    Be sure to schedule a follow-up appointment with your primary care doctor or any specialists as instructed.     Discharge Plan:   Influenza Vaccine Indication: Indicated: 9 to 64 years of age  Influenza Vaccine Given - only chart on this line when given: Influenza Vaccine Given (See MAR)    I understand that a diet low in cholesterol, fat, and sodium is recommended for good health. Unless I have been given specific instructions below for another diet, I accept this instruction as my diet prescription.   Other diet: Regular diet    Special Instructions:   • Remove Prevena wound vac on November 1st. Leave incision open to air. Dispose of the wound vac in the garbage. Double bag prevena. Prevena wound vac packet provided and listed below with contact numbers if you feel the wound vac isn't working properly.   • You may shower. However, press the on/off button to stop therapy. Close the tubing clamp and disconnect the tubing from the unit. The clear drape is waterproof but avoid prolonged water contact with the dressing.   • Don't apply creams to your incision  • No baths or hot tubs for 6 weeks post surgery or until MD   • No bending, lifting greater than 10lbs or twisting  • No NSAIDs or Aspirin (non-steroidal anti-inflammatories) for 14 days after surgery (Advil, Celebrex, Naproxen, Ibuprofen).   • Don't drive for 2 weeks or while taking narcotics  • Take stool softeners/laxatives while taking narcotics  • Schedule follow up appointment with [facility] 2 weeks after surgery  • Inspect the incision and call the office if there is redness, swelling, increased pain, drainage from the incision.     Surgical Spinal Decompression, Care After  Introduction  Refer to this sheet in the next few weeks. These instructions provide you with information about caring  for yourself after your procedure. Your health care provider may also give you more specific instructions. Your treatment has been planned according to current medical practices, but problems sometimes occur. Call your health care provider if you have any problems or questions after your procedure.  What can I expect after the procedure?  It is common to have pain for the first few days after the procedure. Some people continue to have mild pain even after making a full recovery.  Follow these instructions at home:  Medicine  · Take medicines only as directed by your health care provider.  · Avoid taking over-the-counter pain medicines unless your health care provider tells you otherwise. These medicines interfere with the development and growth of new bone cells.  · If you were prescribed a narcotic pain medicine, take it exactly as told by your health care provider.  ¨ Do not drink alcohol while on the medicine.  ¨ Do not drive while on the medicine.  Injury care  · Care for your back brace as told by your health care provider.  · If directed, apply ice to the injured area:  ¨ Put ice in a plastic bag.  ¨ Place a towel between your skin and the bag.  ¨ Leave the ice on for 20 minutes, 2-3 times a day.  Activity  · Perform physical therapy exercises as told by your health care provider.  · Exercise regularly. Start by taking short walks. Slowly increase your activity level over time. Gentle exercise helps to ease pain.  · Sit, stand, walk, turn in bed, and reposition yourself as told by your health care provider. This will help to keep your spine in proper alignment.  · Avoid bending and twisting your body.  · Avoid doing strenuous household chores, such as vacuuming.  · Do not lift anything that is heavier than 10 lb (4.5 kg).  Other Instructions  · Keep all follow-up visits as directed by your health care provider. This is important.  · Do not use any tobacco products, including cigarettes, chewing tobacco, or  electronic cigarettes. If you need help quitting, ask your health care provider. Nicotine affects the way bones heal.  Contact a health care provider if:  · Your pain gets worse.  · You have a fever.  · You have redness, swelling, or pain at the site of your incision.  · You have fluid, blood, or pus coming from your incision.  · You have numbness, tingling, or weakness in any part of your body.  Get help right away if:  · Your incision feels swollen and tender, and the surrounding area looks like a lump. The lump may be red or bluish in color.  · You cannot move any part of your body (paralysis).  · You cannot control your bladder or bowels.  This information is not intended to replace advice given to you by your health care provider. Make sure you discuss any questions you have with your health care provider.  Document Released: 05/03/2016 Document Revised: 05/25/2017 Document Reviewed: 12/21/2015  © 2017 [employer]      · Is patient discharged on Warfarin / Coumadin?   No          PREVENA™ 125 Therapy Unit           System:  Keep your therapy unit in a safe place where:  · Tubing will not become kinked or pinched  · It cannot be pulled off a table or dropped onto the floor    Showering:  If cleared by your doctor, a quick, light shower is ok. Keep the therapy unit away from direct water spray.  Do not submerge dressing in water.  When towel drying, be careful not to disrupt the dressing.   See PREVENA PLUS™ Therapy Patient Guide for additional details.     PREVENA™ Therapy Patient Guide     Batteries:  · This therapy unit comes with three AA size batteries and cannot be recharged.  · It is recommended that you keep extra batteries on hand. If the batteries run out, consult your PREVENA™ Therapy Patient Guide on how to replace the batteries.     Call Setup at [phone] with questions or to speak to a clinician trained on your PREVENA™ System.  Contact Us     FAQs   · The therapy unit has alarms that you can  see and hear to alert you of a potential issue such as a low battery or air leak. In most cases the issue is easily resolved. Refer to the PREVENA™ Therapy Patient Guide or call [facility] at [phone] if you have questions.  Where do I get more information?        · Your PREVENA™ Therapy Patient Guide has more complete safety information and details on how to use your PREVENA™ Therapy.  For answers to more questions, call [facility] at [phone] to speak with a PREVENA™ Therapy specialist.  · Contact your doctor with any medical questions. In the case of an emergency, call your local medical resource (i.e. 1).    Depression / Suicide Risk    As you are discharged from this [facility] facility, it is important to learn how to keep safe from harming yourself.    Recognize the warning signs:  · Abrupt changes in personality, positive or negative- including increase in energy   · Giving away possessions  · Change in eating patterns- significant weight changes-  positive or negative  · Change in sleeping patterns- unable to sleep or sleeping all the time   · Unwillingness or inability to communicate  · Depression  · Unusual sadness, discouragement and loneliness  · Talk of wanting to die  · Neglect of personal appearance   · Rebelliousness- reckless behavior  · Withdrawal from people/activities they love  · Confusion- inability to concentrate     If you or a loved one observes any of these behaviors or has concerns about self-harm, here's what you can do:  · Talk about it- your feelings and reasons for harming yourself  · Remove any means that you might use to hurt yourself (examples: pills, rope, extension cords, firearm)  · Get professional help from the community (Mental Health, Substance Abuse, psychological counseling)  · Do not be alone:Call your Safe Contact- someone whom you trust who will be there for you.  · Call your local CRISIS HOTLINE 145-8126 or 390-166-6657  · Call your local Children's Mobile  Crisis Response Team [place] (699) 506-9405 or www.[employer]  · Call the toll free National Suicide Prevention Hotlines   · National Suicide Prevention Lifeline 436-250-MOOE (0487)  · National Hope Line Network 800-SUICIDE (199-5590)

## 2019-10-28 NOTE — THERAPY
"Physical Therapy Treatment completed.   Bed Mobility:  Supine to Sit: Minimal Assist  Transfers: Sit to Stand: Supervised  Gait: Level Of Assist: Supervised with Front-Wheel Walker       Plan of Care: Patient with no further skilled PT needs in the acute care setting at this time  Discharge Recommendations: Equipment: No Equipment Needed. Post-acute therapy: Recommend outpatient physical therapy services to address higher level deficits.     Patient demonstrated ability to perform log roll to the right while maintaining spinal precautions. Required min assist to assume sitting due to reports of soreness in the elbow from a previous injury to the olecranon though it's anticipated that patient would be able to perform bed mobility independently. Demonstrated all fucntional transfers at SPV level. Patient ambulates with FWW at SPV with reports of 1/10 pain, stating this is the least amount of pain she has been in, in 10 years. Due to patient demonstrating functional mobility at baseline, no additional acute physical therapy is needed at this time. Anticipate pt will DC home with daughter once medically cleared. Recommend outpatient physical therapy services to address higher level needs.     See \"Rehab Therapy-Acute\" Patient Summary Report for complete documentation.       "

## 2019-10-28 NOTE — THERAPY
"Occupational Therapy Treatment completed with focus on ADLs, ADL transfers and patient education.  Functional Status:  Pt seen for OT tx. Min A supine > sit, supv sit > stand, supv amb in room w/ FWW. Stood at sink to complete light grooming w/ supv. Min A LB dressing. Pt reports that daughter will assist w/ ADLs and ADL transfers as needed upon appropriate medical d/c home. Pt able to appropriately maintain spinal precautions throughout session. Completed toilet transfer w/ supv. Continues to be pleasant, cooperative and express motivation to maximize independence in ADLs and ADL transfers.   Plan of Care: Will benefit from Occupational Therapy 3 times per week  Discharge Recommendations:  Equipment Will Continue to Assess for Equipment Needs. Post-acute therapy Recommend home health transitional care for continued occupational therapy services.     See \"Rehab Therapy-Acute\" Patient Summary Report for complete documentation.   "

## 2019-10-28 NOTE — CARE PLAN
Problem: Communication  Goal: The ability to communicate needs accurately and effectively will improve  Outcome: PROGRESSING AS EXPECTED     Problem: Safety  Goal: Will remain free from injury  Outcome: PROGRESSING AS EXPECTED  Goal: Will remain free from falls  Outcome: PROGRESSING AS EXPECTED     Problem: Infection  Goal: Will remain free from infection  Outcome: PROGRESSING AS EXPECTED     Problem: Bowel/Gastric:  Goal: Normal bowel function is maintained or improved  Outcome: PROGRESSING AS EXPECTED  Goal: Will not experience complications related to bowel motility  Outcome: PROGRESSING AS EXPECTED     Problem: Knowledge Deficit  Goal: Knowledge of disease process/condition, treatment plan, diagnostic tests, and medications will improve  Outcome: PROGRESSING AS EXPECTED  Goal: Knowledge of the prescribed therapeutic regimen will improve  Outcome: PROGRESSING AS EXPECTED

## 2019-10-28 NOTE — DISCHARGE PLANNING
Patient is eligible for Medicaid Meds to Beds at discharge if they have coverage with [place] [employer], Medicaid FFS, Medicaid HMO ([facility]), or [place]. This service is provided through the [facility] if orders are received by the pharmacy prior to 4pm Monday through Friday excluding holidays. Preferred pharmacy has been changed to [facility]. Please call [phone] prior to discharge.

## 2019-10-28 NOTE — PROGRESS NOTES
Inquired to pt re: a new IV for the CT.  Pt refused for the night.  Will readdress the issue in the  Morning.

## 2019-11-20 ENCOUNTER — HOSPITAL ENCOUNTER (INPATIENT)
Facility: MEDICAL CENTER | Age: 64
LOS: 4 days | DRG: 348 | End: 2019-11-24
Attending: EMERGENCY MEDICINE | Admitting: INTERNAL MEDICINE
Payer: MEDICAID

## 2019-11-20 ENCOUNTER — APPOINTMENT (OUTPATIENT)
Dept: RADIOLOGY | Facility: MEDICAL CENTER | Age: 64
DRG: 348 | End: 2019-11-20
Attending: EMERGENCY MEDICINE
Payer: MEDICAID

## 2019-11-20 DIAGNOSIS — D64.9 ANEMIA, UNSPECIFIED TYPE: ICD-10-CM

## 2019-11-20 DIAGNOSIS — K62.5 RECTAL BLEEDING: ICD-10-CM

## 2019-11-20 LAB
ABO GROUP BLD: NORMAL
ALBUMIN SERPL BCP-MCNC: 3.5 G/DL (ref 3.2–4.9)
ALBUMIN/GLOB SERPL: 1.4 G/DL
ALP SERPL-CCNC: 106 U/L (ref 30–99)
ALT SERPL-CCNC: 8 U/L (ref 2–50)
ANION GAP SERPL CALC-SCNC: 8 MMOL/L (ref 0–11.9)
ANISOCYTOSIS BLD QL SMEAR: ABNORMAL
APTT PPP: 24.7 SEC (ref 24.7–36)
AST SERPL-CCNC: 11 U/L (ref 12–45)
BARCODED ABORH UBTYP: 6200
BARCODED PRD CODE UBPRD: NORMAL
BARCODED UNIT NUM UBUNT: NORMAL
BASOPHILS # BLD AUTO: 0.7 % (ref 0–1.8)
BASOPHILS # BLD: 0.06 K/UL (ref 0–0.12)
BILIRUB SERPL-MCNC: 0.3 MG/DL (ref 0.1–1.5)
BLD GP AB SCN SERPL QL: NORMAL
BUN SERPL-MCNC: 12 MG/DL (ref 8–22)
CALCIUM SERPL-MCNC: 8.4 MG/DL (ref 8.5–10.5)
CHLORIDE SERPL-SCNC: 103 MMOL/L (ref 96–112)
CO2 SERPL-SCNC: 26 MMOL/L (ref 20–33)
COMMENT 1642: NORMAL
COMPONENT R 8504R: NORMAL
CREAT SERPL-MCNC: 0.68 MG/DL (ref 0.5–1.4)
DACRYOCYTES BLD QL SMEAR: NORMAL
EKG IMPRESSION: NORMAL
EOSINOPHIL # BLD AUTO: 0.01 K/UL (ref 0–0.51)
EOSINOPHIL NFR BLD: 0.1 % (ref 0–6.9)
ERYTHROCYTE [DISTWIDTH] IN BLOOD BY AUTOMATED COUNT: 57.3 FL (ref 35.9–50)
GLOBULIN SER CALC-MCNC: 2.5 G/DL (ref 1.9–3.5)
GLUCOSE SERPL-MCNC: 101 MG/DL (ref 65–99)
HCT VFR BLD AUTO: 21.2 % (ref 37–47)
HGB BLD-MCNC: 5.5 G/DL (ref 12–16)
HYPOCHROMIA BLD QL SMEAR: ABNORMAL
IMM GRANULOCYTES # BLD AUTO: 0.06 K/UL (ref 0–0.11)
IMM GRANULOCYTES NFR BLD AUTO: 0.7 % (ref 0–0.9)
INR PPP: 0.94 (ref 0.87–1.13)
LIPASE SERPL-CCNC: 7 U/L (ref 11–82)
LYMPHOCYTES # BLD AUTO: 0.85 K/UL (ref 1–4.8)
LYMPHOCYTES NFR BLD: 9.3 % (ref 22–41)
MACROCYTES BLD QL SMEAR: ABNORMAL
MCH RBC QN AUTO: 25.1 PG (ref 27–33)
MCHC RBC AUTO-ENTMCNC: 25.9 G/DL (ref 33.6–35)
MCV RBC AUTO: 96.8 FL (ref 81.4–97.8)
MICROCYTES BLD QL SMEAR: ABNORMAL
MONOCYTES # BLD AUTO: 0.43 K/UL (ref 0–0.85)
MONOCYTES NFR BLD AUTO: 4.7 % (ref 0–13.4)
MORPHOLOGY BLD-IMP: NORMAL
NEUTROPHILS # BLD AUTO: 7.69 K/UL (ref 2–7.15)
NEUTROPHILS NFR BLD: 84.5 % (ref 44–72)
NRBC # BLD AUTO: 0.03 K/UL
NRBC BLD-RTO: 0.3 /100 WBC
OVALOCYTES BLD QL SMEAR: NORMAL
PLATELET # BLD AUTO: 292 K/UL (ref 164–446)
PLATELET BLD QL SMEAR: NORMAL
PMV BLD AUTO: 11.8 FL (ref 9–12.9)
POIKILOCYTOSIS BLD QL SMEAR: NORMAL
POLYCHROMASIA BLD QL SMEAR: NORMAL
POTASSIUM SERPL-SCNC: 4.2 MMOL/L (ref 3.6–5.5)
PRODUCT TYPE UPROD: NORMAL
PROT SERPL-MCNC: 6 G/DL (ref 6–8.2)
PROTHROMBIN TIME: 12.8 SEC (ref 12–14.6)
RBC # BLD AUTO: 2.19 M/UL (ref 4.2–5.4)
RBC BLD AUTO: PRESENT
RH BLD: NORMAL
SODIUM SERPL-SCNC: 137 MMOL/L (ref 135–145)
STOMATOCYTES BLD QL SMEAR: NORMAL
TARGETS BLD QL SMEAR: NORMAL
UNIT STATUS USTAT: NORMAL
WBC # BLD AUTO: 9.1 K/UL (ref 4.8–10.8)

## 2019-11-20 PROCEDURE — 36430 TRANSFUSION BLD/BLD COMPNT: CPT

## 2019-11-20 PROCEDURE — 86900 BLOOD TYPING SEROLOGIC ABO: CPT

## 2019-11-20 PROCEDURE — 700102 HCHG RX REV CODE 250 W/ 637 OVERRIDE(OP): Performed by: INTERNAL MEDICINE

## 2019-11-20 PROCEDURE — 85610 PROTHROMBIN TIME: CPT

## 2019-11-20 PROCEDURE — 93005 ELECTROCARDIOGRAM TRACING: CPT

## 2019-11-20 PROCEDURE — 71045 X-RAY EXAM CHEST 1 VIEW: CPT

## 2019-11-20 PROCEDURE — A9270 NON-COVERED ITEM OR SERVICE: HCPCS | Performed by: INTERNAL MEDICINE

## 2019-11-20 PROCEDURE — 86850 RBC ANTIBODY SCREEN: CPT

## 2019-11-20 PROCEDURE — 93005 ELECTROCARDIOGRAM TRACING: CPT | Performed by: EMERGENCY MEDICINE

## 2019-11-20 PROCEDURE — P9016 RBC LEUKOCYTES REDUCED: HCPCS

## 2019-11-20 PROCEDURE — 83690 ASSAY OF LIPASE: CPT

## 2019-11-20 PROCEDURE — 304561 HCHG STAT O2

## 2019-11-20 PROCEDURE — 85025 COMPLETE CBC W/AUTO DIFF WBC: CPT

## 2019-11-20 PROCEDURE — 86923 COMPATIBILITY TEST ELECTRIC: CPT

## 2019-11-20 PROCEDURE — 770020 HCHG ROOM/CARE - TELE (206)

## 2019-11-20 PROCEDURE — 85730 THROMBOPLASTIN TIME PARTIAL: CPT

## 2019-11-20 PROCEDURE — 80053 COMPREHEN METABOLIC PANEL: CPT

## 2019-11-20 PROCEDURE — 99223 1ST HOSP IP/OBS HIGH 75: CPT | Performed by: INTERNAL MEDICINE

## 2019-11-20 PROCEDURE — 86901 BLOOD TYPING SEROLOGIC RH(D): CPT

## 2019-11-20 PROCEDURE — 99285 EMERGENCY DEPT VISIT HI MDM: CPT

## 2019-11-20 PROCEDURE — 94760 N-INVAS EAR/PLS OXIMETRY 1: CPT

## 2019-11-20 RX ORDER — OXYCODONE HYDROCHLORIDE 5 MG/1
5 TABLET ORAL
Status: DISCONTINUED | OUTPATIENT
Start: 2019-11-20 | End: 2019-11-24 | Stop reason: HOSPADM

## 2019-11-20 RX ORDER — ALBUTEROL SULFATE 90 UG/1
2 AEROSOL, METERED RESPIRATORY (INHALATION) EVERY 6 HOURS PRN
Status: DISCONTINUED | OUTPATIENT
Start: 2019-11-20 | End: 2019-11-24 | Stop reason: HOSPADM

## 2019-11-20 RX ORDER — FERROUS SULFATE 325(65) MG
325 TABLET ORAL
Status: DISCONTINUED | OUTPATIENT
Start: 2019-11-20 | End: 2019-11-24 | Stop reason: HOSPADM

## 2019-11-20 RX ORDER — HYDROMORPHONE HYDROCHLORIDE 1 MG/ML
0.25 INJECTION, SOLUTION INTRAMUSCULAR; INTRAVENOUS; SUBCUTANEOUS
Status: DISCONTINUED | OUTPATIENT
Start: 2019-11-20 | End: 2019-11-24 | Stop reason: HOSPADM

## 2019-11-20 RX ORDER — BISACODYL 10 MG
10 SUPPOSITORY, RECTAL RECTAL
Status: DISCONTINUED | OUTPATIENT
Start: 2019-11-20 | End: 2019-11-24 | Stop reason: HOSPADM

## 2019-11-20 RX ORDER — OMEPRAZOLE 20 MG/1
20 CAPSULE, DELAYED RELEASE ORAL DAILY
Status: DISCONTINUED | OUTPATIENT
Start: 2019-11-21 | End: 2019-11-21

## 2019-11-20 RX ORDER — ROPINIROLE 2 MG/1
4 TABLET, FILM COATED ORAL 4 TIMES DAILY
Status: DISCONTINUED | OUTPATIENT
Start: 2019-11-20 | End: 2019-11-24 | Stop reason: HOSPADM

## 2019-11-20 RX ORDER — ONDANSETRON 2 MG/ML
4 INJECTION INTRAMUSCULAR; INTRAVENOUS EVERY 4 HOURS PRN
Status: DISCONTINUED | OUTPATIENT
Start: 2019-11-20 | End: 2019-11-24 | Stop reason: HOSPADM

## 2019-11-20 RX ORDER — IPRATROPIUM BROMIDE AND ALBUTEROL SULFATE 2.5; .5 MG/3ML; MG/3ML
3 SOLUTION RESPIRATORY (INHALATION) EVERY 6 HOURS PRN
Status: DISCONTINUED | OUTPATIENT
Start: 2019-11-20 | End: 2019-11-24 | Stop reason: HOSPADM

## 2019-11-20 RX ORDER — PROMETHAZINE HYDROCHLORIDE 25 MG/1
12.5-25 TABLET ORAL EVERY 4 HOURS PRN
Status: DISCONTINUED | OUTPATIENT
Start: 2019-11-20 | End: 2019-11-24 | Stop reason: HOSPADM

## 2019-11-20 RX ORDER — POLYETHYLENE GLYCOL 3350 17 G/17G
1 POWDER, FOR SOLUTION ORAL
Status: DISCONTINUED | OUTPATIENT
Start: 2019-11-20 | End: 2019-11-24 | Stop reason: HOSPADM

## 2019-11-20 RX ORDER — DOXEPIN HYDROCHLORIDE 10 MG/1
20 CAPSULE ORAL NIGHTLY
Status: DISCONTINUED | OUTPATIENT
Start: 2019-11-20 | End: 2019-11-24 | Stop reason: HOSPADM

## 2019-11-20 RX ORDER — POLYETHYLENE GLYCOL 3350 17 G/17G
1 POWDER, FOR SOLUTION ORAL DAILY
Status: DISCONTINUED | OUTPATIENT
Start: 2019-11-21 | End: 2019-11-24 | Stop reason: HOSPADM

## 2019-11-20 RX ORDER — PROMETHAZINE HYDROCHLORIDE 25 MG/1
12.5-25 SUPPOSITORY RECTAL EVERY 4 HOURS PRN
Status: DISCONTINUED | OUTPATIENT
Start: 2019-11-20 | End: 2019-11-24 | Stop reason: HOSPADM

## 2019-11-20 RX ORDER — OXYCODONE HYDROCHLORIDE 5 MG/1
2.5 TABLET ORAL
Status: DISCONTINUED | OUTPATIENT
Start: 2019-11-20 | End: 2019-11-24 | Stop reason: HOSPADM

## 2019-11-20 RX ORDER — PROCHLORPERAZINE EDISYLATE 5 MG/ML
5-10 INJECTION INTRAMUSCULAR; INTRAVENOUS EVERY 4 HOURS PRN
Status: DISCONTINUED | OUTPATIENT
Start: 2019-11-20 | End: 2019-11-24 | Stop reason: HOSPADM

## 2019-11-20 RX ORDER — FERROUS SULFATE 325(65) MG
325 TABLET ORAL 3 TIMES DAILY
COMMUNITY
End: 2020-11-30

## 2019-11-20 RX ORDER — PEG-3350, SODIUM SULFATE, SODIUM CHLORIDE, POTASSIUM CHLORIDE, SODIUM ASCORBATE AND ASCORBIC ACID 7.5-2.691G
100 KIT ORAL 2 TIMES DAILY
Status: COMPLETED | OUTPATIENT
Start: 2019-11-20 | End: 2019-11-21

## 2019-11-20 RX ORDER — AMLODIPINE BESYLATE 10 MG/1
10 TABLET ORAL DAILY
Status: DISCONTINUED | OUTPATIENT
Start: 2019-11-21 | End: 2019-11-24 | Stop reason: HOSPADM

## 2019-11-20 RX ORDER — AMOXICILLIN 250 MG
2 CAPSULE ORAL 2 TIMES DAILY
Status: DISCONTINUED | OUTPATIENT
Start: 2019-11-20 | End: 2019-11-24 | Stop reason: HOSPADM

## 2019-11-20 RX ORDER — MORPHINE SULFATE 30 MG/1
30 TABLET, FILM COATED, EXTENDED RELEASE ORAL EVERY 12 HOURS
Status: DISCONTINUED | OUTPATIENT
Start: 2019-11-20 | End: 2019-11-24 | Stop reason: HOSPADM

## 2019-11-20 RX ORDER — MORPHINE SULFATE 30 MG/1
15-30 TABLET, FILM COATED, EXTENDED RELEASE ORAL EVERY 12 HOURS
COMMUNITY
End: 2024-01-28

## 2019-11-20 RX ORDER — ONDANSETRON 4 MG/1
4 TABLET, ORALLY DISINTEGRATING ORAL EVERY 4 HOURS PRN
Status: DISCONTINUED | OUTPATIENT
Start: 2019-11-20 | End: 2019-11-24 | Stop reason: HOSPADM

## 2019-11-20 RX ADMIN — POLYETHYLENE GLYCOL 3350, SODIUM SULFATE, SODIUM CHLORIDE, POTASSIUM CHLORIDE, ASCORBIC ACID, SODIUM ASCORBATE 100 G: KIT at 22:27

## 2019-11-20 RX ADMIN — OXYCODONE HYDROCHLORIDE 5 MG: 5 TABLET ORAL at 22:35

## 2019-11-20 RX ADMIN — ROPINIROLE HYDROCHLORIDE 4 MG: 2 TABLET, FILM COATED ORAL at 19:51

## 2019-11-20 RX ADMIN — FERROUS SULFATE TAB 325 MG (65 MG ELEMENTAL FE) 325 MG: 325 (65 FE) TAB at 19:51

## 2019-11-20 RX ADMIN — DOXEPIN HYDROCHLORIDE 20 MG: 10 CAPSULE ORAL at 20:47

## 2019-11-20 RX ADMIN — MORPHINE SULFATE 30 MG: 30 TABLET, EXTENDED RELEASE ORAL at 19:51

## 2019-11-20 SDOH — HEALTH STABILITY: MENTAL HEALTH: HOW OFTEN DO YOU HAVE 6 OR MORE DRINKS ON ONE OCCASION?: NOT ASKED

## 2019-11-20 SDOH — HEALTH STABILITY: MENTAL HEALTH: HOW OFTEN DO YOU HAVE A DRINK CONTAINING ALCOHOL?: NOT ASKED

## 2019-11-20 SDOH — HEALTH STABILITY: MENTAL HEALTH: HOW MANY STANDARD DRINKS CONTAINING ALCOHOL DO YOU HAVE ON A TYPICAL DAY?: NOT ASKED

## 2019-11-20 ASSESSMENT — LIFESTYLE VARIABLES
HOW MANY TIMES IN THE PAST YEAR HAVE YOU HAD 5 OR MORE DRINKS IN A DAY: 0
EVER HAD A DRINK FIRST THING IN THE MORNING TO STEADY YOUR NERVES TO GET RID OF A HANGOVER: NO
CONSUMPTION TOTAL: NEGATIVE
AVERAGE NUMBER OF DAYS PER WEEK YOU HAVE A DRINK CONTAINING ALCOHOL: 0
HAVE YOU EVER FELT YOU SHOULD CUT DOWN ON YOUR DRINKING: NO
EVER_SMOKED: YES
ON A TYPICAL DAY WHEN YOU DRINK ALCOHOL HOW MANY DRINKS DO YOU HAVE: 0
TOTAL SCORE: 0
HAVE PEOPLE ANNOYED YOU BY CRITICIZING YOUR DRINKING: NO
DOES PATIENT WANT TO STOP DRINKING: NO
EVER_SMOKED: YES
ALCOHOL_USE: NO
DO YOU DRINK ALCOHOL: NO
EVER FELT BAD OR GUILTY ABOUT YOUR DRINKING: NO

## 2019-11-20 ASSESSMENT — PATIENT HEALTH QUESTIONNAIRE - PHQ9
SUM OF ALL RESPONSES TO PHQ9 QUESTIONS 1 AND 2: 0
1. LITTLE INTEREST OR PLEASURE IN DOING THINGS: NOT AT ALL
2. FEELING DOWN, DEPRESSED, IRRITABLE, OR HOPELESS: NOT AT ALL

## 2019-11-20 ASSESSMENT — COGNITIVE AND FUNCTIONAL STATUS - GENERAL
MOVING TO AND FROM BED TO CHAIR: A LITTLE
DRESSING REGULAR UPPER BODY CLOTHING: A LITTLE
MOBILITY SCORE: 18
DRESSING REGULAR LOWER BODY CLOTHING: A LITTLE
WALKING IN HOSPITAL ROOM: A LITTLE
STANDING UP FROM CHAIR USING ARMS: A LITTLE
MOVING FROM LYING ON BACK TO SITTING ON SIDE OF FLAT BED: A LITTLE
DAILY ACTIVITIY SCORE: 21
CLIMB 3 TO 5 STEPS WITH RAILING: A LOT
TOILETING: A LITTLE
SUGGESTED CMS G CODE MODIFIER DAILY ACTIVITY: CJ
SUGGESTED CMS G CODE MODIFIER MOBILITY: CK

## 2019-11-20 ASSESSMENT — COPD QUESTIONNAIRES
COPD SCREENING SCORE: 5
DURING THE PAST 4 WEEKS HOW MUCH DID YOU FEEL SHORT OF BREATH: NONE/LITTLE OF THE TIME
HAVE YOU SMOKED AT LEAST 100 CIGARETTES IN YOUR ENTIRE LIFE: YES
DO YOU EVER COUGH UP ANY MUCUS OR PHLEGM?: NO/ONLY WITH OCCASIONAL COLDS OR INFECTIONS

## 2019-11-20 NOTE — ED TRIAGE NOTES
"Chief Complaint   Patient presents with   • Bloody Stools     Pt reports black tarrry stool since 10/29 - states BRB on toilet paper.   • Shortness of Breath     /75   Pulse (!) 128   Temp 36.8 °C (98.2 °F) (Temporal)   Resp 20   Ht 1.676 m (5' 6\")   Wt 91.7 kg (202 lb 2.6 oz)   SpO2 94%   Breastfeeding? No   BMI 32.63 kg/m²     Pt brought into triage by WC, VS as above, NAD, encouraged to return to the triage nurse or tech with any new complaints or symptoms. Pt and family taken to [facility].  "

## 2019-11-21 ENCOUNTER — ANESTHESIA EVENT (OUTPATIENT)
Dept: SURGERY | Facility: MEDICAL CENTER | Age: 64
DRG: 348 | End: 2019-11-21
Payer: MEDICAID

## 2019-11-21 ENCOUNTER — APPOINTMENT (OUTPATIENT)
Dept: RADIOLOGY | Facility: MEDICAL CENTER | Age: 64
DRG: 348 | End: 2019-11-21
Attending: INTERNAL MEDICINE
Payer: MEDICAID

## 2019-11-21 ENCOUNTER — ANESTHESIA (OUTPATIENT)
Dept: SURGERY | Facility: MEDICAL CENTER | Age: 64
DRG: 348 | End: 2019-11-21
Payer: MEDICAID

## 2019-11-21 PROBLEM — Z87.898 HISTORY OF ANESTHESIA COMPLICATIONS: Status: ACTIVE | Noted: 2019-11-21

## 2019-11-21 LAB
ALBUMIN SERPL BCP-MCNC: 3.6 G/DL (ref 3.2–4.9)
ALBUMIN/GLOB SERPL: 1.7 G/DL
ALP SERPL-CCNC: 104 U/L (ref 30–99)
ALT SERPL-CCNC: 7 U/L (ref 2–50)
ANION GAP SERPL CALC-SCNC: 7 MMOL/L (ref 0–11.9)
AST SERPL-CCNC: 10 U/L (ref 12–45)
BASOPHILS # BLD AUTO: 0.6 % (ref 0–1.8)
BASOPHILS # BLD: 0.04 K/UL (ref 0–0.12)
BILIRUB SERPL-MCNC: 0.4 MG/DL (ref 0.1–1.5)
BUN SERPL-MCNC: 11 MG/DL (ref 8–22)
CALCIUM SERPL-MCNC: 8.4 MG/DL (ref 8.5–10.5)
CHLORIDE SERPL-SCNC: 108 MMOL/L (ref 96–112)
CO2 SERPL-SCNC: 27 MMOL/L (ref 20–33)
CREAT SERPL-MCNC: 0.74 MG/DL (ref 0.5–1.4)
EOSINOPHIL # BLD AUTO: 0.03 K/UL (ref 0–0.51)
EOSINOPHIL NFR BLD: 0.4 % (ref 0–6.9)
ERYTHROCYTE [DISTWIDTH] IN BLOOD BY AUTOMATED COUNT: 63.7 FL (ref 35.9–50)
GLOBULIN SER CALC-MCNC: 2.1 G/DL (ref 1.9–3.5)
GLUCOSE SERPL-MCNC: 99 MG/DL (ref 65–99)
HCT VFR BLD AUTO: 24 % (ref 37–47)
HGB BLD-MCNC: 6.5 G/DL (ref 12–16)
HGB BLD-MCNC: 7.3 G/DL (ref 12–16)
HGB BLD-MCNC: 7.4 G/DL (ref 12–16)
IMM GRANULOCYTES # BLD AUTO: 0.04 K/UL (ref 0–0.11)
IMM GRANULOCYTES NFR BLD AUTO: 0.6 % (ref 0–0.9)
LYMPHOCYTES # BLD AUTO: 1.1 K/UL (ref 1–4.8)
LYMPHOCYTES NFR BLD: 15.4 % (ref 22–41)
MCH RBC QN AUTO: 25.3 PG (ref 27–33)
MCHC RBC AUTO-ENTMCNC: 27.1 G/DL (ref 33.6–35)
MCV RBC AUTO: 93.4 FL (ref 81.4–97.8)
MONOCYTES # BLD AUTO: 0.53 K/UL (ref 0–0.85)
MONOCYTES NFR BLD AUTO: 7.4 % (ref 0–13.4)
NEUTROPHILS # BLD AUTO: 5.42 K/UL (ref 2–7.15)
NEUTROPHILS NFR BLD: 75.6 % (ref 44–72)
NRBC # BLD AUTO: 0.04 K/UL
NRBC BLD-RTO: 0.6 /100 WBC
PLATELET # BLD AUTO: 252 K/UL (ref 164–446)
PMV BLD AUTO: 11.7 FL (ref 9–12.9)
POTASSIUM SERPL-SCNC: 3.9 MMOL/L (ref 3.6–5.5)
PROT SERPL-MCNC: 5.7 G/DL (ref 6–8.2)
RBC # BLD AUTO: 2.57 M/UL (ref 4.2–5.4)
SODIUM SERPL-SCNC: 142 MMOL/L (ref 135–145)
WBC # BLD AUTO: 7.2 K/UL (ref 4.8–10.8)

## 2019-11-21 PROCEDURE — 160035 HCHG PACU - 1ST 60 MINS PHASE I: Performed by: INTERNAL MEDICINE

## 2019-11-21 PROCEDURE — 160009 HCHG ANES TIME/MIN: Performed by: INTERNAL MEDICINE

## 2019-11-21 PROCEDURE — 0DJ08ZZ INSPECTION OF UPPER INTESTINAL TRACT, VIA NATURAL OR ARTIFICIAL OPENING ENDOSCOPIC: ICD-10-PCS | Performed by: INTERNAL MEDICINE

## 2019-11-21 PROCEDURE — 770020 HCHG ROOM/CARE - TELE (206)

## 2019-11-21 PROCEDURE — 700111 HCHG RX REV CODE 636 W/ 250 OVERRIDE (IP): Performed by: INTERNAL MEDICINE

## 2019-11-21 PROCEDURE — 700102 HCHG RX REV CODE 250 W/ 637 OVERRIDE(OP): Performed by: INTERNAL MEDICINE

## 2019-11-21 PROCEDURE — 80053 COMPREHEN METABOLIC PANEL: CPT

## 2019-11-21 PROCEDURE — 78278 ACUTE GI BLOOD LOSS IMAGING: CPT

## 2019-11-21 PROCEDURE — 160204 HCHG ENDO MINUTES - 1ST 30 MINS LEVEL 5: Performed by: INTERNAL MEDICINE

## 2019-11-21 PROCEDURE — 36430 TRANSFUSION BLD/BLD COMPNT: CPT

## 2019-11-21 PROCEDURE — 160002 HCHG RECOVERY MINUTES (STAT): Performed by: INTERNAL MEDICINE

## 2019-11-21 PROCEDURE — 36415 COLL VENOUS BLD VENIPUNCTURE: CPT

## 2019-11-21 PROCEDURE — 500066 HCHG BITE BLOCK, ECT: Performed by: INTERNAL MEDICINE

## 2019-11-21 PROCEDURE — P9016 RBC LEUKOCYTES REDUCED: HCPCS

## 2019-11-21 PROCEDURE — 85025 COMPLETE CBC W/AUTO DIFF WBC: CPT

## 2019-11-21 PROCEDURE — 160048 HCHG OR STATISTICAL LEVEL 1-5: Performed by: INTERNAL MEDICINE

## 2019-11-21 PROCEDURE — 0DJD8ZZ INSPECTION OF LOWER INTESTINAL TRACT, VIA NATURAL OR ARTIFICIAL OPENING ENDOSCOPIC: ICD-10-PCS | Performed by: INTERNAL MEDICINE

## 2019-11-21 PROCEDURE — 700105 HCHG RX REV CODE 258: Performed by: ANESTHESIOLOGY

## 2019-11-21 PROCEDURE — A9270 NON-COVERED ITEM OR SERVICE: HCPCS | Performed by: INTERNAL MEDICINE

## 2019-11-21 PROCEDURE — 86923 COMPATIBILITY TEST ELECTRIC: CPT

## 2019-11-21 PROCEDURE — 700111 HCHG RX REV CODE 636 W/ 250 OVERRIDE (IP): Performed by: ANESTHESIOLOGY

## 2019-11-21 PROCEDURE — 99233 SBSQ HOSP IP/OBS HIGH 50: CPT | Performed by: INTERNAL MEDICINE

## 2019-11-21 PROCEDURE — 160209 HCHG ENDO MINUTES - EA ADDL 1 MIN LEVEL 5: Performed by: INTERNAL MEDICINE

## 2019-11-21 PROCEDURE — C9113 INJ PANTOPRAZOLE SODIUM, VIA: HCPCS | Performed by: INTERNAL MEDICINE

## 2019-11-21 PROCEDURE — 85018 HEMOGLOBIN: CPT

## 2019-11-21 RX ORDER — ONDANSETRON 2 MG/ML
4 INJECTION INTRAMUSCULAR; INTRAVENOUS
Status: DISCONTINUED | OUTPATIENT
Start: 2019-11-21 | End: 2019-11-21 | Stop reason: HOSPADM

## 2019-11-21 RX ORDER — PANTOPRAZOLE SODIUM 40 MG/10ML
40 INJECTION, POWDER, LYOPHILIZED, FOR SOLUTION INTRAVENOUS 2 TIMES DAILY
Status: DISCONTINUED | OUTPATIENT
Start: 2019-11-21 | End: 2019-11-23

## 2019-11-21 RX ORDER — SODIUM CHLORIDE 9 MG/ML
INJECTION, SOLUTION INTRAVENOUS
Status: DISCONTINUED
Start: 2019-11-21 | End: 2019-11-21

## 2019-11-21 RX ORDER — SODIUM CHLORIDE, SODIUM LACTATE, POTASSIUM CHLORIDE, CALCIUM CHLORIDE 600; 310; 30; 20 MG/100ML; MG/100ML; MG/100ML; MG/100ML
INJECTION, SOLUTION INTRAVENOUS CONTINUOUS
Status: DISCONTINUED | OUTPATIENT
Start: 2019-11-21 | End: 2019-11-21 | Stop reason: HOSPADM

## 2019-11-21 RX ORDER — SODIUM CHLORIDE, SODIUM LACTATE, POTASSIUM CHLORIDE, CALCIUM CHLORIDE 600; 310; 30; 20 MG/100ML; MG/100ML; MG/100ML; MG/100ML
INJECTION, SOLUTION INTRAVENOUS
Status: DISCONTINUED | OUTPATIENT
Start: 2019-11-21 | End: 2019-11-21 | Stop reason: SURG

## 2019-11-21 RX ORDER — MIDAZOLAM HYDROCHLORIDE 1 MG/ML
INJECTION INTRAMUSCULAR; INTRAVENOUS PRN
Status: DISCONTINUED | OUTPATIENT
Start: 2019-11-21 | End: 2019-11-21 | Stop reason: SURG

## 2019-11-21 RX ADMIN — PROPOFOL 20 MG: 10 INJECTION, EMULSION INTRAVENOUS at 11:41

## 2019-11-21 RX ADMIN — ROPINIROLE HYDROCHLORIDE 4 MG: 2 TABLET, FILM COATED ORAL at 13:14

## 2019-11-21 RX ADMIN — HYDROMORPHONE HYDROCHLORIDE 0.25 MG: 1 INJECTION, SOLUTION INTRAMUSCULAR; INTRAVENOUS; SUBCUTANEOUS at 01:02

## 2019-11-21 RX ADMIN — FERROUS SULFATE TAB 325 MG (65 MG ELEMENTAL FE) 325 MG: 325 (65 FE) TAB at 17:09

## 2019-11-21 RX ADMIN — HYDROMORPHONE HYDROCHLORIDE 0.25 MG: 1 INJECTION, SOLUTION INTRAMUSCULAR; INTRAVENOUS; SUBCUTANEOUS at 04:30

## 2019-11-21 RX ADMIN — MORPHINE SULFATE 30 MG: 30 TABLET, EXTENDED RELEASE ORAL at 20:25

## 2019-11-21 RX ADMIN — OXYCODONE HYDROCHLORIDE 5 MG: 5 TABLET ORAL at 17:09

## 2019-11-21 RX ADMIN — FERROUS SULFATE TAB 325 MG (65 MG ELEMENTAL FE) 325 MG: 325 (65 FE) TAB at 08:26

## 2019-11-21 RX ADMIN — DOXEPIN HYDROCHLORIDE 20 MG: 10 CAPSULE ORAL at 20:25

## 2019-11-21 RX ADMIN — FERROUS SULFATE TAB 325 MG (65 MG ELEMENTAL FE) 325 MG: 325 (65 FE) TAB at 13:08

## 2019-11-21 RX ADMIN — PANTOPRAZOLE SODIUM 40 MG: 40 INJECTION, POWDER, LYOPHILIZED, FOR SOLUTION INTRAVENOUS at 17:10

## 2019-11-21 RX ADMIN — ROPINIROLE HYDROCHLORIDE 4 MG: 2 TABLET, FILM COATED ORAL at 23:03

## 2019-11-21 RX ADMIN — PROPOFOL 50 MG: 10 INJECTION, EMULSION INTRAVENOUS at 11:36

## 2019-11-21 RX ADMIN — MIDAZOLAM 2 MG: 1 INJECTION INTRAMUSCULAR; INTRAVENOUS at 11:31

## 2019-11-21 RX ADMIN — SODIUM CHLORIDE, POTASSIUM CHLORIDE, SODIUM LACTATE AND CALCIUM CHLORIDE: 600; 310; 30; 20 INJECTION, SOLUTION INTRAVENOUS at 11:30

## 2019-11-21 RX ADMIN — POLYETHYLENE GLYCOL 3350, SODIUM SULFATE, SODIUM CHLORIDE, POTASSIUM CHLORIDE, ASCORBIC ACID, SODIUM ASCORBATE 100 G: KIT at 05:32

## 2019-11-21 RX ADMIN — MORPHINE SULFATE 30 MG: 30 TABLET, EXTENDED RELEASE ORAL at 08:26

## 2019-11-21 RX ADMIN — ROPINIROLE HYDROCHLORIDE 4 MG: 2 TABLET, FILM COATED ORAL at 06:02

## 2019-11-21 RX ADMIN — PANTOPRAZOLE SODIUM 40 MG: 40 INJECTION, POWDER, LYOPHILIZED, FOR SOLUTION INTRAVENOUS at 05:32

## 2019-11-21 RX ADMIN — ROPINIROLE HYDROCHLORIDE 4 MG: 2 TABLET, FILM COATED ORAL at 17:09

## 2019-11-21 RX ADMIN — PROPOFOL 75 MCG/KG/MIN: 10 INJECTION, EMULSION INTRAVENOUS at 11:36

## 2019-11-21 RX ADMIN — PROPOFOL 30 MG: 10 INJECTION, EMULSION INTRAVENOUS at 11:47

## 2019-11-21 RX ADMIN — OXYCODONE HYDROCHLORIDE 5 MG: 5 TABLET ORAL at 13:08

## 2019-11-21 RX ADMIN — OXYCODONE HYDROCHLORIDE 5 MG: 5 TABLET ORAL at 23:02

## 2019-11-21 ASSESSMENT — PAIN SCALES - GENERAL: PAIN_LEVEL: 0

## 2019-11-21 ASSESSMENT — ENCOUNTER SYMPTOMS
WHEEZING: 0
ORTHOPNEA: 0
LOSS OF CONSCIOUSNESS: 0
NERVOUS/ANXIOUS: 0
SPUTUM PRODUCTION: 0
FLANK PAIN: 0
BACK PAIN: 0
HALLUCINATIONS: 0
EYE PAIN: 0
MYALGIAS: 0
FEVER: 0
BLOOD IN STOOL: 1
POLYDIPSIA: 0
DIZZINESS: 1
WEIGHT LOSS: 0
EYE REDNESS: 0
ABDOMINAL PAIN: 0
CHILLS: 0
PALPITATIONS: 0
SPEECH CHANGE: 0
ABDOMINAL PAIN: 1
HEMOPTYSIS: 0
DOUBLE VISION: 0
NAUSEA: 1
INSOMNIA: 0
COUGH: 0
CONSTIPATION: 0
BRUISES/BLEEDS EASILY: 0
DIARRHEA: 0
NECK PAIN: 0
HEADACHES: 0
SHORTNESS OF BREATH: 1
FALLS: 0
BLURRED VISION: 0
TREMORS: 0
NAUSEA: 0
PHOTOPHOBIA: 0
VOMITING: 0
SEIZURES: 0
HEARTBURN: 0
WEAKNESS: 1
FOCAL WEAKNESS: 0

## 2019-11-21 ASSESSMENT — COGNITIVE AND FUNCTIONAL STATUS - GENERAL
STANDING UP FROM CHAIR USING ARMS: A LITTLE
MOVING FROM LYING ON BACK TO SITTING ON SIDE OF FLAT BED: A LITTLE
TOILETING: A LITTLE
MOVING TO AND FROM BED TO CHAIR: A LITTLE
SUGGESTED CMS G CODE MODIFIER DAILY ACTIVITY: CJ
DRESSING REGULAR LOWER BODY CLOTHING: A LITTLE
MOBILITY SCORE: 21
SUGGESTED CMS G CODE MODIFIER MOBILITY: CJ
DAILY ACTIVITIY SCORE: 21
DRESSING REGULAR UPPER BODY CLOTHING: A LITTLE

## 2019-11-21 ASSESSMENT — LIFESTYLE VARIABLES: SUBSTANCE_ABUSE: 0

## 2019-11-21 NOTE — CARE PLAN
Problem: Infection  Goal: Will remain free from infection  Intervention: Implement standard precautions and perform hand washing before and after patient contact  Note:   Patient educated on hand hygiene and importance of cleanliness. Patient verbalizes understanding. RN performs appropriate hand hygiene and maintains aseptic technique / standard precautions when in contact with patient.      Problem: Knowledge Deficit  Goal: Knowledge of disease process/condition, treatment plan, diagnostic tests, and medications will improve  Intervention: Explain information regarding disease process/condition, treatment plan, diagnostic tests, and medications and document in education  Note:   Patient educated on POC, treatment plan, diagnostics tests, medications, NPO status, safety, activity, plan for EGD, and encouraged to ask questions regarding care. Patient verbalizes understanding. Reoriented to call light for assistance. Hourly rounding in place.

## 2019-11-21 NOTE — ED NOTES
Med rec updated and complete. Allergies reviewed . Pt denies antibiotic use in last 14 days.   Home pharmacy [facility] complete.

## 2019-11-21 NOTE — ED PROVIDER NOTES
ED Provider Note    Scribed for Dr. Sunil Cuello M.D. by Norma Randall. 11/20/2019  4:18 PM    Primary care provider: Brianne Marti M.D.  Means of arrival: Wheel Chair  History obtained from: Patient  History limited by: None     CHIEF COMPLAINT  Chief Complaint   Patient presents with   • Bloody Stools     Pt reports black tarrry stool since 10/29 - states BRB on toilet paper.   • Shortness of Breath       HPI  Ariella Rendon is a 64 y.o. female who presents to the Emergency Department for evaluation of bloody stool onset around three weeks ago. Patient states that she underwent back surgery at the end of October and onset started soon after. She reports that her stool has a dark, tar-brady color with bright red blood in it. Patient has associated shortness of breath and weakness, but denies any fevers or chills. No alleviating or aggravating factors have been noted at this time. She notes that she had similar symptoms a year ago and needed a transfusion. Patient adds that she has a history of diverticulosis.     REVIEW OF SYSTEMS  Pertinent positives include bloody stool, shortness of breath and weakness. Pertinent negatives include no fevers or chills. As above, all other systems reviewed and are negative.   See HPI for further details.     PAST MEDICAL HISTORY   has a past medical history of Anemia, Anesthesia (06/06/2019), Arthritis, Breath shortness, Cancer (HCC), COPD (chronic obstructive pulmonary disease) (HCC), Dental disorder, Diverticulitis, Heart burn, Hiatus hernia syndrome, High cholesterol, Hypertension, Indigestion, Other specified disorder of intestines, Other specified symptom associated with female genital organs (1970), Oxygen dependent, Psychiatric problem, and Spinal stenosis of lumbar region.    SURGICAL HISTORY   has a past surgical history that includes gyn surgery (1970); gyn surgery (1970); low anterior resection laparoscopic (9/12/2014); other abdominal surgery; bowel resection;  "colonoscopy (N/A, 7/3/2018); gastroscopy-endo (N/A, 7/3/2018); and lumbar fusion anterior (N/A, 10/25/2019).    SOCIAL HISTORY  Social History     Tobacco Use   • Smoking status: Former Smoker     Packs/day: 0.50     Years: 50.00     Pack years: 25.00     Types: Cigarettes     Last attempt to quit: 3/22/2019     Years since quittin.6   • Smokeless tobacco: Never Used   Substance Use Topics   • Alcohol use: No   • Drug use: Yes     Frequency: 7.0 times per week     Types: Marijuana, Inhaled     Comment: marijuana      Social History     Substance and Sexual Activity   Drug Use Yes   • Frequency: 7.0 times per week   • Types: Marijuana, Inhaled    Comment: marijuana       FAMILY HISTORY  Family History   Problem Relation Age of Onset   • Scoliosis Mother    • Cancer Father    • Lung Disease Father    • Hypertension Brother        CURRENT MEDICATIONS  Home Medications     Reviewed by Tanna Pittman R.N. (Registered Nurse) on 19 at 1440  Med List Status: <None>   Medication Last Dose Status   albuterol 108 (90 Base) MCG/ACT Aero Soln inhalation aerosol  Active   amLODIPine (NORVASC) 10 MG Tab  Active   ANECREAM 4 % Cream  Active   Ascorbic Acid (VITAMIN C) 250 MG Chew Tab  Active   cetirizine-psuedoephedrine (ZYRTEC-D ALLERGY & CONGESTION) 5-120 MG per tablet  Active   doxepin (SINEQUAN) 10 MG Cap  Active   hydroCHLOROthiazide (HYDRODIURIL) 12.5 MG tablet  Active   ipratropium-albuterol (DUONEB) 0.5-2.5 (3) MG/3ML nebulizer solution  Active   magnesium gluconate (MAG-G) 500 MG tablet  Active   omeprazole (PRILOSEC) 20 MG delayed-release capsule  Active   oxycodone (OXY-IR) 15 MG immediate release tablet  Active   polyethylene glycol/lytes (MIRALAX) Pack  Active   ropinirole (REQUIP) 4 MG tablet  Active   vitamin E (VITAMIN E) 1000 UNIT Cap  Active                ALLERGIES  Allergies   Allergen Reactions   • Other Environmental Rash     \"alloids in metal\"       PHYSICAL EXAM  VITAL SIGNS: /84   Pulse " "(!) 112   Temp 36.8 °C (98.2 °F) (Temporal)   Resp 19   Ht 1.676 m (5' 6\")   Wt 91.7 kg (202 lb 2.6 oz)   SpO2 94%   Breastfeeding? No   BMI 32.63 kg/m²     Constitutional: Well developed, Well nourished, mild distress  pale  Appearing.  HENT: Normocephalic, Atraumatic, Bilateral external ears normal, Oropharynx moist, No oral exudates.   Eyes: PERRLA, EOMI, Conjunctiva normal, No discharge.   Neck: No tenderness, Supple, No stridor.   Lymphatic: No lymphadenopathy noted.   Cardiovascular: Tachycardic, Normal rhythm.   Thorax & Lungs: Clear to auscultation bilaterally, No respiratory distress, No wheezing, No crackles.   Abdomen: Right-sided abdominal tenderness. Soft, No masses, No pulsatile masses.   Rectal there is dark Hemoccult positive stool  Skin: Warm, Dry, No erythema, No rash.   Extremities:, No edema No cyanosis.   Musculoskeletal: No tenderness to palpation or major deformities noted.  Intact distal pulses  Neurologic: Awake, alert. Moves all extremities spontaneously.  Psychiatric: Affect normal, Judgment normal, Mood normal.       LABS  Results for orders placed or performed during the hospital encounter of 11/20/19   COD (ADULT)   Result Value Ref Range    ABO Grouping Only A     Rh Grouping Only POS     Antibody Screen-Cod NEG    CBC WITH DIFFERENTIAL   Result Value Ref Range    WBC 9.1 4.8 - 10.8 K/uL    RBC 2.19 (L) 4.20 - 5.40 M/uL    Hemoglobin 5.5 (LL) 12.0 - 16.0 g/dL    Hematocrit 21.2 (L) 37.0 - 47.0 %    MCV 96.8 81.4 - 97.8 fL    MCH 25.1 (L) 27.0 - 33.0 pg    MCHC 25.9 (L) 33.6 - 35.0 g/dL    RDW 57.3 (H) 35.9 - 50.0 fL    Platelet Count 292 164 - 446 K/uL    MPV 11.8 9.0 - 12.9 fL    Neutrophils-Polys 84.50 (H) 44.00 - 72.00 %    Lymphocytes 9.30 (L) 22.00 - 41.00 %    Monocytes 4.70 0.00 - 13.40 %    Eosinophils 0.10 0.00 - 6.90 %    Basophils 0.70 0.00 - 1.80 %    Immature Granulocytes 0.70 0.00 - 0.90 %    Nucleated RBC 0.30 /100 WBC    Neutrophils (Absolute) 7.69 (H) 2.00 - " 7.15 K/uL    Lymphs (Absolute) 0.85 (L) 1.00 - 4.80 K/uL    Monos (Absolute) 0.43 0.00 - 0.85 K/uL    Eos (Absolute) 0.01 0.00 - 0.51 K/uL    Baso (Absolute) 0.06 0.00 - 0.12 K/uL    Immature Granulocytes (abs) 0.06 0.00 - 0.11 K/uL    NRBC (Absolute) 0.03 K/uL    Hypochromia 2+     Anisocytosis 1+     Macrocytosis 1+     Microcytosis 1+    COMP METABOLIC PANEL   Result Value Ref Range    Sodium 137 135 - 145 mmol/L    Potassium 4.2 3.6 - 5.5 mmol/L    Chloride 103 96 - 112 mmol/L    Co2 26 20 - 33 mmol/L    Anion Gap 8.0 0.0 - 11.9    Glucose 101 (H) 65 - 99 mg/dL    Bun 12 8 - 22 mg/dL    Creatinine 0.68 0.50 - 1.40 mg/dL    Calcium 8.4 (L) 8.5 - 10.5 mg/dL    AST(SGOT) 11 (L) 12 - 45 U/L    ALT(SGPT) 8 2 - 50 U/L    Alkaline Phosphatase 106 (H) 30 - 99 U/L    Total Bilirubin 0.3 0.1 - 1.5 mg/dL    Albumin 3.5 3.2 - 4.9 g/dL    Total Protein 6.0 6.0 - 8.2 g/dL    Globulin 2.5 1.9 - 3.5 g/dL    A-G Ratio 1.4 g/dL   LIPASE   Result Value Ref Range    Lipase 7 (L) 11 - 82 U/L   PROTHROMBIN TIME   Result Value Ref Range    PT 12.8 12.0 - 14.6 sec    INR 0.94 0.87 - 1.13   APTT   Result Value Ref Range    APTT 24.7 24.7 - 36.0 sec   ESTIMATED GFR   Result Value Ref Range    GFR If African American >60 >60 mL/min/1.73 m 2    GFR If Non African American >60 >60 mL/min/1.73 m 2   PERIPHERAL SMEAR REVIEW   Result Value Ref Range    Peripheral Smear Review see below    PLATELET ESTIMATE   Result Value Ref Range    Plt Estimation Normal    MORPHOLOGY   Result Value Ref Range    RBC Morphology Present     Polychromia 2+     Poikilocytosis 1+     Ovalocytes 1+     Target Cells 1+     Tear Drop Cells 1+     Stomatocytes 1+    DIFFERENTIAL COMMENT   Result Value Ref Range    Comments-Diff see below    EKG (NOW)   Result Value Ref Range    Report       [facility] Emergency Dept.    Test Date:  2019-11-20  Pt Name:    KAMRAN VIEYRA              Department: ER  MRN:        [id number]                       Room:  Gender:     Female                       Technician: [id number]  :        1955                   Requested By:ER TRIAGE PROTOCOL  Order #:    [id number]                    Reading MD:    Measurements  Intervals                                Axis  Rate:       119                          P:          62  AZ:         161                          QRS:        91  QRSD:       96                           T:          41  QT:         325  QTc:        458    Interpretive Statements  Sinus tachycardia  Right axis deviation  Low voltage, precordial leads  Consider anterior infarct  Compared to ECG 10/27/2019 08:02:38  Low QRS voltage now present  Myocardial infarct finding now present  Ventricular premature complex(es) no longer present            EKG  Interpreted by me as shown above.     RADIOLOGY  DX-CHEST-PORTABLE (1 VIEW)   Final Result      No acute cardiac or pulmonary abnormalities are identified.        The radiologist's interpretation of all radiological studies have been reviewed by me.    COURSE & MEDICAL DECISION MAKING  Pertinent Labs & Imaging studies reviewed. (See chart for details)    4:18 PM - Patient seen and examined at bedside. Ordered DX chest, COD-adult, CBC with differentials, CMP, lipase, prothrombin time, APTT and EKG. to evaluate her symptoms. The differential diagnoses include but are not limited to: diverticulitis, anemia. Informed the patient on the plan of care. Patient agrees.     4:20 PM - Rectal exam was performed at this time. I noticed only a small amount of stool on the glove with no blood present.     5:17 PM - Patient was reevaluated at bedside. Discussed lab and radiology results with the patient and informed them that they have low blood count and will likely be admitted to the hospital today. Ordered estimated GFR, peripheral smear review, platelet estimate, morphology, differentials comment to further evaluate the patient's symptoms. Paged Hospitalist at this time.      5:22 PM I discussed the patient's case and the above findings with Dr. Graves (Hospitalist) who agreed to admit the patient to the hospital today.     7:26 PM - Paged GI    5:46 PM I discussed the patient's case and the above findings with Dr. Castillo (GI) who agreed to consult with the patient at a later time.       Decision Making:  This patient with ongoing GI bleeding, with now secondary to severe anemia of 5.7.  Vital signs are not concerning this is been a bleed over a couple of weeks.  The patient will be typed and crossmatched and transfused when available.  GI consultation obtained as noted above and consultation with hospitalist as noted patient is been informed of the plan for admission and is agreeable    DISPOSITION:  Patient will be hospitalized by Dr. Graves (Hospitalist) in guarded condition.      FINAL IMPRESSION  1. Rectal bleeding    2. Anemia, unspecified type          I, Norma Randall (Shanda), am scribing for, and in the presence of, Sunil Cuello M.D..    Electronically signed by: Norma Randall (Scriblianne), 11/20/2019    ISunil M.D. personally performed the services described in this documentation, as scribed by Norma Randall in my presence, and it is both accurate and complete. C.     The note accurately reflects work and decisions made by me.  Sunil Cuello  11/20/2019  10:38 PM

## 2019-11-21 NOTE — ASSESSMENT & PLAN NOTE
Monitor hemoglobin with CBC every 8 hours.  Red blood cells transfusion as needed.  Follow-up with GI recommendations: Diagnostic endoscopy pending  Symptomatic anemia  Trend Hb

## 2019-11-21 NOTE — PROGRESS NOTES
2 RN Skin Check    2 RN skin check complete, Syd DUDLEY.   Devices in place: Nasal Cannula.  Skin assessed under devices: yes.  Confirmed pressure ulcers found on: n/a.  New potential pressure ulcers noted on n/a. Wound consult placed N/A.  The following interventions in place Pillows, Lotion and Waffle bed overlay.    Pt skin is dry clean and intact; pt has a healed abdominal mid line incision from pervious anterior apporch surgery.

## 2019-11-21 NOTE — ANESTHESIA TIME REPORT
Anesthesia Start and Stop Event Times     Date Time Event    11/21/2019 1125 Ready for Procedure     1130 Anesthesia Start     1209 Anesthesia Stop        Responsible Staff  11/21/19    Name Role Begin End    Deirdre Leon M.D. Anesth 1130 1209        Preop Diagnosis (Free Text):  Pre-op Diagnosis     gi bleed        Preop Diagnosis (Codes):    Post op Diagnosis  GI bleed      Premium Reason  Non-Premium    Comments:

## 2019-11-21 NOTE — ANESTHESIA QCDR
2019 [facility] Clinical Data Registry (for Quality Improvement)     Postoperative nausea/vomiting risk protocol (Adult = 18 yrs and Pediatric 3-17 yrs)- (430 and 463)  General inhalation anesthetic (NOT TIVA) with PONV risk factors: No  Provision of anti-emetic therapy with at least 2 different classes of agents: N/A  Patient DID NOT receive anti-emetic therapy and reason is documented in Medical Record: N/A    Multimodal Pain Management- (AQI59)  Patient undergoing Elective Surgery (i.e. Outpatient, or ASC, or Prescheduled Surgery prior to Hospital Admission): No  Use of Multimodal Pain Management, two or more drugs and/or interventions, NOT including systemic opioids: N/A  Exception: Documented allergy to multiple classes of analgesics: N/A    PACU assessment of acute postoperative pain prior to Anesthesia Care End- Applies to Patients Age = 18- (ABG7)  Initial PACU pain score is which of the following: < 7/10  Patient unable to report pain score: N/A    Post-anesthetic transfer of care checklist/protocol to PACU/ICU- (426 and 427)  Upon conclusion of case, patient transferred to which of the following locations: PACU/Non-ICU  Use of transfer checklist/protocol: Yes  Exclusion: Service Performed in Patient Hospital Room (and thus did not require transfer): N/A    PACU Reintubation- (AQI31)  General anesthesia requiring endotracheal intubation (ETT) along with subsequent extubation in OR or PACU: No  Required reintubation in the PACU: N/A  Extubation was a planned trial documented in the medical record prior to removal of the original airway device: N/A    Unplanned admission to ICU related to anesthesia service up through end of PACU care- (MD51)  Unplanned admission to ICU (not initially anticipated at anesthesia start time): No

## 2019-11-21 NOTE — DISCHARGE PLANNING
Patient is eligible for Medicaid Meds to Beds at discharge if they have coverage with [place] Medicaid, Medicaid FFS, Medicaid HMO (Hospitals in [place]), or [place]. This service is provided through the [facility] if orders are received prior to 4 p.m. Monday through Friday, excluding holidays. Please call [phone] prior to discharge.

## 2019-11-21 NOTE — PROGRESS NOTES
Received report from NOC RN. Assumed care of patient at 0700. Patient resting comfortably in bed at this time. Family at bedside. On 3L NC, no signs of respiratory distress. Respirations are even and unlabored. No s/sx of pain or discomfort at this time. Call light and belongings within reach. Bed in lowest locked position. Upper side rails raised. Fall risk precautions in place. Hourly rounding. Will continue to monitor.

## 2019-11-21 NOTE — H&P
Hospital Medicine History & Physical Note    Date of Service  11/20/2019    Primary Care Physician  Brianne Marti M.D.    Consultants  GI consultants    Code Status  Full code    Chief Complaint  Weakness, dizziness    History of Presenting Illness  64 y.o. female with history of   diverticulitis, COPD, cervical and endometrial cancer, nocturnal hypoxia on 3 L at night, opiate dependence, depression, lumbar spinal stenosis, GI bleeding in the past, requiring hospitalization 1 year ago with negative endoscopic work-up, recent back surgery 18 October who presented 11/20/2019 with bloody stools and shortness of breath.  According to her she noted black tarry stools since 10/29 and then she noticed dark blood on toilet paper and eventually she noted bright blood dripping to the floor after the toilet.  Gradually symptoms has been getting worse.  She denies nausea vomiting.  She does have discomfort in the right lower quadrant.  Associated symptoms also include dyspnea on exertion, orthostatic dizziness, generalized weakness.  Patient denies taking ibuprofen or other NSAID, aspirin and blood thinners.  Work-up in ER revealed hemoglobin 5.5 down from 11 in the matter of 3 weeks since back surgery.  ERP consulted to GI consultant/Dr. Castillo, according to him the plan will be endoscopy tomorrow    Review of Systems  Review of Systems   Constitutional: Positive for malaise/fatigue. Negative for chills, fever and weight loss.   HENT: Negative for ear pain, hearing loss and tinnitus.    Eyes: Negative for blurred vision, double vision and photophobia.   Respiratory: Negative for cough, hemoptysis and sputum production.    Cardiovascular: Negative for chest pain, palpitations and orthopnea.   Gastrointestinal: Positive for abdominal pain, blood in stool and nausea. Negative for heartburn, melena and vomiting.   Genitourinary: Negative for dysuria, flank pain, frequency and hematuria.   Musculoskeletal: Negative for back  "pain, joint pain and neck pain.   Skin: Negative for itching and rash.   Neurological: Positive for dizziness. Negative for tremors, speech change, focal weakness and headaches.   Endo/Heme/Allergies: Negative for environmental allergies and polydipsia. Does not bruise/bleed easily.   Psychiatric/Behavioral: Negative for hallucinations and substance abuse. The patient is not nervous/anxious.        Past Medical History   has a past medical history of Anemia, Anesthesia (06/06/2019), Arthritis, Breath shortness, Cancer (HCC), COPD (chronic obstructive pulmonary disease) (HCC), Dental disorder, Diverticulitis, Heart burn, Hiatus hernia syndrome, High cholesterol, Hypertension, Indigestion, Other specified disorder of intestines, Other specified symptom associated with female genital organs (1970), Oxygen dependent, Psychiatric problem, and Spinal stenosis of lumbar region.    Surgical History   has a past surgical history that includes gyn surgery (1970); gyn surgery (1970); low anterior resection laparoscopic (9/12/2014); other abdominal surgery; bowel resection; colonoscopy (N/A, 7/3/2018); gastroscopy-endo (N/A, 7/3/2018); and lumbar fusion anterior (N/A, 10/25/2019).     Family History  family history includes Cancer in her father; Hypertension in her brother; Lung Disease in her father; Scoliosis in her mother.     Social History  she quit smoking about 7 months ago.  She has a 25.00 pack-year smoking history. She has never used smokeless tobacco. She reports current drug use. Frequency: 7.00 times per week. Drugs: Marijuana and Inhaled. She reports that she does not drink alcohol.    Allergies  Allergies   Allergen Reactions   • Other Environmental Rash     \"alloids in metal\"       Medications  Prior to Admission Medications   Prescriptions Last Dose Informant Patient Reported? Taking?   Ascorbic Acid (VITAMIN C) 250 MG Chew Tab 11/20/2019 at 0900 Patient Yes No   Sig: Take 250 mg by mouth 2 Times a Day. "   albuterol 108 (90 Base) MCG/ACT Aero Soln inhalation aerosol 11/19/2019 at 1530 Patient Yes No   Sig: Inhale 2 Puffs by mouth every 6 hours as needed for Shortness of Breath.   amLODIPine (NORVASC) 10 MG Tab 11/19/2019 at 0900 Patient Yes No   Sig: Take 10 mg by mouth every day.   doxepin (SINEQUAN) 10 MG Cap 11/19/2019 at 2000 Patient Yes No   Sig: Take 20 mg by mouth every evening.   ferrous sulfate 325 (65 Fe) MG tablet 11/19/2019 at 2000 Patient Yes Yes   Sig: Take 325 mg by mouth 3 times a day.   hydroCHLOROthiazide (HYDRODIURIL) 12.5 MG tablet 11/19/2019 at 0900 Patient Yes No   Sig: Take 12.5 mg by mouth every day.   ipratropium-albuterol (DUONEB) 0.5-2.5 (3) MG/3ML nebulizer solution 11/19/2019 at 1630 Patient Yes No   Sig: 3 mL by Nebulization route every 6 hours as needed for Shortness of Breath.   magnesium gluconate (MAG-G) 500 MG tablet 11/19/2019 at 2000 Patient Yes No   Sig: Take 1,000 mg by mouth 2 Times a Day.   morphine ER (MS CONTIN) 30 MG Tab CR tablet 11/20/2019 at 0830 Patient Yes Yes   Sig: Take 30 mg by mouth every 12 hours.   omeprazole (PRILOSEC) 20 MG delayed-release capsule 11/19/2019 at 0900 Patient Yes No   Sig: Take 20 mg by mouth every day.   oxycodone (OXY-IR) 15 MG immediate release tablet 11/20/2019 at 1000 Patient Yes No   Sig: Take 15 mg by mouth every four hours as needed for Severe Pain.   polyethylene glycol/lytes (MIRALAX) Pack 11/20/2019 at 1000 Patient Yes No   Sig: Take 17 g by mouth every day.   ropinirole (REQUIP) 4 MG tablet 11/20/2019 at 0830 Patient Yes No   Sig: Take 4 mg by mouth 4 times a day.   vitamin E (VITAMIN E) 1000 UNIT Cap 11/20/2019 at 0830 Patient Yes No   Sig: Take 1,000 Units by mouth every day.      Facility-Administered Medications: None       Physical Exam  Temp:  [36.2 °C (97.2 °F)-36.9 °C (98.5 °F)] 36.2 °C (97.2 °F)  Pulse:  [] 67  Resp:  [3-25] 18  BP: (109-151)/() 150/101  SpO2:  [90 %-99 %] 92 %    Physical Exam  Vitals signs and  nursing note reviewed.   Constitutional:       General: She is not in acute distress.     Appearance: Normal appearance.   HENT:      Head: Normocephalic and atraumatic.      Nose: Nose normal.      Mouth/Throat:      Mouth: Mucous membranes are moist.   Eyes:      Extraocular Movements: Extraocular movements intact.      Pupils: Pupils are equal, round, and reactive to light.   Neck:      Musculoskeletal: Normal range of motion and neck supple.   Cardiovascular:      Rate and Rhythm: Normal rate and regular rhythm.   Pulmonary:      Effort: Pulmonary effort is normal.      Breath sounds: Normal breath sounds.   Abdominal:      General: Abdomen is flat. Bowel sounds are normal. There is no distension.      Tenderness: There is tenderness in the right lower quadrant. There is no guarding or rebound.   Musculoskeletal: Normal range of motion.         General: No swelling or deformity.   Skin:     General: Skin is warm and dry.      Coloration: Skin is pale.   Neurological:      General: No focal deficit present.      Mental Status: She is alert and oriented to person, place, and time.   Psychiatric:         Mood and Affect: Mood normal.         Behavior: Behavior normal.         Laboratory:  Recent Labs     11/20/19  1615   WBC 9.1   RBC 2.19*   HEMOGLOBIN 5.5*   HEMATOCRIT 21.2*   MCV 96.8   MCH 25.1*   MCHC 25.9*   RDW 57.3*   PLATELETCT 292   MPV 11.8     Recent Labs     11/20/19  1615   SODIUM 137   POTASSIUM 4.2   CHLORIDE 103   CO2 26   GLUCOSE 101*   BUN 12   CREATININE 0.68   CALCIUM 8.4*     Recent Labs     11/20/19  1615   ALTSGPT 8   ASTSGOT 11*   ALKPHOSPHAT 106*   TBILIRUBIN 0.3   LIPASE 7*   GLUCOSE 101*     Recent Labs     11/20/19  1615   APTT 24.7   INR 0.94     No results for input(s): NTPROBNP in the last 72 hours.      No results for input(s): TROPONINT in the last 72 hours.    Urinalysis:    No results found     Imaging:  DX-CHEST-PORTABLE (1 VIEW)   Final Result      No acute cardiac or pulmonary  abnormalities are identified.      NM-GI BLEEDING SCAN    (Results Pending)         Assessment/Plan:  I anticipate this patient will require at least two midnights for appropriate medical management, necessitating inpatient admission.    GI bleed- (present on admission)  Assessment & Plan  Likely lower GI bleed, but upper GI bleed cannot be reliably excluded.  GI consulted.  Continue  PPI  Red blood cells transfusion to keep hemoglobin above 7.0  Ordered nuclear medicine scan to look for source of bleeding    Blood loss anemia- (present on admission)  Assessment & Plan  Monitor hemoglobin with CBC every 8 hours.  Red blood cells transfusion as needed.  Follow-up with GI recommendations: Diagnostic endoscopy pending    Status post lumbar spine surgery for decompression of spinal cord- (present on admission)  Assessment & Plan   status post open anterior L4-L5 and L5-S1 lumbar interbody fusion without   Complications 3 weeks ago by Dr. Lemons    EMILY (obstructive sleep apnea)- (present on admission)  Assessment & Plan  Continue with  oxygen supplementation at night    Chronic respiratory failure (HCC)- (present on admission)  Assessment & Plan  Continue with excision as needed  RT protocol    COPD (chronic obstructive pulmonary disease) (HCC)- (present on admission)  Assessment & Plan  No wheezes on exam.  RT protocol in place  Continue home inhalers  Supplemental oxygen      VTE prophylaxis: SCD

## 2019-11-21 NOTE — PROGRESS NOTES
12 hr cc    Received report from Day shift RN. Assumed care of patient at 1900. Patient sitting up in bed at this this time. On 3L O2, no signs of respiratory distress. No reports of pain or discomfort. One unit PRBC infusing now. Call light and belongings within reach. Bed in lowest locked position. Upper side rails raised. Hourly rounding in place. Will continue to monitor.

## 2019-11-21 NOTE — PROCEDURES
DATE OF SERVICE:  11/21/2019    PROCEDURE:  Esophagogastroduodenoscopy and colonoscopy.    PREOPERATIVE DIAGNOSIS:  Gastrointestinal bleed.    POSTOPERATIVE DIAGNOSIS:  Surgical anastomosis in the sigmoid colon.    ANESTHESIA:  Per anesthesiologist.    DESCRIPTION OF PROCEDURE:  After informed consent and appropriate sedation,   the patient was placed in left lateral position and the gastroscope was   advanced in the oropharynx into the esophagus through to the second portion of   the duodenum.  The mucosa was then examined carefully as the scope was gently   withdrawn.  The duodenum was unremarkable as was the gastric antrum and body.    On retroflexion, the gastric cardia and fundus showed normal mucosa.  The   stomach was decompressed.  The scope was withdrawn back into the esophagus, GE   junction and remainder of the esophagus were normal.  An adult colonoscope   was then advanced to the rectum through to the cecum confirmed by presence of   the ileocecal valve and appendiceal orifice.  However, the terminal ileum was   unable to be intubated, but there were no signs of any GI bleeding.  The prep   was adequate.  The scope was gently withdrawn.  The mucosa was examined   carefully.  The cecum, ascending, transverse, and descending colons were   unremarkable.  The descending sigmoid junction area had a surgical   anastomosis, which was patent and no signs of abnormalities.  There was some   colonic diverticulosis in the sigmoid colon.  Otherwise, the rectum was   unremarkable and on retroflexion also no abnormalities.  The bowel was   decompressed.  The scope was withdrawn.  There were no immediate postop   complications.      RECOMMENDATIONS:  1.  Recommend a bleeding scan if the patient bleeds again.    2.  Consider outpatient video capsule endoscopy to evaluate small bowel for   source if the patient no longer bleeds.  If patient rebleeds in the hospital,   recommend inpatient video capsule endoscopy.    3.   Recommend clear liquid diet for now.       ____________________________________     DO JINA Antonio    DD:  11/21/2019 12:11:02  DT:  11/21/2019 13:13:44    D#:  [id number]  Job#:  [id number]

## 2019-11-21 NOTE — ANESTHESIA PREPROCEDURE EVALUATION
65 yo w/GI bleeding and anemia    Relevant Problems   ANESTHESIA   (+) History of anesthesia complications   (+) EMILY (obstructive sleep apnea)      PULMONARY   (+) COPD (chronic obstructive pulmonary disease) (HCC)      NEURO   (+) History of anesthesia complications      CARDIAC   (+) Hypertension      GI   (+) GERD (gastroesophageal reflux disease)     Denies CAD, CP/SOB, CVA, DM, LIVER/KIDNEY DZ, URI    Physical Exam    Airway   Mallampati: II  TM distance: >3 FB  Neck ROM: full       Cardiovascular   Rhythm: regular  Rate: normal  (-) murmur     Dental   Comments: Cracked lower right tooth       Pulmonary   Breath sounds clear to auscultation     Abdominal    Neurological - normal exam                 Anesthesia Plan    ASA 3   ASA physical status 3 criteria: COPD    Plan - MAC             Induction: intravenous      Pertinent diagnostic labs and testing reviewed    Informed Consent:    Anesthetic plan and risks discussed with patient.    Use of blood products discussed with: patient whom consented to blood products.

## 2019-11-21 NOTE — ED NOTES
Lab called with critical result of hgb at 1654. Critical lab result read back to lab.   Dr. Cuello notified of critical lab result at 1654.  Critical lab result read back by Dr. Cuello.

## 2019-11-21 NOTE — ANESTHESIA POSTPROCEDURE EVALUATION
Patient: Ariella Rendon    Procedure Summary     Date:  11/21/19 Room / Location:  [facility] OR 09 / SURGERY [facility]    Anesthesia Start:  1130 Anesthesia Stop:  1209    Procedures:       GASTROSCOPY (N/A Esophagus)      COLONOSCOPY (N/A Esophagus) Diagnosis:  (surgical anastomosis, diverticulosis)    Surgeon:  Mario Holliday D.O. Responsible Provider:  Deirdre Leon M.D.    Anesthesia Type:  MAC ASA Status:  3          Final Anesthesia Type: MAC  Last vitals  BP   Blood Pressure: 111/73, NIBP: 108/54    Temp   36.6 °C (97.9 °F)    Pulse   Pulse: 84   Resp   (!) 25    SpO2   98 %      Anesthesia Post Evaluation    Patient location during evaluation: PACU  Patient participation: complete - patient participated  Level of consciousness: awake  Pain score: 0    Airway patency: patent  Anesthetic complications: no  Cardiovascular status: adequate  Respiratory status: acceptable  Hydration status: acceptable    PONV: none           Nurse Pain Score: 0 (NPRS)

## 2019-11-21 NOTE — ASSESSMENT & PLAN NOTE
status post open anterior L4-L5 and L5-S1 lumbar interbody fusion without   Complications 3 weeks ago by Dr. Lemons

## 2019-11-21 NOTE — RESPIRATORY CARE
COPD EDUCATION by COPD CLINICAL EDUCATOR  11/21/2019 at 9:13 AM by Cristal Montanez     Patient interviewed by COPD education team. Patient refused COPD program at this time.

## 2019-11-21 NOTE — ASSESSMENT & PLAN NOTE
Likely lower GI bleed, but upper GI bleed cannot be reliably excluded.  GI consulted.  Continue  PPI  Red blood cells transfusion to keep hemoglobin above 7.0  Ordered nuclear medicine scan to look for source of bleeding  11/21. Planned for EGD  11/22. EGD by Dr. Holliday on 11/21 was unremarkable.  1.  Recommend a bleeding scan if the patient bleeds again.    2.  Consider outpatient video capsule endoscopy to evaluate small bowel for   source if the patient no longer bleeds.  If patient rebleeds in the hospital,   recommend inpatient video capsule endoscopy.    3.  Recommend clear liquid diet for now.  Currently she still has bloody bowel movement  I called and spoke with Dr. Mills. Plan for capsule endoscopy Monday or Tuesday. If Hb and bleeding stops before then then she can be discharged with outpatient follow up.  11/23. Awaiting sigmoidoscopy and possible banding of internal hemorrhoids by Dr. Mills today

## 2019-11-21 NOTE — CONSULTS
"Date of Service:11/20/2019    Consult Requested By: Ely SOUTH    Reason for Consultation: melena/anemia    History of Present Illness:   Ariella Rendon is a 64 y.o. female that is known to me who comes in today with approximately 1 months worth of hematochezia and melena.  She recently underwent back surgery and was discharged towards the end of October and the following few days she started to have black tarry stools.  She is hoping that things will gradually dissipate and stop unfortunately did not and this prompted her to come to the ER for further evaluation.  She developed lightheadedness dizziness palpitations shortness of breath and was finally encouraged by her family to come in for further evaluation.  She did experience some left lower quadrant and some right lower quadrant discomfort post surgical procedure which is gradually improved and dissipated.  She had large ecchymotic regions of involvement in the lower pelvic region that is also gradually dissipated since her surgery.  Her last bowel movement was at 10 AM this morning.  She is presently receiving 1 unit of packed RBCs.  She is resting comfortably in bed.    Review Of Systems:  She does admit to lightheadedness dizziness visual changes shortness of breath and palpitations she denies any type of auditory abnormalities chest pain dysphagia odynophagia hematemesis coffee-ground emesis.  She denies any type of epigastric right upper quadrant or left upper quadrant pain.  Left lower quadrant and right lower quadrant discomfort has gradually dissipated.  She denies any lower extremity swelling rashes numbness tingling.  She does admit to weakness.    PMH:   Past Medical History:   Diagnosis Date   • Anemia     past history   • Anesthesia 06/06/2019    reports \"breathing issues\", I was told    • Arthritis     generalized all over   • Breath shortness     uses oxygen at 3 liters at night   • Cancer (HCC)     cervical, endometrial   • COPD (chronic " obstructive pulmonary disease) (HCC)    • Dental disorder     full denture on top   • Diverticulitis    • Heart burn     lumbar, colon   • Hiatus hernia syndrome    • High cholesterol     no meds   • Hypertension    • Indigestion    • Other specified disorder of intestines     constipation and diarrhea   • Other specified symptom associated with female genital organs 1970    endometriosis   • Oxygen dependent     3 LTR HS   • Psychiatric problem     depression   • Spinal stenosis of lumbar region          PSH:  Past Surgical History:   Procedure Laterality Date   • LUMBAR FUSION ANTERIOR N/A 10/25/2019    Procedure: FUSION, SPINE, LUMBAR, ANTERIOR APPROACH- L4-S1 ALIF WITH PLATE;  Surgeon: Umair Lemons M.D.;  Location: SURGERY [facility];  Service: Neurosurgery   • COLONOSCOPY N/A 7/3/2018    Procedure: COLONOSCOPY;  Surgeon: Cain Castillo M.D.;  Location: SURGERY [facility];  Service: Gastroenterology   • GASTROSCOPY-ENDO N/A 7/3/2018    Procedure: GASTROSCOPY-ENDO;  Surgeon: Cain Castillo M.D.;  Location: SURGERY [facility];  Service: Gastroenterology   • LOW ANTERIOR RESECTION LAPAROSCOPIC  9/12/2014    Performed by Nakul Beltran M.D. at SURGERY [facility]   • GYN SURGERY  1970    hysterectomy   • GYN SURGERY  1970    scope x 4   • BOWEL RESECTION      generic cymbalta   • OTHER ABDOMINAL SURGERY      remove foreign object, age 7       FAMILY HX:  Family History   Problem Relation Age of Onset   • Scoliosis Mother    • Cancer Father    • Lung Disease Father    • Hypertension Brother        SOCIAL HX:  Social History     Socioeconomic History   • Marital status: Legally      Spouse name: Not on file   • Number of children: Not on file   • Years of education: Not on file   • Highest education level: Not on file   Occupational History   • Not on file   Social Needs   • Financial resource strain: Not on file   • Food insecurity:     Worry: Not on file     Inability: Not on  "file   • Transportation needs:     Medical: Not on file     Non-medical: Not on file   Tobacco Use   • Smoking status: Former Smoker     Packs/day: 0.50     Years: 50.00     Pack years: 25.00     Types: Cigarettes     Last attempt to quit: 3/22/2019     Years since quittin.6   • Smokeless tobacco: Never Used   Substance and Sexual Activity   • Alcohol use: No   • Drug use: Yes     Frequency: 7.0 times per week     Types: Marijuana, Inhaled     Comment: marijuana   • Sexual activity: Not on file   Lifestyle   • Physical activity:     Days per week: Not on file     Minutes per session: Not on file   • Stress: Not on file   Relationships   • Social connections:     Talks on phone: Not on file     Gets together: Not on file     Attends Gnosticism service: Not on file     Active member of club or organization: Not on file     Attends meetings of clubs or organizations: Not on file     Relationship status: Not on file   • Intimate partner violence:     Fear of current or ex partner: Not on file     Emotionally abused: Not on file     Physically abused: Not on file     Forced sexual activity: Not on file   Other Topics Concern   • Not on file   Social History Narrative   • Not on file     Social History     Tobacco Use   Smoking Status Former Smoker   • Packs/day: 0.50   • Years: 50.00   • Pack years: 25.00   • Types: Cigarettes   • Last attempt to quit: 3/22/2019   • Years since quittin.6   Smokeless Tobacco Never Used     Social History     Substance and Sexual Activity   Alcohol Use No       Allergies/Intolerances:  Allergies   Allergen Reactions   • Other Environmental Rash     \"alloids in metal\"       History reviewed with the patient    Other Current Medications:    Current Facility-Administered Medications:   •  senna-docusate (PERICOLACE or SENOKOT S) 8.6-50 MG per tablet 2 Tab, 2 Tab, Oral, BID **AND** polyethylene glycol/lytes (MIRALAX) PACKET 1 Packet, 1 Packet, Oral, QDAY PRN **AND** magnesium hydroxide " (MILK OF MAGNESIA) suspension 30 mL, 30 mL, Oral, QDAY PRN **AND** bisacodyl (DULCOLAX) suppository 10 mg, 10 mg, Rectal, QDAY PRN, Fahad Graves M.D.  •  Respiratory Care per Protocol, , Nebulization, Continuous RT, Fahad Graves M.D.  •  Notify provider if pain remains uncontrolled, , , CONTINUOUS **AND** Use the numeric rating scale (NRS-11) on regular floors and Critical-Care Pain Observation Tool (CPOT) on ICUs/Trauma to assess pain, , , CONTINUOUS **AND** Pulse Ox (Oximetry), , , CONTINUOUS **AND** Pharmacy Consult Request ...Pain Management Review 1 Each, 1 Each, Other, PHARMACY TO DOSE **AND** If patient difficult to arouse and/or has respiratory depression, stop any opiates that are currently infusing and call a Rapid Response., , , CONTINUOUS **AND** oxyCODONE immediate-release (ROXICODONE) tablet 2.5 mg, 2.5 mg, Oral, Q3HRS PRN **AND** oxyCODONE immediate-release (ROXICODONE) tablet 5 mg, 5 mg, Oral, Q3HRS PRN **AND** HYDROmorphone pf (DILAUDID) injection 0.25 mg, 0.25 mg, Intravenous, Q3HRS PRN, Fahad Graves M.D.  •  ondansetron (ZOFRAN) syringe/vial injection 4 mg, 4 mg, Intravenous, Q4HRS PRN, Fahad Graves M.D.  •  ondansetron (ZOFRAN ODT) dispertab 4 mg, 4 mg, Oral, Q4HRS PRN, Fahad Graves M.D.  •  promethazine (PHENERGAN) tablet 12.5-25 mg, 12.5-25 mg, Oral, Q4HRS PRNFahad M.D.  •  promethazine (PHENERGAN) suppository 12.5-25 mg, 12.5-25 mg, Rectal, Q4HRS PRN, Fahad Graves M.D.  •  prochlorperazine (COMPAZINE) injection 5-10 mg, 5-10 mg, Intravenous, Q4HRS PRN, Fahad Graves M.D.  •  albuterol inhaler 2 Puff, 2 Puff, Inhalation, Q6HRS PRN, Fahad Graves M.D.  •  doxepin (SINEQUAN) capsule 20 mg, 20 mg, Oral, Nightly, Fahad Graves M.D., 20 mg at 11/20/19 2047  •  [START ON 11/21/2019] amLODIPine (NORVASC) tablet 10 mg, 10 mg, Oral, DAILY, Fahad Graves M.D.  •  ferrous sulfate tablet 325 mg, 325 mg, Oral, TID WITH MEALS, Fahad Graves  "M.D., 325 mg at 19  •  ipratropium-albuterol (DUONEB) nebulizer solution, 3 mL, Nebulization, Q6HRS PRN, Fahad Graves M.D.  •  morphine ER (MS CONTIN) tablet 30 mg, 30 mg, Oral, Q12HRS, Fahad Graves M.D., 30 mg at 19  •  [START ON 2019] omeprazole (PRILOSEC) capsule 20 mg, 20 mg, Oral, DAILY, Fahad Graves M.D.  •  [START ON 2019] polyethylene glycol/lytes (MIRALAX) PACKET 1 Packet, 1 Packet, Oral, DAILY, Fahad Graves M.D.  •  ROPINIRole (REQUIP) tablet 4 mg, 4 mg, Oral, 4X/DAY, Fahad Graves M.D., 4 mg at 19  •  peg-electrolyte solution (MOVIPREP) package 100 g, 100 g, Oral, BID, Cain Castillo M.D.  [unfilled]    Most Recent Vital Signs:  /79   Pulse 69   Temp 36.6 °C (97.8 °F) (Temporal)   Resp 16   Ht 1.676 m (5' 6\")   Wt 91.3 kg (201 lb 4.5 oz)   SpO2 94%   Breastfeeding? No   BMI 32.49 kg/m²   Temp  Av.6 °C (97.9 °F)  Min: 36.2 °C (97.2 °F)  Max: 36.9 °C (98.5 °F)    Physical Exam:  General: Nontoxic, no acute distress  HEENT: sclera anicteric, PERRL, EOMI, MMM, no oral lesions  Neck: supple, no lymphadenopathy  Chest: CTAB, no r/r/w, normal work of breathing.  Cardiac: Regular, no murmurs no gallops heard  Abdomen: + bowel sounds, soft, non-tender, non-distended, no HSM, there is a surgical scar in the left lower quadrant consistent with the recent back surgery.  Scar is healing well.  Steri-Strips are in place.  Extremities: No edema. No joint swelling.  Skin: no rashes or erythema  Neuro: Alert and oriented times 3, non-focal exam    Pertinent Lab Results:  Recent Labs     19  1615   WBC 9.1      Recent Labs     19  1615   HEMOGLOBIN 5.5*   HEMATOCRIT 21.2*   MCV 96.8   MCH 25.1*   MACROCYTOSIS 1+   ANISOCYTOSIS 1+   PLATELETCT 292         Recent Labs     19  1615   SODIUM 137   POTASSIUM 4.2   CHLORIDE 103   CO2 26   CREATININE 0.68        Recent Labs     19  1615   ALBUMIN 3.5    "   Recent Labs     11/20/19  1615   ASTSGOT 11*   ALTSGPT 8   TBILIRUBIN 0.3   ALKPHOSPHAT 106*   GLOBULIN 2.5   INR 0.94       [unfilled]      Pertinent Micro:  Results     ** No results found for the last 168 hours. **        No results found for: BLOODCULTU, BLDCULT, BCHOLD     Studies:      Results for orders placed in visit on 08/27/15   DX-ANKLE 3+ VIEWS RIGHT    Impression Mild calcaneal enthesopathy. Otherwise negative.      Results for orders placed during the hospital encounter of 03/13/09   DX-CERVICAL SPINE-2 OR 3 VIEWS    Impression IMPRESSION:     ARTHROPATHY AS DESCRIBED ABOVE.  NO ACUTE FINDINGS.    RWK:dxm             Results for orders placed in visit on 08/27/15   DX-CHEST-2 VIEWS    Impression No active disease.                              Results for orders placed in visit on 08/27/15   DX-HIP-COMPLETE - UNILATERAL 2+ RIGHT    Impression Normal-appearing hip joint.    Greater trochanteric enthesopathy.    Mild SI joint DJD.            Results for orders placed in visit on 08/27/15   DX-KNEE 3 VIEWS RIGHT    Impression Mild DJD.          Results for orders placed during the hospital encounter of 10/25/19   DX-LUMBAR SPINE-2 OR 3 VIEWS    Impression Intraoperative evaluation of lumbar spine surgery.            INTERPRETING LOCATION:  [address] [place] [address], [facility]      Results for orders placed during the hospital encounter of 06/06/19   DX-LUMBAR SPINE-4+ VIEWS    Impression 1.  Moderate discal degenerative changes at the L5-S1 level.    2.  Mild marginal osteophytic degenerative changes throughout the lumbar spine.    3.  No evidence of subluxation with flexion or extension.                                        IMPRESSION:     Anemia: She has recently undergone both an EGD and a colonoscopy roughly 1 year ago for similar issues of anemia.  An upper endoscopy was noted to be normal in the lower endoscopy demonstrated the sigmoid anastomosis site but no other appreciable abnormalities except for  some minor hemorrhoids.  She presents today with approximately 1 months worth of black tarry stools subsequently I would recommend repeating both the EGD and colonoscopy to try to determine the bleeding site.  I feel a bleeding scan such as a tagged RBC scan would only be of value if she was actively bleeding which she presently is not.  CT scan or CT angios would be again a little value given that she is not overtly bleeding at this time.  Subsequently I recommend improving her hemodynamic status with IV fluids keep her H&H greater than 7/21 with blood transfusions, keep her on clear liquids.  Ultimately she will require both an EGD and colonoscopy to be performed tomorrow after 1 gallon of GoLYTELY has been administered and she is cleared.  I have informed of the risks and benefits of the procedure along with I have discussed with her the plan and the nursing staff.  If there are any further questions or concerns please feel free to give us a call at [phone].      PLAN:   Ariella Rendon is a 64 y.o.    See above.    Discussed with IM. Will continue to follow    Cain Castillo M.D.

## 2019-11-21 NOTE — CARE PLAN
Problem: Communication  Goal: The ability to communicate needs accurately and effectively will improve  Outcome: PROGRESSING AS EXPECTED  Intervention: Educate patient and significant other/support system about the plan of care, procedures, treatments, medications and allow for questions  Note:   Patient educated to utilize call light. Patient and family oriented to hospital room. Patient encouraged to ask questions about plan of care. Patient effectively uses call light and is involved in POC.      Problem: Knowledge Deficit  Goal: Knowledge of disease process/condition, treatment plan, diagnostic tests, and medications will improve  Outcome: PROGRESSING AS EXPECTED  Intervention: Assess knowledge level of disease process/condition, treatment plan, diagnostic tests, and medications  Note:   Patient is educated of disease process and condition. Treatment team has included patient in plan of care. All medications indications and side effects are explained. Patient is encouraged to ask questions. Patient indicates understanding.       details… detailed exam no joint erythema/no joint warmth/no calf tenderness

## 2019-11-21 NOTE — PROGRESS NOTES
Pt had another large BM with chuck red blood. Hgb 6.5 at 0030, Dr. Regalado notified and 1 unit PRBC ordered. PRBC transfusing now.

## 2019-11-22 LAB
ANION GAP SERPL CALC-SCNC: 5 MMOL/L (ref 0–11.9)
BUN SERPL-MCNC: 8 MG/DL (ref 8–22)
CALCIUM SERPL-MCNC: 8.1 MG/DL (ref 8.5–10.5)
CHLORIDE SERPL-SCNC: 109 MMOL/L (ref 96–112)
CO2 SERPL-SCNC: 26 MMOL/L (ref 20–33)
CREAT SERPL-MCNC: 0.62 MG/DL (ref 0.5–1.4)
ERYTHROCYTE [DISTWIDTH] IN BLOOD BY AUTOMATED COUNT: 62.7 FL (ref 35.9–50)
GLUCOSE SERPL-MCNC: 100 MG/DL (ref 65–99)
HCT VFR BLD AUTO: 24.8 % (ref 37–47)
HGB BLD-MCNC: 6.9 G/DL (ref 12–16)
HGB BLD-MCNC: 8.3 G/DL (ref 12–16)
HGB BLD-MCNC: 8.3 G/DL (ref 12–16)
MCH RBC QN AUTO: 25.7 PG (ref 27–33)
MCHC RBC AUTO-ENTMCNC: 27.8 G/DL (ref 33.6–35)
MCV RBC AUTO: 92.2 FL (ref 81.4–97.8)
PLATELET # BLD AUTO: 226 K/UL (ref 164–446)
PMV BLD AUTO: 11.2 FL (ref 9–12.9)
POTASSIUM SERPL-SCNC: 3.8 MMOL/L (ref 3.6–5.5)
RBC # BLD AUTO: 2.69 M/UL (ref 4.2–5.4)
SODIUM SERPL-SCNC: 140 MMOL/L (ref 135–145)
WBC # BLD AUTO: 6.5 K/UL (ref 4.8–10.8)

## 2019-11-22 PROCEDURE — 770020 HCHG ROOM/CARE - TELE (206)

## 2019-11-22 PROCEDURE — 36430 TRANSFUSION BLD/BLD COMPNT: CPT

## 2019-11-22 PROCEDURE — 700102 HCHG RX REV CODE 250 W/ 637 OVERRIDE(OP): Performed by: INTERNAL MEDICINE

## 2019-11-22 PROCEDURE — P9016 RBC LEUKOCYTES REDUCED: HCPCS

## 2019-11-22 PROCEDURE — 85018 HEMOGLOBIN: CPT

## 2019-11-22 PROCEDURE — 36415 COLL VENOUS BLD VENIPUNCTURE: CPT

## 2019-11-22 PROCEDURE — C9113 INJ PANTOPRAZOLE SODIUM, VIA: HCPCS | Performed by: INTERNAL MEDICINE

## 2019-11-22 PROCEDURE — 99233 SBSQ HOSP IP/OBS HIGH 50: CPT | Performed by: INTERNAL MEDICINE

## 2019-11-22 PROCEDURE — 700111 HCHG RX REV CODE 636 W/ 250 OVERRIDE (IP): Performed by: INTERNAL MEDICINE

## 2019-11-22 PROCEDURE — A9270 NON-COVERED ITEM OR SERVICE: HCPCS | Performed by: INTERNAL MEDICINE

## 2019-11-22 PROCEDURE — 85027 COMPLETE CBC AUTOMATED: CPT

## 2019-11-22 PROCEDURE — 86923 COMPATIBILITY TEST ELECTRIC: CPT

## 2019-11-22 PROCEDURE — 80048 BASIC METABOLIC PNL TOTAL CA: CPT

## 2019-11-22 RX ORDER — SODIUM CHLORIDE 9 MG/ML
INJECTION, SOLUTION INTRAVENOUS
Status: ACTIVE
Start: 2019-11-22 | End: 2019-11-22

## 2019-11-22 RX ADMIN — OXYCODONE HYDROCHLORIDE 5 MG: 5 TABLET ORAL at 04:55

## 2019-11-22 RX ADMIN — FERROUS SULFATE TAB 325 MG (65 MG ELEMENTAL FE) 325 MG: 325 (65 FE) TAB at 08:02

## 2019-11-22 RX ADMIN — FERROUS SULFATE TAB 325 MG (65 MG ELEMENTAL FE) 325 MG: 325 (65 FE) TAB at 17:52

## 2019-11-22 RX ADMIN — ROPINIROLE HYDROCHLORIDE 4 MG: 2 TABLET, FILM COATED ORAL at 04:54

## 2019-11-22 RX ADMIN — ROPINIROLE HYDROCHLORIDE 4 MG: 2 TABLET, FILM COATED ORAL at 17:52

## 2019-11-22 RX ADMIN — PANTOPRAZOLE SODIUM 40 MG: 40 INJECTION, POWDER, LYOPHILIZED, FOR SOLUTION INTRAVENOUS at 04:55

## 2019-11-22 RX ADMIN — AMLODIPINE BESYLATE 10 MG: 10 TABLET ORAL at 04:55

## 2019-11-22 RX ADMIN — ROPINIROLE HYDROCHLORIDE 4 MG: 2 TABLET, FILM COATED ORAL at 12:04

## 2019-11-22 RX ADMIN — FERROUS SULFATE TAB 325 MG (65 MG ELEMENTAL FE) 325 MG: 325 (65 FE) TAB at 12:04

## 2019-11-22 RX ADMIN — ROPINIROLE HYDROCHLORIDE 4 MG: 2 TABLET, FILM COATED ORAL at 23:24

## 2019-11-22 RX ADMIN — OXYCODONE HYDROCHLORIDE 5 MG: 5 TABLET ORAL at 12:04

## 2019-11-22 RX ADMIN — MORPHINE SULFATE 30 MG: 30 TABLET, EXTENDED RELEASE ORAL at 08:02

## 2019-11-22 RX ADMIN — OXYCODONE HYDROCHLORIDE 5 MG: 5 TABLET ORAL at 23:25

## 2019-11-22 RX ADMIN — PANTOPRAZOLE SODIUM 40 MG: 40 INJECTION, POWDER, LYOPHILIZED, FOR SOLUTION INTRAVENOUS at 17:53

## 2019-11-22 RX ADMIN — MORPHINE SULFATE 30 MG: 30 TABLET, EXTENDED RELEASE ORAL at 20:11

## 2019-11-22 RX ADMIN — DOXEPIN HYDROCHLORIDE 20 MG: 10 CAPSULE ORAL at 20:11

## 2019-11-22 RX ADMIN — OXYCODONE HYDROCHLORIDE 5 MG: 5 TABLET ORAL at 16:02

## 2019-11-22 ASSESSMENT — ENCOUNTER SYMPTOMS
SHORTNESS OF BREATH: 1
CHILLS: 0
CONSTIPATION: 0
HEARTBURN: 0
FALLS: 0
COUGH: 0
ABDOMINAL PAIN: 0
DIARRHEA: 0
VOMITING: 0
DIZZINESS: 1
BLOOD IN STOOL: 1
NERVOUS/ANXIOUS: 0
FEVER: 0
FOCAL WEAKNESS: 0
TREMORS: 0
MYALGIAS: 0
EYE PAIN: 0
WHEEZING: 0
INSOMNIA: 0
HEMOPTYSIS: 0
HEADACHES: 0
SHORTNESS OF BREATH: 0
LOSS OF CONSCIOUSNESS: 0
EYE REDNESS: 0
WEAKNESS: 1
NAUSEA: 0
SEIZURES: 0
PALPITATIONS: 0

## 2019-11-22 NOTE — CARE PLAN
Problem: Bowel/Gastric:  Goal: Normal bowel function is maintained or improved  Outcome: PROGRESSING SLOWER THAN EXPECTED  Note:   Bowel sounds active in all four quadrants. Patient tolerating clear liquid diet well and consuming 50-75%. Patient having persistently bloody bowel movements with mild - moderately saturated pads.      Problem: Pain Management  Goal: Pain level will decrease to patient's comfort goal  Intervention: Educate and implement non-pharmacologic comfort measures. Examples: relaxation, distration, play therapy, activity therapy, massage, etc.  Note:   Non-pharmacologic interventions given to promote comfort for patient including ambulation, pillows used for repositioning, extra blankets, education, distraction, emotional support, heat, and rest. Patient states interventions are helpful.

## 2019-11-22 NOTE — CARE PLAN
Problem: Communication  Goal: The ability to communicate needs accurately and effectively will improve  Outcome: PROGRESSING AS EXPECTED     Problem: Pain Management  Goal: Pain level will decrease to patient's comfort goal  Outcome: PROGRESSING AS EXPECTED  Intervention: Educate and implement non-pharmacologic comfort measures. Examples: relaxation, distration, play therapy, activity therapy, massage, etc.  Note:   Patient educated to pain scale system. Patient encouraged to verbalize discomfort. Patient taught about non-pharmacological pain management. Patient is comfortable at this time without statements of discomfort or pain.      Problem: Bowel/Gastric:  Goal: Normal bowel function is maintained or improved  Outcome: PROGRESSING SLOWER THAN EXPECTED  Note:   Patient bowel function is assessed. Education provided on diet, fluid intake and activities that promote bowel function. Toileting at scheduled intervals in place for patient. Patient verbalizes understanding.

## 2019-11-22 NOTE — DISCHARGE PLANNING
Anticipated Disposition:  TBD    Action:   Met with Pt in her room, she reports lives with daughter and grandchild, they are able to provide care for her.  Pt uses 3L oxygen at night via [facility].  Pt has a cane and walker at home. Daughter will come and pick her up upon discharge if needed.   Pt expressed no discharge needs at this point.    Assessment completed.           Barriers to Discharge:  Medical clearance.       Plan:  Follow up with attending physician for medical clearance.       Care Transition Team Assessment    Information Source  Orientation : Oriented x 4  Information Given By: Patient  Informant's Name: Millie   Who is responsible for making decisions for patient? : Patient    Readmission Evaluation  Is this a readmission?: Yes - unplanned readmission    Elopement Risk  Legal Hold: No  Ambulatory or Self Mobile in Wheelchair: Yes  Disoriented: No  Psychiatric Symptoms: None  History of Wandering: No  Elopement this Admit: No  Vocalizing Wanting to Leave: No  Displays Behaviors, Body Language Wanting to Leave: No-Not at Risk for Elopement  Elopement Risk: Not at Risk for Elopement    Interdisciplinary Discharge Planning  Does Admitting Nurse Feel This Could be a Complex Discharge?: No  Primary Care Physician: Dr.Allisa Marti   Lives with - Patient's Self Care Capacity: Adult Children  Patient or legal guardian wants to designate a caregiver (see row info): No  Support Systems: None  Housing / Facility: 1 Story House  Do You Take your Prescribed Medications Regularly: Yes  Able to Return to Previous ADL's: Future Time w/Therapy  Mobility Issues: Yes  Prior Services: Home-Independent  Patient Expects to be Discharged to:: TBD  Assistance Needed: Yes  Durable Medical Equipment: Walker(Pt has cane and walker at home)    Discharge Preparedness  What is your plan after discharge?: Home with help  What are your discharge supports?: Child  Prior Functional Level: Ambulatory, Uses Walker,  Uses Cane    Functional Assesment  Prior Functional Level: Ambulatory, Uses Walker, Uses Cane    Finances  Financial Barriers to Discharge: No  Prescription Coverage: Yes    Vision / Hearing Impairment  Vision Impairment : Yes  Right Eye Vision: Impaired, Wears Glasses  Left Eye Vision: Impaired, Wears Glasses  Hearing Impairment : No              Domestic Abuse  Have you ever been the victim of abuse or violence?: No  Physical Abuse or Sexual Abuse: No  Verbal Abuse or Emotional Abuse: No  Possible Abuse Reported to:: Not Applicable         Discharge Risks or Barriers  Discharge risks or barriers?: No    Anticipated Discharge Information  Anticipated discharge disposition: Home  Discharge Address: [address] [place] [address]

## 2019-11-22 NOTE — PROGRESS NOTES
[facility] Medicine Daily Progress Note    Date of Service  11/21/2019    Chief Complaint  64 y.o. female admitted 11/20/2019 with Bloody Stools (Pt reports black tarrry stool since 10/29 - states BRB on toilet paper.) and Shortness of Breath        Hospital Course    History of diverticulitis and diverticulosis.  History of COPD on nocturnal O2.  History of GI bleeding and negative endoscopy  Presents with Bloody Stools (Pt reports black tarrry stool since 10/29 - states BRB on toilet paper.) and Shortness of Breath  SHe was also dizzy and had exertional symptoms.  At the ED, she is afebrile, normotensive.  Hb was 5.5  GI consulted.        Interval Problem Update  11/21. Anticipating EGD today    Consultants/Specialty  Gastroenterology    Code Status  full    Disposition  Home when better    Review of Systems  Review of Systems   Constitutional: Negative for chills and fever.   HENT: Negative for congestion, hearing loss and nosebleeds.    Eyes: Negative for pain and redness.   Respiratory: Positive for shortness of breath. Negative for cough, hemoptysis and wheezing.    Cardiovascular: Negative for chest pain and palpitations.   Gastrointestinal: Positive for blood in stool. Negative for abdominal pain, constipation, diarrhea, nausea and vomiting.   Genitourinary: Negative for dysuria, frequency and hematuria.   Musculoskeletal: Negative for falls, joint pain and myalgias.   Skin: Negative for rash.   Neurological: Positive for dizziness and weakness. Negative for tremors, focal weakness, seizures, loss of consciousness and headaches.   Psychiatric/Behavioral: The patient is not nervous/anxious and does not have insomnia.    All other systems reviewed and are negative.       Physical Exam  Temp:  [36.2 °C (97.1 °F)-37.2 °C (99 °F)] 37.2 °C (98.9 °F)  Pulse:  [] 88  Resp:  [16-25] 16  BP: ()/() 127/62  SpO2:  [91 %-100 %] 95 %    Physical Exam  Vitals signs and nursing note reviewed.    Constitutional:       General: She is not in acute distress.  HENT:      Head: Normocephalic and atraumatic.      Right Ear: External ear normal.      Left Ear: External ear normal.      Nose: Nose normal.      Mouth/Throat:      Mouth: Mucous membranes are moist.   Eyes:      General: No scleral icterus.     Conjunctiva/sclera: Conjunctivae normal.   Neck:      Musculoskeletal: Normal range of motion and neck supple. No neck rigidity.   Cardiovascular:      Rate and Rhythm: Normal rate and regular rhythm.      Pulses: Normal pulses.      Heart sounds: No murmur. No friction rub. No gallop.    Pulmonary:      Effort: Pulmonary effort is normal. No respiratory distress.      Breath sounds: Normal breath sounds. No stridor. No wheezing, rhonchi or rales.   Chest:      Chest wall: No tenderness.   Abdominal:      General: Abdomen is flat. Bowel sounds are normal. There is no distension.      Palpations: Abdomen is soft.      Tenderness: There is no tenderness. There is no guarding or rebound.   Musculoskeletal: Normal range of motion.         General: No swelling, tenderness or deformity.   Skin:     General: Skin is warm.      Coloration: Skin is pale. Skin is not jaundiced.   Neurological:      General: No focal deficit present.      Mental Status: She is alert and oriented to person, place, and time. Mental status is at baseline.   Psychiatric:         Mood and Affect: Mood normal.         Behavior: Behavior normal.         Thought Content: Thought content normal.         Judgment: Judgment normal.         Fluids    Intake/Output Summary (Last 24 hours) at 11/21/2019 1716  Last data filed at 11/21/2019 1300  Gross per 24 hour   Intake 2962 ml   Output 401 ml   Net 2561 ml       Laboratory  Recent Labs     11/20/19  1615 11/21/19  0033 11/21/19  0812 11/21/19  1650   WBC 9.1 7.2  --   --    RBC 2.19* 2.57*  --   --    HEMOGLOBIN 5.5* 6.5* 7.3* 7.4*   HEMATOCRIT 21.2* 24.0*  --   --    MCV 96.8 93.4  --   --    MCH  25.1* 25.3*  --   --    MCHC 25.9* 27.1*  --   --    RDW 57.3* 63.7*  --   --    PLATELETCT 292 252  --   --    MPV 11.8 11.7  --   --      Recent Labs     11/20/19  1615 11/21/19  0033   SODIUM 137 142   POTASSIUM 4.2 3.9   CHLORIDE 103 108   CO2 26 27   GLUCOSE 101* 99   BUN 12 11   CREATININE 0.68 0.74   CALCIUM 8.4* 8.4*     Recent Labs     11/20/19  1615   APTT 24.7   INR 0.94               Imaging  DX-CHEST-PORTABLE (1 VIEW)   Final Result      No acute cardiac or pulmonary abnormalities are identified.      NM-GI BLEEDING SCAN    (Results Pending)        Assessment/Plan  GI bleed- (present on admission)  Assessment & Plan  Likely lower GI bleed, but upper GI bleed cannot be reliably excluded.  GI consulted.  Continue  PPI  Red blood cells transfusion to keep hemoglobin above 7.0  Ordered nuclear medicine scan to look for source of bleeding  11/21. Planned for EGD    Blood loss anemia- (present on admission)  Assessment & Plan  Monitor hemoglobin with CBC every 8 hours.  Red blood cells transfusion as needed.  Follow-up with GI recommendations: Diagnostic endoscopy pending  Symptomatic anemia  Trend Hb    Status post lumbar spine surgery for decompression of spinal cord- (present on admission)  Assessment & Plan   status post open anterior L4-L5 and L5-S1 lumbar interbody fusion without   Complications 3 weeks ago by Dr. Lemons    EMILY (obstructive sleep apnea)- (present on admission)  Assessment & Plan  Continue with  oxygen supplementation at night    Chronic respiratory failure (HCC)- (present on admission)  Assessment & Plan  Continue with excision as needed  RT protocol    COPD (chronic obstructive pulmonary disease) (HCC)- (present on admission)  Assessment & Plan  No wheezes on exam.  RT protocol in place  Continue home inhalers  Supplemental oxygen       VTE prophylaxis: SCD  Reviewed vitals, labs, imaging, staff notes.  Discussed assessment and plan with Ariella Rendon   Discussed with OCTAVIA

## 2019-11-22 NOTE — PROGRESS NOTES
[facility] Medicine Daily Progress Note    Date of Service  11/22/2019    Chief Complaint  64 y.o. female admitted 11/20/2019 with Bloody Stools (Pt reports black tarrry stool since 10/29 - states BRB on toilet paper.) and Shortness of Breath        Hospital Course    History of diverticulitis and diverticulosis.  History of COPD on nocturnal O2.  History of GI bleeding and negative endoscopy  Presents with Bloody Stools (Pt reports black tarrry stool since 10/29 - states BRB on toilet paper.) and Shortness of Breath  SHe was also dizzy and had exertional symptoms.  At the ED, she is afebrile, normotensive.  Hb was 5.5  GI consulted.        Interval Problem Update  11/21. Anticipating EGD today  11/22. EGD did not show source of bleed  She is still having bloody bowel movement but otherwise stable  Nuclear bleeding scan did not show source of bleed  Consultants/Specialty  Gastroenterology    Code Status  full    Disposition  Home when better    Review of Systems  Review of Systems   Constitutional: Negative for chills and fever.   HENT: Negative for congestion, hearing loss and nosebleeds.    Eyes: Negative for pain and redness.   Respiratory: Positive for shortness of breath. Negative for cough, hemoptysis and wheezing.    Cardiovascular: Negative for chest pain and palpitations.   Gastrointestinal: Positive for blood in stool. Negative for abdominal pain, constipation, diarrhea, nausea and vomiting.   Genitourinary: Negative for dysuria, frequency and hematuria.   Musculoskeletal: Negative for falls, joint pain and myalgias.   Skin: Negative for rash.   Neurological: Positive for dizziness and weakness. Negative for tremors, focal weakness, seizures, loss of consciousness and headaches.   Psychiatric/Behavioral: The patient is not nervous/anxious and does not have insomnia.    All other systems reviewed and are negative.       Physical Exam  Temp:  [36.3 °C (97.4 °F)-37.3 °C (99.1 °F)] 37.2 °C (99 °F)  Pulse:   [75-88] 86  Resp:  [16-18] 17  BP: (104-135)/(62-78) 123/73  SpO2:  [92 %-97 %] 92 %    Physical Exam  Vitals signs and nursing note reviewed.   Constitutional:       General: She is not in acute distress.  HENT:      Head: Normocephalic and atraumatic.      Right Ear: External ear normal.      Left Ear: External ear normal.      Nose: Nose normal.      Mouth/Throat:      Mouth: Mucous membranes are moist.   Eyes:      General: No scleral icterus.     Conjunctiva/sclera: Conjunctivae normal.   Neck:      Musculoskeletal: Normal range of motion and neck supple. No neck rigidity.   Cardiovascular:      Rate and Rhythm: Normal rate and regular rhythm.      Pulses: Normal pulses.      Heart sounds: No murmur. No friction rub. No gallop.    Pulmonary:      Effort: Pulmonary effort is normal. No respiratory distress.      Breath sounds: Normal breath sounds. No stridor. No wheezing, rhonchi or rales.   Chest:      Chest wall: No tenderness.   Abdominal:      General: Abdomen is flat. Bowel sounds are normal. There is no distension.      Palpations: Abdomen is soft.      Tenderness: There is no tenderness. There is no guarding or rebound.   Musculoskeletal: Normal range of motion.         General: No swelling, tenderness or deformity.   Skin:     General: Skin is warm.      Coloration: Skin is pale. Skin is not jaundiced.   Neurological:      General: No focal deficit present.      Mental Status: She is alert and oriented to person, place, and time. Mental status is at baseline.   Psychiatric:         Mood and Affect: Mood normal.         Behavior: Behavior normal.         Thought Content: Thought content normal.         Judgment: Judgment normal.      Comments: Somewhat anxious         Fluids  No intake or output data in the 24 hours ending 11/22/19 1535    Laboratory  Recent Labs     11/20/19  1615 11/21/19  0033  11/21/19  1650 11/22/19  0043 11/22/19  0856   WBC 9.1 7.2  --   --  6.5  --    RBC 2.19* 2.57*  --   --   2.69*  --    HEMOGLOBIN 5.5* 6.5*   < > 7.4* 6.9* 8.3*   HEMATOCRIT 21.2* 24.0*  --   --  24.8*  --    MCV 96.8 93.4  --   --  92.2  --    MCH 25.1* 25.3*  --   --  25.7*  --    MCHC 25.9* 27.1*  --   --  27.8*  --    RDW 57.3* 63.7*  --   --  62.7*  --    PLATELETCT 292 252  --   --  226  --    MPV 11.8 11.7  --   --  11.2  --     < > = values in this interval not displayed.     Recent Labs     11/20/19  1615 11/21/19  0033 11/22/19  0043   SODIUM 137 142 140   POTASSIUM 4.2 3.9 3.8   CHLORIDE 103 108 109   CO2 26 27 26   GLUCOSE 101* 99 100*   BUN 12 11 8   CREATININE 0.68 0.74 0.62   CALCIUM 8.4* 8.4* 8.1*     Recent Labs     11/20/19  1615   APTT 24.7   INR 0.94               Imaging  NM-GI BLEEDING SCAN   Final Result      No evidence of GI bleeding.      DX-CHEST-PORTABLE (1 VIEW)   Final Result      No acute cardiac or pulmonary abnormalities are identified.           Assessment/Plan  GI bleed- (present on admission)  Assessment & Plan  Likely lower GI bleed, but upper GI bleed cannot be reliably excluded.  GI consulted.  Continue  PPI  Red blood cells transfusion to keep hemoglobin above 7.0  Ordered nuclear medicine scan to look for source of bleeding  11/21. Planned for EGD  11/22. EGD by Dr. Holliday on 11/21 was unremarkable.  1.  Recommend a bleeding scan if the patient bleeds again.    2.  Consider outpatient video capsule endoscopy to evaluate small bowel for   source if the patient no longer bleeds.  If patient rebleeds in the hospital,   recommend inpatient video capsule endoscopy.    3.  Recommend clear liquid diet for now.  Currently she still has bloody bowel movement  I called and spoke with Dr. Mills. Plan for capsule endoscopy Monday or Tuesday. If Hb and bleeding stops before then then she can be discharged with outpatient follow up.    Blood loss anemia- (present on admission)  Assessment & Plan  Monitor hemoglobin with CBC every 8 hours.  Red blood cells transfusion as needed.  Follow-up  with GI recommendations: Diagnostic endoscopy pending  Symptomatic anemia  Trend Hb    Status post lumbar spine surgery for decompression of spinal cord- (present on admission)  Assessment & Plan   status post open anterior L4-L5 and L5-S1 lumbar interbody fusion without   Complications 3 weeks ago by Dr. Lemons    EMILY (obstructive sleep apnea)- (present on admission)  Assessment & Plan  Continue with  oxygen supplementation at night    Chronic respiratory failure (HCC)- (present on admission)  Assessment & Plan  Continue with excision as needed  RT protocol    COPD (chronic obstructive pulmonary disease) (HCC)- (present on admission)  Assessment & Plan  No wheezes on exam.  RT protocol in place  Continue home inhalers  Supplemental oxygen       VTE prophylaxis: SCD  Reviewed vitals, labs, imaging, staff notes.  Discussed assessment and plan with Ariella Rendon   Discussed with OCTAVIA

## 2019-11-22 NOTE — PROGRESS NOTES
Received report from Idalia DUDLEY. Assumed care of patient at 0700. Patient A&Ox4, speaking in full sentences, follows commands and responds appropriately to questions. On room air, no signs of respiratory distress. Respirations are even and unlabored. Patient states 5/10 chronic back pain, denies intervention at this time. Call light and belongings within reach. Bed in lowest locked position. Upper side rails raised. Fall risk precautions in place. Hourly rounding. Will continue to monitor.

## 2019-11-22 NOTE — PROGRESS NOTES
Received report from Day shift RN. Assumed care of patient at 1900. Patient sitting at edge of bed at this this time. On RA, no signs of respiratory distress. Pt reports moderate pain in her back and abdomen but declines medication at this time. Call light and belongings within reach. Bed in lowest locked position. Upper side rails raised. Hourly rounding in place. Will continue to monitor.

## 2019-11-23 ENCOUNTER — ANESTHESIA (OUTPATIENT)
Dept: SURGERY | Facility: MEDICAL CENTER | Age: 64
DRG: 348 | End: 2019-11-23
Payer: MEDICAID

## 2019-11-23 ENCOUNTER — ANESTHESIA EVENT (OUTPATIENT)
Dept: SURGERY | Facility: MEDICAL CENTER | Age: 64
DRG: 348 | End: 2019-11-23
Payer: MEDICAID

## 2019-11-23 LAB
ANION GAP SERPL CALC-SCNC: 8 MMOL/L (ref 0–11.9)
BUN SERPL-MCNC: 6 MG/DL (ref 8–22)
CALCIUM SERPL-MCNC: 8.7 MG/DL (ref 8.5–10.5)
CHLORIDE SERPL-SCNC: 109 MMOL/L (ref 96–112)
CO2 SERPL-SCNC: 27 MMOL/L (ref 20–33)
CREAT SERPL-MCNC: 0.63 MG/DL (ref 0.5–1.4)
ERYTHROCYTE [DISTWIDTH] IN BLOOD BY AUTOMATED COUNT: 58.3 FL (ref 35.9–50)
GLUCOSE SERPL-MCNC: 100 MG/DL (ref 65–99)
HCT VFR BLD AUTO: 29.3 % (ref 37–47)
HGB BLD-MCNC: 8.3 G/DL (ref 12–16)
HGB BLD-MCNC: 8.7 G/DL (ref 12–16)
HGB BLD-MCNC: 9.2 G/DL (ref 12–16)
MCH RBC QN AUTO: 26.3 PG (ref 27–33)
MCHC RBC AUTO-ENTMCNC: 28.3 G/DL (ref 33.6–35)
MCV RBC AUTO: 92.7 FL (ref 81.4–97.8)
PLATELET # BLD AUTO: 228 K/UL (ref 164–446)
PMV BLD AUTO: 11.1 FL (ref 9–12.9)
POTASSIUM SERPL-SCNC: 3.9 MMOL/L (ref 3.6–5.5)
RBC # BLD AUTO: 3.16 M/UL (ref 4.2–5.4)
SODIUM SERPL-SCNC: 144 MMOL/L (ref 135–145)
WBC # BLD AUTO: 5.5 K/UL (ref 4.8–10.8)

## 2019-11-23 PROCEDURE — 160035 HCHG PACU - 1ST 60 MINS PHASE I: Performed by: INTERNAL MEDICINE

## 2019-11-23 PROCEDURE — 36415 COLL VENOUS BLD VENIPUNCTURE: CPT

## 2019-11-23 PROCEDURE — 700102 HCHG RX REV CODE 250 W/ 637 OVERRIDE(OP): Performed by: INTERNAL MEDICINE

## 2019-11-23 PROCEDURE — 85018 HEMOGLOBIN: CPT

## 2019-11-23 PROCEDURE — 06LY3CC OCCLUSION OF HEMORRHOIDAL PLEXUS WITH EXTRALUMINAL DEVICE, PERCUTANEOUS APPROACH: ICD-10-PCS | Performed by: INTERNAL MEDICINE

## 2019-11-23 PROCEDURE — 770020 HCHG ROOM/CARE - TELE (206)

## 2019-11-23 PROCEDURE — 700102 HCHG RX REV CODE 250 W/ 637 OVERRIDE(OP): Performed by: ANESTHESIOLOGY

## 2019-11-23 PROCEDURE — 160002 HCHG RECOVERY MINUTES (STAT): Performed by: INTERNAL MEDICINE

## 2019-11-23 PROCEDURE — 700111 HCHG RX REV CODE 636 W/ 250 OVERRIDE (IP): Performed by: INTERNAL MEDICINE

## 2019-11-23 PROCEDURE — 160201 HCHG ENDO MINUTES - 1ST 30 MINS LEVEL 2: Performed by: INTERNAL MEDICINE

## 2019-11-23 PROCEDURE — A9270 NON-COVERED ITEM OR SERVICE: HCPCS | Performed by: INTERNAL MEDICINE

## 2019-11-23 PROCEDURE — 700105 HCHG RX REV CODE 258: Performed by: ANESTHESIOLOGY

## 2019-11-23 PROCEDURE — 700111 HCHG RX REV CODE 636 W/ 250 OVERRIDE (IP): Performed by: ANESTHESIOLOGY

## 2019-11-23 PROCEDURE — 99233 SBSQ HOSP IP/OBS HIGH 50: CPT | Performed by: INTERNAL MEDICINE

## 2019-11-23 PROCEDURE — A9270 NON-COVERED ITEM OR SERVICE: HCPCS | Performed by: ANESTHESIOLOGY

## 2019-11-23 PROCEDURE — 502240 HCHG MISC OR SUPPLY RC 0272: Performed by: INTERNAL MEDICINE

## 2019-11-23 PROCEDURE — 80048 BASIC METABOLIC PNL TOTAL CA: CPT

## 2019-11-23 PROCEDURE — 160009 HCHG ANES TIME/MIN: Performed by: INTERNAL MEDICINE

## 2019-11-23 PROCEDURE — 85027 COMPLETE CBC AUTOMATED: CPT

## 2019-11-23 PROCEDURE — 160048 HCHG OR STATISTICAL LEVEL 1-5: Performed by: INTERNAL MEDICINE

## 2019-11-23 PROCEDURE — C9113 INJ PANTOPRAZOLE SODIUM, VIA: HCPCS | Performed by: INTERNAL MEDICINE

## 2019-11-23 RX ORDER — HYDROMORPHONE HYDROCHLORIDE 1 MG/ML
0.4 INJECTION, SOLUTION INTRAMUSCULAR; INTRAVENOUS; SUBCUTANEOUS
Status: DISCONTINUED | OUTPATIENT
Start: 2019-11-23 | End: 2019-11-24 | Stop reason: HOSPADM

## 2019-11-23 RX ORDER — MEPERIDINE HYDROCHLORIDE 50 MG/ML
12.5 INJECTION INTRAMUSCULAR; INTRAVENOUS; SUBCUTANEOUS
Status: DISCONTINUED | OUTPATIENT
Start: 2019-11-23 | End: 2019-11-24 | Stop reason: HOSPADM

## 2019-11-23 RX ORDER — OMEPRAZOLE 20 MG/1
20 CAPSULE, DELAYED RELEASE ORAL 2 TIMES DAILY
Status: DISCONTINUED | OUTPATIENT
Start: 2019-11-23 | End: 2019-11-24 | Stop reason: HOSPADM

## 2019-11-23 RX ORDER — ONDANSETRON 2 MG/ML
4 INJECTION INTRAMUSCULAR; INTRAVENOUS
Status: DISCONTINUED | OUTPATIENT
Start: 2019-11-23 | End: 2019-11-24 | Stop reason: HOSPADM

## 2019-11-23 RX ORDER — DIPHENHYDRAMINE HYDROCHLORIDE 50 MG/ML
12.5 INJECTION INTRAMUSCULAR; INTRAVENOUS
Status: DISCONTINUED | OUTPATIENT
Start: 2019-11-23 | End: 2019-11-24 | Stop reason: HOSPADM

## 2019-11-23 RX ORDER — HYDROMORPHONE HYDROCHLORIDE 1 MG/ML
0.2 INJECTION, SOLUTION INTRAMUSCULAR; INTRAVENOUS; SUBCUTANEOUS
Status: DISCONTINUED | OUTPATIENT
Start: 2019-11-23 | End: 2019-11-24 | Stop reason: HOSPADM

## 2019-11-23 RX ORDER — OXYCODONE HCL 5 MG/5 ML
10 SOLUTION, ORAL ORAL
Status: COMPLETED | OUTPATIENT
Start: 2019-11-23 | End: 2019-11-23

## 2019-11-23 RX ORDER — OXYCODONE HCL 5 MG/5 ML
5 SOLUTION, ORAL ORAL
Status: COMPLETED | OUTPATIENT
Start: 2019-11-23 | End: 2019-11-23

## 2019-11-23 RX ORDER — MIDAZOLAM HYDROCHLORIDE 1 MG/ML
INJECTION INTRAMUSCULAR; INTRAVENOUS PRN
Status: DISCONTINUED | OUTPATIENT
Start: 2019-11-23 | End: 2019-11-23 | Stop reason: SURG

## 2019-11-23 RX ORDER — HALOPERIDOL 5 MG/ML
1 INJECTION INTRAMUSCULAR
Status: DISCONTINUED | OUTPATIENT
Start: 2019-11-23 | End: 2019-11-24 | Stop reason: HOSPADM

## 2019-11-23 RX ORDER — LORAZEPAM 2 MG/ML
0.5 INJECTION INTRAMUSCULAR
Status: DISCONTINUED | OUTPATIENT
Start: 2019-11-23 | End: 2019-11-24 | Stop reason: HOSPADM

## 2019-11-23 RX ORDER — SODIUM CHLORIDE, SODIUM LACTATE, POTASSIUM CHLORIDE, CALCIUM CHLORIDE 600; 310; 30; 20 MG/100ML; MG/100ML; MG/100ML; MG/100ML
INJECTION, SOLUTION INTRAVENOUS
Status: DISCONTINUED | OUTPATIENT
Start: 2019-11-23 | End: 2019-11-23 | Stop reason: SURG

## 2019-11-23 RX ORDER — PHENYLEPHRINE HCL IN 0.9% NACL 0.5 MG/5ML
SYRINGE (ML) INTRAVENOUS PRN
Status: DISCONTINUED | OUTPATIENT
Start: 2019-11-23 | End: 2019-11-23 | Stop reason: SURG

## 2019-11-23 RX ORDER — HYDROMORPHONE HYDROCHLORIDE 1 MG/ML
0.1 INJECTION, SOLUTION INTRAMUSCULAR; INTRAVENOUS; SUBCUTANEOUS
Status: DISCONTINUED | OUTPATIENT
Start: 2019-11-23 | End: 2019-11-24 | Stop reason: HOSPADM

## 2019-11-23 RX ADMIN — Medication 100 MCG: at 11:44

## 2019-11-23 RX ADMIN — POLYETHYLENE GLYCOL 3350 1 PACKET: 17 POWDER, FOR SOLUTION ORAL at 08:28

## 2019-11-23 RX ADMIN — FERROUS SULFATE TAB 325 MG (65 MG ELEMENTAL FE) 325 MG: 325 (65 FE) TAB at 17:05

## 2019-11-23 RX ADMIN — ROPINIROLE HYDROCHLORIDE 4 MG: 2 TABLET, FILM COATED ORAL at 18:54

## 2019-11-23 RX ADMIN — SENNOSIDES AND DOCUSATE SODIUM 2 TABLET: 8.6; 5 TABLET ORAL at 17:05

## 2019-11-23 RX ADMIN — OMEPRAZOLE 20 MG: 20 CAPSULE, DELAYED RELEASE ORAL at 18:54

## 2019-11-23 RX ADMIN — PROPOFOL 50 MG: 10 INJECTION, EMULSION INTRAVENOUS at 11:38

## 2019-11-23 RX ADMIN — MORPHINE SULFATE 30 MG: 30 TABLET, EXTENDED RELEASE ORAL at 20:14

## 2019-11-23 RX ADMIN — HYDROMORPHONE HYDROCHLORIDE 0.25 MG: 1 INJECTION, SOLUTION INTRAMUSCULAR; INTRAVENOUS; SUBCUTANEOUS at 04:58

## 2019-11-23 RX ADMIN — ROPINIROLE HYDROCHLORIDE 4 MG: 2 TABLET, FILM COATED ORAL at 13:37

## 2019-11-23 RX ADMIN — OXYCODONE HYDROCHLORIDE 10 MG: 5 SOLUTION ORAL at 17:05

## 2019-11-23 RX ADMIN — PROPOFOL 50 MG: 10 INJECTION, EMULSION INTRAVENOUS at 11:42

## 2019-11-23 RX ADMIN — ROPINIROLE HYDROCHLORIDE 4 MG: 2 TABLET, FILM COATED ORAL at 23:57

## 2019-11-23 RX ADMIN — ROPINIROLE HYDROCHLORIDE 4 MG: 2 TABLET, FILM COATED ORAL at 08:27

## 2019-11-23 RX ADMIN — DOXEPIN HYDROCHLORIDE 20 MG: 10 CAPSULE ORAL at 20:14

## 2019-11-23 RX ADMIN — PANTOPRAZOLE SODIUM 40 MG: 40 INJECTION, POWDER, LYOPHILIZED, FOR SOLUTION INTRAVENOUS at 05:00

## 2019-11-23 RX ADMIN — PROPOFOL 50 MG: 10 INJECTION, EMULSION INTRAVENOUS at 11:47

## 2019-11-23 RX ADMIN — MAGNESIUM HYDROXIDE 30 ML: 400 SUSPENSION ORAL at 20:15

## 2019-11-23 RX ADMIN — PROPOFOL 50 MG: 10 INJECTION, EMULSION INTRAVENOUS at 11:44

## 2019-11-23 RX ADMIN — MORPHINE SULFATE 30 MG: 30 TABLET, EXTENDED RELEASE ORAL at 08:27

## 2019-11-23 RX ADMIN — MIDAZOLAM 2 MG: 1 INJECTION INTRAMUSCULAR; INTRAVENOUS at 11:36

## 2019-11-23 RX ADMIN — SODIUM CHLORIDE, POTASSIUM CHLORIDE, SODIUM LACTATE AND CALCIUM CHLORIDE: 600; 310; 30; 20 INJECTION, SOLUTION INTRAVENOUS at 11:36

## 2019-11-23 ASSESSMENT — ENCOUNTER SYMPTOMS
VOMITING: 0
ABDOMINAL PAIN: 0
HEMOPTYSIS: 0
COUGH: 0
DIARRHEA: 0
PALPITATIONS: 0
WEAKNESS: 1
WHEEZING: 0
CONSTIPATION: 0
HEADACHES: 0
FALLS: 0
NERVOUS/ANXIOUS: 0
SHORTNESS OF BREATH: 1
INSOMNIA: 0
SEIZURES: 0
CHILLS: 0
MYALGIAS: 0
NAUSEA: 0
EYE PAIN: 0
DIZZINESS: 1
TREMORS: 0
FEVER: 0
EYE REDNESS: 0
BLOOD IN STOOL: 1
FOCAL WEAKNESS: 0
LOSS OF CONSCIOUSNESS: 0

## 2019-11-23 ASSESSMENT — PAIN SCALES - GENERAL: PAIN_LEVEL: 2

## 2019-11-23 NOTE — OR NURSING
Report called to Irena DUDLEY. All pertinent events, medical information and care plan details reported to receiving RN. Reviewed lines and drains including PIV and fluids received in perioperative period. Reviewed hemodynamics, allergies, code status, applicable medications, and pertinent assessment findings including focused gastrointestinal and surgical site assessment. All questions and concerns addressed. Patient remains HDS on 1L NC, AOx4 at this time. Patient educated on next phase of care. This RN transported patient to room and was met by CNA. This RN connected patient to telemetry monitoring and CNA verbalized that she woud clal monitor room to verify that patient was seen on monitors.

## 2019-11-23 NOTE — PROGRESS NOTES
Received report from Day shift RN. Assumed care of patient at 1900. Patient laying in bed at this this time. On RA, no signs of respiratory distress. Pt reports mild back pain but states this is her baseline and does not require pain medication at this time. Call light and belongings within reach. Bed in lowest locked position. Upper side rails raised. Hourly rounding in place. Will continue to monitor.

## 2019-11-23 NOTE — PROGRESS NOTES
12 hour chart check    Monitor Summary:  SR 68-83  Frequent PVCs, occasional bigeminy, trigeminy  0.20/0.08/0.40

## 2019-11-23 NOTE — ANESTHESIA PREPROCEDURE EVALUATION
Relevant Problems   ANESTHESIA   (+) History of anesthesia complications   (+) EMILY (obstructive sleep apnea)      PULMONARY   (+) COPD (chronic obstructive pulmonary disease) (HCC)      NEURO   (+) History of anesthesia complications      CARDIAC   (+) Hypertension      GI   (+) GERD (gastroesophageal reflux disease)       Physical Exam    Airway   Mallampati: I  TM distance: >3 FB       Cardiovascular - normal exam     Dental   (+) upper dentures         Pulmonary   Breath sounds clear to auscultation     Abdominal   (+) obese     Neurological              Anesthesia Plan    ASA 3   ASA physical status 3 criteria: COPD    Plan - MAC             Induction: intravenous      Pertinent diagnostic labs and testing reviewed    Informed Consent:    Anesthetic plan and risks discussed with patient.

## 2019-11-23 NOTE — PROGRESS NOTES
Gastroenterology Progress Note     Author: Umair Mills   Date & Time Created: 11/22/2019 5:38 PM    Chief Complaint:  hematochezia    Interval History:  Patient says that she has continued to pass blood all day. She denies solid stools. She has had clots as well    Review of Systems:  Review of Systems   Constitutional: Negative for chills and fever.   Respiratory: Negative for shortness of breath.    Cardiovascular: Negative for chest pain.   Gastrointestinal: Positive for blood in stool. Negative for abdominal pain, constipation, diarrhea, heartburn, melena, nausea and vomiting.       Physical Exam:  Physical Exam  Constitutional:       Appearance: Normal appearance.   Cardiovascular:      Rate and Rhythm: Normal rate and regular rhythm.   Pulmonary:      Effort: Pulmonary effort is normal.      Breath sounds: Normal breath sounds.   Abdominal:      General: Abdomen is flat. Bowel sounds are normal. There is no distension.      Palpations: Abdomen is soft.      Tenderness: There is no tenderness.   Neurological:      Mental Status: She is alert.         Labs:          Recent Labs     11/20/19  1615 11/21/19 0033 11/22/19 0043   SODIUM 137 142 140   POTASSIUM 4.2 3.9 3.8   CHLORIDE 103 108 109   CO2 26 27 26   BUN 12 11 8   CREATININE 0.68 0.74 0.62   CALCIUM 8.4* 8.4* 8.1*     Recent Labs     11/20/19  1615 11/21/19 0033 11/22/19  0043   ALTSGPT 8 7  --    ASTSGOT 11* 10*  --    ALKPHOSPHAT 106* 104*  --    TBILIRUBIN 0.3 0.4  --    LIPASE 7*  --   --    GLUCOSE 101* 99 100*     Recent Labs     11/20/19  1615 11/21/19 0033  11/22/19  0043 11/22/19  0856 11/22/19  1626   RBC 2.19* 2.57*  --  2.69*  --   --    HEMOGLOBIN 5.5* 6.5*   < > 6.9* 8.3* 8.3*   HEMATOCRIT 21.2* 24.0*  --  24.8*  --   --    PLATELETCT 292 252  --  226  --   --    PROTHROMBTM 12.8  --   --   --   --   --    APTT 24.7  --   --   --   --   --    INR 0.94  --   --   --   --   --     < > = values in this interval not displayed.      Recent Labs     19  1615 19  0033 19  0043   WBC 9.1 7.2 6.5   NEUTSPOLYS 84.50* 75.60*  --    LYMPHOCYTES 9.30* 15.40*  --    MONOCYTES 4.70 7.40  --    EOSINOPHILS 0.10 0.40  --    BASOPHILS 0.70 0.60  --    ASTSGOT 11* 10*  --    ALTSGPT 8 7  --    ALKPHOSPHAT 106* 104*  --    TBILIRUBIN 0.3 0.4  --      Hemodynamics:  Temp (24hrs), Av.9 °C (98.4 °F), Min:36.3 °C (97.3 °F), Max:37.3 °C (99.1 °F)  Temperature: 36.3 °C (97.3 °F)  Pulse  Av  Min: 67  Max: 128   Blood Pressure: 139/72     Respiratory:    Respiration: 16, Pulse Oximetry: 95 %           Fluids:  No intake or output data in the 24 hours ending 19 1738  Weight: 91.4 kg (201 lb 8 oz)  GI/Nutrition:  Orders Placed This Encounter   Procedures   • Diet Order Clear Liquids - No Red Foods     Standing Status:   Standing     Number of Occurrences:   1     Order Specific Question:   Diet:     Answer:   Clear Liquids - No Red Foods [12]   • Diet NPO     Standing Status:   Standing     Number of Occurrences:   8     Order Specific Question:   Restrict to:     Answer:   Strict [1]     Medical Decision Making, by Problem:  Active Hospital Problems    Diagnosis   • GI bleed [K92.2]   • Blood loss anemia [D50.0]   • Chronic respiratory failure (HCC) [J96.10]   • COPD (chronic obstructive pulmonary disease) (HCC) [J44.9]   • History of anesthesia complications [Z87.898]   • Status post lumbar spine surgery for decompression of spinal cord [Z98.890]   • EMILY (obstructive sleep apnea) [G47.33]       Plan:  Lower GI bleeding - unknown etiology - hemorrhoids were not seen at recent colonoscopy but this seems most likely still. I recommend flex sig with hemorrhoid banding tomorrow. Capsule endoscopy is not performed on the weekends. So if bleeding continues then would pursue capsule endoscopy on Monday    Quality-Core Measures

## 2019-11-23 NOTE — CARE PLAN
Problem: Communication  Goal: The ability to communicate needs accurately and effectively will improve  Outcome: PROGRESSING AS EXPECTED  Intervention: Educate patient and significant other/support system about the plan of care, procedures, treatments, medications and allow for questions  Note:   Patient educated to utilize call light. Patient and family oriented to hospital room. Patient encouraged to ask questions about plan of care. Patient effectively uses call light and is involved in POC.      Problem: Knowledge Deficit  Goal: Knowledge of the prescribed therapeutic regimen will improve  Outcome: PROGRESSING AS EXPECTED  Intervention: Discuss information regarding therpeutic regimen and document in education  Note:   Patient is educated of disease process and condition. Treatment team has included patient in plan of care. All medications indications and side effects are explained. Patient is encouraged to ask questions. Patient indicates understanding.

## 2019-11-23 NOTE — OP REPORT
DATE OF SERVICE:  11/23/2019     INDICATION FOR PROCEDURE:  rectal bleeding     PROCEDURE PERFORMED: Flexible sigmoidoscopy with hemorrhoid banding     CONSENT:  Informed consent was obtained directly from the patient after benefits, risks and possible alternatives were discussed.     MEDICATIONS:  The patient was given Propofol for this procedure. Please see the anesthesia record for details     PROCEDURE DESCRIPTION:  The patient was placed in the left lateral decubitus position and provided with supplemental oxygen via nasal cannula.  Vital signs were monitored continuously throughout the procedure. After appropriate sedation, BLANCA was performed. The colonoscope was then introduced into the rectum through the anus. The colonoscope was advanced through the colon under direct visualization to the sigmoid colon. The scope was then withdrawn and mucosa examined. Retroflexion was performed in the rectum. The patients toleration of this procedure was excellent. The prep was excellent as well.      FINDINGS:    1)  There were large prolapsed grade III/VI hemorrhoids that were oozing blood on contact. These were treated with 4 bands placed internally        COMPLICATIONS:  No complications or blood loss during or in the immediate postoperative period.       RECOMMENDATION:    1) f/u rectal bleeding  2) consider waiting on capsule study to outpatient if bleeding stops  3) restart diet. High fiber diet at home  4) repeat hemorrhoid banding in 4 weeks at [facility]  5) if no more bleeding today, likely can be discharge home  6) limit straining and time on the commode

## 2019-11-23 NOTE — ANESTHESIA POSTPROCEDURE EVALUATION
Patient: Ariella Rendon    Procedure Summary     Date:  11/23/19 Room / Location:  [facility] 09 / SURGERY [facility]    Anesthesia Start:  1136 Anesthesia Stop:  1154    Procedure:  SIGMOIDOSCOPY, FLEXIBLE.  HEMORRHOID BANDING. (N/A ) Diagnosis:  (hemorrhoids)    Surgeon:  Umair Mills M.D. Responsible Provider:  Joan Pandey M.D.    Anesthesia Type:  MAC ASA Status:  3          Final Anesthesia Type: MAC  Last vitals  BP   Blood Pressure: 116/58, NIBP: 108/54    Temp   36.4 °C (97.5 °F)    Pulse   Pulse: 92   Resp   16    SpO2   95 %      Anesthesia Post Evaluation    Patient location during evaluation: PACU  Patient participation: complete - patient participated  Level of consciousness: awake and alert  Pain score: 2    Airway patency: patent  Anesthetic complications: no  Cardiovascular status: adequate  Respiratory status: acceptable  Hydration status: acceptable    PONV: none           Nurse Pain Score: 2 (NPRS)

## 2019-11-24 VITALS
WEIGHT: 203.04 LBS | TEMPERATURE: 97.9 F | OXYGEN SATURATION: 90 % | BODY MASS INDEX: 32.63 KG/M2 | DIASTOLIC BLOOD PRESSURE: 63 MMHG | SYSTOLIC BLOOD PRESSURE: 111 MMHG | HEART RATE: 85 BPM | RESPIRATION RATE: 16 BRPM | HEIGHT: 66 IN

## 2019-11-24 LAB
ANION GAP SERPL CALC-SCNC: 7 MMOL/L (ref 0–11.9)
BUN SERPL-MCNC: 10 MG/DL (ref 8–22)
CALCIUM SERPL-MCNC: 8.4 MG/DL (ref 8.5–10.5)
CHLORIDE SERPL-SCNC: 107 MMOL/L (ref 96–112)
CO2 SERPL-SCNC: 29 MMOL/L (ref 20–33)
CREAT SERPL-MCNC: 0.73 MG/DL (ref 0.5–1.4)
GLUCOSE SERPL-MCNC: 126 MG/DL (ref 65–99)
HGB BLD-MCNC: 7.8 G/DL (ref 12–16)
HGB BLD-MCNC: 8.4 G/DL (ref 12–16)
POTASSIUM SERPL-SCNC: 3.7 MMOL/L (ref 3.6–5.5)
SODIUM SERPL-SCNC: 143 MMOL/L (ref 135–145)

## 2019-11-24 PROCEDURE — A9270 NON-COVERED ITEM OR SERVICE: HCPCS | Performed by: INTERNAL MEDICINE

## 2019-11-24 PROCEDURE — 80048 BASIC METABOLIC PNL TOTAL CA: CPT

## 2019-11-24 PROCEDURE — 700102 HCHG RX REV CODE 250 W/ 637 OVERRIDE(OP): Performed by: INTERNAL MEDICINE

## 2019-11-24 PROCEDURE — 85018 HEMOGLOBIN: CPT

## 2019-11-24 PROCEDURE — 36415 COLL VENOUS BLD VENIPUNCTURE: CPT

## 2019-11-24 PROCEDURE — 99239 HOSP IP/OBS DSCHRG MGMT >30: CPT | Performed by: INTERNAL MEDICINE

## 2019-11-24 RX ORDER — OMEPRAZOLE 20 MG/1
20 CAPSULE, DELAYED RELEASE ORAL 2 TIMES DAILY
Qty: 60 CAP | Refills: 0 | Status: SHIPPED | OUTPATIENT
Start: 2019-11-24

## 2019-11-24 RX ORDER — ONDANSETRON 4 MG/1
4 TABLET, ORALLY DISINTEGRATING ORAL EVERY 4 HOURS PRN
Qty: 10 TAB | Refills: 0 | Status: SHIPPED | OUTPATIENT
Start: 2019-11-24 | End: 2019-12-03

## 2019-11-24 RX ADMIN — MAGNESIUM HYDROXIDE 30 ML: 400 SUSPENSION ORAL at 13:01

## 2019-11-24 RX ADMIN — FERROUS SULFATE TAB 325 MG (65 MG ELEMENTAL FE) 325 MG: 325 (65 FE) TAB at 12:56

## 2019-11-24 RX ADMIN — ROPINIROLE HYDROCHLORIDE 4 MG: 2 TABLET, FILM COATED ORAL at 05:08

## 2019-11-24 RX ADMIN — AMLODIPINE BESYLATE 10 MG: 10 TABLET ORAL at 05:10

## 2019-11-24 RX ADMIN — OXYCODONE HYDROCHLORIDE 5 MG: 5 TABLET ORAL at 10:19

## 2019-11-24 RX ADMIN — POLYETHYLENE GLYCOL 3350 1 PACKET: 17 POWDER, FOR SOLUTION ORAL at 05:08

## 2019-11-24 RX ADMIN — FERROUS SULFATE TAB 325 MG (65 MG ELEMENTAL FE) 325 MG: 325 (65 FE) TAB at 08:15

## 2019-11-24 RX ADMIN — ROPINIROLE HYDROCHLORIDE 4 MG: 2 TABLET, FILM COATED ORAL at 12:56

## 2019-11-24 RX ADMIN — MORPHINE SULFATE 30 MG: 30 TABLET, EXTENDED RELEASE ORAL at 08:14

## 2019-11-24 RX ADMIN — OMEPRAZOLE 20 MG: 20 CAPSULE, DELAYED RELEASE ORAL at 05:09

## 2019-11-24 RX ADMIN — OXYCODONE HYDROCHLORIDE 5 MG: 5 TABLET ORAL at 02:25

## 2019-11-24 RX ADMIN — SENNOSIDES AND DOCUSATE SODIUM 2 TABLET: 8.6; 5 TABLET ORAL at 05:10

## 2019-11-24 RX ADMIN — OXYCODONE HYDROCHLORIDE 5 MG: 5 TABLET ORAL at 05:12

## 2019-11-24 ASSESSMENT — ENCOUNTER SYMPTOMS
BLOOD IN STOOL: 1
CONSTIPATION: 0
SHORTNESS OF BREATH: 0
NAUSEA: 0
VOMITING: 0
DIARRHEA: 0
HEARTBURN: 0
FEVER: 0
ABDOMINAL PAIN: 0
CHILLS: 0

## 2019-11-24 NOTE — CARE PLAN
Problem: Communication  Goal: The ability to communicate needs accurately and effectively will improve  11/24/2019 0840 by Leatha Ortiz R.N.  Outcome: PROGRESSING AS EXPECTED  11/23/2019 1952 by Leatha Ortiz R.N.  Outcome: PROGRESSING AS EXPECTED     Problem: Safety  Goal: Will remain free from injury  11/24/2019 0840 by Leatha Ortiz R.N.  Outcome: PROGRESSING AS EXPECTED  11/23/2019 1952 by Leatha Ortiz R.N.  Outcome: PROGRESSING AS EXPECTED  Goal: Will remain free from falls  11/24/2019 0840 by Leatha Ortiz R.N.  Outcome: PROGRESSING AS EXPECTED  11/23/2019 1952 by Leatha Ortiz R.N.  Outcome: PROGRESSING AS EXPECTED     Problem: Infection  Goal: Will remain free from infection  11/24/2019 0840 by Leatha Ortiz R.N.  Outcome: PROGRESSING AS EXPECTED  11/23/2019 1952 by Leatha Ortiz R.N.  Outcome: PROGRESSING AS EXPECTED     Problem: Venous Thromboembolism (VTW)/Deep Vein Thrombosis (DVT) Prevention:  Goal: Patient will participate in Venous Thrombosis (VTE)/Deep Vein Thrombosis (DVT)Prevention Measures  11/24/2019 0840 by Leatha Ortiz R.N.  Outcome: PROGRESSING AS EXPECTED  11/23/2019 1952 by Leatha Ortiz R.N.  Outcome: PROGRESSING AS EXPECTED     Problem: Bowel/Gastric:  Goal: Normal bowel function is maintained or improved  11/24/2019 0840 by Leatha Ortiz R.N.  Outcome: PROGRESSING AS EXPECTED  11/23/2019 1952 by Leatha Ortiz R.N.  Outcome: PROGRESSING AS EXPECTED  Goal: Will not experience complications related to bowel motility  11/24/2019 0840 by Leatha Ortiz R.N.  Outcome: PROGRESSING AS EXPECTED  11/23/2019 1952 by Leatha Ortiz R.N.  Outcome: PROGRESSING AS EXPECTED     Problem: Knowledge Deficit  Goal: Knowledge of disease process/condition, treatment plan, diagnostic tests, and medications will improve  11/24/2019 0840 by Leatha Ortiz R.N.  Outcome: PROGRESSING AS EXPECTED  11/23/2019 1952 by Leatha Ortiz,  VIANNEY  Outcome: PROGRESSING AS EXPECTED  Goal: Knowledge of the prescribed therapeutic regimen will improve  11/24/2019 0840 by Leatha Ortiz R.N.  Outcome: PROGRESSING AS EXPECTED  11/23/2019 1952 by Leatha Ortiz R.N.  Outcome: PROGRESSING AS EXPECTED     Problem: Discharge Barriers/Planning  Goal: Patient's continuum of care needs will be met  11/24/2019 0840 by Leatha Ortiz R.N.  Outcome: PROGRESSING AS EXPECTED  11/23/2019 1952 by Leatha Ortiz R.N.  Outcome: PROGRESSING AS EXPECTED     Problem: Pain Management  Goal: Pain level will decrease to patient's comfort goal  11/24/2019 0840 by Leatha Ortiz R.N.  Outcome: PROGRESSING AS EXPECTED  11/23/2019 1952 by Leatha Ortiz R.N.  Outcome: PROGRESSING AS EXPECTED     Problem: Mobility  Goal: Risk for activity intolerance will decrease  11/24/2019 0840 by Leatha Ortiz R.N.  Outcome: PROGRESSING AS EXPECTED  11/23/2019 1952 by Leatha Ortiz R.N.  Outcome: PROGRESSING AS EXPECTED

## 2019-11-24 NOTE — CARE PLAN
Problem: Communication  Goal: The ability to communicate needs accurately and effectively will improve  Outcome: PROGRESSING AS EXPECTED     Problem: Safety  Goal: Will remain free from injury  Outcome: PROGRESSING AS EXPECTED  Goal: Will remain free from falls  Outcome: PROGRESSING AS EXPECTED     Problem: Infection  Goal: Will remain free from infection  Outcome: PROGRESSING AS EXPECTED     Problem: Venous Thromboembolism (VTW)/Deep Vein Thrombosis (DVT) Prevention:  Goal: Patient will participate in Venous Thrombosis (VTE)/Deep Vein Thrombosis (DVT)Prevention Measures  Outcome: PROGRESSING AS EXPECTED     Problem: Bowel/Gastric:  Goal: Normal bowel function is maintained or improved  Outcome: PROGRESSING AS EXPECTED  Goal: Will not experience complications related to bowel motility  Outcome: PROGRESSING AS EXPECTED     Problem: Knowledge Deficit  Goal: Knowledge of disease process/condition, treatment plan, diagnostic tests, and medications will improve  Outcome: PROGRESSING AS EXPECTED  Goal: Knowledge of the prescribed therapeutic regimen will improve  Outcome: PROGRESSING AS EXPECTED     Problem: Discharge Barriers/Planning  Goal: Patient's continuum of care needs will be met  Outcome: PROGRESSING AS EXPECTED     Problem: Pain Management  Goal: Pain level will decrease to patient's comfort goal  Outcome: PROGRESSING AS EXPECTED     Problem: Mobility  Goal: Risk for activity intolerance will decrease  Outcome: PROGRESSING AS EXPECTED

## 2019-11-24 NOTE — DISCHARGE INSTRUCTIONS
Discharge Instructions    Discharged to home by car with relative. Discharged via wheelchair, hospital escort: Yes.  Special equipment needed: Not Applicable    Be sure to schedule a follow-up appointment with your primary care doctor or any specialists as instructed.     Discharge Plan:   Smoking Cessation Offered: Patient Refused  Influenza Vaccine Indication: Not indicated: Previously immunized this influenza season and > 8 years of age    I understand that a diet low in cholesterol, fat, and sodium is recommended for good health. Unless I have been given specific instructions below for another diet, I accept this instruction as my diet prescription.   Other diet: Regular    Special Instructions: None    · Is patient discharged on Warfarin / Coumadin?   No     Depression / Suicide Risk    As you are discharged from this [facility] facility, it is important to learn how to keep safe from harming yourself.    Recognize the warning signs:  · Abrupt changes in personality, positive or negative- including increase in energy   · Giving away possessions  · Change in eating patterns- significant weight changes-  positive or negative  · Change in sleeping patterns- unable to sleep or sleeping all the time   · Unwillingness or inability to communicate  · Depression  · Unusual sadness, discouragement and loneliness  · Talk of wanting to die  · Neglect of personal appearance   · Rebelliousness- reckless behavior  · Withdrawal from people/activities they love  · Confusion- inability to concentrate     If you or a loved one observes any of these behaviors or has concerns about self-harm, here's what you can do:  · Talk about it- your feelings and reasons for harming yourself  · Remove any means that you might use to hurt yourself (examples: pills, rope, extension cords, firearm)  · Get professional help from the community (Mental Health, Substance Abuse, psychological counseling)  · Do not be alone:Call your Safe Contact-  someone whom you trust who will be there for you.  · Call your local CRISIS HOTLINE 537-7613 or 395-848-8869  · Call your local Children's Mobile Crisis Response Team [place] (746) 739-3103 or www.[employer]  · Call the toll free National Suicide Prevention Hotlines   · National Suicide Prevention Lifeline 388-940-GXDQ (8805)  · [employer] XII Line Network 800-SUICIDE (426-1446)          Hemorrhoids  Hemorrhoids are swollen veins in and around the rectum or anus. There are two types of hemorrhoids:  · Internal hemorrhoids. These occur in the veins that are just inside the rectum. They may poke through to the outside and become irritated and painful.  · External hemorrhoids. These occur in the veins that are outside of the anus and can be felt as a painful swelling or hard lump near the anus.  Most hemorrhoids do not cause serious problems, and they can be managed with home treatments such as diet and lifestyle changes. If home treatments do not help your symptoms, procedures can be done to shrink or remove the hemorrhoids.  What are the causes?  This condition is caused by increased pressure in the anal area. This pressure may result from various things, including:  · Constipation.  · Straining to have a bowel movement.  · Diarrhea.  · Pregnancy.  · Obesity.  · Sitting for long periods of time.  · Heavy lifting or other activity that causes you to strain.  · Anal sex.  What are the signs or symptoms?  Symptoms of this condition include:  · Pain.  · Anal itching or irritation.  · Rectal bleeding.  · Leakage of stool (feces).  · Anal swelling.  · One or more lumps around the anus.  How is this diagnosed?  This condition can often be diagnosed through a visual exam. Other exams or tests may also be done, such as:  · Examination of the rectal area with a gloved hand (digital rectal exam).  · Examination of the anal canal using a small tube (anoscope).  · A blood test, if you have lost a significant amount of  blood.  · A test to look inside the colon (sigmoidoscopy or colonoscopy).  How is this treated?  This condition can usually be treated at home. However, various procedures may be done if dietary changes, lifestyle changes, and other home treatments do not help your symptoms. These procedures can help make the hemorrhoids smaller or remove them completely. Some of these procedures involve surgery, and others do not. Common procedures include:  · Rubber band ligation. Rubber bands are placed at the base of the hemorrhoids to cut off the blood supply to them.  · Sclerotherapy. Medicine is injected into the hemorrhoids to shrink them.  · Infrared coagulation. A type of light energy is used to get rid of the hemorrhoids.  · Hemorrhoidectomy surgery. The hemorrhoids are surgically removed, and the veins that supply them are tied off.  · Stapled hemorrhoidopexy surgery. A circular stapling device is used to remove the hemorrhoids and use staples to cut off the blood supply to them.  Follow these instructions at home:  Eating and drinking  · Eat foods that have a lot of fiber in them, such as whole grains, beans, nuts, fruits, and vegetables. Ask your health care provider about taking products that have added fiber (fiber supplements).  · Drink enough fluid to keep your urine clear or pale yellow.  Managing pain and swelling  · Take warm sitz baths for 20 minutes, 3-4 times a day to ease pain and discomfort.  · If directed, apply ice to the affected area. Using ice packs between sitz baths may be helpful.  ¨ Put ice in a plastic bag.  ¨ Place a towel between your skin and the bag.  ¨ Leave the ice on for 20 minutes, 2-3 times a day.  General instructions  · Take over-the-counter and prescription medicines only as told by your health care provider.  · Use medicated creams or suppositories as told.  · Exercise regularly.  · Go to the bathroom when you have the urge to have a bowel movement. Do not wait.  · Avoid straining to  have bowel movements.  · Keep the anal area dry and clean. Use wet toilet paper or moist towelettes after a bowel movement.  · Do not sit on the toilet for long periods of time. This increases blood pooling and pain.  Contact a health care provider if:  · You have increasing pain and swelling that are not controlled by treatment or medicine.  · You have uncontrolled bleeding.  · You have difficulty having a bowel movement, or you are unable to have a bowel movement.  · You have pain or inflammation outside the area of the hemorrhoids.  This information is not intended to replace advice given to you by your health care provider. Make sure you discuss any questions you have with your health care provider.  Document Released: 12/15/2001 Document Revised: 05/17/2017 Document Reviewed: 08/31/2016  [employer] Interactive Patient Education © 2017 [employer]  Discharge Instructions per Wm Lowe M.D.    DIET: Resume previous diet  Healthy diet. Plenty of vegetables.    ACTIVITY: Avoid strenuous activity or heavy lifting.     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 Please note importance of compliance and the patient has agreed to proceed with all medical recommendations and follow up plan indicated above. All medications come with benefits and risks. Risks may include permanent  injury or death and these risks can be minimized with close reassessment and monitoring. Please make it to your scheduled follow ups with Brianne Marti M.D., and/or specialists clinic.    Follow up with .Brianne Marti M.D. in 1 week  Follow up with Dr. Gene TEIXEIRA in 1 week  CBCs can be obtained at that visit.  No NSAIDs or blood thinners  Advise Ariella Rendon to check blood pressure at home at least twice a day and have a log for primary provider to review

## 2019-11-24 NOTE — PROGRESS NOTES
Bedside report received from day shift RN, assumed pt care. Pt assessment complete. Pt alert and oriented ,no signs of distress. Reviewed plan of care with pt. Tele box on and rhythm verified.  Chart and labs reviewed.  Bed in lowest position, call light within reach. Hourly rounding in place. Educated about calling for assistance.

## 2019-11-24 NOTE — PROGRESS NOTES
[facility] Medicine Daily Progress Note    Date of Service  11/23/2019    Chief Complaint  64 y.o. female admitted 11/20/2019 with Bloody Stools (Pt reports black tarrry stool since 10/29 - states BRB on toilet paper.) and Shortness of Breath        Hospital Course    History of diverticulitis and diverticulosis.  History of COPD on nocturnal O2.  History of GI bleeding and negative endoscopy  Presents with Bloody Stools (Pt reports black tarrry stool since 10/29 - states BRB on toilet paper.) and Shortness of Breath  SHe was also dizzy and had exertional symptoms.  At the ED, she is afebrile, normotensive.  Hb was 5.5  GI consulted.        Interval Problem Update  11/21. Anticipating EGD today  11/22. EGD did not show source of bleed  She is still having bloody bowel movement but otherwise stable  Nuclear bleeding scan did not show source of bleed  11/23. Still has bloody bowel movement. She is otherwise stable. She reveals to us she has a history of internal hemorrhoids  Consultants/Specialty  Gastroenterology    Code Status  full    Disposition  Home when better    Review of Systems  Review of Systems   Constitutional: Negative for chills and fever.   HENT: Negative for congestion, hearing loss and nosebleeds.    Eyes: Negative for pain and redness.   Respiratory: Positive for shortness of breath. Negative for cough, hemoptysis and wheezing.    Cardiovascular: Negative for chest pain and palpitations.   Gastrointestinal: Positive for blood in stool. Negative for abdominal pain, constipation, diarrhea, nausea and vomiting.   Genitourinary: Negative for dysuria, frequency and hematuria.   Musculoskeletal: Negative for falls, joint pain and myalgias.   Skin: Negative for rash.   Neurological: Positive for dizziness and weakness. Negative for tremors, focal weakness, seizures, loss of consciousness and headaches.   Psychiatric/Behavioral: The patient is not nervous/anxious and does not have insomnia.    All other  systems reviewed and are negative.       Physical Exam  Temp:  [36.4 °C (97.5 °F)-37.1 °C (98.7 °F)] 36.6 °C (97.9 °F)  Pulse:  [73-92] 78  Resp:  [14-19] 16  BP: ()/(42-77) 108/42  SpO2:  [93 %-100 %] 96 %    Physical Exam  Vitals signs and nursing note reviewed.   Constitutional:       General: She is not in acute distress.  HENT:      Head: Normocephalic and atraumatic.      Right Ear: External ear normal.      Left Ear: External ear normal.      Nose: Nose normal.      Mouth/Throat:      Mouth: Mucous membranes are moist.   Eyes:      General: No scleral icterus.     Conjunctiva/sclera: Conjunctivae normal.   Neck:      Musculoskeletal: Normal range of motion and neck supple. No neck rigidity.   Cardiovascular:      Rate and Rhythm: Normal rate and regular rhythm.      Pulses: Normal pulses.      Heart sounds: No murmur. No friction rub. No gallop.    Pulmonary:      Effort: Pulmonary effort is normal. No respiratory distress.      Breath sounds: Normal breath sounds. No stridor. No wheezing, rhonchi or rales.   Chest:      Chest wall: No tenderness.   Abdominal:      General: Abdomen is flat. Bowel sounds are normal. There is no distension.      Palpations: Abdomen is soft.      Tenderness: There is no tenderness. There is no guarding or rebound.   Musculoskeletal: Normal range of motion.         General: No swelling, tenderness or deformity.   Skin:     General: Skin is warm.      Coloration: Skin is pale. Skin is not jaundiced.   Neurological:      General: No focal deficit present.      Mental Status: She is alert and oriented to person, place, and time. Mental status is at baseline.      Comments: Weakness improved   Psychiatric:         Mood and Affect: Mood normal.         Behavior: Behavior normal.         Thought Content: Thought content normal.         Judgment: Judgment normal.      Comments: Somewhat anxious         Fluids    Intake/Output Summary (Last 24 hours) at 11/23/2019 2030  Last data  filed at 11/23/2019 1230  Gross per 24 hour   Intake 350 ml   Output --   Net 350 ml       Laboratory  Recent Labs     11/21/19  0033  11/22/19  0043  11/23/19  0032 11/23/19  0816 11/23/19  1905   WBC 7.2  --  6.5  --  5.5  --   --    RBC 2.57*  --  2.69*  --  3.16*  --   --    HEMOGLOBIN 6.5*   < > 6.9*   < > 8.3* 9.2* 8.7*   HEMATOCRIT 24.0*  --  24.8*  --  29.3*  --   --    MCV 93.4  --  92.2  --  92.7  --   --    MCH 25.3*  --  25.7*  --  26.3*  --   --    MCHC 27.1*  --  27.8*  --  28.3*  --   --    RDW 63.7*  --  62.7*  --  58.3*  --   --    PLATELETCT 252  --  226  --  228  --   --    MPV 11.7  --  11.2  --  11.1  --   --     < > = values in this interval not displayed.     Recent Labs     11/21/19  0033 11/22/19 0043 11/23/19 0032   SODIUM 142 140 144   POTASSIUM 3.9 3.8 3.9   CHLORIDE 108 109 109   CO2 27 26 27   GLUCOSE 99 100* 100*   BUN 11 8 6*   CREATININE 0.74 0.62 0.63   CALCIUM 8.4* 8.1* 8.7                   Imaging  NM-GI BLEEDING SCAN   Final Result      No evidence of GI bleeding.      DX-CHEST-PORTABLE (1 VIEW)   Final Result      No acute cardiac or pulmonary abnormalities are identified.           Assessment/Plan  GI bleed- (present on admission)  Assessment & Plan  Likely lower GI bleed, but upper GI bleed cannot be reliably excluded.  GI consulted.  Continue  PPI  Red blood cells transfusion to keep hemoglobin above 7.0  Ordered nuclear medicine scan to look for source of bleeding  11/21. Planned for EGD  11/22. EGD by Dr. Holliday on 11/21 was unremarkable.  1.  Recommend a bleeding scan if the patient bleeds again.    2.  Consider outpatient video capsule endoscopy to evaluate small bowel for   source if the patient no longer bleeds.  If patient rebleeds in the hospital,   recommend inpatient video capsule endoscopy.    3.  Recommend clear liquid diet for now.  Currently she still has bloody bowel movement  I called and spoke with Dr. Mills. Plan for capsule endoscopy Monday or Tuesday.  If Hb and bleeding stops before then then she can be discharged with outpatient follow up.  11/23. Awaiting sigmoidoscopy and possible banding of internal hemorrhoids by Dr. Mills today     Blood loss anemia- (present on admission)  Assessment & Plan  Monitor hemoglobin with CBC every 8 hours.  Red blood cells transfusion as needed.  Follow-up with GI recommendations: Diagnostic endoscopy pending  Symptomatic anemia  Trend Hb    Status post lumbar spine surgery for decompression of spinal cord- (present on admission)  Assessment & Plan   status post open anterior L4-L5 and L5-S1 lumbar interbody fusion without   Complications 3 weeks ago by Dr. Lemons    EMILY (obstructive sleep apnea)- (present on admission)  Assessment & Plan  Continue with  oxygen supplementation at night    Chronic respiratory failure (HCC)- (present on admission)  Assessment & Plan  Continue with excision as needed  RT protocol    COPD (chronic obstructive pulmonary disease) (HCC)- (present on admission)  Assessment & Plan  No wheezes on exam.  RT protocol in place  Continue home inhalers  Supplemental oxygen       VTE prophylaxis: SCD  Reviewed vitals, labs, imaging, staff notes.  Discussed assessment and plan with Ariella Rendon   Discussed with OCTAVIA

## 2019-11-24 NOTE — PROGRESS NOTES
Gastroenterology Progress Note     Author: Umair Mills   Date & Time Created: 2019 1:29 PM    Chief Complaint:  Rectal bleeding    Interval History:  Pt reports some blood when wiping but normal colored stool today    Review of Systems:  Review of Systems   Constitutional: Negative for chills and fever.   Respiratory: Negative for shortness of breath.    Cardiovascular: Negative for chest pain.   Gastrointestinal: Positive for blood in stool. Negative for abdominal pain, constipation, diarrhea, heartburn, melena, nausea and vomiting.       Physical Exam:  Physical Exam  Constitutional:       General: She is not in acute distress.     Appearance: Normal appearance.   Abdominal:      General: Abdomen is flat. Bowel sounds are normal. There is no distension.      Palpations: Abdomen is soft.   Neurological:      Mental Status: She is alert.         Labs:          Recent Labs     19  0043 192 19  0011   SODIUM 140 144 143   POTASSIUM 3.8 3.9 3.7   CHLORIDE 109 109 107   CO2 26 27 29   BUN 8 6* 10   CREATININE 0.62 0.63 0.73   CALCIUM 8.1* 8.7 8.4*     Recent Labs     19  0043 19  0032 19  0011   GLUCOSE 100* 100* 126*     Recent Labs     19  0043  19  0032  19  1905 19  0011 19  0823   RBC 2.69*  --  3.16*  --   --   --   --    HEMOGLOBIN 6.9*   < > 8.3*   < > 8.7* 7.8* 8.4*   HEMATOCRIT 24.8*  --  29.3*  --   --   --   --    PLATELETCT 226  --  228  --   --   --   --     < > = values in this interval not displayed.     Recent Labs     19  0043 19   WBC 6.5 5.5     Hemodynamics:  Temp (24hrs), Av.7 °C (98 °F), Min:36 °C (96.8 °F), Max:37 °C (98.6 °F)  Temperature: 36.6 °C (97.9 °F)  Pulse  Av.4  Min: 67  Max: 128   Blood Pressure: 111/63     Respiratory:    Respiration: 16, Pulse Oximetry: 90 %           Fluids:  No intake or output data in the 24 hours ending 19 1329  Weight: 92.1 kg (203 lb 0.7  oz)  GI/Nutrition:  Orders Placed This Encounter   Procedures   • Diet Order Regular     Standing Status:   Standing     Number of Occurrences:   1     Order Specific Question:   Diet:     Answer:   Regular [1]     Medical Decision Making, by Problem:  Active Hospital Problems    Diagnosis   • GI bleed [K92.2]   • Blood loss anemia [D50.0]   • Chronic respiratory failure (HCC) [J96.10]   • COPD (chronic obstructive pulmonary disease) (HCC) [J44.9]   • History of anesthesia complications [Z87.898]   • Status post lumbar spine surgery for decompression of spinal cord [Z98.890]   • EMILY (obstructive sleep apnea) [G47.33]       Plan:  Hematochezia - s/p Flex sig with hemorrhoid banding. Will plan to repeat banding in 4 weeks. Would still consider outpatient capsule study if bleeding returns after discharge. Patient should limit straining and avoid constipation. Ok to discharge from the GI perspective    Quality-Core Measures

## 2019-11-25 NOTE — DISCHARGE SUMMARY
Discharge Summary    CHIEF COMPLAINT ON ADMISSION  Chief Complaint   Patient presents with   • Bloody Stools     Pt reports black tarrry stool since 10/29 - states BRB on toilet paper.   • Shortness of Breath       Reason for Admission  SOB, Rectal Bleeding     Admission Date  11/20/2019    CODE STATUS  Full Code    HPI & HOSPITAL COURSE  This is a 64 y.o. female here with Bloody Stools (Pt reports black tarrry stool since 10/29 - states BRB on toilet paper.) and Shortness of Breath    Please review Dr. Fahad Graves M.D. notes for further details of history of present illness, past medical/social/family histories, allergies and medications. Please review Dr. Mills, Dr. Millan GI consultation notes. History of diverticulitis and diverticulosis.  History of COPD on nocturnal O2.  History of GI bleeding and negative endoscopy  Later, she reveals to me she has a history of internal hemorrhoids.  Presents with Bloody Stools (Pt reports black tarrry stool since 10/29 - states BRB on toilet paper.) and Shortness of Breath  SHe was also dizzy and had exertional symptoms.  At the ED, she is afebrile, normotensive.  NM bleeding scan showed no evidence of bleeding.  Hb was 5.5  GI consulted.  She received blood transfusions.  She underwent esophagogastroduodenoscopy and colonoscopy by Dr. Holliday on 11/21 that showed no obvious signs of bleed.  She however continued to have bowel bloody movement.  She undersent flexible sigmoidoscopy and internal hemorrhoid banding by Dr. Mills on  11/23. There were large prolapsed grade III/VI hemorrhoids that were oozing blood on contact. These were treated with 4 bands placed internally.   She tolerated the procedure. She mentioned she only had minimal bleeding. Her Hb stayed at 8 at discharge. GI cleared her for discharge. She wanted to go home.    SHe did not have any wheezing during the hospitalization. She will continue her nocturnal O2 for EMILY.    At discharge date, Ariella  Justice afebrile and hemodynamically stable.  Ariella Rendon wanted to be discharged today.    Discharge Physical Exam  Vitals signs and nursing note reviewed.   Constitutional:       General: She is not in acute distress.  HENT:      Head: Normocephalic and atraumatic.      Right Ear: External ear normal.      Left Ear: External ear normal.      Nose: Nose normal.      Mouth/Throat:      Mouth: Mucous membranes are moist.   Eyes:      General: No scleral icterus.     Conjunctiva/sclera: Conjunctivae normal.   Neck:      Musculoskeletal: Normal range of motion and neck supple. No neck rigidity.   Cardiovascular:      Rate and Rhythm: Normal rate and regular rhythm.      Pulses: Normal pulses.      Heart sounds: No murmur. No friction rub. No gallop.    Pulmonary:      Effort: Pulmonary effort is normal. No respiratory distress.      Breath sounds: Normal breath sounds. No stridor. No wheezing, rhonchi or rales.   Chest:      Chest wall: No tenderness.   Abdominal:      General: Abdomen is flat. Bowel sounds are normal. There is no distension.      Palpations: Abdomen is soft.      Tenderness: There is no tenderness. There is no guarding or rebound.   Musculoskeletal: Normal range of motion.         General: No swelling, tenderness or deformity.   Skin:     General: Skin is warm.      Coloration: Skin is pale. Skin is not jaundiced.   Neurological:      General: No focal deficit present.      Mental Status: She is alert and oriented to person, place, and time. Mental status is at baseline.      Comments: Weakness improved   Psychiatric:         Mood and Affect: Mood normal.         Behavior: Behavior normal.         Thought Content: Thought content normal.         Judgment: Judgment normal.      Comments: Cooperative    Imaging  NM-GI BLEEDING SCAN   Final Result      No evidence of GI bleeding.      DX-CHEST-PORTABLE (1 VIEW)   Final Result      No acute cardiac or pulmonary abnormalities are identified.               Therefore, she is discharged in fair and stable condition to home with close outpatient follow-up.    The patient met 2-midnight criteria for an inpatient stay at the time of discharge.    Discharge Date  11/24/2019    FOLLOW UP ITEMS POST DISCHARGE  FOllow up with GI    DISCHARGE DIAGNOSES  Active Problems:    Blood loss anemia POA: Yes    GI bleed POA: Yes    EMILY (obstructive sleep apnea) POA: Yes    Status post lumbar spine surgery for decompression of spinal cord POA: Yes    History of anesthesia complications POA: Unknown      Overview: Respiratory issues when was smoking    COPD (chronic obstructive pulmonary disease) (HCC) (Chronic) POA: Yes    Chronic respiratory failure (HCC) (Chronic) POA: Yes    Internal hemorrhoids      FOLLOW UP  No future appointments.  Brianne Marti M.D.  [address] [address]  [phone]    Schedule an appointment as soon as possible for a visit in 2 weeks    Follow up with .Brianne Marti M.D. in 1 week  Follow up with Dr. Gene TEIXEIRA in 1 week  CBCs can be obtained at that visit.  No NSAIDs or blood thinners  Advise Ariella Rendon to check blood pressure at home at least twice a day and have a log for primary provider to review    MEDICATIONS ON DISCHARGE     Medication List      START taking these medications      Instructions   ondansetron 4 MG Tbdp  Commonly known as:  ZOFRAN ODT   Take 1 Tab by mouth every four hours as needed for Nausea (give PO if no IV route available).  Dose:  4 mg        CHANGE how you take these medications      Instructions   omeprazole 20 MG delayed-release capsule  What changed:  when to take this  Commonly known as:  PRILOSEC   Take 1 Cap by mouth 2 times a day.  Dose:  20 mg        CONTINUE taking these medications      Instructions   albuterol 108 (90 Base) MCG/ACT Aers inhalation aerosol   Inhale 2 Puffs by mouth every 6 hours as needed for Shortness of Breath.  Dose:  2 Puff     amLODIPine 10 MG Tabs  Commonly known as:   "NORVASC   Take 10 mg by mouth every day.  Dose:  10 mg     doxepin 10 MG Caps  Commonly known as:  SINEQUAN   Take 20 mg by mouth every evening.  Dose:  20 mg     ferrous sulfate 325 (65 Fe) MG tablet   Take 325 mg by mouth 3 times a day.  Dose:  325 mg     hydroCHLOROthiazide 12.5 MG tablet  Commonly known as:  HYDRODIURIL   Take 12.5 mg by mouth every day.  Dose:  12.5 mg     ipratropium-albuterol 0.5-2.5 (3) MG/3ML nebulizer solution  Commonly known as:  DUONEB   3 mL by Nebulization route every 6 hours as needed for Shortness of Breath.  Dose:  3 mL     magnesium gluconate 500 MG tablet  Commonly known as:  MAG-G   Take 1,000 mg by mouth 2 Times a Day.  Dose:  1,000 mg     morphine ER 30 MG Tbcr tablet  Commonly known as:  MS CONTIN   Take 30 mg by mouth every 12 hours.  Dose:  30 mg     oxycodone 15 MG immediate release tablet  Commonly known as:  OXY-IR   Take 15 mg by mouth every four hours as needed for Severe Pain.  Dose:  15 mg     polyethylene glycol/lytes Pack  Commonly known as:  MIRALAX   Take 17 g by mouth every day.  Dose:  17 g     REQUIP 4 MG tablet  Generic drug:  ropinirole   Take 4 mg by mouth 4 times a day.  Dose:  4 mg     Vitamin C 250 MG Chew   Take 250 mg by mouth 2 Times a Day.  Dose:  250 mg     vitamin E 1000 UNIT Caps  Commonly known as:  VITAMIN E   Take 1,000 Units by mouth every day.  Dose:  1,000 Units            Allergies  Allergies   Allergen Reactions   • Other Environmental Rash     \"alloids in metal\"       DIET  Orders Placed This Encounter   Procedures   • Diet Order Regular     Standing Status:   Standing     Number of Occurrences:   1     Order Specific Question:   Diet:     Answer:   Regular [1]       ACTIVITY  No heavy lifting or strenuous activities    CONSULTATIONS  GI    PROCEDURES  Dx-chest-portable (1 View)    Result Date: 11/20/2019 11/20/2019 3:32 PM HISTORY/REASON FOR EXAM:  Blood in vomit or stool or dark tarry stool. TECHNIQUE/EXAM DESCRIPTION AND NUMBER OF " VIEWS: Single portable view of the chest. COMPARISON: October 11, 2019 FINDINGS: Heart size is within normal limits. No pulmonary infiltrates or consolidations are noted. No pleural abnormalities are noted.     No acute cardiac or pulmonary abnormalities are identified.    Ir-us Guided Piv    Result Date: 10/28/2019  EXAMINATION:                                                                    HISTORY/REASON FOR EXAM:  Ultrasound Guided PIV.  TECHNIQUE/EXAM DESCRIPTION AND NUMBER OF VIEWS:  Peripheral IV insertion with ultrasound guidance.  The procedure was prepared using maximal sterile barrier technique including sterile gown, mask, cap, and donning of sterile gloves following appropriate hand hygiene and/or sterile scrub. Patient skin site was prepped with 2% Chlorhexidine solution.   FINDINGS: Peripheral IV insertion with Ultrasound Guidance was performed by qualified imaging nursing staff without the assistance of a Radiologist.      Ultrasound-guided PERIPHERAL IV INSERTION performed by qualified nursing staff as above.     Nm-gi Bleeding Scan    Result Date: 11/21/2019 11/21/2019 2:41 PM HISTORY/REASON FOR EXAM:  GI bleed TECHNIQUE/EXAM DESCRIPTION AND NUMBER OF VIEWS: GI bleeding scan. COMPARISON: None PROCEDURE: The patient?s red cells were tagged with 28.1 mCi of Tc UltraTag.  Serial filming was done over the abdomen. FINDINGS: The normal vascular structures are seen.  No areas of abnormal red cell accumulation are noted.     No evidence of GI bleeding.    Us-extremity Venous Lower Bilat    Result Date: 10/28/2019   Vascular Laboratory  CONCLUSIONS  No evidence of deep venous thrombosis.  KAMRAN VIEYRA  Exam Date:     10/28/2019 13:52  Room #:     Inpatient  Priority:     Routine  Ht (in):             Wt (lb):  Ordering Physician:        NAKITA ACEVEDO  Referring Physician:       [id number], CAO  Sonographer:               Bon Horan  Study Type:                Complete Bilateral  Technical Quality:          Good  Age:    64    Gender:     F  MRN:    [id number]  :    1955      BSA:  Indications:     Localized swelling, mass and lump, unspecified lower limb  CPT Codes:       24585  ICD Codes:       R22.40  History:         Swelling in unspecified lower extremity, edema.  Limitations:  PROCEDURES:  Bilateral lower extremity venous duplex imaging including serial  compressions, augmentation maneuvers,  color and spectral Doppler flow  evaluations. The following venous structures were evaluated: common  femoral, profunda femoral, greater saphenous, femoral, popliteal , peroneal  and posterior tibial veins.  FINDINGS:  Bilateral lower extremities -  Complete color filling and compressibility with normal venous flow dynamics  including spontaneous flow, response to augmentation maneuvers, and  respiratory phasicity.  No evidence of deep venous thrombosis.  Bryant Seo  (Electronically Signed)  Final Date:      2019                   16:14    Ct-cta Chest Pulmonary Artery W/ Recons    Result Date: 10/28/2019  10/28/2019 7:35 PM HISTORY/REASON FOR EXAM:  PE suspected, intermediate prob, positive D-dimer TECHNIQUE/EXAM DESCRIPTION: CT angiogram scan for pulmonary embolism with contrast, with reconstructions. 1.25 mm helical sections were obtained from the lung apices through the lung bases following the rapid bolus administration of 100 mL of Omnipaque 350 nonionic contrast. Thin-section overlapping reconstruction interval was utilized. Coronal reconstructions were generated from the axial data. MIP post processing was performed and utilized for the interpretation. Low dose optimization technique was utilized for this CT exam including automated exposure control and adjustment of the mA and/or kV according to patient size. COMPARISON: None FINDINGS: Thoracic aorta shows mild atherosclerotic calcification. No mediastinal mass or adenopathy. No filling defects within the pulmonary arteries to indicate  emboli. Lungs show mild bibasilar atelectasis. No focal consolidation. No pleural fluid collection or pneumothorax. Degenerative change of thoracic spine.     1.  No CT evidence for pulmonary emboli. 2.  No pneumonia or pneumothorax.     Ec-echocardiogram Ltd W/o Cont    Result Date: 10/28/2019  Transthoracic Echo Report Echocardiography Laboratory CONCLUSIONS Compared to the images of the prior study done on 2018 there has been no significant change in left ventricular systolic function. Normal left ventricular size and systolic function. Limited study. KAMRAN VIEYRA Exam Date:         10/28/2019                    12:37 Exam Location:     Inpatient Priority:          Routine Ordering Physician:        NAKITA ACEVEDO Referring Physician:       [id number], CAO Sonographer:               Britni Lopez RDCS, RVT Age:    64     Gender:    F MRN:    [id number] :    1955 BSA:    2.03   Ht (in):    66     Wt (lb):    206 Exam Type:     Limited Indications:     Tachycardia ICD Codes:       785 CPT Codes:       03218 BP:   132    /   73     HR: Technical Quality:       Technically difficult study -                          adequate information is obtained MEASUREMENTS  (Male / Female) Normal Values 2D ECHO Estimated LV Ejection Fraction    65 %                  IVC Diameter                      1.6 cm                DOPPLER TR Peak Velocity                  226 cm/s              PV Peak Velocity                  1.9 m/s               PV Peak Gradient                  14.2 mmHg             RVOT Peak Velocity                1.6 m/s               * Indicates values subject to auto-interpretation LV EF:  65    % FINDINGS Left Ventricle Normal left ventricular size and systolic function. Right Ventricle Normal right ventricular size. Normal right ventricular systolic function. Right Atrium Normal right atrial size. Normal inferior vena cava size and inspiratory collapse. Left Atrium  Mitral Valve Aortic Valve Tricuspid Valve Structurally normal tricuspid valve. No tricuspid stenosis. Trace tricuspid regurgitation. Right ventricular systolic pressure is estimated to be 30 mmHg. Pulmonic Valve Pericardium Aorta Tiffani SOLORIO To (Electronically Signed) Final Date:     28 October 2019                 14:09    Please see Ayse Holliday and Gene procedure notes    LABORATORY  Lab Results   Component Value Date    SODIUM 143 11/24/2019    POTASSIUM 3.7 11/24/2019    CHLORIDE 107 11/24/2019    CO2 29 11/24/2019    GLUCOSE 126 (H) 11/24/2019    BUN 10 11/24/2019    CREATININE 0.73 11/24/2019        Lab Results   Component Value Date    WBC 5.5 11/23/2019    HEMOGLOBIN 8.4 (L) 11/24/2019    HEMATOCRIT 29.3 (L) 11/23/2019    PLATELETCT 228 11/23/2019        Total time of the discharge process exceeds 45 minutes.

## 2019-12-03 ENCOUNTER — APPOINTMENT (OUTPATIENT)
Dept: RADIOLOGY | Facility: MEDICAL CENTER | Age: 64
DRG: 348 | End: 2019-12-03
Attending: EMERGENCY MEDICINE
Payer: MEDICAID

## 2019-12-03 ENCOUNTER — HOSPITAL ENCOUNTER (INPATIENT)
Facility: MEDICAL CENTER | Age: 64
LOS: 6 days | DRG: 348 | End: 2019-12-09
Attending: EMERGENCY MEDICINE | Admitting: HOSPITALIST
Payer: MEDICAID

## 2019-12-03 DIAGNOSIS — K62.5 RECTAL BLEEDING: ICD-10-CM

## 2019-12-03 DIAGNOSIS — D62 ACUTE BLOOD LOSS ANEMIA: ICD-10-CM

## 2019-12-03 LAB
ABO GROUP BLD: NORMAL
ALBUMIN SERPL BCP-MCNC: 3.7 G/DL (ref 3.2–4.9)
ALBUMIN/GLOB SERPL: 1.4 G/DL
ALP SERPL-CCNC: 124 U/L (ref 30–99)
ALT SERPL-CCNC: 10 U/L (ref 2–50)
ANION GAP SERPL CALC-SCNC: 5 MMOL/L (ref 0–11.9)
ANISOCYTOSIS BLD QL SMEAR: ABNORMAL
APTT PPP: 23.8 SEC (ref 24.7–36)
AST SERPL-CCNC: 10 U/L (ref 12–45)
BARCODED ABORH UBTYP: 6200
BARCODED PRD CODE UBPRD: NORMAL
BARCODED UNIT NUM UBUNT: NORMAL
BASOPHILS # BLD AUTO: 0.8 % (ref 0–1.8)
BASOPHILS # BLD: 0.07 K/UL (ref 0–0.12)
BILIRUB SERPL-MCNC: 0.3 MG/DL (ref 0.1–1.5)
BLD GP AB SCN SERPL QL: NORMAL
BUN SERPL-MCNC: 14 MG/DL (ref 8–22)
CALCIUM SERPL-MCNC: 9.3 MG/DL (ref 8.5–10.5)
CHLORIDE SERPL-SCNC: 104 MMOL/L (ref 96–112)
CO2 SERPL-SCNC: 30 MMOL/L (ref 20–33)
COMMENT 1642: NORMAL
COMPONENT R 8504R: NORMAL
CREAT SERPL-MCNC: 0.74 MG/DL (ref 0.5–1.4)
DACRYOCYTES BLD QL SMEAR: NORMAL
EOSINOPHIL # BLD AUTO: 0.08 K/UL (ref 0–0.51)
EOSINOPHIL NFR BLD: 1 % (ref 0–6.9)
ERYTHROCYTE [DISTWIDTH] IN BLOOD BY AUTOMATED COUNT: 56.3 FL (ref 35.9–50)
GIANT PLATELETS BLD QL SMEAR: NORMAL
GLOBULIN SER CALC-MCNC: 2.6 G/DL (ref 1.9–3.5)
GLUCOSE SERPL-MCNC: 116 MG/DL (ref 65–99)
HCT VFR BLD AUTO: 26 % (ref 37–47)
HGB BLD-MCNC: 6.8 G/DL (ref 12–16)
HYPOCHROMIA BLD QL SMEAR: ABNORMAL
IMM GRANULOCYTES # BLD AUTO: 0.04 K/UL (ref 0–0.11)
IMM GRANULOCYTES NFR BLD AUTO: 0.5 % (ref 0–0.9)
INR PPP: 0.94 (ref 0.87–1.13)
LIPASE SERPL-CCNC: 6 U/L (ref 11–82)
LYMPHOCYTES # BLD AUTO: 1.18 K/UL (ref 1–4.8)
LYMPHOCYTES NFR BLD: 14 % (ref 22–41)
MCH RBC QN AUTO: 24.9 PG (ref 27–33)
MCHC RBC AUTO-ENTMCNC: 26.2 G/DL (ref 33.6–35)
MCV RBC AUTO: 95.2 FL (ref 81.4–97.8)
MICROCYTES BLD QL SMEAR: ABNORMAL
MONOCYTES # BLD AUTO: 0.55 K/UL (ref 0–0.85)
MONOCYTES NFR BLD AUTO: 6.5 % (ref 0–13.4)
MORPHOLOGY BLD-IMP: NORMAL
NEUTROPHILS # BLD AUTO: 6.5 K/UL (ref 2–7.15)
NEUTROPHILS NFR BLD: 77.2 % (ref 44–72)
NRBC # BLD AUTO: 0.02 K/UL
NRBC BLD-RTO: 0.2 /100 WBC
PLATELET # BLD AUTO: 342 K/UL (ref 164–446)
PLATELET BLD QL SMEAR: NORMAL
PMV BLD AUTO: 11.1 FL (ref 9–12.9)
POIKILOCYTOSIS BLD QL SMEAR: NORMAL
POLYCHROMASIA BLD QL SMEAR: NORMAL
POTASSIUM SERPL-SCNC: 4.2 MMOL/L (ref 3.6–5.5)
PRODUCT TYPE UPROD: NORMAL
PROT SERPL-MCNC: 6.3 G/DL (ref 6–8.2)
PROTHROMBIN TIME: 12.7 SEC (ref 12–14.6)
RBC # BLD AUTO: 2.73 M/UL (ref 4.2–5.4)
RBC BLD AUTO: PRESENT
RH BLD: NORMAL
SODIUM SERPL-SCNC: 139 MMOL/L (ref 135–145)
STOMATOCYTES BLD QL SMEAR: NORMAL
UNIT STATUS USTAT: NORMAL
WBC # BLD AUTO: 8.4 K/UL (ref 4.8–10.8)

## 2019-12-03 PROCEDURE — P9016 RBC LEUKOCYTES REDUCED: HCPCS

## 2019-12-03 PROCEDURE — 85025 COMPLETE CBC W/AUTO DIFF WBC: CPT

## 2019-12-03 PROCEDURE — 71045 X-RAY EXAM CHEST 1 VIEW: CPT

## 2019-12-03 PROCEDURE — 86900 BLOOD TYPING SEROLOGIC ABO: CPT

## 2019-12-03 PROCEDURE — 99223 1ST HOSP IP/OBS HIGH 75: CPT | Performed by: HOSPITALIST

## 2019-12-03 PROCEDURE — 36415 COLL VENOUS BLD VENIPUNCTURE: CPT

## 2019-12-03 PROCEDURE — 83690 ASSAY OF LIPASE: CPT

## 2019-12-03 PROCEDURE — 99285 EMERGENCY DEPT VISIT HI MDM: CPT

## 2019-12-03 PROCEDURE — 36430 TRANSFUSION BLD/BLD COMPNT: CPT

## 2019-12-03 PROCEDURE — 85610 PROTHROMBIN TIME: CPT

## 2019-12-03 PROCEDURE — 86850 RBC ANTIBODY SCREEN: CPT

## 2019-12-03 PROCEDURE — 770020 HCHG ROOM/CARE - TELE (206)

## 2019-12-03 PROCEDURE — 86923 COMPATIBILITY TEST ELECTRIC: CPT

## 2019-12-03 PROCEDURE — 700105 HCHG RX REV CODE 258: Performed by: EMERGENCY MEDICINE

## 2019-12-03 PROCEDURE — 80053 COMPREHEN METABOLIC PANEL: CPT

## 2019-12-03 PROCEDURE — 86901 BLOOD TYPING SEROLOGIC RH(D): CPT

## 2019-12-03 PROCEDURE — 85730 THROMBOPLASTIN TIME PARTIAL: CPT

## 2019-12-03 PROCEDURE — 30233N1 TRANSFUSION OF NONAUTOLOGOUS RED BLOOD CELLS INTO PERIPHERAL VEIN, PERCUTANEOUS APPROACH: ICD-10-PCS | Performed by: HOSPITALIST

## 2019-12-03 RX ORDER — FERROUS SULFATE 325(65) MG
325 TABLET ORAL 3 TIMES DAILY
Status: DISCONTINUED | OUTPATIENT
Start: 2019-12-04 | End: 2019-12-09 | Stop reason: HOSPADM

## 2019-12-03 RX ORDER — SODIUM CHLORIDE 9 MG/ML
INJECTION, SOLUTION INTRAVENOUS CONTINUOUS
Status: ACTIVE | OUTPATIENT
Start: 2019-12-03 | End: 2019-12-04

## 2019-12-03 RX ORDER — IPRATROPIUM BROMIDE AND ALBUTEROL SULFATE 2.5; .5 MG/3ML; MG/3ML
3 SOLUTION RESPIRATORY (INHALATION) EVERY 6 HOURS PRN
Status: DISCONTINUED | OUTPATIENT
Start: 2019-12-03 | End: 2019-12-09 | Stop reason: HOSPADM

## 2019-12-03 RX ORDER — PANTOPRAZOLE SODIUM 40 MG/10ML
40 INJECTION, POWDER, LYOPHILIZED, FOR SOLUTION INTRAVENOUS 2 TIMES DAILY
Status: DISCONTINUED | OUTPATIENT
Start: 2019-12-03 | End: 2019-12-05

## 2019-12-03 RX ORDER — PROCHLORPERAZINE EDISYLATE 5 MG/ML
5-10 INJECTION INTRAMUSCULAR; INTRAVENOUS EVERY 4 HOURS PRN
Status: DISCONTINUED | OUTPATIENT
Start: 2019-12-03 | End: 2019-12-09 | Stop reason: HOSPADM

## 2019-12-03 RX ORDER — ONDANSETRON 2 MG/ML
4 INJECTION INTRAMUSCULAR; INTRAVENOUS EVERY 4 HOURS PRN
Status: DISCONTINUED | OUTPATIENT
Start: 2019-12-03 | End: 2019-12-09 | Stop reason: HOSPADM

## 2019-12-03 RX ORDER — ALBUTEROL SULFATE 90 UG/1
2 AEROSOL, METERED RESPIRATORY (INHALATION) EVERY 6 HOURS PRN
Status: DISCONTINUED | OUTPATIENT
Start: 2019-12-03 | End: 2019-12-09 | Stop reason: HOSPADM

## 2019-12-03 RX ORDER — PROMETHAZINE HYDROCHLORIDE 12.5 MG/1
12.5-25 SUPPOSITORY RECTAL EVERY 4 HOURS PRN
Status: DISCONTINUED | OUTPATIENT
Start: 2019-12-03 | End: 2019-12-09 | Stop reason: HOSPADM

## 2019-12-03 RX ORDER — MORPHINE SULFATE 30 MG/1
30 TABLET, FILM COATED, EXTENDED RELEASE ORAL EVERY 12 HOURS
Status: DISCONTINUED | OUTPATIENT
Start: 2019-12-03 | End: 2019-12-09 | Stop reason: HOSPADM

## 2019-12-03 RX ORDER — BISACODYL 10 MG
10 SUPPOSITORY, RECTAL RECTAL
Status: DISCONTINUED | OUTPATIENT
Start: 2019-12-03 | End: 2019-12-09 | Stop reason: HOSPADM

## 2019-12-03 RX ORDER — ROPINIROLE 2 MG/1
4 TABLET, FILM COATED ORAL 4 TIMES DAILY
Status: DISCONTINUED | OUTPATIENT
Start: 2019-12-03 | End: 2019-12-09 | Stop reason: HOSPADM

## 2019-12-03 RX ORDER — POLYETHYLENE GLYCOL 3350 17 G/17G
4 POWDER, FOR SOLUTION ORAL DAILY
Status: CANCELLED | OUTPATIENT
Start: 2019-12-04

## 2019-12-03 RX ORDER — PROMETHAZINE HYDROCHLORIDE 25 MG/1
12.5-25 TABLET ORAL EVERY 4 HOURS PRN
Status: DISCONTINUED | OUTPATIENT
Start: 2019-12-03 | End: 2019-12-09 | Stop reason: HOSPADM

## 2019-12-03 RX ORDER — POLYETHYLENE GLYCOL 3350 17 G/17G
4 POWDER, FOR SOLUTION ORAL DAILY
Status: DISCONTINUED | OUTPATIENT
Start: 2019-12-04 | End: 2019-12-09 | Stop reason: HOSPADM

## 2019-12-03 RX ORDER — ONDANSETRON 4 MG/1
4 TABLET, ORALLY DISINTEGRATING ORAL EVERY 4 HOURS PRN
Status: DISCONTINUED | OUTPATIENT
Start: 2019-12-03 | End: 2019-12-09 | Stop reason: HOSPADM

## 2019-12-03 RX ORDER — POLYETHYLENE GLYCOL 3350 17 G/17G
1 POWDER, FOR SOLUTION ORAL
Status: DISCONTINUED | OUTPATIENT
Start: 2019-12-03 | End: 2019-12-09 | Stop reason: HOSPADM

## 2019-12-03 RX ORDER — AMOXICILLIN 250 MG
2 CAPSULE ORAL 2 TIMES DAILY
Status: DISCONTINUED | OUTPATIENT
Start: 2019-12-03 | End: 2019-12-09 | Stop reason: HOSPADM

## 2019-12-03 RX ORDER — DOXEPIN HYDROCHLORIDE 10 MG/1
20 CAPSULE ORAL NIGHTLY
Status: DISCONTINUED | OUTPATIENT
Start: 2019-12-03 | End: 2019-12-05

## 2019-12-03 RX ADMIN — SODIUM CHLORIDE: 9 INJECTION, SOLUTION INTRAVENOUS at 21:15

## 2019-12-03 ASSESSMENT — COGNITIVE AND FUNCTIONAL STATUS - GENERAL
DRESSING REGULAR UPPER BODY CLOTHING: A LITTLE
SUGGESTED CMS G CODE MODIFIER MOBILITY: CJ
MOVING TO AND FROM BED TO CHAIR: A LITTLE
SUGGESTED CMS G CODE MODIFIER DAILY ACTIVITY: CJ
DRESSING REGULAR LOWER BODY CLOTHING: A LITTLE
STANDING UP FROM CHAIR USING ARMS: A LITTLE
MOVING FROM LYING ON BACK TO SITTING ON SIDE OF FLAT BED: A LITTLE
MOBILITY SCORE: 21
TOILETING: A LITTLE
DAILY ACTIVITIY SCORE: 21

## 2019-12-03 ASSESSMENT — LIFESTYLE VARIABLES
ALCOHOL_USE: NO
EVER FELT BAD OR GUILTY ABOUT YOUR DRINKING: NO
AVERAGE NUMBER OF DAYS PER WEEK YOU HAVE A DRINK CONTAINING ALCOHOL: 0
TOTAL SCORE: 0
HAVE YOU EVER FELT YOU SHOULD CUT DOWN ON YOUR DRINKING: NO
HAVE PEOPLE ANNOYED YOU BY CRITICIZING YOUR DRINKING: NO
EVER HAD A DRINK FIRST THING IN THE MORNING TO STEADY YOUR NERVES TO GET RID OF A HANGOVER: NO
CONSUMPTION TOTAL: NEGATIVE
HOW MANY TIMES IN THE PAST YEAR HAVE YOU HAD 5 OR MORE DRINKS IN A DAY: 0
ON A TYPICAL DAY WHEN YOU DRINK ALCOHOL HOW MANY DRINKS DO YOU HAVE: 0
TOTAL SCORE: 0
TOTAL SCORE: 0
EVER_SMOKED: YES
DOES PATIENT WANT TO STOP DRINKING: NO

## 2019-12-04 PROBLEM — G89.29 CHRONIC PAIN: Status: ACTIVE | Noted: 2019-12-04

## 2019-12-04 LAB
ALBUMIN SERPL BCP-MCNC: 3.4 G/DL (ref 3.2–4.9)
ALBUMIN/GLOB SERPL: 1.5 G/DL
ALP SERPL-CCNC: 109 U/L (ref 30–99)
ALT SERPL-CCNC: 9 U/L (ref 2–50)
ANION GAP SERPL CALC-SCNC: 4 MMOL/L (ref 0–11.9)
AST SERPL-CCNC: 11 U/L (ref 12–45)
BASOPHILS # BLD AUTO: 0.9 % (ref 0–1.8)
BASOPHILS # BLD: 0.07 K/UL (ref 0–0.12)
BILIRUB SERPL-MCNC: 0.5 MG/DL (ref 0.1–1.5)
BUN SERPL-MCNC: 10 MG/DL (ref 8–22)
CALCIUM SERPL-MCNC: 8.7 MG/DL (ref 8.5–10.5)
CHLORIDE SERPL-SCNC: 105 MMOL/L (ref 96–112)
CO2 SERPL-SCNC: 30 MMOL/L (ref 20–33)
CREAT SERPL-MCNC: 0.64 MG/DL (ref 0.5–1.4)
EOSINOPHIL # BLD AUTO: 0.09 K/UL (ref 0–0.51)
EOSINOPHIL NFR BLD: 1.2 % (ref 0–6.9)
ERYTHROCYTE [DISTWIDTH] IN BLOOD BY AUTOMATED COUNT: 55 FL (ref 35.9–50)
GLOBULIN SER CALC-MCNC: 2.3 G/DL (ref 1.9–3.5)
GLUCOSE SERPL-MCNC: 106 MG/DL (ref 65–99)
HCT VFR BLD AUTO: 26.8 % (ref 37–47)
HGB BLD-MCNC: 6.8 G/DL (ref 12–16)
HGB BLD-MCNC: 7.3 G/DL (ref 12–16)
IMM GRANULOCYTES # BLD AUTO: 0.03 K/UL (ref 0–0.11)
IMM GRANULOCYTES NFR BLD AUTO: 0.4 % (ref 0–0.9)
LYMPHOCYTES # BLD AUTO: 1.22 K/UL (ref 1–4.8)
LYMPHOCYTES NFR BLD: 15.7 % (ref 22–41)
MCH RBC QN AUTO: 25.9 PG (ref 27–33)
MCHC RBC AUTO-ENTMCNC: 27.2 G/DL (ref 33.6–35)
MCV RBC AUTO: 95 FL (ref 81.4–97.8)
MONOCYTES # BLD AUTO: 0.42 K/UL (ref 0–0.85)
MONOCYTES NFR BLD AUTO: 5.4 % (ref 0–13.4)
NEUTROPHILS # BLD AUTO: 5.94 K/UL (ref 2–7.15)
NEUTROPHILS NFR BLD: 76.4 % (ref 44–72)
NRBC # BLD AUTO: 0 K/UL
NRBC BLD-RTO: 0 /100 WBC
PLATELET # BLD AUTO: 302 K/UL (ref 164–446)
PMV BLD AUTO: 11.6 FL (ref 9–12.9)
POTASSIUM SERPL-SCNC: 4.3 MMOL/L (ref 3.6–5.5)
PROT SERPL-MCNC: 5.7 G/DL (ref 6–8.2)
RBC # BLD AUTO: 2.82 M/UL (ref 4.2–5.4)
SODIUM SERPL-SCNC: 139 MMOL/L (ref 135–145)
WBC # BLD AUTO: 7.8 K/UL (ref 4.8–10.8)

## 2019-12-04 PROCEDURE — 700105 HCHG RX REV CODE 258

## 2019-12-04 PROCEDURE — 80048 BASIC METABOLIC PNL TOTAL CA: CPT

## 2019-12-04 PROCEDURE — 700101 HCHG RX REV CODE 250: Performed by: INTERNAL MEDICINE

## 2019-12-04 PROCEDURE — 86923 COMPATIBILITY TEST ELECTRIC: CPT

## 2019-12-04 PROCEDURE — 160002 HCHG RECOVERY MINUTES (STAT): Performed by: INTERNAL MEDICINE

## 2019-12-04 PROCEDURE — 770020 HCHG ROOM/CARE - TELE (206)

## 2019-12-04 PROCEDURE — 80053 COMPREHEN METABOLIC PANEL: CPT

## 2019-12-04 PROCEDURE — 700102 HCHG RX REV CODE 250 W/ 637 OVERRIDE(OP): Performed by: INTERNAL MEDICINE

## 2019-12-04 PROCEDURE — 85025 COMPLETE CBC W/AUTO DIFF WBC: CPT | Mod: 91

## 2019-12-04 PROCEDURE — 99233 SBSQ HOSP IP/OBS HIGH 50: CPT | Performed by: HOSPITALIST

## 2019-12-04 PROCEDURE — C9113 INJ PANTOPRAZOLE SODIUM, VIA: HCPCS | Performed by: HOSPITALIST

## 2019-12-04 PROCEDURE — 160048 HCHG OR STATISTICAL LEVEL 1-5: Performed by: INTERNAL MEDICINE

## 2019-12-04 PROCEDURE — 700111 HCHG RX REV CODE 636 W/ 250 OVERRIDE (IP): Performed by: INTERNAL MEDICINE

## 2019-12-04 PROCEDURE — 36415 COLL VENOUS BLD VENIPUNCTURE: CPT

## 2019-12-04 PROCEDURE — 700102 HCHG RX REV CODE 250 W/ 637 OVERRIDE(OP): Performed by: HOSPITALIST

## 2019-12-04 PROCEDURE — P9016 RBC LEUKOCYTES REDUCED: HCPCS

## 2019-12-04 PROCEDURE — 160201 HCHG ENDO MINUTES - 1ST 30 MINS LEVEL 2: Performed by: INTERNAL MEDICINE

## 2019-12-04 PROCEDURE — A9270 NON-COVERED ITEM OR SERVICE: HCPCS | Performed by: INTERNAL MEDICINE

## 2019-12-04 PROCEDURE — 700111 HCHG RX REV CODE 636 W/ 250 OVERRIDE (IP): Performed by: HOSPITALIST

## 2019-12-04 PROCEDURE — 94664 DEMO&/EVAL PT USE INHALER: CPT

## 2019-12-04 PROCEDURE — 0DJD8ZZ INSPECTION OF LOWER INTESTINAL TRACT, VIA NATURAL OR ARTIFICIAL OPENING ENDOSCOPIC: ICD-10-PCS | Performed by: INTERNAL MEDICINE

## 2019-12-04 PROCEDURE — 85018 HEMOGLOBIN: CPT

## 2019-12-04 PROCEDURE — 99152 MOD SED SAME PHYS/QHP 5/>YRS: CPT | Performed by: INTERNAL MEDICINE

## 2019-12-04 PROCEDURE — A9270 NON-COVERED ITEM OR SERVICE: HCPCS | Performed by: HOSPITALIST

## 2019-12-04 PROCEDURE — 36430 TRANSFUSION BLD/BLD COMPNT: CPT

## 2019-12-04 PROCEDURE — 700105 HCHG RX REV CODE 258: Performed by: INTERNAL MEDICINE

## 2019-12-04 PROCEDURE — 160035 HCHG PACU - 1ST 60 MINS PHASE I: Performed by: INTERNAL MEDICINE

## 2019-12-04 RX ORDER — SODIUM CHLORIDE 9 MG/ML
INJECTION, SOLUTION INTRAVENOUS
Status: COMPLETED
Start: 2019-12-04 | End: 2019-12-04

## 2019-12-04 RX ORDER — MIDAZOLAM HYDROCHLORIDE 1 MG/ML
INJECTION INTRAMUSCULAR; INTRAVENOUS
Status: DISCONTINUED | OUTPATIENT
Start: 2019-12-04 | End: 2019-12-04 | Stop reason: HOSPADM

## 2019-12-04 RX ORDER — ENEMA 19; 7 G/133ML; G/133ML
1 ENEMA RECTAL ONCE
Status: COMPLETED | OUTPATIENT
Start: 2019-12-04 | End: 2019-12-04

## 2019-12-04 RX ORDER — SODIUM CHLORIDE 9 MG/ML
500 INJECTION, SOLUTION INTRAVENOUS
Status: ACTIVE | OUTPATIENT
Start: 2019-12-04 | End: 2019-12-04

## 2019-12-04 RX ORDER — MIDAZOLAM HYDROCHLORIDE 1 MG/ML
.5-2 INJECTION INTRAMUSCULAR; INTRAVENOUS PRN
Status: ACTIVE | OUTPATIENT
Start: 2019-12-04 | End: 2019-12-04

## 2019-12-04 RX ADMIN — SENNOSIDES AND DOCUSATE SODIUM 2 TABLET: 8.6; 5 TABLET ORAL at 16:50

## 2019-12-04 RX ADMIN — DOXEPIN HYDROCHLORIDE 20 MG: 10 CAPSULE ORAL at 20:22

## 2019-12-04 RX ADMIN — SODIUM PHOSPHATE, DIBASIC AND SODIUM PHOSPHATE, MONOBASIC 133 ML: 7; 19 ENEMA RECTAL at 09:15

## 2019-12-04 RX ADMIN — MAGNESIUM HYDROXIDE 30 ML: 400 SUSPENSION ORAL at 16:51

## 2019-12-04 RX ADMIN — FERROUS SULFATE TAB 325 MG (65 MG ELEMENTAL FE) 325 MG: 325 (65 FE) TAB at 16:50

## 2019-12-04 RX ADMIN — PANTOPRAZOLE SODIUM 40 MG: 40 INJECTION, POWDER, LYOPHILIZED, FOR SOLUTION INTRAVENOUS at 16:51

## 2019-12-04 RX ADMIN — OXYCODONE HYDROCHLORIDE 15 MG: 10 TABLET ORAL at 23:08

## 2019-12-04 RX ADMIN — MORPHINE SULFATE 30 MG: 30 TABLET, FILM COATED, EXTENDED RELEASE ORAL at 16:50

## 2019-12-04 RX ADMIN — ROPINIROLE HYDROCHLORIDE 4 MG: 2 TABLET, FILM COATED ORAL at 12:06

## 2019-12-04 RX ADMIN — DOXEPIN HYDROCHLORIDE 20 MG: 10 CAPSULE ORAL at 00:29

## 2019-12-04 RX ADMIN — PANTOPRAZOLE SODIUM 40 MG: 40 INJECTION, POWDER, LYOPHILIZED, FOR SOLUTION INTRAVENOUS at 05:17

## 2019-12-04 RX ADMIN — SENNOSIDES AND DOCUSATE SODIUM 2 TABLET: 8.6; 5 TABLET ORAL at 00:30

## 2019-12-04 RX ADMIN — ROPINIROLE HYDROCHLORIDE 4 MG: 2 TABLET, FILM COATED ORAL at 08:03

## 2019-12-04 RX ADMIN — POLYETHYLENE GLYCOL 3350 4 PACKET: 17 POWDER, FOR SOLUTION ORAL at 05:16

## 2019-12-04 RX ADMIN — ROPINIROLE HYDROCHLORIDE 4 MG: 2 TABLET, FILM COATED ORAL at 16:51

## 2019-12-04 RX ADMIN — OXYCODONE HYDROCHLORIDE 15 MG: 10 TABLET ORAL at 10:32

## 2019-12-04 RX ADMIN — ROPINIROLE HYDROCHLORIDE 4 MG: 2 TABLET, FILM COATED ORAL at 20:22

## 2019-12-04 RX ADMIN — ROPINIROLE HYDROCHLORIDE 4 MG: 2 TABLET, FILM COATED ORAL at 00:28

## 2019-12-04 RX ADMIN — MORPHINE SULFATE 30 MG: 30 TABLET, FILM COATED, EXTENDED RELEASE ORAL at 00:28

## 2019-12-04 RX ADMIN — SODIUM CHLORIDE: 9 INJECTION, SOLUTION INTRAVENOUS at 17:15

## 2019-12-04 RX ADMIN — FERROUS SULFATE TAB 325 MG (65 MG ELEMENTAL FE) 325 MG: 325 (65 FE) TAB at 12:06

## 2019-12-04 ASSESSMENT — ENCOUNTER SYMPTOMS
VOMITING: 0
CHILLS: 0
HALLUCINATIONS: 0
DIARRHEA: 0
WHEEZING: 0
ABDOMINAL PAIN: 0
EYE DISCHARGE: 0
DIZZINESS: 1
SHORTNESS OF BREATH: 0
BLOOD IN STOOL: 1
TREMORS: 0
HEMOPTYSIS: 0
SPEECH CHANGE: 0
HEARTBURN: 0
DEPRESSION: 0
FLANK PAIN: 0
NAUSEA: 0
WEAKNESS: 0
FEVER: 0
FOCAL WEAKNESS: 0
MYALGIAS: 0
BRUISES/BLEEDS EASILY: 0
NECK PAIN: 0
SENSORY CHANGE: 0
PALPITATIONS: 0
HEADACHES: 1
BACK PAIN: 0
BLURRED VISION: 0
DOUBLE VISION: 0
COUGH: 0
STRIDOR: 0
DIAPHORESIS: 0

## 2019-12-04 ASSESSMENT — COGNITIVE AND FUNCTIONAL STATUS - GENERAL
SUGGESTED CMS G CODE MODIFIER MOBILITY: CI
MOBILITY SCORE: 23
MOVING TO AND FROM BED TO CHAIR: A LITTLE
SUGGESTED CMS G CODE MODIFIER DAILY ACTIVITY: CH
DAILY ACTIVITIY SCORE: 24

## 2019-12-04 ASSESSMENT — LIFESTYLE VARIABLES: SUBSTANCE_ABUSE: 0

## 2019-12-04 NOTE — RESPIRATORY CARE
COPD EDUCATION by COPD CLINICAL EDUCATOR  12/4/2019  at  1:29 PM by Srinivasa Poole     Patient interviewed by COPD education team.  Patient unable to participate in full program.  Short intervention has been conducted.  A comprehensive packet including information about COPD, treatments, and smoking cessation given. Provided spacer with instruction for use, care, and cleaning.

## 2019-12-04 NOTE — ASSESSMENT & PLAN NOTE
Initially improved following packed RBC transfusion with steady decline.  She has persistent rectal bleeding.  Post prbc Tx 12-5.   decrease H/H frequency and follow-up in a.m.  Telemetry monitoring  Continue iron supplementation

## 2019-12-04 NOTE — ASSESSMENT & PLAN NOTE
Controlled with at times hypotensive episodes, asymptomatic  Continue to hold BP Rx  Monitor blood pressure response.

## 2019-12-04 NOTE — OP REPORT
DATE OF SERVICE:  12/04/2019    PROCEDURE:  Flexible sigmoidoscopy.    PREOPERATIVE DIAGNOSIS:  Gastrointestinal bleed.    POSTOPERATIVE DIAGNOSES:  Healed internal hemorrhoids with ulcerations and   external hemorrhoids were noted.    ANESTHESIA:  I directly observed the endoscopy nurse giving fentanyl and   Versed.  Start time 1108 hours, end time 1115 hours, total of 4 mg IV Versed   and 100 mcg IV fentanyl were given for perioperative anesthesia.    DESCRIPTION OF PROCEDURE:  After informed consent and appropriate sedation,   the patient was placed in left lateral position and the therapeutic   gastroscope was advanced through the rectum near the sigmoid colon.  At that   time, the mucosa was then examined carefully and the scope was retroflexed.    There were several areas of previous ulcerations at the site of internal   hemorrhoid banding, which were healing well with no active bleeding, but there   were ulcerations as expected.  There were no significant remaining internal   hemorrhoids.  However, the patient did have external hemorrhoid tissue, but no   active bleeding at the time of exam.  The rectum was decompressed.  The scope   was withdrawn.  There were no immediate postop complications.    RECOMMENDATIONS:  1.  Recommend follow up with general surgery as an outpatient or if the   patient persistently bleeds, have them see the patient as an inpatient to   discuss potential therapy for external hemorrhoids.  2.  In the meantime, stool softeners and laxatives as needed to keep the   stools very soft and avoid from any straining.  3.  Follow up with Dr. Mills as an outpatient as needed for followup on   internal hemorrhoid banding in the future, although right now they are doing   very well.  4.  Could consider suppositories with hydrocortisone to prevent any   inflammation and to allow further healing, although this may be unnecessary   and with another week or two, I expect ulceration should heal  completely.  5.  Recommend Sitz baths twice daily.  6.  Recommend advance diet as tolerated.  7.  We will sign off for now.  Please call with any questions or concerns or   changes in the patient's status.  Once the patient's H and H is stable, okay   to consider discharge.  See above for discussion about hemorrhoid management   with surgery as an outpatient versus inpatient.  Please call with any   questions or concerns.       ____________________________________     DO JINA Antonio / JEFFERSON    DD:  12/04/2019 11:21:22  DT:  12/04/2019 11:32:25    D#:  [id number]  Job#:  [id number]

## 2019-12-04 NOTE — ED PROVIDER NOTES
ED Provider Note    Scribed for Susan Lloyd M.D. by Dania Rivers. 12/3/2019  8:51 PM    Means of arrival: Walk-in  History obtained from: Patient  History limited by: None    CHIEF COMPLAINT  Chief Complaint   Patient presents with   • Hemorrhoids   • Melena     HPI  Ariella Rendon is a 64 y.o. female with past medical history of COPD, diverticulitis, hypertension with recent admission for lower GI bleed due to internal hemorrhoids requiring blood transfusion and banding, now presenting with recurrent rectal bleeding.  Patient was discharged on the  of this month.  She states that a few days following this rectal bleeding recurred.  She endorses both dark black and bright red blood which has been ongoing since that time.  She has had lightheadedness as well as ongoing lower abdominal pain.  No dysuria or hematuria.  Patient denies hematemesis or vomiting.    REVIEW OF SYSTEMS  Pertinent positive include hemorrhoids, black and red stools, dizziness, rectal pain, abdominal pain, and headache. Pertinent negative include fever. All other systems reviewed and are negative.    PAST MEDICAL HISTORY   has a past medical history of Anemia, Anesthesia (2019), Arthritis, Breath shortness, Cancer (HCC), COPD (chronic obstructive pulmonary disease) (HCC), Dental disorder, Diverticulitis, Heart burn, Hiatus hernia syndrome, High cholesterol, Hypertension, Indigestion, Other specified disorder of intestines, Other specified symptom associated with female genital organs (1970), Oxygen dependent, Psychiatric problem, and Spinal stenosis of lumbar region.    SOCIAL HISTORY  Social History     Tobacco Use   • Smoking status: Former Smoker     Packs/day: 0.50     Years: 50.00     Pack years: 25.00     Types: Cigarettes     Last attempt to quit: 3/22/2019     Years since quittin.7   • Smokeless tobacco: Never Used   Substance and Sexual Activity   • Alcohol use: No   • Drug use: Yes     Frequency: 7.0 times per  "week     Types: Marijuana, Inhaled     Comment: marijuana   • Sexual activity: Not reported     SURGICAL HISTORY   has a past surgical history that includes gyn surgery (1970); gyn surgery (1970); low anterior resection laparoscopic (9/12/2014); other abdominal surgery; bowel resection; colonoscopy (N/A, 7/3/2018); gastroscopy-endo (N/A, 7/3/2018); lumbar fusion anterior (N/A, 10/25/2019); gastroscopy (N/A, 11/21/2019); colonoscopy (N/A, 11/21/2019); and sigmoidoscopy flex (N/A, 11/23/2019).    CURRENT MEDICATIONS  Home Medications     Reviewed by Schuyler B Collett, R.N. (Registered Nurse) on 12/03/19 at 1756  Med List Status: Partial   Medication Last Dose Status   albuterol 108 (90 Base) MCG/ACT Aero Soln inhalation aerosol  Active   amLODIPine (NORVASC) 10 MG Tab  Active   Ascorbic Acid (VITAMIN C) 250 MG Chew Tab  Active   doxepin (SINEQUAN) 10 MG Cap  Active   ferrous sulfate 325 (65 Fe) MG tablet  Active   hydroCHLOROthiazide (HYDRODIURIL) 12.5 MG tablet  Active   ipratropium-albuterol (DUONEB) 0.5-2.5 (3) MG/3ML nebulizer solution  Active   magnesium gluconate (MAG-G) 500 MG tablet  Active   morphine ER (MS CONTIN) 30 MG Tab CR tablet 12/3/2019 Active   omeprazole (PRILOSEC) 20 MG delayed-release capsule 12/3/2019 Active   ondansetron (ZOFRAN ODT) 4 MG TABLET DISPERSIBLE  Active   oxycodone (OXY-IR) 15 MG immediate release tablet 12/3/2019 Active   polyethylene glycol/lytes (MIRALAX) Pack  Active   ropinirole (REQUIP) 4 MG tablet 12/3/2019 Active   vitamin E (VITAMIN E) 1000 UNIT Cap  Active              ALLERGIES  Allergies   Allergen Reactions   • Other Environmental Rash     \"alloids in metal\"     PHYSICAL EXAM   VITAL SIGNS: /87   Pulse (!) 128   Temp 36.9 °C (98.4 °F)   Resp 18   Ht 1.676 m (5' 6\")   Wt 90.4 kg (199 lb 4.7 oz)   SpO2 94%   BMI 32.17 kg/m²    Constitutional: Elderly female, Nontoxic appearing, Alert in no apparent distress.   HENT: Normocephalic, Atraumatic. Bilateral " external ears normal. Nose normal.  Moist mucous membranes.  Oropharynx clear.  Eyes: Pupils are equal and reactive. Conjunctiva normal.   Neck: Supple, full range of motion  Heart: Tachycardic rate and regular rhythm.  No murmurs.    Lungs: No respiratory distress, normal work of breathing. Lungs clear to auscultation bilaterally.  Abdomen Soft, no distention.  No tenderness to palpation.  Rectal: Non-thrombosed external hemorrhoids. Internal exam not performed at patient request. No active bleeding.  Musculoskeletal: Atraumatic. No obvious deformities noted.  No lower extremity edema.  Skin: Warm, Dry.  No erythema, No rash. Pallor noted.  Neurologic: Alert and oriented x3. Moving all extremities spontaneously without focal deficits.  Psychiatric: Affect normal, Mood normal, Appears appropriate and not intoxicated.    DIAGNOSTIC STUDIES    LABS  Personally reviewed by me  Labs Reviewed   CBC WITH DIFFERENTIAL - Abnormal; Notable for the following components:       Result Value    RBC 2.73 (*)     Hemoglobin 6.8 (*)     Hematocrit 26.0 (*)     MCH 24.9 (*)     MCHC 26.2 (*)     RDW 56.3 (*)     Neutrophils-Polys 77.20 (*)     Lymphocytes 14.00 (*)     All other components within normal limits    Narrative:     Indicate which anticoagulants the patient is on:->UNKNOWN   COMP METABOLIC PANEL - Abnormal; Notable for the following components:    Glucose 116 (*)     AST(SGOT) 10 (*)     Alkaline Phosphatase 124 (*)     All other components within normal limits    Narrative:     Indicate which anticoagulants the patient is on:->UNKNOWN   LIPASE - Abnormal; Notable for the following components:    Lipase 6 (*)     All other components within normal limits    Narrative:     Indicate which anticoagulants the patient is on:->UNKNOWN   APTT - Abnormal; Notable for the following components:    APTT 23.8 (*)     All other components within normal limits    Narrative:     Indicate which anticoagulants the patient is on:->UNKNOWN  "  COD (ADULT)   PROTHROMBIN TIME    Narrative:     Indicate which anticoagulants the patient is on:->UNKNOWN   ESTIMATED GFR    Narrative:     Indicate which anticoagulants the patient is on:->UNKNOWN   PERIPHERAL SMEAR REVIEW    Narrative:     Indicate which anticoagulants the patient is on:->UNKNOWN   PLATELET ESTIMATE    Narrative:     Indicate which anticoagulants the patient is on:->UNKNOWN   MORPHOLOGY    Narrative:     Indicate which anticoagulants the patient is on:->UNKNOWN   DIFFERENTIAL COMMENT    Narrative:     Indicate which anticoagulants the patient is on:->UNKNOWN   HGB   RELEASE RED BLOOD CELLS   TRANSFUSE RED BLOOD CELLS-NURSING COMMUNICATION (UNITS)      RADIOLOGY  Personally reviewed by me  DX-CHEST-PORTABLE (1 VIEW)    (Results Pending)     ED COURSE  Vitals:    12/03/19 1743 12/03/19 2049 12/03/19 2056   BP: 118/87 (!) 91/74 (!) 99/70   Pulse: (!) 128 (!) 116 (!) 120   Resp: 18 18    Temp: 36.9 °C (98.4 °F)     SpO2: 94% 90% 90%   Weight: 90.4 kg (199 lb 4.7 oz)     Height: 1.676 m (5' 6\")         Medications administered:  PRBCs transfusion    Patient was given IV fluids for tachycardia and possible dehydration.  IV hydration was used because oral hydration was not adequate alone.  Following fluid administration patient's vital signs were improved.     Old records personally reviewed:  Patient recently was admitted to [facility] from 11/20-11/24 and had internal hemorrhoid banding done by Dr. Mills on 11/23.  She also had EGD and colonoscopy done prior to this without signs of active bleeding.  She was transfused while she was here and left the hospital with a hemoglobin of 8.    8:51 PM Patient seen and examined at bedside. The patient presents with hemorrhoids and melena. I told her of the plan for admission. She understands and agrees. Ordered for DX-Chest-Portable, Estimated GFR, Peripheral Smear Review, Platelet Estimate, Morphology, Differential Comment, COD (Adult), CBC with Differential, " CMP, Lipase, Prothrombin Time, and APTT to evaluate. Patient will be treated with NS Infusion for her symptoms.     MEDICAL DECISION MAKING  Patient with recent admission for lower GI bleed from internal hemorrhoid requiring blood transfusion who presents with recurrent rectal bleeding as well as lightheadedness.  She is afebrile, tachycardic and borderline hypotensive on arrival however overall nontoxic-appearing.  Abdominal exam is benign without concern for underlying surgical process such as obstruction, perforation, appendicitis, diverticulitis.  Labs demonstrate a worsening anemia at 6.8 down from 8 approximately 1 week prior.  She has no evidence of active bleeding on exam however I assume this is continued bleeding from her internal hemorrhoids.  We will initiate transfusion here in the emergency department due to significant anemia.    On reassessment, the patient's blood pressures appear to be stable without worsening hypotension.  I have discussed her findings with her and need for admission.  She has been consented for blood transfusion and is agreeable with plan of care at this time.     9:10 PM -I discussed the patient's case and the above findings with Dr. Engle (GI) who states he will consult the patient.       9:26 PM - I discussed the patient's case and the above findings with Dr. Huston (Hospitalist) who agrees to evaluate patient for hospitalization.     CRITICAL CARE TIME  Upon my evaluation, this patient had a high probability of imminent or life-threatening deterioration due to acute blood loss anemia due to lower GI bleed requiring blood transfusion which required my direct attention, intervention, and personal management.     I personally provided 36 minutes of total critical care time outside of time spent on separately billable/documented procedures. Time includes: review of laboratory data, review of radiology studies, discussion with consultants, discussion with family/patient, monitoring  for potential decompensation.  Interventions were performed as documented above.       DISPOSITION:  Patient will be hospitalized by Dr. Huston, Hospitalist, in guarded condition.     IMPRESSION  (K62.5) Rectal bleeding  (D62) Acute blood loss anemia    Results, diagnoses, and treatment options were discussed with the patient and/or family. Patient verbalized understanding of plan of care.     Dania PELAEZ (Shanda), am scribing for, and in the presence of, Susan Lloyd M.D..    Electronically signed by: Dania Rivers (Shanda), 12/3/2019    Susan PELAEZ M.D. personally performed the services described in this documentation, as scribed by Dania Rivers in my presence, and it is both accurate and complete. C    The note accurately reflects work and decisions made by me.  Susan Lloyd  12/4/2019  2:09 AM

## 2019-12-04 NOTE — PROGRESS NOTES
2 RN skin check complete with OCTAVIA Tolbert.  Skin assessed under devices yes.  Confirmed pressure ulcers found on N/A.  New potential pressure ulcers noted on N/A. Wound consult placed no.  The following interventions in place encouraged the pt to make frequent changes in body position, moisturizer provided, extra pillows for support.     All skin intact, red, blanching. Old abdominal incision, clean, dry, red, intact. Old back incision clean, dry, red, intact.

## 2019-12-04 NOTE — PROGRESS NOTES
Assumed care of patient, bedside report received from Perlita. Updated on POC, call light within reach and fall precautions in place. Bed locked and in lowest position. Patient instructed to call for assistance before getting out of bed. All questions answered, no other needs at this time.

## 2019-12-04 NOTE — PROGRESS NOTES
Received report from ER RN, Baldev. Pt resting in [facility] and does not appear to be in any distress. Blood transfusion is running. Pt states that she is not having any pain. Transferred to unit on zoll monitor. Hooked to tele monitor, . Oriented to room, and use of call light. Personal belongings at bedside, and bed in lowest locked position. Plan of care addressed. No further questions at this time.

## 2019-12-04 NOTE — DISCHARGE PLANNING
Patient is eligible for Medicaid Meds to Beds at discharge if they have coverage with [place] Medicaid, Medicaid FFS, Medicaid HMO ([facility]), or [place]. This service is provided through the [facility] if orders are received prior to 4 p.m. Monday through Friday, excluding holidays. Please call [phone] prior to discharge.

## 2019-12-04 NOTE — H&P
Hospital Medicine History & Physical Note    Date of Service  12/3/2019    Primary Care Physician  Brianne Marti M.D.    Consultants  Gi     Code Status  full    Chief Complaint  Blood in stool     History of Presenting Illness  64 y.o. female who presented 12/3/2019 with past medical history of COPD, hypertension, hyperlipidemia who presents with blood in stool.  This patient has been evaluated for GI bleed due to hemorrhoids and had hemorrhoid banding.  Her bleeding was initially stopped she was discharged on 11/24/2019.  Her symptoms returned when she was at Thanksgiving dinner.  She has had some associated rectal and abdominal pain.  She is also had some dizziness with standing.  And a headache.  She has had endoscopy and colonoscopy in the hospital that were unremarkable.  She subsequently went hemorrhoid banding.  She really presents with rectal bleeding and symptomatic anemia.  Otherwise no known alleviating or exacerbating factors to her symptoms.  She will be admitted to the hospital for further management of her symptoms.     Review of Systems  Review of Systems   Constitutional: Positive for malaise/fatigue. Negative for chills and fever.   HENT: Negative for congestion, hearing loss and tinnitus.    Eyes: Negative for blurred vision, double vision and discharge.   Respiratory: Negative for cough, hemoptysis and shortness of breath.    Cardiovascular: Negative for chest pain, palpitations and leg swelling.   Gastrointestinal: Positive for blood in stool and melena. Negative for abdominal pain, heartburn, nausea and vomiting.   Genitourinary: Negative for dysuria and flank pain.   Musculoskeletal: Negative for joint pain and myalgias.   Skin: Negative for rash.   Neurological: Positive for dizziness and headaches. Negative for sensory change, speech change, focal weakness and weakness.   Endo/Heme/Allergies: Negative for environmental allergies. Does not bruise/bleed easily.   Psychiatric/Behavioral:  "Negative for depression, hallucinations and substance abuse.       Past Medical History   has a past medical history of Anemia, Anesthesia (06/06/2019), Arthritis, Breath shortness, Cancer (HCC), COPD (chronic obstructive pulmonary disease) (HCC), Dental disorder, Diverticulitis, Heart burn, Hiatus hernia syndrome, High cholesterol, Hypertension, Indigestion, Other specified disorder of intestines, Other specified symptom associated with female genital organs (1970), Oxygen dependent, Psychiatric problem, and Spinal stenosis of lumbar region.    Surgical History   has a past surgical history that includes gyn surgery (1970); gyn surgery (1970); low anterior resection laparoscopic (9/12/2014); other abdominal surgery; bowel resection; colonoscopy (N/A, 7/3/2018); gastroscopy-endo (N/A, 7/3/2018); lumbar fusion anterior (N/A, 10/25/2019); gastroscopy (N/A, 11/21/2019); colonoscopy (N/A, 11/21/2019); and sigmoidoscopy flex (N/A, 11/23/2019).     Family History  family history includes Cancer in her father; Hypertension in her brother; Lung Disease in her father; Scoliosis in her mother.     Social History   reports that she quit smoking about 8 months ago. Her smoking use included cigarettes. She has a 25.00 pack-year smoking history. She has never used smokeless tobacco. She reports current drug use. Frequency: 7.00 times per week. Drugs: Marijuana and Inhaled. She reports that she does not drink alcohol.    Allergies  Allergies   Allergen Reactions   • Other Environmental Rash     \"alloids in metal\"       Medications  Prior to Admission Medications   Prescriptions Last Dose Informant Patient Reported? Taking?   Ascorbic Acid (VITAMIN C) 250 MG Chew Tab  Patient Yes No   Sig: Take 250 mg by mouth 2 Times a Day.   albuterol 108 (90 Base) MCG/ACT Aero Soln inhalation aerosol  Patient Yes No   Sig: Inhale 2 Puffs by mouth every 6 hours as needed for Shortness of Breath.   amLODIPine (NORVASC) 10 MG Tab  Patient Yes No "   Sig: Take 10 mg by mouth every day.   doxepin (SINEQUAN) 10 MG Cap  Patient Yes No   Sig: Take 20 mg by mouth every evening.   ferrous sulfate 325 (65 Fe) MG tablet  Patient Yes No   Sig: Take 325 mg by mouth 3 times a day.   hydroCHLOROthiazide (HYDRODIURIL) 12.5 MG tablet  Patient Yes No   Sig: Take 12.5 mg by mouth every day.   ipratropium-albuterol (DUONEB) 0.5-2.5 (3) MG/3ML nebulizer solution  Patient Yes No   Sig: 3 mL by Nebulization route every 6 hours as needed for Shortness of Breath.   magnesium gluconate (MAG-G) 500 MG tablet  Patient Yes No   Sig: Take 1,000 mg by mouth 2 Times a Day.   morphine ER (MS CONTIN) 30 MG Tab CR tablet 12/3/2019 at 0700 Patient Yes No   Sig: Take 30 mg by mouth every 12 hours.   omeprazole (PRILOSEC) 20 MG delayed-release capsule 12/3/2019 at 0700  No No   Sig: Take 1 Cap by mouth 2 times a day.   ondansetron (ZOFRAN ODT) 4 MG TABLET DISPERSIBLE   No No   Sig: Take 1 Tab by mouth every four hours as needed for Nausea (give PO if no IV route available).   oxycodone (OXY-IR) 15 MG immediate release tablet 12/3/2019 at 1300 Patient Yes No   Sig: Take 15 mg by mouth every four hours as needed for Severe Pain.   polyethylene glycol/lytes (MIRALAX) Pack  Patient Yes No   Sig: Take 17 g by mouth every day.   ropinirole (REQUIP) 4 MG tablet 12/3/2019 at 0700 Patient Yes No   Sig: Take 4 mg by mouth 4 times a day.   vitamin E (VITAMIN E) 1000 UNIT Cap  Patient Yes No   Sig: Take 1,000 Units by mouth every day.      Facility-Administered Medications: None       Physical Exam  Temp:  [36.9 °C (98.4 °F)] 36.9 °C (98.4 °F)  Pulse:  [116-128] 120  Resp:  [18] 18  BP: ()/(70-87) 99/70  SpO2:  [90 %-94 %] 90 %    Physical Exam  Vitals signs reviewed.   Constitutional:       Appearance: Normal appearance. She is ill-appearing.   HENT:      Head: Normocephalic and atraumatic.      Nose: No congestion.   Eyes:      Extraocular Movements: Extraocular movements intact.      Pupils:  Pupils are equal, round, and reactive to light.   Neck:      Musculoskeletal: Normal range of motion and neck supple. No muscular tenderness.   Cardiovascular:      Rate and Rhythm: Regular rhythm. Tachycardia present.      Pulses: Normal pulses.      Heart sounds: Normal heart sounds. No murmur.   Pulmonary:      Effort: Pulmonary effort is normal. No respiratory distress.      Breath sounds: Normal breath sounds. No stridor.   Abdominal:      General: Bowel sounds are normal. There is no distension.      Palpations: Abdomen is soft.      Tenderness: There is tenderness.   Musculoskeletal:         General: No swelling or deformity.   Skin:     General: Skin is warm and dry.      Capillary Refill: Capillary refill takes less than 2 seconds.      Coloration: Skin is pale.   Neurological:      General: No focal deficit present.      Mental Status: She is alert and oriented to person, place, and time.   Psychiatric:         Mood and Affect: Mood normal.         Behavior: Behavior normal.         Thought Content: Thought content normal.         Judgment: Judgment normal.         Laboratory:  Recent Labs     12/03/19  1838   WBC 8.4   RBC 2.73*   HEMOGLOBIN 6.8*   HEMATOCRIT 26.0*   MCV 95.2   MCH 24.9*   MCHC 26.2*   RDW 56.3*   PLATELETCT 342   MPV 11.1     Recent Labs     12/03/19  1838   SODIUM 139   POTASSIUM 4.2   CHLORIDE 104   CO2 30   GLUCOSE 116*   BUN 14   CREATININE 0.74   CALCIUM 9.3     Recent Labs     12/03/19  1838   ALTSGPT 10   ASTSGOT 10*   ALKPHOSPHAT 124*   TBILIRUBIN 0.3   LIPASE 6*   GLUCOSE 116*     Recent Labs     12/03/19  1838   APTT 23.8*   INR 0.94     No results for input(s): NTPROBNP in the last 72 hours.      No results for input(s): TROPONINT in the last 72 hours.    Urinalysis:    No results found     Imaging:  DX-CHEST-PORTABLE (1 VIEW)   Final Result      No acute cardiac or pulmonary abnormalities are identified.            Assessment/Plan:  I anticipate this patient will require at  least two midnights for appropriate medical management, necessitating inpatient admission.    * GI bleed- (present on admission)  Assessment & Plan  Acute GI bleed, hx of hemrrhoid banding at last hospitalization   S/p EGD and Colonscopy last hospitalization   Serial hgb for now   PPI BID   GI consulted by ERP     Sinus tachycardia- (present on admission)  Assessment & Plan  Assess response to 1 unit PRBC     Blood loss anemia- (present on admission)  Assessment & Plan  Due to GI bleed   Serial hgb ordered   1 unit PRBC transfuse now as Hgb <7      GERD (gastroesophageal reflux disease)- (present on admission)  Assessment & Plan  PPI BID    Restless leg syndrome- (present on admission)  Assessment & Plan  Resume home requip     EMILY (obstructive sleep apnea)- (present on admission)  Assessment & Plan  CPAP     Hypertension- (present on admission)  Assessment & Plan  Hold home BP medications as her BP is marginal     Chronic respiratory failure (HCC)- (present on admission)  Assessment & Plan  At baseline 02     COPD (chronic obstructive pulmonary disease) (HCC)- (present on admission)  Assessment & Plan  Not in acute exacerbation   resp care protocol ordered      VTE prophylaxis: scd

## 2019-12-04 NOTE — ED NOTES
Med rec updated and complete. Allergies reviewed.  Met with pt at bedside. Dicussed  Medications and last doses taken. Pt denies antibiotic use in last 14 days.  Pt takes her oxycodone and ropinirole  Every 6 hours including during the night.  [facility].

## 2019-12-04 NOTE — ED NOTES
Assumed care of pt. Pt c/o melena. Pt is pale and becomes dizzy when standing. Hx of GI bleeds per pt. Pt AAOx4. VSS. No signs of distress. Side rails up.

## 2019-12-04 NOTE — CONSULTS
DATE OF SERVICE:  12/03/2019    GI CONSULTATION    REASON FOR CONSULTATION:  ER physician.    HISTORY OF PRESENT ILLNESS:  The patient is a 64-year-old female with a   history of bleeding hemorrhoids dating back per her report approximately 2   years.  She underwent a flexible sigmoidoscopy by my partner, Dr. Mills, on   11/23 with findings of grade III to IV internal hemorrhoids, which were   banded.  The patient actually did very well for several days without any   bleeding thereafter, though started having recurrent bleeding of bright red   blood, which was significant, oozing fairly freely.  She presented to the   emergency room, and was found to have worsening anemia with a hemoglobin of   about 6.5.  She previously had a hemoglobin of about 8.  She is not having any   chest pain, lightheadedness, or dizziness currently.  She is receiving some   blood at this time in the emergency department.  She does not appear to be   actively bleeding.    PAST MEDICAL HISTORY:  Significant for grade III to IV bleeding hemorrhoids,   history of COPD, history of cervical cancer, history of heartburn, history of   dyslipidemia, hypertension, obesity, oxygen dependent, history of depression.    PAST SURGICAL HISTORY:  Includes recent back surgery with anterior lumbar   fusion.  Her neurosurgeon is Dr. Lemons.  Patient has also had hysterectomy and   recent upper and lower endoscopies.    MEDICATIONS:  As per chart, patient is on no blood thinning agents.    ALLERGIES:  No known drug allergies.    SOCIAL HISTORY:  Negative for alcohol or tobacco.    FAMILY HISTORY:  Negative for GI disease.    REVIEW OF SYSTEMS:  Significant only for some weakness and very recent   significant bright red blood per rectum.  She denies any fevers or chills, any   chest pain or shortness of breath.  No nausea or vomiting, no abdominal pain,   no dysuria or hematuria.  No lower extremity swelling, numbness, tingling,   pain or weakness.    PHYSICAL  EXAMINATION:  VITAL SIGNS:  Temperature is 37.2, pulse 108, blood pressure is 109/63.  GENERAL:  This is a pale, but otherwise normal appearing female.  She is alert   and oriented in no apparent distress.  HEENT:  Sclerae are anicteric.  LUNGS:  Clear.  HEART:  Regular rhythm.  ABDOMEN:  Soft, nontender, nondistended.  External rectal examination reveals   no prolapsing hemorrhoids.  EXTREMITIES:  No rashes or edema present.  Pulses are present in the distal   extremities bilaterally.    LABORATORY DATA:  Hemoglobin 6.8, platelet count 342,000, white cell count   8.4.  Chemistry panel is normal.  INR is 0.94.    ASSESSMENT AND PLAN:  1.  Recurrent hemorrhoidal bleeding.  2.  Recent hemorrhoidal banding on 11/23.  3.  History of chronic obstructive pulmonary disease, oxygen dependent.  4.  Dyslipidemia.  5.  Hypertension.  6.  Depression.  7.  Obesity.  8.  Recent lumbar fusion.    DISCUSSION:  The patient is a 64-year-old female with what appears to be a   recurrent bleeding after her initial banding on 11/23.  She is not actively   bleeding currently.  She is currently being transfused with PRBCs to correct   her current hemoglobin of 6.4.  Her platelet count and INR are normal.  I   discussed options with the patient including repeat flexible sigmoidoscopy   with reevaluation with the intention for further banding versus surgery.  I do   think she is a good candidate for endoscopic treatment given the fact that   she is not having any prolapsing hemorrhoids at this time.  She is agreeable   to pursuing endoscopic treatment.  We will therefore place her on a clear   liquid diet, give her some laxatives, and plan for repeat flexible   sigmoidoscopy with banding tomorrow morning.    Thank you for allowing me to participate in the care of this patient.       ____________________________________     CASSANDRA PATEL MD    WP / NTS    DD:  12/03/2019 22:18:28  DT:  12/04/2019 00:44:19    D#:  [id number]  Job#:  [id number]

## 2019-12-04 NOTE — ASSESSMENT & PLAN NOTE
Acute GI bleed, hx of hemrrhoid banding at last hospitalization.  Post flex sigmoid with findings of healed internal hemorrhoids  ulceration in external hemorrhoids.  Persistent hemorrhoidal bleeding.  Post hemorrhoidectomy with improved symptoms, decrease rectal bleeding and pain  Pain control-PRN IV morphine, oxycodone  Hep-Lock IV fluids  Sitz bath  Laxatives to keep stool soft  Follow-up hemoglobin  Discussed with surgery.  Anticipate discharge tomorrow if continued stable

## 2019-12-04 NOTE — ED TRIAGE NOTES
Ariella Rendon presents to triage reporting black and red stools with BRB x5 days. Pt was admitted to [facility] on 11/20-11/24 and had hemorrhoid banding.  Bleeding stopped, pt was d/damaris and then it promptly resumed. Pt is now pale and dizzy when standing.  .Pt speaking in full sentences, NAD noted. Pt educated of triage process, placed back in waiting area pending room assignment.

## 2019-12-05 ENCOUNTER — PATIENT OUTREACH (OUTPATIENT)
Dept: HEALTH INFORMATION MANAGEMENT | Facility: OTHER | Age: 64
End: 2019-12-05

## 2019-12-05 LAB
ANION GAP SERPL CALC-SCNC: 7 MMOL/L (ref 0–11.9)
BASOPHILS # BLD AUTO: 0.6 % (ref 0–1.8)
BASOPHILS # BLD AUTO: 0.7 % (ref 0–1.8)
BASOPHILS # BLD: 0.04 K/UL (ref 0–0.12)
BASOPHILS # BLD: 0.06 K/UL (ref 0–0.12)
BUN SERPL-MCNC: 11 MG/DL (ref 8–22)
CALCIUM SERPL-MCNC: 8.8 MG/DL (ref 8.5–10.5)
CHLORIDE SERPL-SCNC: 107 MMOL/L (ref 96–112)
CO2 SERPL-SCNC: 26 MMOL/L (ref 20–33)
CREAT SERPL-MCNC: 0.66 MG/DL (ref 0.5–1.4)
EOSINOPHIL # BLD AUTO: 0.11 K/UL (ref 0–0.51)
EOSINOPHIL # BLD AUTO: 0.11 K/UL (ref 0–0.51)
EOSINOPHIL NFR BLD: 1.3 % (ref 0–6.9)
EOSINOPHIL NFR BLD: 1.5 % (ref 0–6.9)
ERYTHROCYTE [DISTWIDTH] IN BLOOD BY AUTOMATED COUNT: 53.2 FL (ref 35.9–50)
ERYTHROCYTE [DISTWIDTH] IN BLOOD BY AUTOMATED COUNT: 54.7 FL (ref 35.9–50)
GLUCOSE SERPL-MCNC: 119 MG/DL (ref 65–99)
HCT VFR BLD AUTO: 25 % (ref 37–47)
HCT VFR BLD AUTO: 28.5 % (ref 37–47)
HGB BLD-MCNC: 6.9 G/DL (ref 12–16)
HGB BLD-MCNC: 7.1 G/DL (ref 12–16)
HGB BLD-MCNC: 7.5 G/DL (ref 12–16)
HGB BLD-MCNC: 8 G/DL (ref 12–16)
IMM GRANULOCYTES # BLD AUTO: 0.03 K/UL (ref 0–0.11)
IMM GRANULOCYTES # BLD AUTO: 0.07 K/UL (ref 0–0.11)
IMM GRANULOCYTES NFR BLD AUTO: 0.4 % (ref 0–0.9)
IMM GRANULOCYTES NFR BLD AUTO: 0.8 % (ref 0–0.9)
LYMPHOCYTES # BLD AUTO: 1 K/UL (ref 1–4.8)
LYMPHOCYTES # BLD AUTO: 1.14 K/UL (ref 1–4.8)
LYMPHOCYTES NFR BLD: 13.8 % (ref 22–41)
LYMPHOCYTES NFR BLD: 13.8 % (ref 22–41)
MCH RBC QN AUTO: 25.9 PG (ref 27–33)
MCH RBC QN AUTO: 26.4 PG (ref 27–33)
MCHC RBC AUTO-ENTMCNC: 27.6 G/DL (ref 33.6–35)
MCHC RBC AUTO-ENTMCNC: 28.1 G/DL (ref 33.6–35)
MCV RBC AUTO: 94 FL (ref 81.4–97.8)
MCV RBC AUTO: 94.1 FL (ref 81.4–97.8)
MONOCYTES # BLD AUTO: 0.42 K/UL (ref 0–0.85)
MONOCYTES # BLD AUTO: 0.47 K/UL (ref 0–0.85)
MONOCYTES NFR BLD AUTO: 5.1 % (ref 0–13.4)
MONOCYTES NFR BLD AUTO: 6.5 % (ref 0–13.4)
NEUTROPHILS # BLD AUTO: 5.62 K/UL (ref 2–7.15)
NEUTROPHILS # BLD AUTO: 6.46 K/UL (ref 2–7.15)
NEUTROPHILS NFR BLD: 77.2 % (ref 44–72)
NEUTROPHILS NFR BLD: 78.3 % (ref 44–72)
NRBC # BLD AUTO: 0 K/UL
NRBC # BLD AUTO: 0.03 K/UL
NRBC BLD-RTO: 0 /100 WBC
NRBC BLD-RTO: 0.4 /100 WBC
PLATELET # BLD AUTO: 269 K/UL (ref 164–446)
PLATELET # BLD AUTO: 289 K/UL (ref 164–446)
PMV BLD AUTO: 11.8 FL (ref 9–12.9)
PMV BLD AUTO: 11.8 FL (ref 9–12.9)
POTASSIUM SERPL-SCNC: 4.2 MMOL/L (ref 3.6–5.5)
RBC # BLD AUTO: 2.66 M/UL (ref 4.2–5.4)
RBC # BLD AUTO: 3.03 M/UL (ref 4.2–5.4)
SODIUM SERPL-SCNC: 140 MMOL/L (ref 135–145)
WBC # BLD AUTO: 7.3 K/UL (ref 4.8–10.8)
WBC # BLD AUTO: 8.3 K/UL (ref 4.8–10.8)

## 2019-12-05 PROCEDURE — 700111 HCHG RX REV CODE 636 W/ 250 OVERRIDE (IP): Performed by: HOSPITALIST

## 2019-12-05 PROCEDURE — 36415 COLL VENOUS BLD VENIPUNCTURE: CPT

## 2019-12-05 PROCEDURE — 85025 COMPLETE CBC W/AUTO DIFF WBC: CPT

## 2019-12-05 PROCEDURE — 99233 SBSQ HOSP IP/OBS HIGH 50: CPT | Performed by: HOSPITALIST

## 2019-12-05 PROCEDURE — A9270 NON-COVERED ITEM OR SERVICE: HCPCS | Performed by: HOSPITALIST

## 2019-12-05 PROCEDURE — 700102 HCHG RX REV CODE 250 W/ 637 OVERRIDE(OP): Performed by: HOSPITALIST

## 2019-12-05 PROCEDURE — C9113 INJ PANTOPRAZOLE SODIUM, VIA: HCPCS | Performed by: HOSPITALIST

## 2019-12-05 PROCEDURE — 85018 HEMOGLOBIN: CPT

## 2019-12-05 PROCEDURE — 770020 HCHG ROOM/CARE - TELE (206)

## 2019-12-05 RX ORDER — OMEPRAZOLE 20 MG/1
20 CAPSULE, DELAYED RELEASE ORAL 2 TIMES DAILY
Status: DISCONTINUED | OUTPATIENT
Start: 2019-12-05 | End: 2019-12-09 | Stop reason: HOSPADM

## 2019-12-05 RX ORDER — DOXEPIN HYDROCHLORIDE 25 MG/1
25 CAPSULE ORAL NIGHTLY
Status: DISCONTINUED | OUTPATIENT
Start: 2019-12-05 | End: 2019-12-09 | Stop reason: HOSPADM

## 2019-12-05 RX ORDER — SODIUM CHLORIDE, SODIUM LACTATE, POTASSIUM CHLORIDE, CALCIUM CHLORIDE 600; 310; 30; 20 MG/100ML; MG/100ML; MG/100ML; MG/100ML
INJECTION, SOLUTION INTRAVENOUS CONTINUOUS
Status: DISCONTINUED | OUTPATIENT
Start: 2019-12-06 | End: 2019-12-07

## 2019-12-05 RX ADMIN — OXYCODONE HYDROCHLORIDE 15 MG: 10 TABLET ORAL at 07:19

## 2019-12-05 RX ADMIN — DOXEPIN HYDROCHLORIDE 25 MG: 25 CAPSULE ORAL at 20:18

## 2019-12-05 RX ADMIN — MORPHINE SULFATE 30 MG: 30 TABLET, FILM COATED, EXTENDED RELEASE ORAL at 17:55

## 2019-12-05 RX ADMIN — OMEPRAZOLE 20 MG: 20 CAPSULE, DELAYED RELEASE ORAL at 16:15

## 2019-12-05 RX ADMIN — ROPINIROLE HYDROCHLORIDE 4 MG: 2 TABLET, FILM COATED ORAL at 14:43

## 2019-12-05 RX ADMIN — OXYCODONE HYDROCHLORIDE 15 MG: 10 TABLET ORAL at 15:24

## 2019-12-05 RX ADMIN — ROPINIROLE HYDROCHLORIDE 4 MG: 2 TABLET, FILM COATED ORAL at 20:17

## 2019-12-05 RX ADMIN — FERROUS SULFATE TAB 325 MG (65 MG ELEMENTAL FE) 325 MG: 325 (65 FE) TAB at 16:15

## 2019-12-05 RX ADMIN — OXYCODONE HYDROCHLORIDE 15 MG: 10 TABLET ORAL at 20:17

## 2019-12-05 RX ADMIN — PANTOPRAZOLE SODIUM 40 MG: 40 INJECTION, POWDER, LYOPHILIZED, FOR SOLUTION INTRAVENOUS at 04:34

## 2019-12-05 RX ADMIN — ROPINIROLE HYDROCHLORIDE 4 MG: 2 TABLET, FILM COATED ORAL at 10:15

## 2019-12-05 RX ADMIN — ROPINIROLE HYDROCHLORIDE 4 MG: 2 TABLET, FILM COATED ORAL at 17:55

## 2019-12-05 RX ADMIN — OXYCODONE HYDROCHLORIDE 15 MG: 10 TABLET ORAL at 11:25

## 2019-12-05 RX ADMIN — MORPHINE SULFATE 30 MG: 30 TABLET, FILM COATED, EXTENDED RELEASE ORAL at 04:34

## 2019-12-05 RX ADMIN — FERROUS SULFATE TAB 325 MG (65 MG ELEMENTAL FE) 325 MG: 325 (65 FE) TAB at 04:34

## 2019-12-05 RX ADMIN — MAGNESIUM HYDROXIDE 30 ML: 400 SUSPENSION ORAL at 11:25

## 2019-12-05 RX ADMIN — FERROUS SULFATE TAB 325 MG (65 MG ELEMENTAL FE) 325 MG: 325 (65 FE) TAB at 11:25

## 2019-12-05 RX ADMIN — MAGNESIUM HYDROXIDE 30 ML: 400 SUSPENSION ORAL at 16:15

## 2019-12-05 RX ADMIN — SENNOSIDES AND DOCUSATE SODIUM 2 TABLET: 8.6; 5 TABLET ORAL at 16:15

## 2019-12-05 RX ADMIN — SENNOSIDES AND DOCUSATE SODIUM 2 TABLET: 8.6; 5 TABLET ORAL at 04:34

## 2019-12-05 ASSESSMENT — ENCOUNTER SYMPTOMS
BACK PAIN: 0
SHORTNESS OF BREATH: 0
COUGH: 0
FLANK PAIN: 0
FEVER: 0
VOMITING: 0
HALLUCINATIONS: 0
CHILLS: 0
PALPITATIONS: 0
WEAKNESS: 0
WHEEZING: 0
FOCAL WEAKNESS: 0
STRIDOR: 0
ABDOMINAL PAIN: 0
BLOOD IN STOOL: 1
SPEECH CHANGE: 0
NECK PAIN: 0
NERVOUS/ANXIOUS: 1
DIARRHEA: 0
DIAPHORESIS: 0

## 2019-12-05 ASSESSMENT — LIFESTYLE VARIABLES: SUBSTANCE_ABUSE: 0

## 2019-12-05 NOTE — PROGRESS NOTES
Hospital Medicine Daily Progress Note    Date of Service  12/4/2019    Chief Complaint  64 y.o. female admitted 12/3/2019 with bloody stools     Hospital Course      Hx of COPD, Htn , dyslipidemia, hemorroids- had previously followed up with surgery admitted with acute lower GI bleed.  She had history of hemorrhoid banding at last hospitalization.. GI consulted.  Started on IV fluids.    Interval Problem Update    Post flex sig with findings of healed internal hemorrhoids with ulceration and external hemorrhoids.  Had two  bloody bowel movements overnight.  Hemoglobin declined to 6.8.  Packed RBC transfusion ordered.    Consultants/Specialty  Dr Mills    Code Status  Full     Disposition    Anticipate dc home when stable.     Review of Systems  Review of Systems   Constitutional: Positive for malaise/fatigue. Negative for chills, diaphoresis and fever.   HENT: Negative for congestion.    Respiratory: Negative for cough, shortness of breath, wheezing and stridor.    Cardiovascular: Negative for chest pain, palpitations and leg swelling.   Gastrointestinal: Positive for blood in stool. Negative for abdominal pain, diarrhea and vomiting.   Genitourinary: Negative for flank pain and hematuria.   Musculoskeletal: Negative for back pain, joint pain and neck pain.   Neurological: Negative for tremors, sensory change, speech change, focal weakness and weakness.   Psychiatric/Behavioral: Negative for hallucinations and substance abuse.        Physical Exam  Temp:  [36.2 °C (97.2 °F)-37.7 °C (99.9 °F)] 36.8 °C (98.2 °F)  Pulse:  [] 90  Resp:  [15-34] 16  BP: ()/(41-75) 111/50  SpO2:  [89 %-99 %] 93 %    Physical Exam  Constitutional:       General: She is not in acute distress.     Appearance: She is obese. She is not diaphoretic.   HENT:      Head: Normocephalic and atraumatic.      Right Ear: External ear normal.      Left Ear: External ear normal.      Nose: Nose normal.   Eyes:      General: No scleral  icterus.     Extraocular Movements: Extraocular movements intact.      Conjunctiva/sclera: Conjunctivae normal.   Neck:      Musculoskeletal: Normal range of motion and neck supple.   Cardiovascular:      Rate and Rhythm: Normal rate and regular rhythm.      Heart sounds: No murmur.   Pulmonary:      Breath sounds: No wheezing, rhonchi or rales.   Abdominal:      General: Abdomen is flat. Bowel sounds are normal. There is no distension.      Palpations: Abdomen is soft.   Musculoskeletal:         General: No swelling or tenderness.   Skin:     General: Skin is warm and dry.      Coloration: Skin is pale. Skin is not jaundiced.   Neurological:      Mental Status: She is alert.   Psychiatric:         Mood and Affect: Mood normal.         Behavior: Behavior normal.         Fluids    Intake/Output Summary (Last 24 hours) at 12/4/2019 2000  Last data filed at 12/4/2019 0230  Gross per 24 hour   Intake 991.25 ml   Output --   Net 991.25 ml       Laboratory  Recent Labs     12/03/19  1838 12/04/19  0533 12/04/19  1322   WBC 8.4 7.8  --    RBC 2.73* 2.82*  --    HEMOGLOBIN 6.8* 7.3* 6.8*   HEMATOCRIT 26.0* 26.8*  --    MCV 95.2 95.0  --    MCH 24.9* 25.9*  --    MCHC 26.2* 27.2*  --    RDW 56.3* 55.0*  --    PLATELETCT 342 302  --    MPV 11.1 11.6  --      Recent Labs     12/03/19  1838 12/04/19  0533   SODIUM 139 139   POTASSIUM 4.2 4.3   CHLORIDE 104 105   CO2 30 30   GLUCOSE 116* 106*   BUN 14 10   CREATININE 0.74 0.64   CALCIUM 9.3 8.7     Recent Labs     12/03/19 1838   APTT 23.8*   INR 0.94               Imaging  DX-CHEST-PORTABLE (1 VIEW)   Final Result      No acute cardiac or pulmonary abnormalities are identified.           Assessment/Plan  * GI bleed- (present on admission)  Assessment & Plan  Acute GI bleed, hx of hemrrhoid banding at last hospitalization.  With persistent symptoms.  Post flex sigmoid with findings of healed internal hemorrhoids  ulceration in external hemorrhoids  Follow serial H/H  GI  input  We will consult her previous surgeon Dr. Silva if recurrent hemorrhoidal bleeding    Sinus tachycardia- (present on admission)  Assessment & Plan  Resolved  Monitor telemetry  Plan additional packed RBC transfusion    Blood loss anemia- (present on admission)  Assessment & Plan  Worsen, hemoglobin 6.8  I have ordered packed RBC zovyebjzycq-vdzypk-ic hemoglobin  Monitor serial H/H  Transfuse if hemoglobin less than 7  Telemetry monitoring  Continue iron supplementation      Chronic pain  Assessment & Plan  Back  Continue with home scheduled MS Contin, PRN oxycodone    GERD (gastroesophageal reflux disease)- (present on admission)  Assessment & Plan  Continue PPI therapy    Restless leg syndrome- (present on admission)  Assessment & Plan  Resume home requip     EMILY (obstructive sleep apnea)- (present on admission)  Assessment & Plan  CPAP     Hypertension- (present on admission)  Assessment & Plan  Hold home Norvasc, diuretic with recent mild hypotension and acute GI bleed  Monitor blood pressure    Chronic respiratory failure (HCC)- (present on admission)  Assessment & Plan  At baseline 02     COPD (chronic obstructive pulmonary disease) (HCC)- (present on admission)  Assessment & Plan  Not in acute exacerbation   resp care protocol ordered       VTE prophylaxis: SCDs    I Discussed with multidisciplinary team plan of care.

## 2019-12-05 NOTE — CARE PLAN
Problem: Knowledge Deficit  Goal: Knowledge of disease process/condition, treatment plan, diagnostic tests, and medications will improve  Outcome: PROGRESSING AS EXPECTED  Intervention: Assess knowledge level of disease process/condition, treatment plan, diagnostic tests, and medications  Note:   Pt educated regarding plan of care and medications. All questions answered.

## 2019-12-05 NOTE — PROGRESS NOTES
Received report from day shift RNSamantha. Pt is resting in bed and does not appear to be in any distress. Call light and personal belongings within reach. Bed in lowest locked position. POC addressed, white board updated. No further questions at this time.

## 2019-12-06 ENCOUNTER — ANESTHESIA EVENT (OUTPATIENT)
Dept: SURGERY | Facility: MEDICAL CENTER | Age: 64
DRG: 348 | End: 2019-12-06
Payer: MEDICAID

## 2019-12-06 ENCOUNTER — ANESTHESIA (OUTPATIENT)
Dept: SURGERY | Facility: MEDICAL CENTER | Age: 64
DRG: 348 | End: 2019-12-06
Payer: MEDICAID

## 2019-12-06 LAB
HGB BLD-MCNC: 7.4 G/DL (ref 12–16)
HGB BLD-MCNC: 7.7 G/DL (ref 12–16)
HGB BLD-MCNC: 8 G/DL (ref 12–16)
HGB BLD-MCNC: 8.4 G/DL (ref 12–16)
PATHOLOGY CONSULT NOTE: NORMAL

## 2019-12-06 PROCEDURE — 770020 HCHG ROOM/CARE - TELE (206)

## 2019-12-06 PROCEDURE — 86923 COMPATIBILITY TEST ELECTRIC: CPT

## 2019-12-06 PROCEDURE — A9270 NON-COVERED ITEM OR SERVICE: HCPCS | Performed by: HOSPITALIST

## 2019-12-06 PROCEDURE — 06BY0ZC EXCISION OF HEMORRHOIDAL PLEXUS, OPEN APPROACH: ICD-10-PCS | Performed by: SURGERY

## 2019-12-06 PROCEDURE — 160009 HCHG ANES TIME/MIN: Performed by: SURGERY

## 2019-12-06 PROCEDURE — 160035 HCHG PACU - 1ST 60 MINS PHASE I: Performed by: SURGERY

## 2019-12-06 PROCEDURE — 160002 HCHG RECOVERY MINUTES (STAT): Performed by: SURGERY

## 2019-12-06 PROCEDURE — 36430 TRANSFUSION BLD/BLD COMPNT: CPT

## 2019-12-06 PROCEDURE — 99232 SBSQ HOSP IP/OBS MODERATE 35: CPT | Performed by: HOSPITALIST

## 2019-12-06 PROCEDURE — 160028 HCHG SURGERY MINUTES - 1ST 30 MINS LEVEL 3: Performed by: SURGERY

## 2019-12-06 PROCEDURE — 88304 TISSUE EXAM BY PATHOLOGIST: CPT

## 2019-12-06 PROCEDURE — 160048 HCHG OR STATISTICAL LEVEL 1-5: Performed by: SURGERY

## 2019-12-06 PROCEDURE — 700101 HCHG RX REV CODE 250: Performed by: SURGERY

## 2019-12-06 PROCEDURE — 501838 HCHG SUTURE GENERAL: Performed by: SURGERY

## 2019-12-06 PROCEDURE — 36415 COLL VENOUS BLD VENIPUNCTURE: CPT

## 2019-12-06 PROCEDURE — 700102 HCHG RX REV CODE 250 W/ 637 OVERRIDE(OP): Performed by: HOSPITALIST

## 2019-12-06 PROCEDURE — 700105 HCHG RX REV CODE 258: Performed by: HOSPITALIST

## 2019-12-06 PROCEDURE — 700105 HCHG RX REV CODE 258

## 2019-12-06 PROCEDURE — 500892 HCHG PACK, PERI-GYN: Performed by: SURGERY

## 2019-12-06 PROCEDURE — 500423 HCHG DRESSING, ABD COMBINE: Performed by: SURGERY

## 2019-12-06 PROCEDURE — 700111 HCHG RX REV CODE 636 W/ 250 OVERRIDE (IP): Performed by: ANESTHESIOLOGY

## 2019-12-06 PROCEDURE — P9016 RBC LEUKOCYTES REDUCED: HCPCS

## 2019-12-06 PROCEDURE — 700105 HCHG RX REV CODE 258: Performed by: ANESTHESIOLOGY

## 2019-12-06 PROCEDURE — 160039 HCHG SURGERY MINUTES - EA ADDL 1 MIN LEVEL 3: Performed by: SURGERY

## 2019-12-06 PROCEDURE — 700111 HCHG RX REV CODE 636 W/ 250 OVERRIDE (IP): Performed by: HOSPITALIST

## 2019-12-06 PROCEDURE — 700101 HCHG RX REV CODE 250: Performed by: ANESTHESIOLOGY

## 2019-12-06 PROCEDURE — 85018 HEMOGLOBIN: CPT | Mod: 91

## 2019-12-06 RX ORDER — PHENYLEPHRINE HYDROCHLORIDE 10 MG/ML
INJECTION, SOLUTION INTRAMUSCULAR; INTRAVENOUS; SUBCUTANEOUS PRN
Status: DISCONTINUED | OUTPATIENT
Start: 2019-12-06 | End: 2019-12-06 | Stop reason: SURG

## 2019-12-06 RX ORDER — METOPROLOL TARTRATE 1 MG/ML
1 INJECTION, SOLUTION INTRAVENOUS
Status: DISCONTINUED | OUTPATIENT
Start: 2019-12-06 | End: 2019-12-06 | Stop reason: HOSPADM

## 2019-12-06 RX ORDER — LORAZEPAM 2 MG/ML
1 INJECTION INTRAMUSCULAR ONCE
Status: COMPLETED | OUTPATIENT
Start: 2019-12-06 | End: 2019-12-06

## 2019-12-06 RX ORDER — SODIUM CHLORIDE 9 MG/ML
INJECTION, SOLUTION INTRAVENOUS
Status: COMPLETED
Start: 2019-12-06 | End: 2019-12-06

## 2019-12-06 RX ORDER — CEFAZOLIN SODIUM 1 G/3ML
INJECTION, POWDER, FOR SOLUTION INTRAMUSCULAR; INTRAVENOUS PRN
Status: DISCONTINUED | OUTPATIENT
Start: 2019-12-06 | End: 2019-12-06 | Stop reason: SURG

## 2019-12-06 RX ORDER — DEXAMETHASONE SODIUM PHOSPHATE 4 MG/ML
INJECTION, SOLUTION INTRA-ARTICULAR; INTRALESIONAL; INTRAMUSCULAR; INTRAVENOUS; SOFT TISSUE PRN
Status: DISCONTINUED | OUTPATIENT
Start: 2019-12-06 | End: 2019-12-06 | Stop reason: SURG

## 2019-12-06 RX ORDER — ONDANSETRON 2 MG/ML
4 INJECTION INTRAMUSCULAR; INTRAVENOUS
Status: DISCONTINUED | OUTPATIENT
Start: 2019-12-06 | End: 2019-12-06 | Stop reason: HOSPADM

## 2019-12-06 RX ORDER — HALOPERIDOL 5 MG/ML
1 INJECTION INTRAMUSCULAR
Status: DISCONTINUED | OUTPATIENT
Start: 2019-12-06 | End: 2019-12-06 | Stop reason: HOSPADM

## 2019-12-06 RX ORDER — MAGNESIUM SULFATE HEPTAHYDRATE 500 MG/ML
INJECTION, SOLUTION INTRAMUSCULAR; INTRAVENOUS PRN
Status: DISCONTINUED | OUTPATIENT
Start: 2019-12-06 | End: 2019-12-06 | Stop reason: SURG

## 2019-12-06 RX ORDER — OXYCODONE HCL 5 MG/5 ML
5 SOLUTION, ORAL ORAL
Status: DISCONTINUED | OUTPATIENT
Start: 2019-12-06 | End: 2019-12-06 | Stop reason: HOSPADM

## 2019-12-06 RX ORDER — MEPERIDINE HYDROCHLORIDE 25 MG/ML
12.5 INJECTION INTRAMUSCULAR; INTRAVENOUS; SUBCUTANEOUS
Status: DISCONTINUED | OUTPATIENT
Start: 2019-12-06 | End: 2019-12-06 | Stop reason: HOSPADM

## 2019-12-06 RX ORDER — HYDROMORPHONE HYDROCHLORIDE 1 MG/ML
0.4 INJECTION, SOLUTION INTRAMUSCULAR; INTRAVENOUS; SUBCUTANEOUS
Status: DISCONTINUED | OUTPATIENT
Start: 2019-12-06 | End: 2019-12-06 | Stop reason: HOSPADM

## 2019-12-06 RX ORDER — PHENYLEPHRINE HCL IN 0.9% NACL 0.5 MG/5ML
SYRINGE (ML) INTRAVENOUS PRN
Status: DISCONTINUED | OUTPATIENT
Start: 2019-12-06 | End: 2019-12-06 | Stop reason: SURG

## 2019-12-06 RX ORDER — ONDANSETRON 2 MG/ML
INJECTION INTRAMUSCULAR; INTRAVENOUS PRN
Status: DISCONTINUED | OUTPATIENT
Start: 2019-12-06 | End: 2019-12-06 | Stop reason: SURG

## 2019-12-06 RX ORDER — ROCURONIUM BROMIDE 10 MG/ML
INJECTION, SOLUTION INTRAVENOUS PRN
Status: DISCONTINUED | OUTPATIENT
Start: 2019-12-06 | End: 2019-12-06 | Stop reason: SURG

## 2019-12-06 RX ORDER — BUPIVACAINE HYDROCHLORIDE AND EPINEPHRINE 5; 5 MG/ML; UG/ML
INJECTION, SOLUTION EPIDURAL; INTRACAUDAL; PERINEURAL
Status: DISCONTINUED | OUTPATIENT
Start: 2019-12-06 | End: 2019-12-06 | Stop reason: HOSPADM

## 2019-12-06 RX ORDER — OXYCODONE HCL 5 MG/5 ML
10 SOLUTION, ORAL ORAL
Status: DISCONTINUED | OUTPATIENT
Start: 2019-12-06 | End: 2019-12-06 | Stop reason: HOSPADM

## 2019-12-06 RX ORDER — HYDROMORPHONE HYDROCHLORIDE 1 MG/ML
0.2 INJECTION, SOLUTION INTRAMUSCULAR; INTRAVENOUS; SUBCUTANEOUS
Status: DISCONTINUED | OUTPATIENT
Start: 2019-12-06 | End: 2019-12-06 | Stop reason: HOSPADM

## 2019-12-06 RX ORDER — HYDRALAZINE HYDROCHLORIDE 20 MG/ML
5 INJECTION INTRAMUSCULAR; INTRAVENOUS
Status: DISCONTINUED | OUTPATIENT
Start: 2019-12-06 | End: 2019-12-06 | Stop reason: HOSPADM

## 2019-12-06 RX ORDER — SODIUM CHLORIDE, SODIUM LACTATE, POTASSIUM CHLORIDE, CALCIUM CHLORIDE 600; 310; 30; 20 MG/100ML; MG/100ML; MG/100ML; MG/100ML
INJECTION, SOLUTION INTRAVENOUS CONTINUOUS
Status: DISCONTINUED | OUTPATIENT
Start: 2019-12-06 | End: 2019-12-06 | Stop reason: HOSPADM

## 2019-12-06 RX ORDER — METOCLOPRAMIDE HYDROCHLORIDE 5 MG/ML
INJECTION INTRAMUSCULAR; INTRAVENOUS PRN
Status: DISCONTINUED | OUTPATIENT
Start: 2019-12-06 | End: 2019-12-06 | Stop reason: SURG

## 2019-12-06 RX ORDER — LABETALOL HYDROCHLORIDE 5 MG/ML
5 INJECTION, SOLUTION INTRAVENOUS
Status: DISCONTINUED | OUTPATIENT
Start: 2019-12-06 | End: 2019-12-06 | Stop reason: HOSPADM

## 2019-12-06 RX ORDER — LIDOCAINE HYDROCHLORIDE 20 MG/ML
INJECTION, SOLUTION EPIDURAL; INFILTRATION; INTRACAUDAL; PERINEURAL PRN
Status: DISCONTINUED | OUTPATIENT
Start: 2019-12-06 | End: 2019-12-06 | Stop reason: SURG

## 2019-12-06 RX ORDER — HYDROMORPHONE HYDROCHLORIDE 1 MG/ML
0.1 INJECTION, SOLUTION INTRAMUSCULAR; INTRAVENOUS; SUBCUTANEOUS
Status: DISCONTINUED | OUTPATIENT
Start: 2019-12-06 | End: 2019-12-06 | Stop reason: HOSPADM

## 2019-12-06 RX ADMIN — ONDANSETRON 4 MG: 2 INJECTION INTRAMUSCULAR; INTRAVENOUS at 15:39

## 2019-12-06 RX ADMIN — ROCURONIUM BROMIDE 10 MG: 10 INJECTION, SOLUTION INTRAVENOUS at 15:29

## 2019-12-06 RX ADMIN — FENTANYL CITRATE 50 MCG: 50 INJECTION, SOLUTION INTRAMUSCULAR; INTRAVENOUS at 14:48

## 2019-12-06 RX ADMIN — Medication 100 MCG: at 15:03

## 2019-12-06 RX ADMIN — METOCLOPRAMIDE 10 MG: 5 INJECTION, SOLUTION INTRAMUSCULAR; INTRAVENOUS at 15:39

## 2019-12-06 RX ADMIN — LORAZEPAM 1 MG: 2 INJECTION INTRAMUSCULAR; INTRAVENOUS at 19:58

## 2019-12-06 RX ADMIN — OMEPRAZOLE 20 MG: 20 CAPSULE, DELAYED RELEASE ORAL at 05:25

## 2019-12-06 RX ADMIN — SODIUM CHLORIDE 200 ML: 9 INJECTION, SOLUTION INTRAVENOUS at 01:43

## 2019-12-06 RX ADMIN — OXYCODONE HYDROCHLORIDE 15 MG: 10 TABLET ORAL at 23:09

## 2019-12-06 RX ADMIN — PROPOFOL 200 MG: 10 INJECTION, EMULSION INTRAVENOUS at 14:48

## 2019-12-06 RX ADMIN — OXYCODONE HYDROCHLORIDE 15 MG: 10 TABLET ORAL at 01:39

## 2019-12-06 RX ADMIN — MAGNESIUM SULFATE HEPTAHYDRATE 2 G: 500 INJECTION, SOLUTION INTRAMUSCULAR; INTRAVENOUS at 14:48

## 2019-12-06 RX ADMIN — DEXAMETHASONE SODIUM PHOSPHATE 4 MG: 4 INJECTION, SOLUTION INTRA-ARTICULAR; INTRALESIONAL; INTRAMUSCULAR; INTRAVENOUS; SOFT TISSUE at 14:48

## 2019-12-06 RX ADMIN — FENTANYL CITRATE 50 MCG: 50 INJECTION, SOLUTION INTRAMUSCULAR; INTRAVENOUS at 15:12

## 2019-12-06 RX ADMIN — MAGNESIUM HYDROXIDE 30 ML: 400 SUSPENSION ORAL at 07:48

## 2019-12-06 RX ADMIN — FENTANYL CITRATE 50 MCG: 50 INJECTION, SOLUTION INTRAMUSCULAR; INTRAVENOUS at 14:56

## 2019-12-06 RX ADMIN — FENTANYL CITRATE 25 MCG: 0.05 INJECTION, SOLUTION INTRAMUSCULAR; INTRAVENOUS at 15:59

## 2019-12-06 RX ADMIN — CEFAZOLIN 2 G: 330 INJECTION, POWDER, FOR SOLUTION INTRAMUSCULAR; INTRAVENOUS at 14:48

## 2019-12-06 RX ADMIN — HALOPERIDOL LACTATE 1 MG: 5 INJECTION, SOLUTION INTRAMUSCULAR at 16:14

## 2019-12-06 RX ADMIN — SODIUM CHLORIDE, POTASSIUM CHLORIDE, SODIUM LACTATE AND CALCIUM CHLORIDE: 600; 310; 30; 20 INJECTION, SOLUTION INTRAVENOUS at 00:34

## 2019-12-06 RX ADMIN — FENTANYL CITRATE 50 MCG: 50 INJECTION, SOLUTION INTRAMUSCULAR; INTRAVENOUS at 15:21

## 2019-12-06 RX ADMIN — FERROUS SULFATE TAB 325 MG (65 MG ELEMENTAL FE) 325 MG: 325 (65 FE) TAB at 16:45

## 2019-12-06 RX ADMIN — FENTANYL CITRATE 50 MCG: 50 INJECTION, SOLUTION INTRAMUSCULAR; INTRAVENOUS at 16:05

## 2019-12-06 RX ADMIN — SODIUM CHLORIDE, POTASSIUM CHLORIDE, SODIUM LACTATE AND CALCIUM CHLORIDE: 600; 310; 30; 20 INJECTION, SOLUTION INTRAVENOUS at 16:14

## 2019-12-06 RX ADMIN — ROPINIROLE HYDROCHLORIDE 4 MG: 2 TABLET, FILM COATED ORAL at 07:53

## 2019-12-06 RX ADMIN — OXYCODONE HYDROCHLORIDE 15 MG: 10 TABLET ORAL at 11:51

## 2019-12-06 RX ADMIN — SODIUM CHLORIDE, POTASSIUM CHLORIDE, SODIUM LACTATE AND CALCIUM CHLORIDE: 600; 310; 30; 20 INJECTION, SOLUTION INTRAVENOUS at 22:15

## 2019-12-06 RX ADMIN — MORPHINE SULFATE 30 MG: 30 TABLET, FILM COATED, EXTENDED RELEASE ORAL at 05:26

## 2019-12-06 RX ADMIN — SENNOSIDES AND DOCUSATE SODIUM 2 TABLET: 8.6; 5 TABLET ORAL at 16:45

## 2019-12-06 RX ADMIN — OMEPRAZOLE 20 MG: 20 CAPSULE, DELAYED RELEASE ORAL at 16:45

## 2019-12-06 RX ADMIN — FERROUS SULFATE TAB 325 MG (65 MG ELEMENTAL FE) 325 MG: 325 (65 FE) TAB at 05:26

## 2019-12-06 RX ADMIN — OXYCODONE HYDROCHLORIDE 15 MG: 10 TABLET ORAL at 16:45

## 2019-12-06 RX ADMIN — FENTANYL CITRATE 100 MCG: 50 INJECTION, SOLUTION INTRAMUSCULAR; INTRAVENOUS at 15:45

## 2019-12-06 RX ADMIN — ROPINIROLE HYDROCHLORIDE 4 MG: 2 TABLET, FILM COATED ORAL at 19:58

## 2019-12-06 RX ADMIN — ROPINIROLE HYDROCHLORIDE 4 MG: 2 TABLET, FILM COATED ORAL at 11:51

## 2019-12-06 RX ADMIN — ROCURONIUM BROMIDE 30 MG: 10 INJECTION, SOLUTION INTRAVENOUS at 15:07

## 2019-12-06 RX ADMIN — LORAZEPAM 1 MG: 2 INJECTION INTRAMUSCULAR; INTRAVENOUS at 16:46

## 2019-12-06 RX ADMIN — FERROUS SULFATE TAB 325 MG (65 MG ELEMENTAL FE) 325 MG: 325 (65 FE) TAB at 11:50

## 2019-12-06 RX ADMIN — LIDOCAINE HYDROCHLORIDE 40 MG: 20 INJECTION, SOLUTION EPIDURAL; INFILTRATION; INTRACAUDAL at 14:48

## 2019-12-06 RX ADMIN — OXYCODONE HYDROCHLORIDE 15 MG: 10 TABLET ORAL at 07:48

## 2019-12-06 RX ADMIN — PHENYLEPHRINE HYDROCHLORIDE 200 MCG: 10 INJECTION INTRAVENOUS at 15:00

## 2019-12-06 RX ADMIN — ROPINIROLE HYDROCHLORIDE 4 MG: 2 TABLET, FILM COATED ORAL at 16:30

## 2019-12-06 RX ADMIN — SUGAMMADEX 200 MG: 100 INJECTION, SOLUTION INTRAVENOUS at 15:43

## 2019-12-06 RX ADMIN — MORPHINE SULFATE 30 MG: 30 TABLET, FILM COATED, EXTENDED RELEASE ORAL at 18:01

## 2019-12-06 RX ADMIN — DOXEPIN HYDROCHLORIDE 25 MG: 25 CAPSULE ORAL at 19:58

## 2019-12-06 ASSESSMENT — ENCOUNTER SYMPTOMS
SPEECH CHANGE: 0
WHEEZING: 0
DIARRHEA: 0
NERVOUS/ANXIOUS: 1
NECK PAIN: 0
ABDOMINAL PAIN: 0
WEAKNESS: 0
CHILLS: 0
HALLUCINATIONS: 0
BACK PAIN: 0
FLANK PAIN: 0
DIAPHORESIS: 0
VOMITING: 0
COUGH: 0
FEVER: 0
SHORTNESS OF BREATH: 0
FOCAL WEAKNESS: 0
BLOOD IN STOOL: 1

## 2019-12-06 ASSESSMENT — LIFESTYLE VARIABLES: SUBSTANCE_ABUSE: 0

## 2019-12-06 ASSESSMENT — PAIN SCALES - WONG BAKER: WONGBAKER_NUMERICALRESPONSE: HURTS JUST A LITTLE BIT

## 2019-12-06 NOTE — PROGRESS NOTES
Hospital Medicine Daily Progress Note    Date of Service  12/5/2019    Chief Complaint  64 y.o. female admitted 12/3/2019 with bloody stools     Hospital Course      Hx of COPD, Htn , dyslipidemia, hemorroids- had previously followed up with surgery admitted with acute lower GI bleed.  She had history of hemorrhoid banding at last hospitalization.. GI consulted.  Started on IV fluids.    Interval Problem Update    Patient having recurrent bloody rectal output at times without bowel movement. Hgb steady decline.  I have discussed case with surgery Dr. Silva-to plan surgical intervention.    Consultants/Specialty  Dr Mills  Surgery - Dr. Silva    Code Status  Full     Disposition    Anticipate dc home when stable.     Review of Systems  Review of Systems   Constitutional: Positive for malaise/fatigue. Negative for chills, diaphoresis and fever.   HENT: Negative for congestion.    Respiratory: Negative for cough, shortness of breath, wheezing and stridor.    Cardiovascular: Negative for chest pain, palpitations and leg swelling.   Gastrointestinal: Positive for blood in stool. Negative for abdominal pain, diarrhea and vomiting.   Genitourinary: Negative for flank pain and hematuria.   Musculoskeletal: Negative for back pain, joint pain and neck pain.   Neurological: Negative for speech change, focal weakness and weakness.   Psychiatric/Behavioral: Negative for hallucinations and substance abuse. The patient is nervous/anxious.         Physical Exam  Temp:  [36.2 °C (97.2 °F)-37 °C (98.6 °F)] 36.8 °C (98.2 °F)  Pulse:  [75-99] 77  Resp:  [16-18] 16  BP: (101-122)/(53-78) 105/53  SpO2:  [93 %-96 %] 95 %    Physical Exam  Constitutional:       General: She is not in acute distress.     Appearance: She is obese. She is not diaphoretic.   HENT:      Head: Normocephalic and atraumatic.      Right Ear: External ear normal.      Left Ear: External ear normal.      Nose: Nose normal.   Eyes:      General: No scleral  icterus.     Extraocular Movements: Extraocular movements intact.      Conjunctiva/sclera: Conjunctivae normal.   Neck:      Musculoskeletal: Normal range of motion and neck supple.   Cardiovascular:      Rate and Rhythm: Normal rate and regular rhythm.      Heart sounds: No murmur.   Pulmonary:      Breath sounds: No wheezing, rhonchi or rales.   Abdominal:      General: Abdomen is flat. Bowel sounds are normal. There is no distension.      Palpations: Abdomen is soft.   Genitourinary:     Comments: Rectal exam with external hemorrhoids without active bleeding  Musculoskeletal:         General: No swelling or tenderness.   Skin:     General: Skin is warm and dry.      Coloration: Skin is pale. Skin is not jaundiced.   Neurological:      General: No focal deficit present.      Mental Status: She is alert and oriented to person, place, and time.   Psychiatric:         Mood and Affect: Mood normal.         Behavior: Behavior normal.         Fluids    Intake/Output Summary (Last 24 hours) at 12/5/2019 1844  Last data filed at 12/4/2019 2115  Gross per 24 hour   Intake 472 ml   Output --   Net 472 ml       Laboratory  Recent Labs     12/03/19 1838 12/04/19 0533  12/04/19  2346 12/05/19  0931 12/05/19  1700   WBC 8.4 7.8  --  8.3  --   --    RBC 2.73* 2.82*  --  3.03*  --   --    HEMOGLOBIN 6.8* 7.3*   < > 8.0* 7.5* 7.1*   HEMATOCRIT 26.0* 26.8*  --  28.5*  --   --    MCV 95.2 95.0  --  94.1  --   --    MCH 24.9* 25.9*  --  26.4*  --   --    MCHC 26.2* 27.2*  --  28.1*  --   --    RDW 56.3* 55.0*  --  53.2*  --   --    PLATELETCT 342 302  --  289  --   --    MPV 11.1 11.6  --  11.8  --   --     < > = values in this interval not displayed.     Recent Labs     12/03/19 1838 12/04/19  0533 12/04/19  2346   SODIUM 139 139 140   POTASSIUM 4.2 4.3 4.2   CHLORIDE 104 105 107   CO2 30 30 26   GLUCOSE 116* 106* 119*   BUN 14 10 11   CREATININE 0.74 0.64 0.66   CALCIUM 9.3 8.7 8.8     Recent Labs     12/03/19  1838   APTT 23.8*    INR 0.94               Imaging  DX-CHEST-PORTABLE (1 VIEW)   Final Result      No acute cardiac or pulmonary abnormalities are identified.           Assessment/Plan  * GI bleed- (present on admission)  Assessment & Plan  Acute GI bleed, hx of hemrrhoid banding at last hospitalization.  Post flex sigmoid with findings of healed internal hemorrhoids  ulceration in external hemorrhoids.  She has persistent bleeding.  Hemoglobin declined to 7.1 -transfuse packed RBCs with anticipated surgery by Dr. Silva   IVF support .    Sinus tachycardia- (present on admission)  Assessment & Plan  Resolved  Monitor telemetry  Monitor hemoglobin-transfuse packed RBC    Blood loss anemia- (present on admission)  Assessment & Plan  Initially improved following packed RBC transfusion with steady decline.  She has persistent rectal bleeding.  Transfuse packed RBC  Monitor serial H/H--repeat transfusion if hemoglobin less than 7  Telemetry monitoring  Continue iron supplementation      Chronic pain  Assessment & Plan  Back  Continue with home scheduled MS Contin, PRN oxycodone    GERD (gastroesophageal reflux disease)- (present on admission)  Assessment & Plan  Continue PPI therapy    Restless leg syndrome- (present on admission)  Assessment & Plan  Resume home requip     EMILY (obstructive sleep apnea)- (present on admission)  Assessment & Plan  CPAP     Hypertension- (present on admission)  Assessment & Plan  Hold home Norvasc, diuretic with recent mild hypotension and acute GI bleed  Monitor blood pressure    Chronic respiratory failure (HCC)- (present on admission)  Assessment & Plan  At baseline 02     COPD (chronic obstructive pulmonary disease) (HCC)- (present on admission)  Assessment & Plan  Not in acute exacerbation   resp care protocol ordered       VTE prophylaxis: SCDs    I Discussed with multidisciplinary team and surgery plan of care.

## 2019-12-06 NOTE — ANESTHESIA PREPROCEDURE EVALUATION
65 yo with severe anemia, morbid obesity, EMILY, hypoxemia on 3l n/c and hemorrhoids for EUA and hemorrhoidectomy    Relevant Problems   ANESTHESIA   (+) History of anesthesia complications   (+) EMILY (obstructive sleep apnea)      PULMONARY   (+) COPD (chronic obstructive pulmonary disease) (HCC)      NEURO   (+) History of anesthesia complications      CARDIAC   (+) Hypertension      GI   (+) GERD (gastroesophageal reflux disease)      Other   (+) Abnormal liver enzymes   (+) Blood loss anemia   (+) Chronic pain   (+) Chronic respiratory failure (HCC)   (+) Constipation   (+) Diastolic dysfunction   (+) Diverticulitis of colon   (+) Elevated LFTs   (+) GI bleed   (+) Generalized weakness   (+) Microcytic anemia   (+) Restless leg syndrome   (+) Sinus tachycardia   (+) Status post lumbar spine surgery for decompression of spinal cord   (+) Thrombocytopenia (HCC)       Physical Exam    Airway   Mallampati: II  TM distance: >3 FB  Neck ROM: full       Cardiovascular - normal exam  Rhythm: regular  Rate: normal  (-) murmur     Dental    Pulmonary - normal exam  Breath sounds clear to auscultation     Abdominal   (+) obese     Neurological - normal exam                 Anesthesia Plan    ASA 3 (anemia Hgb 7.4 emily, hypoxemia)   ASA physical status 3 criteria: other (comment) and respiratory insufficiency or compromise    Plan - general       Airway plan will be ETT        Induction: intravenous    Postoperative Plan: Postoperative administration of opioids is intended.    Pertinent diagnostic labs and testing reviewed    Informed Consent:    Anesthetic plan and risks discussed with patient.    Use of blood products discussed with: patient whom consented to blood products.

## 2019-12-06 NOTE — ANESTHESIA TIME REPORT
Anesthesia Start and Stop Event Times     Date Time Event    12/6/2019 1407 Ready for Procedure     1442 Anesthesia Start     1553 Anesthesia Stop        Responsible Staff  12/06/19    Name Role Begin End    Jean Claude Henderson M.D. Anesth 1442 1553        Preop Diagnosis (Free Text):  Pre-op Diagnosis     BLEEDING HEMORRHOIS        Preop Diagnosis (Codes):    Post op Diagnosis  Bleeding hemorrhoids      Premium Reason  A. 3PM - 7AM    Comments:

## 2019-12-06 NOTE — CARE PLAN
Problem: Knowledge Deficit  Goal: Knowledge of the prescribed therapeutic regimen will improve  Outcome: PROGRESSING AS EXPECTED  Note:   Patient aware NPO after midnight. Monitoring of H+H and possible blood transfuse if hgb is less than 7. Review of recent results (7.1) LR at midnoc.      Problem: Pain Management  Goal: Pain level will decrease to patient's comfort goal  Outcome: PROGRESSING AS EXPECTED  Note:   Recent back surgery, Oxycodone and Morphine ER effective for back pain.      Problem: Bowel/Gastric:  Goal: Will not experience complications related to bowel motility  Outcome: PROGRESSING SLOWER THAN EXPECTED  Note:   Pre op for surgery tomorrow, still has bloody stools, monitoring H+H,

## 2019-12-06 NOTE — PROGRESS NOTES
Surgery Consult Dictated  Patient with prolapsing internal hemorrhoids, BRBPR, anemia requiring transfusions  s/p banding  Upper endoscopy and colonoscopy done recently to rule out other source of bleeding  Plan for exam under anesthesia, hemorrhoidectomy.  Risks and indications explained to patient.  Risks; persistent or worsening bleeding, infection, severe post operative pain,  Incontinence, fissures and stricture

## 2019-12-06 NOTE — PROGRESS NOTES
Hospital Medicine Daily Progress Note    Date of Service  12/6/2019    Chief Complaint  64 y.o. female admitted 12/3/2019 with bloody stools     Hospital Course      Hx of COPD, Htn , dyslipidemia, hemorroids- had previously followed up with surgery admitted with acute lower GI bleed.  She had history of hemorrhoid banding at last hospitalization.. GI consulted.  Started on IV fluids.    Interval Problem Update    Post prbc Tx yesterday. Persistent rectal bleeding. Plan to OR this afternoon.     Consultants/Specialty  Dr Mills  Surgery - Dr. Silva    Code Status  Full     Disposition    Anticipate dc home when stable.     Review of Systems  Review of Systems   Constitutional: Positive for malaise/fatigue. Negative for chills, diaphoresis and fever.   Respiratory: Negative for cough, shortness of breath and wheezing.    Cardiovascular: Negative for chest pain and leg swelling.   Gastrointestinal: Positive for blood in stool. Negative for abdominal pain, diarrhea and vomiting.   Genitourinary: Negative for flank pain and hematuria.   Musculoskeletal: Negative for back pain, joint pain and neck pain.   Neurological: Negative for speech change, focal weakness and weakness.   Psychiatric/Behavioral: Negative for hallucinations and substance abuse. The patient is nervous/anxious.         Physical Exam  Temp:  [36.1 °C (97 °F)-37.1 °C (98.8 °F)] 36.3 °C (97.4 °F)  Pulse:  [62-87] 62  Resp:  [16-17] 17  BP: (103-144)/(53-75) 106/60  SpO2:  [93 %-98 %] 96 %    Physical Exam  Constitutional:       General: She is not in acute distress.     Appearance: She is obese. She is not diaphoretic.   HENT:      Head: Normocephalic and atraumatic.      Right Ear: External ear normal.      Left Ear: External ear normal.      Nose: Nose normal.   Eyes:      General: No scleral icterus.     Extraocular Movements: Extraocular movements intact.      Conjunctiva/sclera: Conjunctivae normal.   Neck:      Musculoskeletal: Normal range of  motion and neck supple.   Cardiovascular:      Rate and Rhythm: Normal rate and regular rhythm.      Heart sounds: No murmur.   Pulmonary:      Breath sounds: No wheezing, rhonchi or rales.   Abdominal:      General: Abdomen is flat. Bowel sounds are normal. There is no distension.      Palpations: Abdomen is soft.   Musculoskeletal:         General: No swelling or tenderness.   Skin:     General: Skin is warm and dry.      Coloration: Skin is pale. Skin is not jaundiced.   Neurological:      General: No focal deficit present.      Mental Status: She is alert and oriented to person, place, and time.   Psychiatric:         Mood and Affect: Mood normal.         Behavior: Behavior normal.         Fluids    Intake/Output Summary (Last 24 hours) at 12/6/2019 1436  Last data filed at 12/6/2019 0600  Gross per 24 hour   Intake 1132.08 ml   Output --   Net 1132.08 ml       Laboratory  Recent Labs     12/04/19  0533  12/04/19  2346  12/05/19  2326 12/06/19  0500 12/06/19  1010   WBC 7.8  --  8.3  --  7.3  --   --    RBC 2.82*  --  3.03*  --  2.66*  --   --    HEMOGLOBIN 7.3*   < > 8.0*   < > 6.9* 8.0* 7.4*   HEMATOCRIT 26.8*  --  28.5*  --  25.0*  --   --    MCV 95.0  --  94.1  --  94.0  --   --    MCH 25.9*  --  26.4*  --  25.9*  --   --    MCHC 27.2*  --  28.1*  --  27.6*  --   --    RDW 55.0*  --  53.2*  --  54.7*  --   --    PLATELETCT 302  --  289  --  269  --   --    MPV 11.6  --  11.8  --  11.8  --   --     < > = values in this interval not displayed.     Recent Labs     12/03/19  1838 12/04/19  0533 12/04/19  2346   SODIUM 139 139 140   POTASSIUM 4.2 4.3 4.2   CHLORIDE 104 105 107   CO2 30 30 26   GLUCOSE 116* 106* 119*   BUN 14 10 11   CREATININE 0.74 0.64 0.66   CALCIUM 9.3 8.7 8.8     Recent Labs     12/03/19  1838   APTT 23.8*   INR 0.94               Imaging  DX-CHEST-PORTABLE (1 VIEW)   Final Result      No acute cardiac or pulmonary abnormalities are identified.           Assessment/Plan  * GI bleed- (present  on admission)  Assessment & Plan  Acute GI bleed, hx of hemrrhoid banding at last hospitalization.  Post flex sigmoid with findings of healed internal hemorrhoids  ulceration in external hemorrhoids.  She has persistent bleeding . Post prbc tx.   Monitor serial H/H-- Tx if hgb < 7   IVF support .  Plan  To OR today .     Blood loss anemia- (present on admission)  Assessment & Plan  Initially improved following packed RBC transfusion with steady decline.  She has persistent rectal bleeding.  Post prbc Tx 12-5.   Continue to Monitor serial H/H--Tx if Hgb < 7  Telemetry monitoring  Continue iron supplementation      Chronic pain  Assessment & Plan  Back  Continue with home scheduled MS Contin, PRN oxycodone    Sinus tachycardia- (present on admission)  Assessment & Plan  Resolved. Post prbc tx.  Monitor telemetry  IVFs while npo     GERD (gastroesophageal reflux disease)- (present on admission)  Assessment & Plan  Continue PPI therapy    Restless leg syndrome- (present on admission)  Assessment & Plan  Resume home requip     EMILY (obstructive sleep apnea)- (present on admission)  Assessment & Plan  CPAP     Hypertension- (present on admission)  Assessment & Plan  Hold home Norvasc, diuretic with recent mild hypotension and acute GI bleed  BP stable - monitor     Chronic respiratory failure (HCC)- (present on admission)  Assessment & Plan  At baseline 02     COPD (chronic obstructive pulmonary disease) (HCC)- (present on admission)  Assessment & Plan  Not in acute exacerbation   resp care protocol ordered       VTE prophylaxis: SCDs    I discussed with Rn plan of care.

## 2019-12-06 NOTE — OR SURGEON
Immediate Post OP Note    PreOp Diagnosis: Lower GI bleed, Grade III/IV Internal hemorrhoids    PostOp Diagnosis: Bleeding internal hemorrhoids     Procedure(s):  EXAM UNDER ANESTHESIA, RECTUM - Wound Class: Dirty or Infected  HEMORRHOIDECTOMY - Wound Class: Dirty or Infected    Surgeon(s):  Tea Mendoza M.D.    Anesthesiologist/Type of Anesthesia:  Anesthesiologist: Jean Claude Henderson M.D./General    Surgical Staff:  Circulator: Carin Valencia R.N.  Relief Circulator: Kayy Gomez R.N.  Scrub Person: Anabelle Amin    Specimens removed if any:  4 hemorrhoid columns    Estimated Blood Loss: 10cc      Complications none        12/6/2019 3:45 PM Tea Mendoza M.D.

## 2019-12-07 ENCOUNTER — APPOINTMENT (OUTPATIENT)
Dept: RADIOLOGY | Facility: MEDICAL CENTER | Age: 64
DRG: 348 | End: 2019-12-07
Attending: HOSPITALIST
Payer: MEDICAID

## 2019-12-07 LAB
ANION GAP SERPL CALC-SCNC: 9 MMOL/L (ref 0–11.9)
BUN SERPL-MCNC: 11 MG/DL (ref 8–22)
CALCIUM SERPL-MCNC: 8.6 MG/DL (ref 8.5–10.5)
CHLORIDE SERPL-SCNC: 108 MMOL/L (ref 96–112)
CO2 SERPL-SCNC: 24 MMOL/L (ref 20–33)
CREAT SERPL-MCNC: 0.67 MG/DL (ref 0.5–1.4)
GLUCOSE SERPL-MCNC: 105 MG/DL (ref 65–99)
HGB BLD-MCNC: 6.8 G/DL (ref 12–16)
HGB BLD-MCNC: 7.3 G/DL (ref 12–16)
HGB BLD-MCNC: 7.8 G/DL (ref 12–16)
HGB BLD-MCNC: 7.8 G/DL (ref 12–16)
POTASSIUM SERPL-SCNC: 4.1 MMOL/L (ref 3.6–5.5)
SODIUM SERPL-SCNC: 141 MMOL/L (ref 135–145)

## 2019-12-07 PROCEDURE — 86923 COMPATIBILITY TEST ELECTRIC: CPT

## 2019-12-07 PROCEDURE — A9270 NON-COVERED ITEM OR SERVICE: HCPCS | Performed by: HOSPITALIST

## 2019-12-07 PROCEDURE — 700112 HCHG RX REV CODE 229: Performed by: SURGERY

## 2019-12-07 PROCEDURE — 770020 HCHG ROOM/CARE - TELE (206)

## 2019-12-07 PROCEDURE — 700102 HCHG RX REV CODE 250 W/ 637 OVERRIDE(OP): Performed by: HOSPITALIST

## 2019-12-07 PROCEDURE — 76937 US GUIDE VASCULAR ACCESS: CPT

## 2019-12-07 PROCEDURE — 36430 TRANSFUSION BLD/BLD COMPNT: CPT

## 2019-12-07 PROCEDURE — 99232 SBSQ HOSP IP/OBS MODERATE 35: CPT | Performed by: HOSPITALIST

## 2019-12-07 PROCEDURE — A9270 NON-COVERED ITEM OR SERVICE: HCPCS | Performed by: SURGERY

## 2019-12-07 PROCEDURE — 80048 BASIC METABOLIC PNL TOTAL CA: CPT

## 2019-12-07 PROCEDURE — 700111 HCHG RX REV CODE 636 W/ 250 OVERRIDE (IP): Performed by: SURGERY

## 2019-12-07 PROCEDURE — P9016 RBC LEUKOCYTES REDUCED: HCPCS

## 2019-12-07 PROCEDURE — 36415 COLL VENOUS BLD VENIPUNCTURE: CPT

## 2019-12-07 PROCEDURE — 05HY33Z INSERTION OF INFUSION DEVICE INTO UPPER VEIN, PERCUTANEOUS APPROACH: ICD-10-PCS

## 2019-12-07 PROCEDURE — 85018 HEMOGLOBIN: CPT | Mod: 91

## 2019-12-07 PROCEDURE — 700105 HCHG RX REV CODE 258

## 2019-12-07 RX ORDER — DOCUSATE SODIUM 100 MG/1
100 CAPSULE, LIQUID FILLED ORAL 2 TIMES DAILY
Status: DISCONTINUED | OUTPATIENT
Start: 2019-12-07 | End: 2019-12-09 | Stop reason: HOSPADM

## 2019-12-07 RX ORDER — MORPHINE SULFATE 4 MG/ML
2 INJECTION, SOLUTION INTRAMUSCULAR; INTRAVENOUS
Status: DISCONTINUED | OUTPATIENT
Start: 2019-12-07 | End: 2019-12-09 | Stop reason: HOSPADM

## 2019-12-07 RX ORDER — SODIUM CHLORIDE 9 MG/ML
INJECTION, SOLUTION INTRAVENOUS
Status: COMPLETED
Start: 2019-12-07 | End: 2019-12-07

## 2019-12-07 RX ADMIN — DOCUSATE SODIUM 100 MG: 100 CAPSULE, LIQUID FILLED ORAL at 16:12

## 2019-12-07 RX ADMIN — ROPINIROLE HYDROCHLORIDE 4 MG: 2 TABLET, FILM COATED ORAL at 16:12

## 2019-12-07 RX ADMIN — OMEPRAZOLE 20 MG: 20 CAPSULE, DELAYED RELEASE ORAL at 05:00

## 2019-12-07 RX ADMIN — OXYCODONE HYDROCHLORIDE 15 MG: 10 TABLET ORAL at 03:57

## 2019-12-07 RX ADMIN — SENNOSIDES AND DOCUSATE SODIUM 2 TABLET: 8.6; 5 TABLET ORAL at 16:12

## 2019-12-07 RX ADMIN — OXYCODONE HYDROCHLORIDE 15 MG: 10 TABLET ORAL at 16:12

## 2019-12-07 RX ADMIN — OXYCODONE HYDROCHLORIDE 15 MG: 10 TABLET ORAL at 23:58

## 2019-12-07 RX ADMIN — MORPHINE SULFATE 2 MG: 4 INJECTION INTRAVENOUS at 21:45

## 2019-12-07 RX ADMIN — DOXEPIN HYDROCHLORIDE 25 MG: 25 CAPSULE ORAL at 20:24

## 2019-12-07 RX ADMIN — OMEPRAZOLE 20 MG: 20 CAPSULE, DELAYED RELEASE ORAL at 16:12

## 2019-12-07 RX ADMIN — MORPHINE SULFATE 30 MG: 30 TABLET, FILM COATED, EXTENDED RELEASE ORAL at 18:29

## 2019-12-07 RX ADMIN — ROPINIROLE HYDROCHLORIDE 4 MG: 2 TABLET, FILM COATED ORAL at 08:46

## 2019-12-07 RX ADMIN — MORPHINE SULFATE 2 MG: 4 INJECTION INTRAVENOUS at 17:34

## 2019-12-07 RX ADMIN — MORPHINE SULFATE 2 MG: 4 INJECTION INTRAVENOUS at 13:14

## 2019-12-07 RX ADMIN — OXYCODONE HYDROCHLORIDE 15 MG: 10 TABLET ORAL at 12:04

## 2019-12-07 RX ADMIN — FERROUS SULFATE TAB 325 MG (65 MG ELEMENTAL FE) 325 MG: 325 (65 FE) TAB at 12:04

## 2019-12-07 RX ADMIN — FERROUS SULFATE TAB 325 MG (65 MG ELEMENTAL FE) 325 MG: 325 (65 FE) TAB at 05:00

## 2019-12-07 RX ADMIN — DOCUSATE SODIUM 100 MG: 100 CAPSULE, LIQUID FILLED ORAL at 09:48

## 2019-12-07 RX ADMIN — MORPHINE SULFATE 30 MG: 30 TABLET, FILM COATED, EXTENDED RELEASE ORAL at 06:28

## 2019-12-07 RX ADMIN — MAGNESIUM HYDROXIDE 30 ML: 400 SUSPENSION ORAL at 20:24

## 2019-12-07 RX ADMIN — SODIUM CHLORIDE 500 ML: 9 INJECTION, SOLUTION INTRAVENOUS at 06:45

## 2019-12-07 RX ADMIN — ROPINIROLE HYDROCHLORIDE 4 MG: 2 TABLET, FILM COATED ORAL at 20:24

## 2019-12-07 RX ADMIN — ROPINIROLE HYDROCHLORIDE 4 MG: 2 TABLET, FILM COATED ORAL at 12:05

## 2019-12-07 RX ADMIN — MORPHINE SULFATE 2 MG: 4 INJECTION INTRAVENOUS at 09:48

## 2019-12-07 RX ADMIN — FERROUS SULFATE TAB 325 MG (65 MG ELEMENTAL FE) 325 MG: 325 (65 FE) TAB at 16:12

## 2019-12-07 RX ADMIN — OXYCODONE HYDROCHLORIDE 15 MG: 10 TABLET ORAL at 08:01

## 2019-12-07 ASSESSMENT — ENCOUNTER SYMPTOMS
WEAKNESS: 0
COUGH: 0
DIARRHEA: 0
SHORTNESS OF BREATH: 0
VOMITING: 0
BLOOD IN STOOL: 1
NECK PAIN: 0
FLANK PAIN: 0
FEVER: 0
ROS GI COMMENTS: RECTAL PAIN
ABDOMINAL PAIN: 0
FOCAL WEAKNESS: 0
SPEECH CHANGE: 0
NERVOUS/ANXIOUS: 1
HALLUCINATIONS: 0
BACK PAIN: 1
DIAPHORESIS: 0
CHILLS: 0

## 2019-12-07 ASSESSMENT — LIFESTYLE VARIABLES: SUBSTANCE_ABUSE: 0

## 2019-12-07 NOTE — CARE PLAN
Problem: Communication  Goal: The ability to communicate needs accurately and effectively will improve  Outcome: PROGRESSING AS EXPECTED  Intervention: [place] patient and significant other/support system to call light to alert staff of needs  Note:   Patient oriented to call light system and has been able to communicate needs accurately and effectively.      Problem: Safety  Goal: Will remain free from falls  Outcome: PROGRESSING AS EXPECTED  Intervention: Assess risk factors for falls  Note:   Patient has been assessed for fall risks and fall precautions have been put in place.

## 2019-12-07 NOTE — PROGRESS NOTES
[facility] Medicine Daily Progress Note    Date of Service  12/7/2019    Chief Complaint  64 y.o. female admitted 12/3/2019 with bloody stools     Hospital Course      Hx of COPD, Htn , dyslipidemia, hemorroids- had previously followed up with surgery admitted with acute lower GI bleed.  She had history of hemorrhoid banding at last hospitalization.. GI consulted.  Started on IV fluids.    Interval Problem Update    Post hemorrhoidectomy.  Reports rectal bleeding subsiding.  Hemoglobin dropped overnight-plan packed RBC transfusion.  Complains at times of uncontrolled pain    Consultants/Specialty  Dr Mills  Surgery - Dr. Silva    Code Status  Full     Disposition    Anticipate dc home when stable.     Review of Systems  Review of Systems   Constitutional: Negative for chills, diaphoresis, fever and malaise/fatigue.   Respiratory: Negative for cough and shortness of breath.    Cardiovascular: Negative for chest pain and leg swelling.   Gastrointestinal: Positive for blood in stool. Negative for abdominal pain, diarrhea and vomiting.        Rectal pain   Genitourinary: Negative for flank pain and hematuria.   Musculoskeletal: Positive for back pain (Chronic back). Negative for joint pain and neck pain.   Neurological: Negative for speech change, focal weakness and weakness.   Psychiatric/Behavioral: Negative for hallucinations and substance abuse. The patient is nervous/anxious.         Physical Exam  Temp:  [36.1 °C (97 °F)-37.5 °C (99.5 °F)] 36.6 °C (97.8 °F)  Pulse:  [] 126  Resp:  [15-20] 18  BP: ()/() 136/70  SpO2:  [92 %-99 %] 92 %    Physical Exam  Constitutional:       General: She is not in acute distress.     Appearance: She is obese. She is not diaphoretic.      Comments: Alert, appropriate oriented, anxious   HENT:      Head: Normocephalic and atraumatic.      Right Ear: External ear normal.      Left Ear: External ear normal.      Nose: Nose normal.   Eyes:      General: No scleral  icterus.     Extraocular Movements: Extraocular movements intact.      Conjunctiva/sclera: Conjunctivae normal.   Neck:      Musculoskeletal: Normal range of motion and neck supple.   Cardiovascular:      Rate and Rhythm: Normal rate and regular rhythm.      Heart sounds: No murmur.   Pulmonary:      Breath sounds: No wheezing, rhonchi or rales.   Abdominal:      General: Abdomen is flat. Bowel sounds are normal. There is no distension.      Palpations: Abdomen is soft.   Musculoskeletal:         General: No swelling or tenderness.   Skin:     General: Skin is warm and dry.      Coloration: Skin is pale. Skin is not jaundiced.   Neurological:      General: No focal deficit present.      Mental Status: She is alert and oriented to person, place, and time.         Fluids    Intake/Output Summary (Last 24 hours) at 12/7/2019 0959  Last data filed at 12/6/2019 1552  Gross per 24 hour   Intake 1000 ml   Output 100 ml   Net 900 ml       Laboratory  Recent Labs     12/04/19  2346  12/05/19  2326  12/06/19  1707 12/06/19  2305 12/07/19  0512   WBC 8.3  --  7.3  --   --   --   --    RBC 3.03*  --  2.66*  --   --   --   --    HEMOGLOBIN 8.0*   < > 6.9*   < > 8.4* 7.7* 6.8*   HEMATOCRIT 28.5*  --  25.0*  --   --   --   --    MCV 94.1  --  94.0  --   --   --   --    MCH 26.4*  --  25.9*  --   --   --   --    MCHC 28.1*  --  27.6*  --   --   --   --    RDW 53.2*  --  54.7*  --   --   --   --    PLATELETCT 289  --  269  --   --   --   --    MPV 11.8  --  11.8  --   --   --   --     < > = values in this interval not displayed.     Recent Labs     12/04/19  2346 12/07/19  0512   SODIUM 140 141   POTASSIUM 4.2 4.1   CHLORIDE 107 108   CO2 26 24   GLUCOSE 119* 105*   BUN 11 11   CREATININE 0.66 0.67   CALCIUM 8.8 8.6                   Imaging  DX-CHEST-PORTABLE (1 VIEW)   Final Result      No acute cardiac or pulmonary abnormalities are identified.      IR-MIDLINE CATHETER INSERTION WO GUIDANCE > AGE 3    (Results Pending)         Assessment/Plan  * GI bleed- (present on admission)  Assessment & Plan  Acute GI bleed, hx of hemrrhoid banding at last hospitalization.  Post flex sigmoid with findings of healed internal hemorrhoids  ulceration in external hemorrhoids.  Persistent hemorrhoidal bleeding.    Post hemorrhoidectomy with improved symptoms.  Pain control-PRN IV morphine added  IVF support .  Sitz bath  Surgery input    Blood loss anemia- (present on admission)  Assessment & Plan  Initially improved following packed RBC transfusion with steady decline.  She has persistent rectal bleeding.  Post prbc Tx 12-5.   Transfuse packed RBC for hemoglobin of 6.8  Follow-up hemoglobin  Telemetry monitoring  Continue iron supplementation      Chronic pain  Assessment & Plan  Back  Continue with home scheduled MS Contin, PRN oxycodone    Sinus tachycardia- (present on admission)  Assessment & Plan  Resolved.  Transfuse packed RBC for hemoglobin less than 7  Monitor telemetry      GERD (gastroesophageal reflux disease)- (present on admission)  Assessment & Plan  Continue PPI therapy    Restless leg syndrome- (present on admission)  Assessment & Plan  Resume home requip     EMILY (obstructive sleep apnea)- (present on admission)  Assessment & Plan  CPAP     Hypertension- (present on admission)  Assessment & Plan  Controlled  Holding thiazide diuretic and Norvasc with GI bleed  Monitor BP    Chronic respiratory failure (HCC)- (present on admission)  Assessment & Plan  At baseline 02     COPD (chronic obstructive pulmonary disease) (HCC)- (present on admission)  Assessment & Plan  Not in acute exacerbation   resp care protocol ordered       VTE prophylaxis: SCDs

## 2019-12-07 NOTE — PROCEDURES
Infectious Disease Consult    Today's Date: 3/20/2017   Admit Date: 3/17/2017    Impression:   · Neutropenic fever  · Positive blood culture--?strep  · Pneumonia   · AML not in remission  · Leg wound    Plan:   · Continue current antibiotic therapy  · Add Levaquin    Anti-infectives:     Inpat Anti-Infectives     Start     Ordered Stop    03/18/17 1100  vancomycin (VANCOCIN) 1,000 mg in 0.9% sodium chloride (MBP/ADV) 250 mL  1,000 mg,   IntraVENous,   EVERY 18 HOURS      03/17/17 1802 --    03/17/17 1800  aztreonam (AZACTAM) 2 g in 0.9% sodium chloride (MBP/ADV) 100 mL  2 g,   IntraVENous,   EVERY 8 HOURS      03/17/17 1754 --          Subjective:   Date of Consultation:  March 20, 2017  Referring Physician: Dr Norman Schwartz    Patient is a 80 y.o. female admitted with a picture of sepsis. She is currently unable to provide much of a history, but has been diagnosed with pneumonia, been found to be bacteremic and has an inflammatory skin lesion on her leg. She has AML that is not in remission. She is on broad antibiotic coverage and we are asked to see her in consultation.     Patient Active Problem List   Diagnosis Code    Thyroid activity decreased E03.9    HTN (hypertension) I10    AML (acute myeloblastic leukemia) ([facility]) C92.00    Neutropenic fever ([address] D70.9, R50.81    Neutropenia ([address] D70.9    Leg DVT (deep venous thromboembolism), acute ([facility]) I82.409    Anemia D64.9    Sepsis (HCC) A41.9    Thrombocytopenia ([facility]) D69.6    Sinus tachycardia R00.0     Past Medical History:   Diagnosis Date    [facility]) [facility] [address] [facility]     GERD (gastroesophageal reflux disease)     HTN (hypertension)     Thyroid activity decreased       Family History   Problem Relation Age of Onset    Alcohol abuse Father      ETOH      Social History   Substance Use Topics    Smoking status: Never Smoker    Smokeless tobacco: Not on file    Alcohol use Yes      Comment: occasional     Past Surgical History: Vascular Access Team    Date of Insertion: 12/7/19  Arm Circumference: n/a  Line Length: 10  Line Size: 20  Vein Occupancy %: 41  Reason for Midline: access  Labs: on 12/7/19 WBC 6.8, on 12/5/19 , on 12/7/19 BUN 11, Cr 0.67, GFR >60, on 12/3/19 INR 0.94    Orders confirmed, vessel patency confirmed with ultrasound. Risks and benefits of procedure explained to patient and education regarding line associated bloodstream infections provided. Questions answered.     PowerGlide Midline placed in LUE per licensed provider order with ultrasound guidance. 20g, 10 cm line placed in cephalic vein after 1 attempt(s).  Catheter inserted with brisk blood return. Secured with 0cm external from insertion site.  Line flushed without resistance with 10 mL 0.9% normal saline.  Midline secured with Biopatch and Tegaderm.     Midline placement is confirmed by nurse using ultrasound and ability to flush and draw blood. Midline is appropriate for use at this time.  No X-ray is needed for placement confirmation. Pt tolerated procedure well.  Patient condition relayed to unit RN or ordering physician via this post procedure note in the EMR.    Ultrasound images uploaded to PACS and viewable in the EMR - yes  Ultrasound imaged printed and placed in paper chart - no      BARD PowerGlide Midline ref # [id number], Lot # [id number]   Procedure Laterality Date    ENDOSCOPY, COLON, DIAGNOSTIC      neg   per  pt      Prior to Admission medications    Medication Sig Start Date End Date Taking? Authorizing Provider   ondansetron hcl (ZOFRAN, AS HYDROCHLORIDE,) 4 mg tablet Take 4 mg by mouth two (2) times a day. Yes Historical Provider   venetoclax (VENCLEXTA) 100 mg tab Take 6 Tabs by mouth daily. Yes Historical Provider   levothyroxine (SYNTHROID) 100 mcg tablet TAKE 1 TABLET BY MOUTH EVERY DAY 16  Yes Lili Gunderson MD   allopurinol (ZYLOPRIM) 100 mg tablet Take  by mouth daily. Historical Provider   therapeutic multivitamin (THERAGRAN) tablet Take 1 Tab by mouth nightly. Historical Provider       Allergies   Allergen Reactions    Cephalexin Rash    Pcn [Penicillins] Rash        Review of Systems:  Review of systems not obtained due to patient factors. Objective:     Visit Vitals    /64 (BP 1 Location: Left arm, BP Patient Position: At rest)    Pulse (!) 114    Temp 99.4 °F (37.4 °C)    Resp 22    Ht 5' 3\" (1.6 m)    Wt 69.3 kg (152 lb 12.5 oz)    SpO2 92%    BMI 27.06 kg/m2     Temp (24hrs), Av.4 °F (37.4 °C), Min:98 °F (36.7 °C), Max:102.7 °F (39.3 °C)       Lines:  Peripheral IV:            Physical Exam:  Neck:  no erythema or exudates noted. Lungs:  clear to auscultation bilaterally  Heart:  regular rate and rhythm  Abdomen:  soft, non-tender.  Bowel sounds normal. No masses,  no organomegaly  Cardiovascular:  pedal pulses normal and no edema  Skin:  Leg wound noted    Data Review:     CBC:  Recent Labs      17   03017   1851  17   0543   WBC  0.1*  0.1*  0.1*   GRANS  6*  20*   --    MONOS  3*  5   --    EOS  0  0   --    ANEU  0.0*  0.0*   --    ABL  0.1*  0.1*   --    HGB  7.3*  7.9*  6.6*   HCT  21.0*  22.7*  18.7*   PLT  10*  9*  7*       BMP:  Recent Labs      17   03017   0543  17   2334   CREA  0.64  0.61  0.67   BUN  24*  19  21*   NA  142 139  137   K  3.6  3.3*  3.8   CL  110*  109*  105   CO2  24  22  23   AGAP  8  8  9   GLU  97  95  98       LFTS:  Recent Labs      03/20/17   0309  03/19/17   0543  03/18/17   2334   TBILI  4.3*  2.5*  3.8*   ALT  14  15  20   SGOT  7*  9*  19   AP  90  82  87   TP  5.4*  4.8*  5.7*   ALB  1.8*  1.9*  2.1*       Microbiology:     All Micro Results     Procedure Component Value Units Date/Time    CULTURE, BLOOD, PAIRED [[id number]] Collected:  03/18/17 2334    Order Status:  Completed Specimen:  Blood Updated:  03/20/17 0544     Special Requests: NO SPECIAL REQUESTS        Culture result: NO GROWTH AFTER 23 HOURS       CULTURE, BLOOD, PAIRED [[id number]]  (Abnormal) Collected:  03/17/17 1648    Order Status:  Completed Specimen:  Blood Updated:  03/19/17 1338     Special Requests: NO SPECIAL REQUESTS        Culture result:       PROBABLE  STREPTOCOCCUS SPECIES  GROWING IN 1 OF 4 BOTTLES DRAWN  (SITE = L WRIST)   (A)            PRELIMINARY REPORT OF  GRAM POSITIVE COCCI   IN PAIRS AND CHAINS   GROWING IN 1 OF 4 BOTTLES DRAWN  CALLED TO AND READ BACK BY   JOHN LANGFORD ON 3/18/17 AT 8300.  SH   (A)              REMAINING BOTTLE(S) HAS/HAVE NO GROWTH SO FAR    CULTURE, URINE [[id number]] Collected:  03/17/17 1826    Order Status:  Completed Specimen:  Urine from Clean catch Updated:  03/19/17 0740     Special Requests: NO SPECIAL REQUESTS        [place] Count --        91601  COLONIES/mL       Culture result:         MIXED UROGENITAL GLENDY ISOLATED          Imaging:   Results noted    Signed By: Joce Scott MD     March 20, 2017

## 2019-12-07 NOTE — PROGRESS NOTES
"Surgery    Postop day 1 after hemorrhoidectomy for bleeding internal hemorrhoids  Doing well  Some pain  On diet  Being transfused 1 unit    /70   Pulse (!) 126   Temp 36.6 °C (97.8 °F) (Temporal)   Resp 18   Ht 1.676 m (5' 6\")   Wt 90.6 kg (199 lb 11.8 oz)   SpO2 92%   BMI 32.24 kg/m²     Packing has already been removed  Wound clean  Some fecal soiling  No sign of bleeding    Recent Labs     12/04/19  2346  12/05/19  2326  12/06/19  1707 12/06/19  2305 12/07/19  0512   WBC 8.3  --  7.3  --   --   --   --    RBC 3.03*  --  2.66*  --   --   --   --    HEMOGLOBIN 8.0*   < > 6.9*   < > 8.4* 7.7* 6.8*   HEMATOCRIT 28.5*  --  25.0*  --   --   --   --    MCV 94.1  --  94.0  --   --   --   --    MCH 26.4*  --  25.9*  --   --   --   --    RDW 53.2*  --  54.7*  --   --   --   --    PLATELETCT 289  --  269  --   --   --   --    MPV 11.8  --  11.8  --   --   --   --    NEUTSPOLYS 78.30*  --  77.20*  --   --   --   --    LYMPHOCYTES 13.80*  --  13.80*  --   --   --   --    MONOCYTES 5.10  --  6.50  --   --   --   --    EOSINOPHILS 1.30  --  1.50  --   --   --   --    BASOPHILS 0.70  --  0.60  --   --   --   --     < > = values in this interval not displayed.     Assessment and plan:  Doing well after hemorrhoidectomy  Watch for signs of bleeding  Sitz bath versus shower twice daily and after bowel movements  Analgesia  May need more durable IV access    "

## 2019-12-07 NOTE — OP REPORT
DATE OF SERVICE:  12/06/2019    PREOPERATIVE DIAGNOSIS:  Grade III, grade IV prolapsing internal hemorrhoids   with bleeding.    POSTOPERATIVE DIAGNOSIS:  Grade III, grade IV prolapsing internal hemorrhoids   with bleeding.    PROCEDURES:  1.  Exam under anesthesia.  2.  Hemorrhoidectomy, total of four columns.    SURGEON:  Tea Mendoza MD    ANESTHESIOLOGIST:  Jean Claude Henderson MD    ANESTHESIA:  GET.    FINDINGS:  Grade III and IV internal hemorrhoids with active bleeding at the 5   and 7 o'clock position.    COMPLICATIONS:  None.    ESTIMATED BLOOD LOSS:  10 mL.    SPECIMENS:  Hemorrhoidal plexus with overlying mucosa.    DRAINS:  None.    PROPHYLAXIS:  IV antibiotics.    INDICATIONS:  The patient is a 64-year-old female with a recent readmission   for severe anemia diagnosed with a recurrent bleeding hemorrhoids confirmed on   colonoscopy.  She is status post banding of the internal hemorrhoids with   persistent bleeding and multiple transfusions.  Hemorrhoidectomy was   indicated.    PROCEDURE IN DETAIL:  The patient was taken to the operating room and placed   in supine position.  Anesthesia was induced and she was endotracheally   intubated without difficulty.  Patient was then carefully placed in low   lithotomy position.  Perianal, perineal region were then prepped and draped in   the usual sterile fashion.  30 mL of 0.5% Marcaine with epinephrine was used   to anesthetize the perianal region and perform perineal and pudendal block.    On internal exam, the patient was found to have grade III and IV hemorrhoids   engorged and bleeding at the 5 and 7 o'clock position along the mucosa with   only mild manipulation.  The 5 and 7 o'clock hemorrhoidal columns were excised   in the following fashion.  An elliptical incision was made around the mucosa   and skin distally and then the hemorrhoidal plexus was dissected off of the   mucosa and skin down to the internal sphincter.  At the takeoff of the   sphincter,  ligation was performed with 3-0 Vicryl suture and then Harmonic was   used to excise the venous plexus on both sides.  After hemostasis was   achieved with further electrocautery, the mucosa and skin was then closed with   interrupted 3-0 chromic sutures.  The patient also had a venous engorgement   at the 10, 2 o'clock position.  These were excised with the harmonic device   down to the takeoff of the internal sphincter with care not to injure the   sphincter mechanism.  After hemostasis was confirmed again along the mucosa,   the wound was packed lightly with Betadine-soaked Kerlix and then gauze   dressing applied.  Patient tolerated the procedure well.  She was awakened   from anesthesia, extubated, taken to the postanesthesia care unit in stable   condition.       ____________________________________     MD CHIDI Chin / JEFFERSON    DD:  12/06/2019 15:49:05  DT:  12/06/2019 17:57:17    D#:  [id number]  Job#:  [id number]

## 2019-12-07 NOTE — CONSULTS
DATE OF SERVICE:  12/06/2019    SURGERY CONSULTATION    CHIEF COMPLAINT:  Lower gastrointestinal bleed from prolapsing internal   hemorrhoids.    HISTORY OF PRESENT ILLNESS:  The patient is a 64-year-old female with history   of hemorrhoids and hemorrhoidectomy, history also of COPD, diverticular   disease, and hypertension.  She was admitted with severe anemia and persistent   bright red blood per rectum with bowel movements.  The patient recently   underwent lumbar fusion 10/20/2019.  She stated that shortly after her   discharge from the hospital, she had been experiencing constipation and   bleeding.  She has had multiple admissions since then for anemia requiring   transfusion.  She underwent upper and lower endoscopy, complete colonoscopy   last month during her admission 11/21/2019 that showed nonbleeding   diverticular disease.  Bleeding scan was negative.  Patient had banding of   internal bleeding hemorrhoids.  She was admitted once again requiring 2 units   of packed red blood cells on admission on 12/03/2019 for severe anemia.  She   underwent lower sigmoidoscopy that showed residual ulcerations where her   internal hemorrhoids were, no bleeding during that exam.  The patient   continues to have bleeding from her rectum, dropping H and H.  The patient   denies chest pain or shortness of breath.    REVIEW OF SYSTEMS:  Positive for bright red blood per rectum.  Denies rectal   pain.  Denies abdominal pain or chest pain.  All other review of systems on a   10-point review according to AMA guidelines except for that stated in the HPI   are negative.    PAST MEDICAL HISTORY:  COPD, diverticular disease, dyslipidemia, hypertension,   and spinal stenosis lumbar region.    PAST SURGICAL HISTORY:  Low anterior resection, laparoscopic 2014, colonoscopy   2018, and lumbar fusion 10/2019.    MEDICATIONS:  Albuterol, amlodipine, iron sulfate, DuoNeb, omeprazole, Zofran,   oxycodone, and MiraLax.    ALLERGIES:  No  known drug allergies.    SOCIAL HISTORY:  Former smoker, half a pack per day x50 years.  Denies   alcohol.  Positive frequent marijuana.    PHYSICAL EXAMINATION:  VITAL SIGNS:  Temperature of 36.3, pulse 60s, blood pressure 106/50.  CONSTITUTIONAL:  Patient is alert and oriented x3.  Nontoxic, nondiaphoretic   appearing.  HEENT:  Normocephalic, atraumatic.  Mucous membranes are moist.  Eyes:  Pupils   are equally round and reactive to light.  No scleral icterus.  NECK:  Soft, supple, no JVD, no lymphadenopathy.  HEART:  Rate normal sinus rhythm, no murmurs.  EXTREMITIES:  Warm, no edema.  LUNGS AND THORAX:  Normal respiratory effort.  No wheezes or stridor, no   rhonchi.  ABDOMEN:  Soft, nontender, nondistended.  RECTAL:  On rectal exam, patient has bright red blood on rectal exam with a   grade III and IV prolapsing internal hemorrhoids.  Mild external component.    No fissures.  MUSCULOSKELETAL:  No joint deformities or effusions.  SKIN:  Warm, well perfused.  No petechiae or rashes.  NEUROLOGIC:  Cranial nerves II-XII grossly intact.  Normal sensation and   strength in bilateral upper and lower extremities.    LABORATORY DATA:  White count of 7.3, hemoglobin 7.4, hematocrit 25 after 2   units packed red blood cells on 12/04/2019, platelets 269.  Sodium 140,   potassium 4.2, chloride 107, bicarbonate 26, BUN is 11, creatinine 0.6,   calcium 8.8.  Coagulation study PT 11.5, INR 0.8, PTT 27.    ASSESSMENT AND PLAN:  This is a 64-year-old male with lower gastrointestinal   bleed requiring multiple transfusions and admissions.  Plan to take the   patient for exam under anesthesia, hemorrhoidectomy.  Risks of surgery were   discussed with the patient including persistent recurrent bleeding or   hemorrhoids.  Postoperative pain, postoperative incontinence, fissures, or   stenosis of the anal ring.  The patient states she understands risks of   surgery and wishes to proceed with the above plan.        ____________________________________     Tea MD CHIDI Malagon    DD:  12/06/2019 14:48:14  DT:  12/06/2019 19:07:35    D#:  [id number]  Job#:  [id number]

## 2019-12-07 NOTE — OR NURSING
Report called to RNJing.  All pertinent events, medical information and care plan details reported to receiving RN. Reviewed hemodynamics, allergies, code status, applicable medications including pain and nausea medicine given, and pertinent assessment findings including surgical site assessment. All questions and concerns addressed. Patient remains HDS on 3L NC, AOx4 at this time. Patient educated on next phase of care. Pt is appropriate, but restless. Pt states her whole body is spasming because she missed a regularly scheduled dose of Requip due to surgery. Patient adamant she needs to get back to room to take her Requip. This RN educated patient that we will get her back to room as soon as she meets post-anesthesia requirements.   Pt taken to room by this RN. Surgical site reviewed at bedside with receiving RN.

## 2019-12-07 NOTE — ANESTHESIA POSTPROCEDURE EVALUATION
Patient: Ariella Rendon    Procedure Summary     Date:  12/06/19 Room / Location:  [place] [address] / SURGERY [facility]    Anesthesia Start:  1442 Anesthesia Stop:  1553    Procedures:       EXAM UNDER ANESTHESIA, RECTUM (Anus)      HEMORRHOIDECTOMY (N/A Anus) Diagnosis:  (BLEEDING HEMORRHOIDS)    Surgeon:  Tea Mendoza M.D. Responsible Provider:  Jean Claude Henderson M.D.    Anesthesia Type:  general ASA Status:  3          Final Anesthesia Type: general  Last vitals  BP   Blood Pressure: 121/81    Temp   37 °C (98.6 °F)    Pulse   Pulse: (!) 117   Resp   17    SpO2   95 %      Anesthesia Post Evaluation    Patient location during evaluation: PACU  Patient participation: complete - patient participated  Level of consciousness: awake and alert    Airway patency: patent  Anesthetic complications: no  Cardiovascular status: hemodynamically stable  Respiratory status: acceptable  Hydration status: euvolemic    PONV: none           Nurse Pain Score: 9 (NPRS)

## 2019-12-08 LAB
HGB BLD-MCNC: 7.6 G/DL (ref 12–16)
HGB BLD-MCNC: 8 G/DL (ref 12–16)

## 2019-12-08 PROCEDURE — 99232 SBSQ HOSP IP/OBS MODERATE 35: CPT | Performed by: HOSPITALIST

## 2019-12-08 PROCEDURE — 700111 HCHG RX REV CODE 636 W/ 250 OVERRIDE (IP): Performed by: SURGERY

## 2019-12-08 PROCEDURE — 700102 HCHG RX REV CODE 250 W/ 637 OVERRIDE(OP): Performed by: HOSPITALIST

## 2019-12-08 PROCEDURE — 36415 COLL VENOUS BLD VENIPUNCTURE: CPT

## 2019-12-08 PROCEDURE — A9270 NON-COVERED ITEM OR SERVICE: HCPCS | Performed by: HOSPITALIST

## 2019-12-08 PROCEDURE — 85018 HEMOGLOBIN: CPT | Mod: 91

## 2019-12-08 PROCEDURE — 700112 HCHG RX REV CODE 229: Performed by: SURGERY

## 2019-12-08 PROCEDURE — 770020 HCHG ROOM/CARE - TELE (206)

## 2019-12-08 PROCEDURE — A9270 NON-COVERED ITEM OR SERVICE: HCPCS | Performed by: SURGERY

## 2019-12-08 RX ADMIN — OXYCODONE HYDROCHLORIDE 15 MG: 10 TABLET ORAL at 11:57

## 2019-12-08 RX ADMIN — ROPINIROLE HYDROCHLORIDE 4 MG: 2 TABLET, FILM COATED ORAL at 14:10

## 2019-12-08 RX ADMIN — MORPHINE SULFATE 30 MG: 30 TABLET, FILM COATED, EXTENDED RELEASE ORAL at 18:38

## 2019-12-08 RX ADMIN — OXYCODONE HYDROCHLORIDE 15 MG: 10 TABLET ORAL at 08:05

## 2019-12-08 RX ADMIN — FERROUS SULFATE TAB 325 MG (65 MG ELEMENTAL FE) 325 MG: 325 (65 FE) TAB at 11:57

## 2019-12-08 RX ADMIN — MORPHINE SULFATE 2 MG: 4 INJECTION INTRAVENOUS at 17:25

## 2019-12-08 RX ADMIN — OMEPRAZOLE 20 MG: 20 CAPSULE, DELAYED RELEASE ORAL at 06:05

## 2019-12-08 RX ADMIN — SENNOSIDES AND DOCUSATE SODIUM 2 TABLET: 8.6; 5 TABLET ORAL at 06:05

## 2019-12-08 RX ADMIN — SENNOSIDES AND DOCUSATE SODIUM 2 TABLET: 8.6; 5 TABLET ORAL at 18:38

## 2019-12-08 RX ADMIN — OXYCODONE HYDROCHLORIDE 15 MG: 10 TABLET ORAL at 15:55

## 2019-12-08 RX ADMIN — MORPHINE SULFATE 2 MG: 4 INJECTION INTRAVENOUS at 20:50

## 2019-12-08 RX ADMIN — OXYCODONE HYDROCHLORIDE 15 MG: 10 TABLET ORAL at 21:34

## 2019-12-08 RX ADMIN — ROPINIROLE HYDROCHLORIDE 4 MG: 2 TABLET, FILM COATED ORAL at 20:55

## 2019-12-08 RX ADMIN — MORPHINE SULFATE 2 MG: 4 INJECTION INTRAVENOUS at 14:09

## 2019-12-08 RX ADMIN — ROPINIROLE HYDROCHLORIDE 4 MG: 2 TABLET, FILM COATED ORAL at 17:26

## 2019-12-08 RX ADMIN — DOXEPIN HYDROCHLORIDE 25 MG: 25 CAPSULE ORAL at 20:55

## 2019-12-08 RX ADMIN — MORPHINE SULFATE 2 MG: 4 INJECTION INTRAVENOUS at 09:36

## 2019-12-08 RX ADMIN — MORPHINE SULFATE 2 MG: 4 INJECTION INTRAVENOUS at 06:04

## 2019-12-08 RX ADMIN — MAGNESIUM HYDROXIDE 30 ML: 400 SUSPENSION ORAL at 15:38

## 2019-12-08 RX ADMIN — DOCUSATE SODIUM 100 MG: 100 CAPSULE, LIQUID FILLED ORAL at 06:05

## 2019-12-08 RX ADMIN — OMEPRAZOLE 20 MG: 20 CAPSULE, DELAYED RELEASE ORAL at 18:38

## 2019-12-08 RX ADMIN — FERROUS SULFATE TAB 325 MG (65 MG ELEMENTAL FE) 325 MG: 325 (65 FE) TAB at 18:38

## 2019-12-08 RX ADMIN — ROPINIROLE HYDROCHLORIDE 4 MG: 2 TABLET, FILM COATED ORAL at 08:13

## 2019-12-08 RX ADMIN — FERROUS SULFATE TAB 325 MG (65 MG ELEMENTAL FE) 325 MG: 325 (65 FE) TAB at 06:05

## 2019-12-08 RX ADMIN — DOCUSATE SODIUM 100 MG: 100 CAPSULE, LIQUID FILLED ORAL at 18:39

## 2019-12-08 RX ADMIN — MORPHINE SULFATE 2 MG: 4 INJECTION INTRAVENOUS at 01:20

## 2019-12-08 RX ADMIN — MORPHINE SULFATE 30 MG: 30 TABLET, FILM COATED, EXTENDED RELEASE ORAL at 06:05

## 2019-12-08 ASSESSMENT — ENCOUNTER SYMPTOMS
SPEECH CHANGE: 0
FEVER: 0
SHORTNESS OF BREATH: 0
NECK PAIN: 0
ROS GI COMMENTS: RECTAL PAIN
DIARRHEA: 0
VOMITING: 0
BLOOD IN STOOL: 1
NERVOUS/ANXIOUS: 0
CHILLS: 0
WEAKNESS: 0
FLANK PAIN: 0
COUGH: 0
BACK PAIN: 1
ABDOMINAL PAIN: 0
HALLUCINATIONS: 0
FOCAL WEAKNESS: 0

## 2019-12-08 ASSESSMENT — LIFESTYLE VARIABLES: SUBSTANCE_ABUSE: 0

## 2019-12-08 NOTE — CARE PLAN
Problem: Communication  Goal: The ability to communicate needs accurately and effectively will improve  Outcome: PROGRESSING AS EXPECTED  Intervention: [place] patient and significant other/support system to call light to alert staff of needs  Note:   Patient oriented to call light system and has been able to communicate needs accurately and effectively.      Problem: Safety  Goal: Will remain free from falls  Outcome: PROGRESSING AS EXPECTED  Intervention: Assess risk factors for falls  Note:   Patient has been assessed for fall risks and fall precautions have been put in place.

## 2019-12-08 NOTE — PROGRESS NOTES
"Surgery    S/P hemorrhoidectomy for bleeding    Doing well  Transfused yest  Pain controlled  Mobilizing  Min blood w BM    /68   Pulse 91   Temp 36.9 °C (98.4 °F) (Temporal)   Resp 16   Ht 1.676 m (5' 6\")   Wt 90.3 kg (199 lb 1.2 oz)   SpO2 93%   BMI 32.13 kg/m²     NAD  Abd soft    Recent Labs     12/05/19  2326  12/07/19  1329 12/07/19  2202 12/08/19  0532   WBC 7.3  --   --   --   --    RBC 2.66*  --   --   --   --    HEMOGLOBIN 6.9*   < > 7.8* 7.3* 7.6*   HEMATOCRIT 25.0*  --   --   --   --    MCV 94.0  --   --   --   --    MCH 25.9*  --   --   --   --    RDW 54.7*  --   --   --   --    PLATELETCT 269  --   --   --   --    MPV 11.8  --   --   --   --    NEUTSPOLYS 77.20*  --   --   --   --    LYMPHOCYTES 13.80*  --   --   --   --    MONOCYTES 6.50  --   --   --   --    EOSINOPHILS 1.50  --   --   --   --    BASOPHILS 0.60  --   --   --   --     < > = values in this interval not displayed.     A/P  Doing well  hgb better  Continue sitz baths/local hygiene  Low fiber diet  Bowel regimen    Care per med [facility]  "

## 2019-12-09 VITALS
SYSTOLIC BLOOD PRESSURE: 119 MMHG | TEMPERATURE: 98.2 F | RESPIRATION RATE: 15 BRPM | HEART RATE: 87 BPM | WEIGHT: 196.65 LBS | OXYGEN SATURATION: 90 % | HEIGHT: 66 IN | BODY MASS INDEX: 31.6 KG/M2 | DIASTOLIC BLOOD PRESSURE: 73 MMHG

## 2019-12-09 LAB — HGB BLD-MCNC: 8 G/DL (ref 12–16)

## 2019-12-09 PROCEDURE — 99239 HOSP IP/OBS DSCHRG MGMT >30: CPT | Performed by: HOSPITALIST

## 2019-12-09 PROCEDURE — A9270 NON-COVERED ITEM OR SERVICE: HCPCS | Performed by: HOSPITALIST

## 2019-12-09 PROCEDURE — 36415 COLL VENOUS BLD VENIPUNCTURE: CPT

## 2019-12-09 PROCEDURE — 85018 HEMOGLOBIN: CPT

## 2019-12-09 PROCEDURE — 700102 HCHG RX REV CODE 250 W/ 637 OVERRIDE(OP): Performed by: HOSPITALIST

## 2019-12-09 PROCEDURE — 700112 HCHG RX REV CODE 229: Performed by: SURGERY

## 2019-12-09 PROCEDURE — 700111 HCHG RX REV CODE 636 W/ 250 OVERRIDE (IP): Performed by: SURGERY

## 2019-12-09 PROCEDURE — A9270 NON-COVERED ITEM OR SERVICE: HCPCS | Performed by: SURGERY

## 2019-12-09 RX ORDER — MAGNESIUM GLUCONATE 27 MG(500)
500 TABLET ORAL
Qty: 60 TAB | Refills: 1
Start: 2019-12-09 | End: 2024-01-28

## 2019-12-09 RX ORDER — AMOXICILLIN 250 MG
2 CAPSULE ORAL 2 TIMES DAILY
Qty: 60 TAB | Refills: 2 | Status: SHIPPED | OUTPATIENT
Start: 2019-12-09 | End: 2020-11-30

## 2019-12-09 RX ADMIN — OXYCODONE HYDROCHLORIDE 15 MG: 10 TABLET ORAL at 01:56

## 2019-12-09 RX ADMIN — MAGNESIUM HYDROXIDE 30 ML: 400 SUSPENSION ORAL at 03:27

## 2019-12-09 RX ADMIN — MORPHINE SULFATE 30 MG: 30 TABLET, FILM COATED, EXTENDED RELEASE ORAL at 05:09

## 2019-12-09 RX ADMIN — SENNOSIDES AND DOCUSATE SODIUM 2 TABLET: 8.6; 5 TABLET ORAL at 05:10

## 2019-12-09 RX ADMIN — ROPINIROLE HYDROCHLORIDE 4 MG: 2 TABLET, FILM COATED ORAL at 08:13

## 2019-12-09 RX ADMIN — DOCUSATE SODIUM 100 MG: 100 CAPSULE, LIQUID FILLED ORAL at 05:10

## 2019-12-09 RX ADMIN — FERROUS SULFATE TAB 325 MG (65 MG ELEMENTAL FE) 325 MG: 325 (65 FE) TAB at 05:09

## 2019-12-09 RX ADMIN — OXYCODONE HYDROCHLORIDE 15 MG: 10 TABLET ORAL at 08:16

## 2019-12-09 RX ADMIN — MORPHINE SULFATE 2 MG: 4 INJECTION INTRAVENOUS at 00:22

## 2019-12-09 RX ADMIN — OMEPRAZOLE 20 MG: 20 CAPSULE, DELAYED RELEASE ORAL at 05:10

## 2019-12-09 NOTE — PROGRESS NOTES
[facility] Medicine Daily Progress Note    Date of Service  12/8/2019    Chief Complaint  64 y.o. female admitted 12/3/2019 with bloody stools     Hospital Course      Hx of COPD, Htn , dyslipidemia, hemorroids- had previously followed up with surgery admitted with acute lower GI bleed.  She had history of hemorrhoid banding at last hospitalization.. GI consulted.  Started on IV fluids.    Interval Problem Update    Feeling better.  Decreased rectal bleeding.    Consultants/Specialty  Dr Mills  Surgery - Dr. Silva    Code Status  Full     Disposition    Anticipate dc home when stable.     Review of Systems  Review of Systems   Constitutional: Negative for chills and fever.   Respiratory: Negative for cough and shortness of breath.    Cardiovascular: Negative for chest pain and leg swelling.   Gastrointestinal: Positive for blood in stool. Negative for abdominal pain, diarrhea and vomiting.        Rectal pain   Genitourinary: Negative for flank pain and hematuria.   Musculoskeletal: Positive for back pain (Chronic back). Negative for neck pain.   Neurological: Negative for speech change, focal weakness and weakness.   Psychiatric/Behavioral: Negative for hallucinations and substance abuse. The patient is not nervous/anxious.         Physical Exam  Temp:  [36.1 °C (96.9 °F)-37 °C (98.6 °F)] 36.4 °C (97.6 °F)  Pulse:  [] 72  Resp:  [16-18] 17  BP: ()/(55-77) 90/55  SpO2:  [90 %-95 %] 90 %    Physical Exam  Constitutional:       General: She is not in acute distress.     Appearance: She is obese. She is not ill-appearing or diaphoretic.      Comments: Alert, appropriate oriented, anxious   HENT:      Head: Normocephalic and atraumatic.      Right Ear: External ear normal.      Left Ear: External ear normal.      Nose: Nose normal.   Eyes:      General: No scleral icterus.     Extraocular Movements: Extraocular movements intact.      Conjunctiva/sclera: Conjunctivae normal.   Neck:      Musculoskeletal:  Normal range of motion and neck supple.   Cardiovascular:      Rate and Rhythm: Normal rate and regular rhythm.      Heart sounds: No murmur.   Pulmonary:      Breath sounds: No wheezing, rhonchi or rales.   Abdominal:      General: Abdomen is flat. There is no distension.      Palpations: Abdomen is soft.   Musculoskeletal:         General: No swelling or tenderness.   Skin:     General: Skin is warm and dry.      Coloration: Skin is pale. Skin is not jaundiced.   Neurological:      General: No focal deficit present.      Mental Status: She is alert and oriented to person, place, and time. Mental status is at baseline.         Fluids  No intake or output data in the 24 hours ending 12/08/19 1944    Laboratory  Recent Labs     12/05/19  2326  12/07/19  2202 12/08/19  0532 12/08/19  1419   WBC 7.3  --   --   --   --    RBC 2.66*  --   --   --   --    HEMOGLOBIN 6.9*   < > 7.3* 7.6* 8.0*   HEMATOCRIT 25.0*  --   --   --   --    MCV 94.0  --   --   --   --    MCH 25.9*  --   --   --   --    MCHC 27.6*  --   --   --   --    RDW 54.7*  --   --   --   --    PLATELETCT 269  --   --   --   --    MPV 11.8  --   --   --   --     < > = values in this interval not displayed.     Recent Labs     12/07/19  0512   SODIUM 141   POTASSIUM 4.1   CHLORIDE 108   CO2 24   GLUCOSE 105*   BUN 11   CREATININE 0.67   CALCIUM 8.6                   Imaging  IR-MIDLINE CATHETER INSERTION WO GUIDANCE > AGE 3   Final Result                  Ultrasound-guided midline placement performed by qualified nursing staff    as above.          DX-CHEST-PORTABLE (1 VIEW)   Final Result      No acute cardiac or pulmonary abnormalities are identified.           Assessment/Plan  * GI bleed- (present on admission)  Assessment & Plan  Acute GI bleed, hx of hemrrhoid banding at last hospitalization.  Post flex sigmoid with findings of healed internal hemorrhoids  ulceration in external hemorrhoids.  Persistent hemorrhoidal bleeding.  Post hemorrhoidectomy with  improved symptoms, decrease rectal bleeding and pain  Pain control-PRN IV morphine, oxycodone  Hep-Lock IV fluids  Sitz bath  Laxatives to keep stool soft  Follow-up hemoglobin  Discussed with surgery.  Anticipate discharge tomorrow if continued stable    Blood loss anemia- (present on admission)  Assessment & Plan  Initially improved following packed RBC transfusion with steady decline.  She has persistent rectal bleeding.  Post prbc Tx 12-5.   decrease H/H frequency and follow-up in a.m.  Telemetry monitoring  Continue iron supplementation      Chronic pain  Assessment & Plan  Back  Continue with home scheduled MS Contin, PRN oxycodone    Sinus tachycardia- (present on admission)  Assessment & Plan  Resolved.  Transfuse packed RBC for hemoglobin less than 7  Monitor telemetry      GERD (gastroesophageal reflux disease)- (present on admission)  Assessment & Plan  Continue PPI therapy    Restless leg syndrome- (present on admission)  Assessment & Plan  Resume home requip     EMILY (obstructive sleep apnea)- (present on admission)  Assessment & Plan  CPAP     Hypertension- (present on admission)  Assessment & Plan  Controlled with at times hypotensive episodes, asymptomatic  Continue to hold BP Rx  Monitor blood pressure response.    Chronic respiratory failure (HCC)- (present on admission)  Assessment & Plan  At baseline 02     COPD (chronic obstructive pulmonary disease) (HCC)- (present on admission)  Assessment & Plan  Not in acute exacerbation   resp care protocol ordered       VTE prophylaxis: SCDs    I discussed plan of care with daughter at bedside.

## 2019-12-09 NOTE — PROGRESS NOTES
Assumed care of patient, bedside report received from NOC RN. Updated on POC, call light within reach and fall precautions in place. Bed locked and in lowest position. Patient instructed to call for assistance before getting out of bed. All questions answered, no other needs at this time.

## 2019-12-09 NOTE — DISCHARGE PLANNING
Medicaid Meds-to-Beds: Discharge prescription order listed below delivered to patient's bedside. RN notified. Patient counseled. Answered patient questions.     Ariella Rendon   Home Medication Instructions MATTHEW:[id number]    Printed on:12/09/19 1053   Medication Information                      senna-docusate (PERICOLACE OR SENOKOT S) 8.6-50 MG Tab  Take 2 Tabs by mouth 2 Times a Day.               iCtlali Urias, PharmD

## 2019-12-09 NOTE — CARE PLAN
Problem: Communication  Goal: The ability to communicate needs accurately and effectively will improve  Outcome: PROGRESSING AS EXPECTED  Intervention: [place] patient and significant other/support system to call light to alert staff of needs  Note:   Call light within reach at all times     Problem: Safety  Goal: Will remain free from falls  Outcome: PROGRESSING AS EXPECTED  Intervention: Assess risk factors for falls  Note:   Patient assessed for fall risk, appropriate fall precautions implemented.

## 2019-12-09 NOTE — PROGRESS NOTES
Discharge Summary  Educated patient on new medications, up coming appointments, and s/s to look for when returning to the ER. Patient verbalized understanding. PIV removed with no s/s of bleeding or infection at site. Vitals WNL. Escorted patient downstairs via wheelchair for discharge home.

## 2019-12-09 NOTE — DISCHARGE SUMMARY
Hospital Medicine Discharge Note     Admit Date:  12/3/2019       Discharge Date:   12/9/2019    Attending Physician:  Tyrone Castro M.D.      Diagnoses (includes active and resolved):     Principal Problem:    GI bleed POA: Yes   Hemorrhoidal bleed  Active Problems:    Blood loss anemia POA: Yes    Generalized weakness POA: Yes    Sinus tachycardia POA: Yes    Chronic pain POA: Unknown    Diverticulitis of colon POA: Yes      Overview: ICD-10 transition    Hypertension POA: Yes    EMILY (obstructive sleep apnea) POA: Yes    Elevated LFTs POA: Yes    Microcytic anemia POA: Yes    Diastolic dysfunction POA: Yes    Thrombocytopenia (HCC) POA: Yes    Abnormal liver enzymes POA: Yes    Constipation POA: Yes    Status post lumbar spine surgery for decompression of spinal cord POA: Yes    Restless leg syndrome POA: Yes    GERD (gastroesophageal reflux disease) POA: Yes    History of anesthesia complications POA: Yes      Overview: Respiratory issues when was smoking    COPD (chronic obstructive pulmonary disease) (HCC) (Chronic) POA: Yes    Chronic respiratory failure (HCC) (Chronic) POA: Yes      Resolved Problems:  GI bleed    Hospital Summary (Brief Narrative):       64-year-old female Hx of COPD, chronic respiratory failure,, Htn , dyslipidemia, obesity, chronic pain on opiates and follows up with pain management, anemia, hemorroids- had previously followed up with surgery admitted with acute lower GI bleed.  She had history of hemorrhoid banding at last hospitalization.. GI consulted.  Started on IV fluids.  EGD revealed healed internal hemorrhoids with ulcerations and external hemorrhoids.  She had acute blood loss anemia with hemoglobin declined to 6.8 and required multiple transfusions.  Following the procedure she had persistent rectal bleeding.  Surgery Dr. Mendoza was consulted and she underwent hemorrhoidectomy.  Post surgery she had significant improved symptoms with only residual amount of rectal bleeding.   Hemoglobin was stable in the 7.6-8 range prior to discharge.  She is been recommended to continue with her iron supplementation.  Also routine sitz bath, hygiene, low fiber diet and use of laxatives.  Patient tachycardia resolved.  Post surgery she tolerated diet.  She has been advised to follow-up with her pain management doctor.  On day of discharge I examined patient.  Discussed findings, plan of care and follow-up.  She was discharged home in stable condition.     Consultants:        Dr Tea Mendoza, surgery   Dr. Holliday, GI    Imaging/ Testing:      IR-MIDLINE CATHETER INSERTION WO GUIDANCE > AGE 3   Final Result                  Ultrasound-guided midline placement performed by qualified nursing staff    as above.          DX-CHEST-PORTABLE (1 VIEW)   Final Result      No acute cardiac or pulmonary abnormalities are identified.            Procedures:          DATE OF SERVICE:  12/06/2019     PREOPERATIVE DIAGNOSIS:  Grade III, grade IV prolapsing internal hemorrhoids   with bleeding.     POSTOPERATIVE DIAGNOSIS:  Grade III, grade IV prolapsing internal hemorrhoids   with bleeding.     PROCEDURES:  1.  Exam under anesthesia.  2.  Hemorrhoidectomy, total of four columns.     SURGEON:  Tea Mendoza MD     ANESTHESIOLOGIST:  MD Mario Armendariz, D.O.   Physician   Gastroenterology   OP Report   Unsigned Transcription   Date of Service:  12/4/2019 12:00 AM            (suggestion)   Draft: Not electronically signed.             DATE OF SERVICE:  12/04/2019     PROCEDURE:  Flexible sigmoidoscopy.     PREOPERATIVE DIAGNOSIS:  Gastrointestinal bleed.     POSTOPERATIVE DIAGNOSES:  Healed internal hemorrhoids with ulcerations and   external hemorrhoids were noted.               Discharge Medications:             Medication List      START taking these medications      Instructions   senna-docusate 8.6-50 MG Tabs  Commonly known as:  PERICOLACE or SENOKOT S   Take 2 Tabs by mouth 2 Times a Day.  Dose:  2  Tab     Sitz Bath Misc   1 Application by Does not apply route every day.  Dose:  1 Application        CHANGE how you take these medications      Instructions   magnesium gluconate 500 MG tablet  What changed:  when to take this  Commonly known as:  MAG-G   Take 1 Tab by mouth 2 times daily, before breakfast and dinner.  Dose:  500 mg        CONTINUE taking these medications      Instructions   albuterol 108 (90 Base) MCG/ACT Aers inhalation aerosol   Inhale 2 Puffs by mouth every 6 hours as needed for Shortness of Breath.  Dose:  2 Puff     doxepin 10 MG Caps  Commonly known as:  SINEQUAN   Take 20 mg by mouth every bedtime.  Dose:  20 mg     ferrous sulfate 325 (65 Fe) MG tablet   Take 325 mg by mouth 3 times a day.  Dose:  325 mg     hydroCHLOROthiazide 12.5 MG tablet  Commonly known as:  HYDRODIURIL   Take 12.5 mg by mouth every day.  Dose:  12.5 mg     morphine ER 30 MG Tbcr tablet  Commonly known as:  MS CONTIN   Take 30 mg by mouth every 12 hours.  Dose:  30 mg     omeprazole 20 MG delayed-release capsule  Commonly known as:  PRILOSEC   Take 1 Cap by mouth 2 times a day.  Dose:  20 mg     oxycodone 15 MG immediate release tablet  Commonly known as:  OXY-IR   Take 15 mg by mouth every 6 hours.  Dose:  15 mg     polyethylene glycol/lytes Pack  Commonly known as:  MIRALAX   Take 17 g by mouth every day.  Dose:  17 g     REQUIP 4 MG tablet  Generic drug:  ropinirole   Take 4 mg by mouth 4 times a day.  Dose:  4 mg     Vitamin C 250 MG Chew   Take 500 mg by mouth every day.  Dose:  500 mg     vitamin E 1000 UNIT Caps  Commonly known as:  VITAMIN E   Take 1,000 Units by mouth every day.  Dose:  1,000 Units        STOP taking these medications    amLODIPine 10 MG Tabs  Commonly known as:  NORVASC               Diet:       DIET ORDERS (From admission to next 24h)    None            Activity:   As tolerated.      Code status:   Full code    Primary Care Provider:    Brianne Marti M.D.    Follow up appointment details  :      .  Brianne Marti M.D.  [address] [facility] [address]  [phone]    Go on 12/13/2019  Please arrive at 9:30 am for your appointment with Desi Boston. Thank you     Brianne Marti M.D.  [address] [facility] [address]  [phone]          Tea Mendoza M.D.  [address] [facility] [address]  [phone]    In 2 weeks              Time spent on discharge day patient visit: 40 minutes    #################################################

## 2019-12-09 NOTE — CARE PLAN
Educated patient on the importance of good hand hygiene for self and staff,verbalized understanding.

## 2020-11-25 NOTE — PROGRESS NOTES
"Chief Complaint   Patient presents with   • Tachycardia     pre op  neck fusion. not scheduled yet. Dr Stevenson @ [facility] Surgery       Subjective:   Ariella Rendon is a 65 y.o. female patient of Dr. Jessica Diaz who presents today for perioperative evaluation for cervical neck fusion surgery with Dr. Nichols.     Patient was last seen by Dr. Diaz on 4/26/19 for perioperative risk stratification for have lumbar back surgery. An MPI was ordered due to patients inability to perform 4 MET of exercise which showed no fixed or reversible defects with negative ECG for ischemia.     Past medical history significant for HTN, HLD, EMILY on nocturnal O2, COPD and GIB in 2018 and December of 2019.      Patient states that she needs to reach out to her gynecologist due to feeling something has prolapsed in her vaginal canal, this is not painful. States that she has had a hysterectomy in the past. This is very upsetting for her and she is tearful as she tells me about it.     Otherwise she feels her cardiac and pulmonary health has been relatively stable. She feels her  has improved with use of Spiriva, states that she can now climb the stairs in her home with some SOB but does not need to stop as she had to before. Reports that she has pulmonary clearance for surgery. She does admit to feeling as though her heart is racing at times. She is not sure what her BP has been averaging at home. States that her Amlodpine was discontinued due to borderline low BP.     Denies chest pain, edema, dizziness or syncope.     Past Medical History:   Diagnosis Date   • Anemia     past history   • Anesthesia 06/06/2019    reports \"breathing issues\", I was told    • Arthritis     generalized all over   • Breath shortness     uses oxygen at 3 liters at night   • Cancer (HCC)     cervical, endometrial   • COPD (chronic obstructive pulmonary disease) ([facility])    • Dental disorder     full denture on top   • Diverticulitis    • Heart burn     " lumbar, colon   • Hiatus hernia syndrome    • High cholesterol     no meds   • Hypertension    • Indigestion    • Other specified disorder of intestines     constipation and diarrhea   • Other specified symptom associated with female genital organs 1970    endometriosis   • Oxygen dependent     3 LTR HS   • Psychiatric problem     depression   • Spinal stenosis of lumbar region      Past Surgical History:   Procedure Laterality Date   • PB SURG DIAGNOSTIC EXAM, ANORECTAL  12/6/2019    Procedure: EXAM UNDER ANESTHESIA, RECTUM;  Surgeon: Tea Mendoza M.D.;  Location: SURGERY [facility];  Service: General   • HEMORRHOIDECTOMY N/A 12/6/2019    Procedure: HEMORRHOIDECTOMY;  Surgeon: Tea Mendoza M.D.;  Location: SURGERY [facility];  Service: General   • SIGMOIDOSCOPY FLEX N/A 12/4/2019    Procedure: SIGMOIDOSCOPY, FLEXIBLE;  Surgeon: Mario Holliday D.O.;  Location: ENDOSCOPY [facility];  Service: Gastroenterology   • SIGMOIDOSCOPY FLEX N/A 11/23/2019    Procedure: SIGMOIDOSCOPY, FLEXIBLE.  HEMORRHOID BANDING.;  Surgeon: Umair Mills M.D.;  Location: SURGERY [facility];  Service: Gastroenterology   • GASTROSCOPY N/A 11/21/2019    Procedure: GASTROSCOPY;  Surgeon: Mario Holliday D.O.;  Location: SURGERY [facility];  Service: Gastroenterology   • COLONOSCOPY N/A 11/21/2019    Procedure: COLONOSCOPY;  Surgeon: Mario Holliday D.O.;  Location: SURGERY [facility];  Service: Gastroenterology   • LUMBAR FUSION ANTERIOR N/A 10/25/2019    Procedure: FUSION, SPINE, LUMBAR, ANTERIOR APPROACH- L4-S1 ALIF WITH PLATE;  Surgeon: Umair Lemons M.D.;  Location: SURGERY [facility];  Service: Neurosurgery   • COLONOSCOPY N/A 7/3/2018    Procedure: COLONOSCOPY;  Surgeon: Cain Castillo M.D.;  Location: SURGERY [facility];  Service: Gastroenterology   • GASTROSCOPY-ENDO N/A 7/3/2018    Procedure: GASTROSCOPY-ENDO;  Surgeon: Cain Castillo M.D.;  Location: SURGERY [facility];  Service:  Gastroenterology   • LOW ANTERIOR RESECTION LAPAROSCOPIC  2014    Performed by Nakul Beltran M.D. at SURGERY [facility] ORS   • GYN SURGERY  1970    hysterectomy   • GYN SURGERY  1970    scope x 4   • BOWEL RESECTION      generic cymbalta   • OTHER ABDOMINAL SURGERY      remove foreign object, age 7     Family History   Problem Relation Age of Onset   • Scoliosis Mother    • Cancer Father    • Lung Disease Father    • Hypertension Brother      Social History     Socioeconomic History   • Marital status:      Spouse name: Not on file   • Number of children: Not on file   • Years of education: Not on file   • Highest education level: Not on file   Occupational History   • Not on file   Social Needs   • Financial resource strain: Not on file   • Food insecurity     Worry: Not on file     Inability: Not on file   • Transportation needs     Medical: Not on file     Non-medical: Not on file   Tobacco Use   • Smoking status: Former Smoker     Packs/day: 0.50     Years: 50.00     Pack years: 25.00     Types: Cigarettes     Quit date: 3/22/2019     Years since quittin.6   • Smokeless tobacco: Never Used   Substance and Sexual Activity   • Alcohol use: No   • Drug use: Yes     Frequency: 7.0 times per week     Types: Marijuana, Inhaled     Comment: marijuana   • Sexual activity: Not on file   Lifestyle   • Physical activity     Days per week: Not on file     Minutes per session: Not on file   • Stress: Not on file   Relationships   • Social connections     Talks on phone: Not on file     Gets together: Not on file     Attends Anabaptist service: Not on file     Active member of club or organization: Not on file     Attends meetings of clubs or organizations: Not on file     Relationship status: Not on file   • Intimate partner violence     Fear of current or ex partner: Not on file     Emotionally abused: Not on file     Physically abused: Not on file     Forced sexual activity: Not on file   Other Topics  "Concern   • Not on file   Social History Narrative   • Not on file     Allergies   Allergen Reactions   • Other Environmental Rash     \"alloids in metal\"     Outpatient Encounter Medications as of 11/30/2020   Medication Sig Dispense Refill   • doxepin (SINEQUAN) 50 MG Cap Take 50 mg by mouth every evening.     • tiotropium (SPIRIVA HANDIHALER) 18 MCG Cap Place 18 mcg into inhaler and inhale every day.     • DILTIAZem CD (CARDIZEM CD) 120 MG CAPSULE SR 24 HR Take 1 Cap by mouth every day. 30 Cap 6   • magnesium gluconate (MAG-G) 500 MG tablet Take 1 Tab by mouth 2 times daily, before breakfast and dinner. (Patient taking differently: Take 1,500 mg by mouth every day.) 60 Tab 1   • omeprazole (PRILOSEC) 20 MG delayed-release capsule Take 1 Cap by mouth 2 times a day. 60 Cap 0   • morphine ER (MS CONTIN) 30 MG Tab CR tablet Take 30 mg by mouth every 12 hours.     • Ascorbic Acid (VITAMIN C) 250 MG Chew Tab Take 500 mg by mouth every day.  5   • polyethylene glycol/lytes (MIRALAX) Pack Take 17 g by mouth every day.     • vitamin E (VITAMIN E) 1000 UNIT Cap Take 1,000 Units by mouth every day.     • hydroCHLOROthiazide (HYDRODIURIL) 12.5 MG tablet Take 12.5 mg by mouth every day.     • albuterol 108 (90 Base) MCG/ACT Aero Soln inhalation aerosol Inhale 2 Puffs by mouth every 6 hours as needed for Shortness of Breath.     • oxycodone (OXY-IR) 15 MG immediate release tablet Take 15 mg by mouth. Every 8 hours     • ropinirole (REQUIP) 4 MG tablet Take 4 mg by mouth 4 times a day.     • [DISCONTINUED] Misc. Devices (SITZ BATH) Misc 1 Application by Does not apply route every day. (Patient not taking: Reported on 11/30/2020) 30 Each 1   • [DISCONTINUED] senna-docusate (PERICOLACE OR SENOKOT S) 8.6-50 MG Tab Take 2 Tabs by mouth 2 Times a Day. (Patient not taking: Reported on 11/30/2020) 60 Tab 2   • [DISCONTINUED] ferrous sulfate 325 (65 Fe) MG tablet Take 325 mg by mouth 3 times a day.     • [DISCONTINUED] doxepin " "(SINEQUAN) 10 MG Cap Take 20 mg by mouth every bedtime.       No facility-administered encounter medications on file as of 11/30/2020.      Review of Systems   Constitutional: Negative for malaise/fatigue and weight loss.   Respiratory: Negative for shortness of breath.    Cardiovascular: Negative for chest pain, palpitations, orthopnea, claudication, leg swelling and PND.   Musculoskeletal: Positive for neck pain.   Neurological: Positive for sensory change (Neuropathy ). Negative for dizziness and weakness.   Psychiatric/Behavioral: The patient is nervous/anxious.    All other systems reviewed and are negative.       Objective:   /82 (BP Location: Left arm, Patient Position: Sitting)   Pulse (!) 112   Ht 1.676 m (5' 6\")   Wt 101.2 kg (223 lb)   SpO2 91%   BMI 35.99 kg/m²     Physical Exam     NM Cardiac Stress Test 5/30/2019:  NUCLEAR IMAGING INTERPRETATION  Normal left ventricular perfusion with no fixed or reversible defects.   Normal left ventricular wall motion, with EF of 73%.   ECG INTERPRETATION  Negative stress ECG for ischemia.    Echocardiogram 2/3/2018:  CONCLUSIONS  No prior study is available for comparison.   Normal left ventricular systolic function.  Left ventricular ejection fraction is visually estimated to be 60%.  Grade I diastolic dysfunction.  Normal inferior vena cava size and inspiratory collapse.    FINDINGS  Left Ventricle  Normal left ventricular chamber size. Normal left ventricular wall thickness. Normal left ventricular systolic function. Left ventricular ejection fraction is visually estimated to be 60%. Normal regional wall motion. Grade I diastolic dysfunction.     Right Ventricle  The right ventricle was normal in size and function.     Right Atrium  The right atrium is normal in size.normal inferior vena cava size and inspiratory collapse.     Left Atrium  The left atrium is normal in size.     Mitral Valve  Structurally normal mitral valve without significant stenosis " or regurgitation.     Aortic Valve  Structurally normal aortic valve without significant stenosis or regurgitation.     Tricuspid Valve  Structurally normal tricuspid valve without significant stenosis or regurgitation.     Pulmonic Valve  Structurally normal pulmonic valve without significant stenosis or   regurgitation.     Pericardium  Trace anterior pericardial effusion without hemodynamic significance.   Fat pad present.     Aorta  The aortic root is normal.        Limited Echocardiogram 10/28/2019:  CONCLUSIONS  Compared to the images of the prior study done on 2/2/2018 there has been no significant change in left ventricular systolic function.Normal left ventricular size and systolic function. Limited study.    Assessment:     1. Pre-operative cardiovascular examination  EKG   2. Sinus tachycardia     3. Essential hypertension     4. EMILY (obstructive sleep apnea)     5. Other emphysema (HCC)         Medical Decision Making:  Today's Assessment / Status / Plan:     Hypertension/Elevated Resting Pulse:  - EKG today showed ST rate 112, no significant change compared to previous EKG.   - BP significantly elevated at 178/92 upon initial check, manual BP by myself after approximately 10 minutes of rest was 148/82. Pulse is also mildly elevated. Will start Diltiazem 120 mg daily.   - Continue HCTZ 12.5 mg daily.    - Patient asked to start checking her BP daily and log for follow up visit as below. Would like to gain better BP/pulse control prior to surgery.     Hyperlipidemia:  Review of lipid panel in media tab from May of 2019:     HDL 51      Will repeat annual lipid panel at next visit, can be done with pre-op lab work.     EMILY:  - On nocturnal O2, refuses CPAP use.     COPD:  - Followed by pulmonary, encouraged patient to continue monitoring her O2 saturation at home.     Patient will follow up with me in 10 days for pulse and BP check.   Encouraged patient to contact our office should any  questions or concerns arise in the mean time. Patient understands and agrees with the plan of care.     Future Appointments   Date Time Provider Department Center   12/10/2020  1:15 PM FLORENTINO Rausch. [facility] None

## 2020-11-30 ENCOUNTER — OFFICE VISIT (OUTPATIENT)
Dept: CARDIOLOGY | Facility: MEDICAL CENTER | Age: 65
End: 2020-11-30
Payer: MEDICARE

## 2020-11-30 VITALS
DIASTOLIC BLOOD PRESSURE: 82 MMHG | OXYGEN SATURATION: 91 % | SYSTOLIC BLOOD PRESSURE: 148 MMHG | HEIGHT: 66 IN | BODY MASS INDEX: 35.84 KG/M2 | WEIGHT: 223 LBS | HEART RATE: 112 BPM

## 2020-11-30 DIAGNOSIS — G47.33 OSA (OBSTRUCTIVE SLEEP APNEA): ICD-10-CM

## 2020-11-30 DIAGNOSIS — I10 ESSENTIAL HYPERTENSION: ICD-10-CM

## 2020-11-30 DIAGNOSIS — J43.8 OTHER EMPHYSEMA (HCC): Chronic | ICD-10-CM

## 2020-11-30 DIAGNOSIS — Z01.810 PRE-OPERATIVE CARDIOVASCULAR EXAMINATION: ICD-10-CM

## 2020-11-30 DIAGNOSIS — R00.0 SINUS TACHYCARDIA: ICD-10-CM

## 2020-11-30 PROBLEM — D50.0 BLOOD LOSS ANEMIA: Status: RESOLVED | Noted: 2018-06-28 | Resolved: 2020-11-30

## 2020-11-30 PROBLEM — R79.89 ELEVATED LFTS: Status: RESOLVED | Noted: 2018-06-28 | Resolved: 2020-11-30

## 2020-11-30 LAB — EKG IMPRESSION: NORMAL

## 2020-11-30 PROCEDURE — 93000 ELECTROCARDIOGRAM COMPLETE: CPT | Performed by: INTERNAL MEDICINE

## 2020-11-30 PROCEDURE — 99214 OFFICE O/P EST MOD 30 MIN: CPT | Performed by: NURSE PRACTITIONER

## 2020-11-30 RX ORDER — DOXEPIN HYDROCHLORIDE 50 MG/1
75 CAPSULE ORAL NIGHTLY
COMMUNITY
End: 2021-04-20

## 2020-11-30 RX ORDER — DILTIAZEM HYDROCHLORIDE 120 MG/1
120 CAPSULE, COATED, EXTENDED RELEASE ORAL DAILY
Qty: 30 CAP | Refills: 6 | Status: SHIPPED | OUTPATIENT
Start: 2020-11-30 | End: 2020-12-10 | Stop reason: SDUPTHER

## 2020-11-30 RX ORDER — TIOTROPIUM BROMIDE 18 UG/1
18 CAPSULE ORAL; RESPIRATORY (INHALATION) DAILY
COMMUNITY
End: 2021-04-20

## 2020-11-30 ASSESSMENT — ENCOUNTER SYMPTOMS
SENSORY CHANGE: 1
CLAUDICATION: 0
PALPITATIONS: 0
WEAKNESS: 0
PND: 0
ORTHOPNEA: 0
NECK PAIN: 1
WEIGHT LOSS: 0
DIZZINESS: 0
NERVOUS/ANXIOUS: 1
SHORTNESS OF BREATH: 0

## 2020-11-30 ASSESSMENT — FIBROSIS 4 INDEX: FIB4 SCORE: 0.89

## 2020-12-10 ENCOUNTER — OFFICE VISIT (OUTPATIENT)
Dept: CARDIOLOGY | Facility: MEDICAL CENTER | Age: 65
End: 2020-12-10
Payer: MEDICARE

## 2020-12-10 VITALS
BODY MASS INDEX: 35.68 KG/M2 | DIASTOLIC BLOOD PRESSURE: 84 MMHG | HEART RATE: 108 BPM | SYSTOLIC BLOOD PRESSURE: 138 MMHG | WEIGHT: 222 LBS | OXYGEN SATURATION: 90 % | HEIGHT: 66 IN

## 2020-12-10 DIAGNOSIS — Z01.810 PRE-OPERATIVE CARDIOVASCULAR EXAMINATION: ICD-10-CM

## 2020-12-10 DIAGNOSIS — J43.8 OTHER EMPHYSEMA (HCC): ICD-10-CM

## 2020-12-10 DIAGNOSIS — I10 ESSENTIAL HYPERTENSION: ICD-10-CM

## 2020-12-10 DIAGNOSIS — G47.33 OSA (OBSTRUCTIVE SLEEP APNEA): ICD-10-CM

## 2020-12-10 DIAGNOSIS — R00.0 SINUS TACHYCARDIA: ICD-10-CM

## 2020-12-10 PROBLEM — R53.1 GENERALIZED WEAKNESS: Status: RESOLVED | Noted: 2018-06-28 | Resolved: 2020-12-10

## 2020-12-10 PROBLEM — K59.00 CONSTIPATION: Status: RESOLVED | Noted: 2018-07-02 | Resolved: 2020-12-10

## 2020-12-10 PROCEDURE — 99214 OFFICE O/P EST MOD 30 MIN: CPT | Performed by: NURSE PRACTITIONER

## 2020-12-10 RX ORDER — NYSTATIN 100000 [USP'U]/G
1 POWDER TOPICAL PRN
Status: ON HOLD | COMMUNITY
Start: 2020-12-08 | End: 2021-04-27

## 2020-12-10 RX ORDER — ATORVASTATIN CALCIUM 10 MG/1
10 TABLET, FILM COATED ORAL DAILY
COMMUNITY
Start: 2020-12-02

## 2020-12-10 RX ORDER — PREGABALIN 150 MG/1
300 CAPSULE ORAL EVERY EVENING
COMMUNITY
Start: 2020-12-05

## 2020-12-10 RX ORDER — DILTIAZEM HYDROCHLORIDE 180 MG/1
180 CAPSULE, COATED, EXTENDED RELEASE ORAL DAILY
Qty: 90 CAP | Refills: 3 | Status: SHIPPED | OUTPATIENT
Start: 2020-12-10 | End: 2022-07-24

## 2020-12-10 ASSESSMENT — ENCOUNTER SYMPTOMS
PALPITATIONS: 0
WEIGHT LOSS: 0
DIZZINESS: 0
ORTHOPNEA: 0
NECK PAIN: 1
SHORTNESS OF BREATH: 1
PND: 0
CLAUDICATION: 0
WEAKNESS: 0

## 2020-12-10 ASSESSMENT — FIBROSIS 4 INDEX: FIB4 SCORE: 0.89

## 2020-12-10 NOTE — LETTER
PROCEDURE/SURGERY CLEARANCE FORM      Encounter Date: 12/10/2020    Patient: Ariella Rendon  YOB: 1955    CARDIOLOGIST:  ALBERTO Rausch    REFERRING DOCTOR:  Mary Stewart A*    PATIENT does not have  PPM.    PATIENT does not have  AICD.    The above patient is low risk to have the following procedure/surgery: Cervical neck fusion surgery.                                            Additional comments: No further cardiac workup needed.                  ALBERTO Rausch

## 2020-12-10 NOTE — PROGRESS NOTES
"Chief Complaint   Patient presents with   • Follow-Up     Pre Op Cardio       Subjective:   Ariella Rendon is a 65 y.o. female patient of Dr. Jessica Diaz who presents today for perioperative evaluation for cervical neck fusion surgery with Dr. Nichols.     Patient was last seen by myself on 11/30/2020.     Patient was last seen by Dr. Diaz on 4/26/19 for perioperative risk stratification for have lumbar back surgery. An MPI was ordered due to patients inability to perform 4 MET of exercise which showed no fixed or reversible defects with negative ECG for ischemia.     Past medical history significant for HTN, HLD, EMILY on nocturnal O2, COPD and GIB in 2018 and December of 2019.      Patient states that her BP has been fairly stable averaging in the 130's/80's. Her HR remains mildly elevated, she notices it increases after breathing treatments. Denies having any significant concerns or complaints from a cardiovascular perspective.     Past Medical History:   Diagnosis Date   • Anemia     past history   • Anesthesia 06/06/2019    reports \"breathing issues\", I was told    • Arthritis     generalized all over   • Breath shortness     uses oxygen at 3 liters at night   • Cancer (HCC)     cervical, endometrial   • COPD (chronic obstructive pulmonary disease) (HCC)    • Dental disorder     full denture on top   • Diverticulitis    • Heart burn     lumbar, colon   • Hiatus hernia syndrome    • High cholesterol     no meds   • Hypertension    • Indigestion    • Other specified disorder of intestines     constipation and diarrhea   • Other specified symptom associated with female genital organs 1970    endometriosis   • Oxygen dependent     3 LTR HS   • Psychiatric problem     depression   • Spinal stenosis of lumbar region      Past Surgical History:   Procedure Laterality Date   • PB SURG DIAGNOSTIC EXAM, ANORECTAL  12/6/2019    Procedure: EXAM UNDER ANESTHESIA, RECTUM;  Surgeon: Tea Mendoza M.D.;  Location: SURGERY [facility];  Service: General   • HEMORRHOIDECTOMY N/A 12/6/2019    Procedure: HEMORRHOIDECTOMY;  Surgeon: Tea Mendoza M.D.;  Location: SURGERY [facility], [place];  Service: General   • SIGMOIDOSCOPY FLEX N/A 12/4/2019    Procedure: SIGMOIDOSCOPY, FLEXIBLE;  Surgeon: Mario Holliday D.O.;  Location: [facility];  Service: Gastroenterology   • SIGMOIDOSCOPY FLEX N/A 11/23/2019    Procedure: SIGMOIDOSCOPY, FLEXIBLE.  HEMORRHOID BANDING.;  Surgeon: Umair Mills M.D.;  Location: SURGERY [facility], [place];  Service: Gastroenterology   • GASTROSCOPY N/A 11/21/2019    Procedure: GASTROSCOPY;  Surgeon: Mario Holliday D.O.;  Location: SURGERY [facility] [place];  Service: Gastroenterology   • COLONOSCOPY N/A 11/21/2019    Procedure: COLONOSCOPY;  Surgeon: Mario Holliday D.O.;  Location: SURGERY [facility] [place];  Service: Gastroenterology   • LUMBAR FUSION ANTERIOR N/A 10/25/2019    Procedure: FUSION, SPINE, LUMBAR, ANTERIOR APPROACH- L4-S1 ALIF WITH PLATE;  Surgeon: Umari Lemons M.D.;  Location: SURGERY [facility] [place];  Service: Neurosurgery   • COLONOSCOPY N/A 7/3/2018    Procedure: COLONOSCOPY;  Surgeon: Cain Castillo M.D.;  Location: [facility];  Service: Gastroenterology   • GASTROSCOPY-ENDO N/A 7/3/2018    Procedure: GASTROSCOPY-ENDO;  Surgeon: Cain Castillo M.D.;  Location: SURGERY [facility] [place];  Service: Gastroenterology   • LOW ANTERIOR RESECTION LAPAROSCOPIC  9/12/2014    Performed by Nakul Beltran M.D. at [facility]   • GYN SURGERY  1970    hysterectomy   • GYN SURGERY  1970    scope x 4   • BOWEL RESECTION      generic cymbalta   • OTHER ABDOMINAL SURGERY      remove foreign object, age 7     Family History   Problem Relation Age of Onset   • Scoliosis Mother    • Cancer Father    • Lung Disease Father    • Hypertension Brother      Social History     Socioeconomic History   • Marital status:      Spouse name: Not on file   • Number of children: Not on file  "  • Years of education: Not on file   • Highest education level: Not on file   Occupational History   • Not on file   Social Needs   • Financial resource strain: Not on file   • Food insecurity     Worry: Not on file     Inability: Not on file   • Transportation needs     Medical: Not on file     Non-medical: Not on file   Tobacco Use   • Smoking status: Former Smoker     Packs/day: 0.50     Years: 50.00     Pack years: 25.00     Types: Cigarettes     Quit date: 3/22/2019     Years since quittin.7   • Smokeless tobacco: Never Used   Substance and Sexual Activity   • Alcohol use: No   • Drug use: Yes     Frequency: 7.0 times per week     Types: Marijuana, Inhaled     Comment: marijuana   • Sexual activity: Not on file   Lifestyle   • Physical activity     Days per week: Not on file     Minutes per session: Not on file   • Stress: Not on file   Relationships   • Social connections     Talks on phone: Not on file     Gets together: Not on file     Attends Islam service: Not on file     Active member of club or organization: Not on file     Attends meetings of clubs or organizations: Not on file     Relationship status: Not on file   • Intimate partner violence     Fear of current or ex partner: Not on file     Emotionally abused: Not on file     Physically abused: Not on file     Forced sexual activity: Not on file   Other Topics Concern   • Not on file   Social History Narrative   • Not on file     Allergies   Allergen Reactions   • Other Environmental Rash     \"alloids in metal\"     Outpatient Encounter Medications as of 12/10/2020   Medication Sig Dispense Refill   • atorvastatin (LIPITOR) 10 MG Tab      • NYAMYC powder      • pregabalin (LYRICA) 100 MG Cap      • INCRUSE ELLIPTA 62.5 MCG/INH AEROSOL POWDER, BREATH ACTIVATED INL 1 PUFF PO Q 24 H     • doxepin (SINEQUAN) 50 MG Cap Take 50 mg by mouth every evening.     • tiotropium (SPIRIVA HANDIHALER) 18 MCG Cap Place 18 mcg into inhaler and inhale every " "day.     • DILTIAZem CD (CARDIZEM CD) 120 MG CAPSULE SR 24 HR Take 1 Cap by mouth every day. 30 Cap 6   • magnesium gluconate (MAG-G) 500 MG tablet Take 1 Tab by mouth 2 times daily, before breakfast and dinner. (Patient taking differently: Take 1,500 mg by mouth every day.) 60 Tab 1   • omeprazole (PRILOSEC) 20 MG delayed-release capsule Take 1 Cap by mouth 2 times a day. 60 Cap 0   • morphine ER (MS CONTIN) 30 MG Tab CR tablet Take 30 mg by mouth every 12 hours.     • Ascorbic Acid (VITAMIN C) 250 MG Chew Tab Take 500 mg by mouth every day.  5   • polyethylene glycol/lytes (MIRALAX) Pack Take 17 g by mouth every day.     • vitamin E (VITAMIN E) 1000 UNIT Cap Take 1,000 Units by mouth every day.     • hydroCHLOROthiazide (HYDRODIURIL) 12.5 MG tablet Take 12.5 mg by mouth every day.     • albuterol 108 (90 Base) MCG/ACT Aero Soln inhalation aerosol Inhale 2 Puffs by mouth every 6 hours as needed for Shortness of Breath.     • oxycodone (OXY-IR) 15 MG immediate release tablet Take 15 mg by mouth. Every 8 hours     • ropinirole (REQUIP) 4 MG tablet Take 4 mg by mouth 4 times a day.       No facility-administered encounter medications on file as of 12/10/2020.      Review of Systems   Constitutional: Negative for malaise/fatigue and weight loss.   Respiratory: Positive for shortness of breath (Stable).    Cardiovascular: Negative for chest pain, palpitations, orthopnea, claudication, leg swelling and PND.        Elevated heart rate   Musculoskeletal: Positive for neck pain (with radiculopathy ).   Neurological: Negative for dizziness and weakness.   All other systems reviewed and are negative.       Objective:   /80 (BP Location: Left arm, Patient Position: Sitting, BP Cuff Size: Adult)   Pulse (!) 112   Ht 1.676 m (5' 6\")   Wt 100.7 kg (222 lb)   SpO2 90%   BMI 35.83 kg/m²     Physical Exam   Constitutional: She is oriented to person, place, and time. She appears well-developed and well-nourished. No " distress.   HENT:   Head: Normocephalic.   Eyes: EOM are normal.   Neck: No JVD present.   Cardiovascular: Regular rhythm and normal heart sounds. Tachycardia present.   Pulmonary/Chest: Breath sounds normal. No respiratory distress.   Abdominal: Soft. There is no abdominal tenderness.   Musculoskeletal:         General: No edema.   Neurological: She is alert and oriented to person, place, and time.   Skin: Skin is warm and dry.   Psychiatric: She has a normal mood and affect. Her behavior is normal.        NM Cardiac Stress Test 5/30/2019:  NUCLEAR IMAGING INTERPRETATION  Normal left ventricular perfusion with no fixed or reversible defects.   Normal left ventricular wall motion, with EF of 73%.   ECG INTERPRETATION  Negative stress ECG for ischemia.    Echocardiogram 2/3/2018:  CONCLUSIONS  No prior study is available for comparison.   Normal left ventricular systolic function.  Left ventricular ejection fraction is visually estimated to be 60%.  Grade I diastolic dysfunction.  Normal inferior vena cava size and inspiratory collapse.    FINDINGS  Left Ventricle  Normal left ventricular chamber size. Normal left ventricular wall thickness. Normal left ventricular systolic function. Left ventricular ejection fraction is visually estimated to be 60%. Normal regional wall motion. Grade I diastolic dysfunction.     Right Ventricle  The right ventricle was normal in size and function.     Right Atrium  The right atrium is normal in size.normal inferior vena cava size and inspiratory collapse.     Left Atrium  The left atrium is normal in size.     Mitral Valve  Structurally normal mitral valve without significant stenosis or regurgitation.     Aortic Valve  Structurally normal aortic valve without significant stenosis or regurgitation.     Tricuspid Valve  Structurally normal tricuspid valve without significant stenosis or regurgitation.     Pulmonic Valve  Structurally normal pulmonic valve without significant stenosis  or   regurgitation.     Pericardium  Trace anterior pericardial effusion without hemodynamic significance.   Fat pad present.     Aorta  The aortic root is normal.        Limited Echocardiogram 10/28/2019:  CONCLUSIONS  Compared to the images of the prior study done on 2/2/2018 there has been no significant change in left ventricular systolic function.Normal left ventricular size and systolic function. Limited study.    Assessment:     1. Sinus tachycardia     2. Essential hypertension     3. EMILY (obstructive sleep apnea)     4. Pre-operative cardiovascular examination     5. Other emphysema (HCC)         Medical Decision Making:  Today's Assessment / Status / Plan:     Hypertension/Elevated Resting Pulse:  - Elevated HT likely due to breathing treatments and chronic pain.   - BP improved, averaging 130's/80's.  - Increased Diltiazem to 180 mg daily.   - Continue HCTZ 12.5 mg daily.    - Patient asked to continue to monitor her BP and pulse at home.     Hyperlipidemia:  Review of lipid panel in media tab from May of 2019:     HDL 51      -Repeat annual lipid panel ordered, can be done with pre-op lab work.     EMILY:  - On nocturnal O2, refuses CPAP use.     COPD:  - Followed by pulmonary.     Preoperative Surgical Evaluation:  - Low risk MPI recently completed.   - TTE showed preserved LVEF with no significant valvular abnormalities.   - Patient is low risk from cardiology perspective, letter written and faxed to Dr. Nichols's office.     Patient will follow up with me in 4 months. Encouraged patient to contact our office should any questions or concerns arise in the mean time. Patient understands and agrees with the plan of care.     Future Appointments   Date Time Provider Department Center   12/10/2020  1:15 PM FLORENTINO Rausch. [facility] None

## 2020-12-16 ENCOUNTER — HOSPITAL ENCOUNTER (OUTPATIENT)
Facility: MEDICAL CENTER | Age: 65
End: 2020-12-16
Attending: NEUROLOGICAL SURGERY | Admitting: NEUROLOGICAL SURGERY
Payer: MEDICARE

## 2021-01-12 ENCOUNTER — HOSPITAL ENCOUNTER (OUTPATIENT)
Dept: RADIOLOGY | Facility: MEDICAL CENTER | Age: 66
End: 2021-01-12
Attending: NEUROLOGICAL SURGERY | Admitting: NEUROLOGICAL SURGERY
Payer: MEDICARE

## 2021-01-12 ENCOUNTER — PRE-ADMISSION TESTING (OUTPATIENT)
Dept: ADMISSIONS | Facility: MEDICAL CENTER | Age: 66
End: 2021-01-12
Attending: NEUROLOGICAL SURGERY
Payer: MEDICARE

## 2021-01-12 DIAGNOSIS — Z01.812 PRE-OPERATIVE LABORATORY EXAMINATION: ICD-10-CM

## 2021-01-12 DIAGNOSIS — Z01.811 PRE-OPERATIVE RESPIRATORY EXAMINATION: ICD-10-CM

## 2021-01-12 LAB
ANION GAP SERPL CALC-SCNC: 10 MMOL/L (ref 7–16)
APTT PPP: 29.6 SEC (ref 24.7–36)
BUN SERPL-MCNC: 14 MG/DL (ref 8–22)
CALCIUM SERPL-MCNC: 10 MG/DL (ref 8.5–10.5)
CHLORIDE SERPL-SCNC: 100 MMOL/L (ref 96–112)
CO2 SERPL-SCNC: 28 MMOL/L (ref 20–33)
CREAT SERPL-MCNC: 0.6 MG/DL (ref 0.5–1.4)
ERYTHROCYTE [DISTWIDTH] IN BLOOD BY AUTOMATED COUNT: 50.5 FL (ref 35.9–50)
GLUCOSE SERPL-MCNC: 106 MG/DL (ref 65–99)
HCT VFR BLD AUTO: 52.2 % (ref 37–47)
HGB BLD-MCNC: 16.2 G/DL (ref 12–16)
INR PPP: 0.86 (ref 0.87–1.13)
MCH RBC QN AUTO: 29.7 PG (ref 27–33)
MCHC RBC AUTO-ENTMCNC: 31 G/DL (ref 33.6–35)
MCV RBC AUTO: 95.8 FL (ref 81.4–97.8)
PLATELET # BLD AUTO: 214 K/UL (ref 164–446)
PMV BLD AUTO: 11.8 FL (ref 9–12.9)
POTASSIUM SERPL-SCNC: 4.9 MMOL/L (ref 3.6–5.5)
PROTHROMBIN TIME: 12 SEC (ref 12–14.6)
RBC # BLD AUTO: 5.45 M/UL (ref 4.2–5.4)
SODIUM SERPL-SCNC: 138 MMOL/L (ref 135–145)
WBC # BLD AUTO: 6.8 K/UL (ref 4.8–10.8)

## 2021-01-12 PROCEDURE — 71046 X-RAY EXAM CHEST 2 VIEWS: CPT

## 2021-01-12 PROCEDURE — 80048 BASIC METABOLIC PNL TOTAL CA: CPT

## 2021-01-12 PROCEDURE — 85610 PROTHROMBIN TIME: CPT

## 2021-01-12 PROCEDURE — 85027 COMPLETE CBC AUTOMATED: CPT

## 2021-01-12 PROCEDURE — 85730 THROMBOPLASTIN TIME PARTIAL: CPT

## 2021-01-12 PROCEDURE — 36415 COLL VENOUS BLD VENIPUNCTURE: CPT

## 2021-01-12 ASSESSMENT — FIBROSIS 4 INDEX: FIB4 SCORE: 0.89

## 2021-01-22 ENCOUNTER — PRE-ADMISSION TESTING (OUTPATIENT)
Dept: ADMISSIONS | Facility: MEDICAL CENTER | Age: 66
End: 2021-01-22
Attending: NEUROLOGICAL SURGERY
Payer: MEDICARE

## 2021-01-22 DIAGNOSIS — Z01.812 PRE-OPERATIVE LABORATORY EXAMINATION: ICD-10-CM

## 2021-01-22 LAB
COVID ORDER STATUS COVID19: NORMAL
SARS-COV-2 RNA RESP QL NAA+PROBE: NOTDETECTED
SPECIMEN SOURCE: NORMAL

## 2021-01-22 PROCEDURE — U0003 INFECTIOUS AGENT DETECTION BY NUCLEIC ACID (DNA OR RNA); SEVERE ACUTE RESPIRATORY SYNDROME CORONAVIRUS 2 (SARS-COV-2) (CORONAVIRUS DISEASE [COVID-19]), AMPLIFIED PROBE TECHNIQUE, MAKING USE OF HIGH THROUGHPUT TECHNOLOGIES AS DESCRIBED BY CMS-2020-01-R: HCPCS

## 2021-01-22 PROCEDURE — U0005 INFEC AGEN DETEC AMPLI PROBE: HCPCS

## 2021-01-22 PROCEDURE — C9803 HOPD COVID-19 SPEC COLLECT: HCPCS

## 2021-01-25 NOTE — OR NURSING
COVID-19 Pre-surgery screenin. Do you have an undiagnosed respiratory illness or symptoms such as coughing or sneezing?No    a. Onset of Sx No  b. Acute vs. chronic respiratory illness No    2. Do you have an unexplained fever greater than 100.4 degrees Fahrenheit or 38 degrees Celsius?     No     3. Have you had direct exposure to a patient who tested positive for Covid-19?    No     4. Have you had any loss of your sense of taste or smell? Have you had N/V or sore throat? No    Patient has been informed of visitor policy and asked to wear a mask upon entering the hospital   Yes    Pt. Wanted it noted that she has really bad veins.  She has left word for Dr. Nichols, as well.

## 2021-01-26 ENCOUNTER — ANESTHESIA EVENT (OUTPATIENT)
Dept: SURGERY | Facility: MEDICAL CENTER | Age: 66
End: 2021-01-26
Payer: MEDICARE

## 2021-01-26 ENCOUNTER — ANESTHESIA (OUTPATIENT)
Dept: SURGERY | Facility: MEDICAL CENTER | Age: 66
End: 2021-01-26
Payer: MEDICARE

## 2021-01-26 ENCOUNTER — APPOINTMENT (OUTPATIENT)
Dept: RADIOLOGY | Facility: MEDICAL CENTER | Age: 66
End: 2021-01-26
Attending: NEUROLOGICAL SURGERY
Payer: MEDICARE

## 2021-01-26 ENCOUNTER — HOSPITAL ENCOUNTER (OUTPATIENT)
Facility: MEDICAL CENTER | Age: 66
End: 2021-01-28
Attending: NEUROLOGICAL SURGERY | Admitting: NEUROLOGICAL SURGERY
Payer: MEDICARE

## 2021-01-26 PROCEDURE — 160048 HCHG OR STATISTICAL LEVEL 1-5: Performed by: NEUROLOGICAL SURGERY

## 2021-01-26 PROCEDURE — A9270 NON-COVERED ITEM OR SERVICE: HCPCS | Performed by: ANESTHESIOLOGY

## 2021-01-26 PROCEDURE — 700102 HCHG RX REV CODE 250 W/ 637 OVERRIDE(OP): Performed by: PHYSICIAN ASSISTANT

## 2021-01-26 PROCEDURE — 700105 HCHG RX REV CODE 258: Performed by: NEUROLOGICAL SURGERY

## 2021-01-26 PROCEDURE — A9270 NON-COVERED ITEM OR SERVICE: HCPCS | Performed by: NEUROLOGICAL SURGERY

## 2021-01-26 PROCEDURE — 700101 HCHG RX REV CODE 250: Performed by: NEUROLOGICAL SURGERY

## 2021-01-26 PROCEDURE — 502000 HCHG MISC OR IMPLANTS RC 0278: Performed by: NEUROLOGICAL SURGERY

## 2021-01-26 PROCEDURE — G0378 HOSPITAL OBSERVATION PER HR: HCPCS

## 2021-01-26 PROCEDURE — 95937 NEUROMUSCULAR JUNCTION TEST: CPT | Performed by: NEUROLOGICAL SURGERY

## 2021-01-26 PROCEDURE — 72040 X-RAY EXAM NECK SPINE 2-3 VW: CPT

## 2021-01-26 PROCEDURE — 700105 HCHG RX REV CODE 258: Performed by: ANESTHESIOLOGY

## 2021-01-26 PROCEDURE — 96376 TX/PRO/DX INJ SAME DRUG ADON: CPT

## 2021-01-26 PROCEDURE — 95939 C MOTOR EVOKED UPR&LWR LIMBS: CPT | Performed by: NEUROLOGICAL SURGERY

## 2021-01-26 PROCEDURE — 110454 HCHG SHELL REV 250: Performed by: NEUROLOGICAL SURGERY

## 2021-01-26 PROCEDURE — 500367 HCHG DRAIN KIT, HEMOVAC: Performed by: NEUROLOGICAL SURGERY

## 2021-01-26 PROCEDURE — 160029 HCHG SURGERY MINUTES - 1ST 30 MINS LEVEL 4: Performed by: NEUROLOGICAL SURGERY

## 2021-01-26 PROCEDURE — 500331 HCHG COTTONOID, SURG PATTIE: Performed by: NEUROLOGICAL SURGERY

## 2021-01-26 PROCEDURE — 76937 US GUIDE VASCULAR ACCESS: CPT

## 2021-01-26 PROCEDURE — 160036 HCHG PACU - EA ADDL 30 MINS PHASE I: Performed by: NEUROLOGICAL SURGERY

## 2021-01-26 PROCEDURE — 700111 HCHG RX REV CODE 636 W/ 250 OVERRIDE (IP): Performed by: NEUROLOGICAL SURGERY

## 2021-01-26 PROCEDURE — 95861 NEEDLE EMG 2 EXTREMITIES: CPT | Performed by: NEUROLOGICAL SURGERY

## 2021-01-26 PROCEDURE — 700101 HCHG RX REV CODE 250: Performed by: ANESTHESIOLOGY

## 2021-01-26 PROCEDURE — 95938 SOMATOSENSORY TESTING: CPT | Performed by: NEUROLOGICAL SURGERY

## 2021-01-26 PROCEDURE — 700111 HCHG RX REV CODE 636 W/ 250 OVERRIDE (IP): Performed by: ANESTHESIOLOGY

## 2021-01-26 PROCEDURE — 700102 HCHG RX REV CODE 250 W/ 637 OVERRIDE(OP): Performed by: NURSE PRACTITIONER

## 2021-01-26 PROCEDURE — A9270 NON-COVERED ITEM OR SERVICE: HCPCS | Performed by: PHYSICIAN ASSISTANT

## 2021-01-26 PROCEDURE — 700111 HCHG RX REV CODE 636 W/ 250 OVERRIDE (IP): Performed by: NURSE PRACTITIONER

## 2021-01-26 PROCEDURE — C1713 ANCHOR/SCREW BN/BN,TIS/BN: HCPCS | Performed by: NEUROLOGICAL SURGERY

## 2021-01-26 PROCEDURE — 501838 HCHG SUTURE GENERAL: Performed by: NEUROLOGICAL SURGERY

## 2021-01-26 PROCEDURE — 160035 HCHG PACU - 1ST 60 MINS PHASE I: Performed by: NEUROLOGICAL SURGERY

## 2021-01-26 PROCEDURE — 160002 HCHG RECOVERY MINUTES (STAT): Performed by: NEUROLOGICAL SURGERY

## 2021-01-26 PROCEDURE — 700105 HCHG RX REV CODE 258: Performed by: NURSE PRACTITIONER

## 2021-01-26 PROCEDURE — 700102 HCHG RX REV CODE 250 W/ 637 OVERRIDE(OP): Performed by: ANESTHESIOLOGY

## 2021-01-26 PROCEDURE — 160009 HCHG ANES TIME/MIN: Performed by: NEUROLOGICAL SURGERY

## 2021-01-26 PROCEDURE — 700101 HCHG RX REV CODE 250: Performed by: NURSE PRACTITIONER

## 2021-01-26 PROCEDURE — 160041 HCHG SURGERY MINUTES - EA ADDL 1 MIN LEVEL 4: Performed by: NEUROLOGICAL SURGERY

## 2021-01-26 PROCEDURE — A9270 NON-COVERED ITEM OR SERVICE: HCPCS | Performed by: NURSE PRACTITIONER

## 2021-01-26 PROCEDURE — 96365 THER/PROPH/DIAG IV INF INIT: CPT

## 2021-01-26 PROCEDURE — 95940 IONM IN OPERATNG ROOM 15 MIN: CPT | Performed by: NEUROLOGICAL SURGERY

## 2021-01-26 PROCEDURE — 96375 TX/PRO/DX INJ NEW DRUG ADDON: CPT

## 2021-01-26 DEVICE — IMPLANTABLE DEVICE: Type: IMPLANTABLE DEVICE | Site: SPINE CERVICAL | Status: FUNCTIONAL

## 2021-01-26 RX ORDER — ALBUTEROL SULFATE 90 UG/1
2 AEROSOL, METERED RESPIRATORY (INHALATION) EVERY 6 HOURS PRN
Status: DISCONTINUED | OUTPATIENT
Start: 2021-01-26 | End: 2021-01-28 | Stop reason: HOSPADM

## 2021-01-26 RX ORDER — HYDROMORPHONE HYDROCHLORIDE 1 MG/ML
0.1 INJECTION, SOLUTION INTRAMUSCULAR; INTRAVENOUS; SUBCUTANEOUS
Status: DISCONTINUED | OUTPATIENT
Start: 2021-01-26 | End: 2021-01-26 | Stop reason: HOSPADM

## 2021-01-26 RX ORDER — CEFAZOLIN SODIUM 1 G/3ML
INJECTION, POWDER, FOR SOLUTION INTRAMUSCULAR; INTRAVENOUS PRN
Status: DISCONTINUED | OUTPATIENT
Start: 2021-01-26 | End: 2021-01-26 | Stop reason: SURG

## 2021-01-26 RX ORDER — LABETALOL HYDROCHLORIDE 5 MG/ML
10 INJECTION, SOLUTION INTRAVENOUS
Status: DISCONTINUED | OUTPATIENT
Start: 2021-01-26 | End: 2021-01-28 | Stop reason: HOSPADM

## 2021-01-26 RX ORDER — HYDROCHLOROTHIAZIDE 12.5 MG/1
12.5 TABLET ORAL DAILY
Status: DISCONTINUED | OUTPATIENT
Start: 2021-01-26 | End: 2021-01-28 | Stop reason: HOSPADM

## 2021-01-26 RX ORDER — AMOXICILLIN 250 MG
1 CAPSULE ORAL NIGHTLY
Status: DISCONTINUED | OUTPATIENT
Start: 2021-01-26 | End: 2021-01-28 | Stop reason: HOSPADM

## 2021-01-26 RX ORDER — ONDANSETRON 4 MG/1
4 TABLET, ORALLY DISINTEGRATING ORAL EVERY 4 HOURS PRN
Status: DISCONTINUED | OUTPATIENT
Start: 2021-01-26 | End: 2021-01-28 | Stop reason: HOSPADM

## 2021-01-26 RX ORDER — POLYETHYLENE GLYCOL 3350 17 G/17G
1 POWDER, FOR SOLUTION ORAL 2 TIMES DAILY PRN
Status: DISCONTINUED | OUTPATIENT
Start: 2021-01-26 | End: 2021-01-28 | Stop reason: HOSPADM

## 2021-01-26 RX ORDER — AMOXICILLIN 250 MG
1 CAPSULE ORAL
Status: DISCONTINUED | OUTPATIENT
Start: 2021-01-26 | End: 2021-01-28 | Stop reason: HOSPADM

## 2021-01-26 RX ORDER — LIDOCAINE HYDROCHLORIDE 20 MG/ML
INJECTION, SOLUTION EPIDURAL; INFILTRATION; INTRACAUDAL; PERINEURAL PRN
Status: DISCONTINUED | OUTPATIENT
Start: 2021-01-26 | End: 2021-01-26 | Stop reason: SURG

## 2021-01-26 RX ORDER — CETIRIZINE HYDROCHLORIDE 10 MG/1
10 TABLET ORAL DAILY
COMMUNITY
Start: 2021-01-02

## 2021-01-26 RX ORDER — AMOXICILLIN 500 MG/1
500 CAPSULE ORAL 3 TIMES DAILY
Status: ON HOLD | COMMUNITY
Start: 2021-01-18 | End: 2021-01-26

## 2021-01-26 RX ORDER — SODIUM CHLORIDE, SODIUM LACTATE, POTASSIUM CHLORIDE, CALCIUM CHLORIDE 600; 310; 30; 20 MG/100ML; MG/100ML; MG/100ML; MG/100ML
INJECTION, SOLUTION INTRAVENOUS CONTINUOUS
Status: ACTIVE | OUTPATIENT
Start: 2021-01-26 | End: 2021-01-26

## 2021-01-26 RX ORDER — CYCLOBENZAPRINE HCL 10 MG
10 TABLET ORAL EVERY 8 HOURS PRN
Status: DISCONTINUED | OUTPATIENT
Start: 2021-01-26 | End: 2021-01-28 | Stop reason: HOSPADM

## 2021-01-26 RX ORDER — OXYCODONE HYDROCHLORIDE AND ACETAMINOPHEN 5; 325 MG/1; MG/1
1 TABLET ORAL
Status: DISCONTINUED | OUTPATIENT
Start: 2021-01-26 | End: 2021-01-26

## 2021-01-26 RX ORDER — DIPHENHYDRAMINE HCL 25 MG
25 TABLET ORAL EVERY 6 HOURS PRN
Status: DISCONTINUED | OUTPATIENT
Start: 2021-01-26 | End: 2021-01-28 | Stop reason: HOSPADM

## 2021-01-26 RX ORDER — OXYCODONE HYDROCHLORIDE 10 MG/1
20 TABLET ORAL EVERY 6 HOURS PRN
Status: DISCONTINUED | OUTPATIENT
Start: 2021-01-26 | End: 2021-01-28 | Stop reason: HOSPADM

## 2021-01-26 RX ORDER — SODIUM CHLORIDE AND POTASSIUM CHLORIDE 150; 900 MG/100ML; MG/100ML
INJECTION, SOLUTION INTRAVENOUS CONTINUOUS
Status: DISCONTINUED | OUTPATIENT
Start: 2021-01-26 | End: 2021-01-28 | Stop reason: HOSPADM

## 2021-01-26 RX ORDER — CETIRIZINE HYDROCHLORIDE 10 MG/1
10 TABLET ORAL DAILY
Status: DISCONTINUED | OUTPATIENT
Start: 2021-01-26 | End: 2021-01-28 | Stop reason: HOSPADM

## 2021-01-26 RX ORDER — ROPINIROLE 2 MG/1
4 TABLET, FILM COATED ORAL 4 TIMES DAILY
Status: DISCONTINUED | OUTPATIENT
Start: 2021-01-26 | End: 2021-01-28 | Stop reason: HOSPADM

## 2021-01-26 RX ORDER — OMEPRAZOLE 20 MG/1
20 CAPSULE, DELAYED RELEASE ORAL 2 TIMES DAILY
Status: DISCONTINUED | OUTPATIENT
Start: 2021-01-26 | End: 2021-01-28 | Stop reason: HOSPADM

## 2021-01-26 RX ORDER — HALOPERIDOL 5 MG/ML
1 INJECTION INTRAMUSCULAR
Status: DISCONTINUED | OUTPATIENT
Start: 2021-01-26 | End: 2021-01-26 | Stop reason: HOSPADM

## 2021-01-26 RX ORDER — METHOCARBAMOL 750 MG/1
750 TABLET, FILM COATED ORAL EVERY 8 HOURS PRN
Status: DISCONTINUED | OUTPATIENT
Start: 2021-01-26 | End: 2021-01-28 | Stop reason: HOSPADM

## 2021-01-26 RX ORDER — DOCUSATE SODIUM 100 MG/1
100 CAPSULE, LIQUID FILLED ORAL 2 TIMES DAILY
Status: DISCONTINUED | OUTPATIENT
Start: 2021-01-26 | End: 2021-01-28 | Stop reason: HOSPADM

## 2021-01-26 RX ORDER — HYDROMORPHONE HYDROCHLORIDE 1 MG/ML
0.2 INJECTION, SOLUTION INTRAMUSCULAR; INTRAVENOUS; SUBCUTANEOUS
Status: DISCONTINUED | OUTPATIENT
Start: 2021-01-26 | End: 2021-01-26 | Stop reason: HOSPADM

## 2021-01-26 RX ORDER — SODIUM CHLORIDE, SODIUM LACTATE, POTASSIUM CHLORIDE, CALCIUM CHLORIDE 600; 310; 30; 20 MG/100ML; MG/100ML; MG/100ML; MG/100ML
INJECTION, SOLUTION INTRAVENOUS CONTINUOUS
Status: DISCONTINUED | OUTPATIENT
Start: 2021-01-26 | End: 2021-01-26 | Stop reason: HOSPADM

## 2021-01-26 RX ORDER — BISACODYL 10 MG
10 SUPPOSITORY, RECTAL RECTAL
Status: DISCONTINUED | OUTPATIENT
Start: 2021-01-26 | End: 2021-01-28 | Stop reason: HOSPADM

## 2021-01-26 RX ORDER — OXYCODONE HCL 5 MG/5 ML
5 SOLUTION, ORAL ORAL
Status: COMPLETED | OUTPATIENT
Start: 2021-01-26 | End: 2021-01-26

## 2021-01-26 RX ORDER — CEFAZOLIN SODIUM 2 G/100ML
2 INJECTION, SOLUTION INTRAVENOUS EVERY 8 HOURS
Status: COMPLETED | OUTPATIENT
Start: 2021-01-26 | End: 2021-01-26

## 2021-01-26 RX ORDER — ONDANSETRON 2 MG/ML
INJECTION INTRAMUSCULAR; INTRAVENOUS PRN
Status: DISCONTINUED | OUTPATIENT
Start: 2021-01-26 | End: 2021-01-26 | Stop reason: SURG

## 2021-01-26 RX ORDER — ATORVASTATIN CALCIUM 10 MG/1
10 TABLET, FILM COATED ORAL EVERY EVENING
Status: DISCONTINUED | OUTPATIENT
Start: 2021-01-26 | End: 2021-01-28 | Stop reason: HOSPADM

## 2021-01-26 RX ORDER — ONDANSETRON 2 MG/ML
4 INJECTION INTRAMUSCULAR; INTRAVENOUS EVERY 4 HOURS PRN
Status: DISCONTINUED | OUTPATIENT
Start: 2021-01-26 | End: 2021-01-28 | Stop reason: HOSPADM

## 2021-01-26 RX ORDER — IPRATROPIUM BROMIDE AND ALBUTEROL SULFATE 2.5; .5 MG/3ML; MG/3ML
3 SOLUTION RESPIRATORY (INHALATION)
Status: DISCONTINUED | OUTPATIENT
Start: 2021-01-26 | End: 2021-01-26 | Stop reason: HOSPADM

## 2021-01-26 RX ORDER — OXYCODONE HYDROCHLORIDE AND ACETAMINOPHEN 5; 325 MG/1; MG/1
2 TABLET ORAL
Status: DISCONTINUED | OUTPATIENT
Start: 2021-01-26 | End: 2021-01-26

## 2021-01-26 RX ORDER — DIAZEPAM 5 MG/1
5 TABLET ORAL EVERY 4 HOURS PRN
Status: DISCONTINUED | OUTPATIENT
Start: 2021-01-26 | End: 2021-01-28 | Stop reason: HOSPADM

## 2021-01-26 RX ORDER — CEFAZOLIN SODIUM 1 G/3ML
INJECTION, POWDER, FOR SOLUTION INTRAMUSCULAR; INTRAVENOUS
Status: DISCONTINUED | OUTPATIENT
Start: 2021-01-26 | End: 2021-01-26 | Stop reason: HOSPADM

## 2021-01-26 RX ORDER — BUPIVACAINE HYDROCHLORIDE AND EPINEPHRINE 5; 5 MG/ML; UG/ML
INJECTION, SOLUTION EPIDURAL; INTRACAUDAL; PERINEURAL
Status: DISCONTINUED | OUTPATIENT
Start: 2021-01-26 | End: 2021-01-26 | Stop reason: HOSPADM

## 2021-01-26 RX ORDER — ROCURONIUM BROMIDE 10 MG/ML
INJECTION, SOLUTION INTRAVENOUS PRN
Status: DISCONTINUED | OUTPATIENT
Start: 2021-01-26 | End: 2021-01-26 | Stop reason: SURG

## 2021-01-26 RX ORDER — DILTIAZEM HYDROCHLORIDE 180 MG/1
180 CAPSULE, COATED, EXTENDED RELEASE ORAL DAILY
Status: DISCONTINUED | OUTPATIENT
Start: 2021-01-26 | End: 2021-01-28 | Stop reason: HOSPADM

## 2021-01-26 RX ORDER — OXYCODONE HCL 5 MG/5 ML
10 SOLUTION, ORAL ORAL
Status: COMPLETED | OUTPATIENT
Start: 2021-01-26 | End: 2021-01-26

## 2021-01-26 RX ORDER — PREGABALIN 150 MG/1
300 CAPSULE ORAL EVERY EVENING
Status: DISCONTINUED | OUTPATIENT
Start: 2021-01-26 | End: 2021-01-28 | Stop reason: HOSPADM

## 2021-01-26 RX ORDER — MORPHINE SULFATE 15 MG/1
30 TABLET, FILM COATED, EXTENDED RELEASE ORAL EVERY 12 HOURS
Status: DISCONTINUED | OUTPATIENT
Start: 2021-01-26 | End: 2021-01-28 | Stop reason: HOSPADM

## 2021-01-26 RX ORDER — METOPROLOL TARTRATE 1 MG/ML
1 INJECTION, SOLUTION INTRAVENOUS
Status: DISCONTINUED | OUTPATIENT
Start: 2021-01-26 | End: 2021-01-26 | Stop reason: HOSPADM

## 2021-01-26 RX ORDER — UREA 10 %
1500 LOTION (ML) TOPICAL DAILY
Status: DISCONTINUED | OUTPATIENT
Start: 2021-01-26 | End: 2021-01-28 | Stop reason: HOSPADM

## 2021-01-26 RX ORDER — HYDROMORPHONE HYDROCHLORIDE 1 MG/ML
0.4 INJECTION, SOLUTION INTRAMUSCULAR; INTRAVENOUS; SUBCUTANEOUS
Status: DISCONTINUED | OUTPATIENT
Start: 2021-01-26 | End: 2021-01-26 | Stop reason: HOSPADM

## 2021-01-26 RX ORDER — DEXAMETHASONE SODIUM PHOSPHATE 4 MG/ML
INJECTION, SOLUTION INTRA-ARTICULAR; INTRALESIONAL; INTRAMUSCULAR; INTRAVENOUS; SOFT TISSUE PRN
Status: DISCONTINUED | OUTPATIENT
Start: 2021-01-26 | End: 2021-01-26 | Stop reason: SURG

## 2021-01-26 RX ORDER — DOXEPIN HYDROCHLORIDE 50 MG/1
50 CAPSULE ORAL NIGHTLY
Status: DISCONTINUED | OUTPATIENT
Start: 2021-01-26 | End: 2021-01-28 | Stop reason: HOSPADM

## 2021-01-26 RX ORDER — DIPHENHYDRAMINE HYDROCHLORIDE 50 MG/ML
25 INJECTION INTRAMUSCULAR; INTRAVENOUS EVERY 6 HOURS PRN
Status: DISCONTINUED | OUTPATIENT
Start: 2021-01-26 | End: 2021-01-28 | Stop reason: HOSPADM

## 2021-01-26 RX ORDER — ENEMA 19; 7 G/133ML; G/133ML
1 ENEMA RECTAL
Status: DISCONTINUED | OUTPATIENT
Start: 2021-01-26 | End: 2021-01-28 | Stop reason: HOSPADM

## 2021-01-26 RX ORDER — DIPHENHYDRAMINE HYDROCHLORIDE 50 MG/ML
12.5 INJECTION INTRAMUSCULAR; INTRAVENOUS
Status: DISCONTINUED | OUTPATIENT
Start: 2021-01-26 | End: 2021-01-26 | Stop reason: HOSPADM

## 2021-01-26 RX ADMIN — CEFAZOLIN SODIUM 2 G: 2 INJECTION, SOLUTION INTRAVENOUS at 22:57

## 2021-01-26 RX ADMIN — HYDROMORPHONE HYDROCHLORIDE 0.4 MG: 1 INJECTION, SOLUTION INTRAMUSCULAR; INTRAVENOUS; SUBCUTANEOUS at 12:25

## 2021-01-26 RX ADMIN — OMEPRAZOLE 20 MG: 20 CAPSULE, DELAYED RELEASE ORAL at 17:21

## 2021-01-26 RX ADMIN — PREGABALIN 300 MG: 150 CAPSULE ORAL at 17:23

## 2021-01-26 RX ADMIN — FENTANYL CITRATE 100 MCG: 50 INJECTION, SOLUTION INTRAMUSCULAR; INTRAVENOUS at 07:56

## 2021-01-26 RX ADMIN — MORPHINE SULFATE 30 MG: 15 TABLET, FILM COATED, EXTENDED RELEASE ORAL at 17:22

## 2021-01-26 RX ADMIN — HYDROMORPHONE HYDROCHLORIDE 0.2 MG: 1 INJECTION, SOLUTION INTRAMUSCULAR; INTRAVENOUS; SUBCUTANEOUS at 12:35

## 2021-01-26 RX ADMIN — FENTANYL CITRATE 25 MCG: 50 INJECTION, SOLUTION INTRAMUSCULAR; INTRAVENOUS at 11:19

## 2021-01-26 RX ADMIN — HYDROCHLOROTHIAZIDE 12.5 MG: 12.5 TABLET ORAL at 15:13

## 2021-01-26 RX ADMIN — OXYCODONE HYDROCHLORIDE 10 MG: 5 SOLUTION ORAL at 11:09

## 2021-01-26 RX ADMIN — DOXEPIN HYDROCHLORIDE 50 MG: 50 CAPSULE ORAL at 20:29

## 2021-01-26 RX ADMIN — FENTANYL CITRATE 25 MCG: 50 INJECTION, SOLUTION INTRAMUSCULAR; INTRAVENOUS at 11:14

## 2021-01-26 RX ADMIN — DEXAMETHASONE SODIUM PHOSPHATE 12 MG: 4 INJECTION, SOLUTION INTRA-ARTICULAR; INTRALESIONAL; INTRAMUSCULAR; INTRAVENOUS; SOFT TISSUE at 08:11

## 2021-01-26 RX ADMIN — FENTANYL CITRATE 50 MCG: 50 INJECTION, SOLUTION INTRAMUSCULAR; INTRAVENOUS at 11:23

## 2021-01-26 RX ADMIN — ROPINIROLE HYDROCHLORIDE 4 MG: 2 TABLET, FILM COATED ORAL at 12:45

## 2021-01-26 RX ADMIN — CEFAZOLIN SODIUM 2 G: 2 INJECTION, SOLUTION INTRAVENOUS at 16:39

## 2021-01-26 RX ADMIN — DOCUSATE SODIUM 100 MG: 100 CAPSULE, LIQUID FILLED ORAL at 17:22

## 2021-01-26 RX ADMIN — ROPINIROLE HYDROCHLORIDE 4 MG: 2 TABLET, FILM COATED ORAL at 17:22

## 2021-01-26 RX ADMIN — ATORVASTATIN CALCIUM 10 MG: 10 TABLET, FILM COATED ORAL at 17:22

## 2021-01-26 RX ADMIN — POVIDONE IODINE 15 ML: 100 SOLUTION TOPICAL at 07:21

## 2021-01-26 RX ADMIN — MORPHINE SULFATE: 50 INJECTION, SOLUTION, CONCENTRATE INTRAVENOUS at 14:41

## 2021-01-26 RX ADMIN — ROCURONIUM BROMIDE 50 MG: 10 INJECTION, SOLUTION INTRAVENOUS at 08:08

## 2021-01-26 RX ADMIN — LIDOCAINE HYDROCHLORIDE 3 ML: 20 INJECTION, SOLUTION EPIDURAL; INFILTRATION; INTRACAUDAL at 08:08

## 2021-01-26 RX ADMIN — SODIUM CHLORIDE, POTASSIUM CHLORIDE, SODIUM LACTATE AND CALCIUM CHLORIDE: 600; 310; 30; 20 INJECTION, SOLUTION INTRAVENOUS at 07:21

## 2021-01-26 RX ADMIN — ONDANSETRON 4 MG: 2 INJECTION INTRAMUSCULAR; INTRAVENOUS at 10:16

## 2021-01-26 RX ADMIN — FENTANYL CITRATE 50 MCG: 50 INJECTION, SOLUTION INTRAMUSCULAR; INTRAVENOUS at 08:00

## 2021-01-26 RX ADMIN — PROPOFOL 100 MCG/KG/MIN: 10 INJECTION, EMULSION INTRAVENOUS at 08:12

## 2021-01-26 RX ADMIN — DOCUSATE SODIUM 50 MG AND SENNOSIDES 8.6 MG 1 TABLET: 8.6; 5 TABLET, FILM COATED ORAL at 20:30

## 2021-01-26 RX ADMIN — PROPOFOL 120 MG: 10 INJECTION, EMULSION INTRAVENOUS at 08:08

## 2021-01-26 RX ADMIN — GLYCOPYRROLATE 0.3 MG: 0.2 INJECTION INTRAMUSCULAR; INTRAVENOUS at 08:30

## 2021-01-26 RX ADMIN — POTASSIUM CHLORIDE AND SODIUM CHLORIDE: 900; 150 INJECTION, SOLUTION INTRAVENOUS at 14:38

## 2021-01-26 RX ADMIN — Medication 1500 MG: at 16:38

## 2021-01-26 RX ADMIN — OXYCODONE HYDROCHLORIDE 20 MG: 10 TABLET ORAL at 20:29

## 2021-01-26 RX ADMIN — SUGAMMADEX 200 MG: 100 INJECTION, SOLUTION INTRAVENOUS at 08:40

## 2021-01-26 RX ADMIN — ROPINIROLE HYDROCHLORIDE 4 MG: 2 TABLET, FILM COATED ORAL at 20:30

## 2021-01-26 RX ADMIN — CEFAZOLIN 2 G: 330 INJECTION, POWDER, FOR SOLUTION INTRAMUSCULAR; INTRAVENOUS at 08:11

## 2021-01-26 RX ADMIN — PHENYLEPHRINE HYDROCHLORIDE 40 MCG/MIN: 10 INJECTION INTRAVENOUS at 08:10

## 2021-01-26 RX ADMIN — HYDROMORPHONE HYDROCHLORIDE 0.4 MG: 1 INJECTION, SOLUTION INTRAMUSCULAR; INTRAVENOUS; SUBCUTANEOUS at 12:30

## 2021-01-26 RX ADMIN — DILTIAZEM HYDROCHLORIDE 180 MG: 180 CAPSULE, COATED, EXTENDED RELEASE ORAL at 14:30

## 2021-01-26 ASSESSMENT — COGNITIVE AND FUNCTIONAL STATUS - GENERAL
MOBILITY SCORE: 14
MOVING FROM LYING ON BACK TO SITTING ON SIDE OF FLAT BED: A LOT
CLIMB 3 TO 5 STEPS WITH RAILING: A LOT
HELP NEEDED FOR BATHING: A LOT
TOILETING: A LOT
TURNING FROM BACK TO SIDE WHILE IN FLAT BAD: A LITTLE
DAILY ACTIVITIY SCORE: 15
SUGGESTED CMS G CODE MODIFIER DAILY ACTIVITY: CK
WALKING IN HOSPITAL ROOM: A LOT
DRESSING REGULAR UPPER BODY CLOTHING: A LITTLE
STANDING UP FROM CHAIR USING ARMS: A LOT
DRESSING REGULAR LOWER BODY CLOTHING: A LOT
SUGGESTED CMS G CODE MODIFIER MOBILITY: CL
MOVING TO AND FROM BED TO CHAIR: A LITTLE
EATING MEALS: A LOT

## 2021-01-26 ASSESSMENT — PAIN DESCRIPTION - PAIN TYPE
TYPE: ACUTE PAIN;CHRONIC PAIN;SURGICAL PAIN
TYPE: ACUTE PAIN;SURGICAL PAIN
TYPE: ACUTE PAIN;CHRONIC PAIN;SURGICAL PAIN
TYPE: ACUTE PAIN;CHRONIC PAIN;SURGICAL PAIN
TYPE: SURGICAL PAIN;ACUTE PAIN
TYPE: ACUTE PAIN;CHRONIC PAIN;SURGICAL PAIN
TYPE: ACUTE PAIN;SURGICAL PAIN
TYPE: ACUTE PAIN;CHRONIC PAIN;SURGICAL PAIN
TYPE: ACUTE PAIN;SURGICAL PAIN
TYPE: ACUTE PAIN;SURGICAL PAIN;CHRONIC PAIN
TYPE: ACUTE PAIN;CHRONIC PAIN;SURGICAL PAIN
TYPE: ACUTE PAIN;CHRONIC PAIN;SURGICAL PAIN
TYPE: SURGICAL PAIN
TYPE: ACUTE PAIN;SURGICAL PAIN
TYPE: ACUTE PAIN;SURGICAL PAIN
TYPE: ACUTE PAIN

## 2021-01-26 ASSESSMENT — LIFESTYLE VARIABLES
EVER HAD A DRINK FIRST THING IN THE MORNING TO STEADY YOUR NERVES TO GET RID OF A HANGOVER: NO
TOTAL SCORE: 0
ALCOHOL_USE: NO
CONSUMPTION TOTAL: INCOMPLETE
HAVE PEOPLE ANNOYED YOU BY CRITICIZING YOUR DRINKING: NO
EVER FELT BAD OR GUILTY ABOUT YOUR DRINKING: NO
HAVE YOU EVER FELT YOU SHOULD CUT DOWN ON YOUR DRINKING: NO

## 2021-01-26 ASSESSMENT — PAIN SCALES - GENERAL: PAIN_LEVEL: 2

## 2021-01-26 ASSESSMENT — FIBROSIS 4 INDEX: FIB4 SCORE: 1.11

## 2021-01-26 NOTE — ANESTHESIA PROCEDURE NOTES
Arterial Line  Performed by: Anoop Briggs M.D.  Authorized by: Anoop Briggs M.D.     Start Time:  1/26/2021 8:15 AM  End Time:  1/26/2021 8:20 AM  Patient Location:  OR  Indication: continuous blood pressure monitoring        Catheter Size:  20 G  Seldinger Technique?: Yes    Laterality:  Right  Site:  Radial artery  Line Secured:  Transparent dressing, antimicrobial disc and tape  Events: patient tolerated procedure well with no complications

## 2021-01-26 NOTE — ANESTHESIA POSTPROCEDURE EVALUATION
Patient: Ariella Rendon    Procedure Summary     Date: 01/26/21 Room / Location: [facility] 03 / SURGERY [facility]    Anesthesia Start: 0740 Anesthesia Stop: 1038    Procedures:       FUSION, SPINE, CERVICAL, POSTERIOR [id number] (N/A Spine Cervical)      LAMINECTOMY, SPINE, CERVICAL, POSTERIOR [id number] (N/A Spine Cervical) Diagnosis: (SPINAL STENOSIS IN CERVICAL REGION)    Surgeons: Jacob Nichols M.D. Responsible Provider: Anoop Briggs M.D.    Anesthesia Type: general ASA Status: 4          Final Anesthesia Type: general  Last vitals  BP   Blood Pressure : 130/80    Temp   36.5 °C (97.7 °F)    Pulse   Pulse: 79   Resp   18    SpO2   92 %      Anesthesia Post Evaluation    Patient location during evaluation: PACU  Patient participation: complete - patient participated  Level of consciousness: awake and alert  Pain score: 2    Airway patency: patent  Anesthetic complications: no  Cardiovascular status: hemodynamically stable  Respiratory status: acceptable  Hydration status: euvolemic    PONV: none           Nurse Pain Score: 5 (NPRS)

## 2021-01-26 NOTE — ANESTHESIA PROCEDURE NOTES
Airway    Date/Time: 1/26/2021 8:19 AM  Performed by: Anoop Briggs M.D.  Authorized by: Anoop Briggs M.D.     Location:  OR  Urgency:  Elective  Indications for Airway Management:  Anesthesia      Spontaneous Ventilation: absent    Sedation Level:  Deep  Preoxygenated: Yes    Patient Position:  Sniffing  Mask Difficulty Assessment:  1 - vent by mask  Final Airway Type:  Endotracheal airway  Final Endotracheal Airway:  ETT  Cuffed: Yes    Technique Used for Successful ETT Placement:  Direct laryngoscopy    Insertion Site:  Oral  Blade Type:  Valerie  Laryngoscope Blade/Videolaryngoscope Blade Size:  3  ETT Size (mm):  6.5  Measured from:  Teeth  ETT to Teeth (cm):  24  Placement Verified by: auscultation and capnometry    Cormack-Lehane Classification:  Grade IIa - partial view of glottis  Number of Attempts at Approach:  1

## 2021-01-26 NOTE — ANESTHESIA TIME REPORT
Anesthesia Start and Stop Event Times     Date Time Event    1/26/2021 0727 Ready for Procedure     0740 Anesthesia Start     1038 Anesthesia Stop        Responsible Staff  01/26/21    Name Role Begin End    Anoop Briggs M.D. Anesth 0740 1038        Preop Diagnosis (Free Text):  Pre-op Diagnosis     SPINAL STENOSIS IN CERVICAL REGION        Preop Diagnosis (Codes):    Post op Diagnosis  Cervical spinal stenosis      Premium Reason  Non-Premium    Comments:

## 2021-01-26 NOTE — ANESTHESIA PREPROCEDURE EVALUATION
Relevant Problems   ANESTHESIA   (+) History of anesthesia complications   (+) EMILY (obstructive sleep apnea)      PULMONARY   (+) COPD (chronic obstructive pulmonary disease) (HCC)      NEURO   (+) History of anesthesia complications      CARDIAC   (+) Hypertension      GI   (+) GERD (gastroesophageal reflux disease)      Other   (+) Chronic respiratory failure (HCC)   (+) Microcytic anemia       Physical Exam    Airway   Mallampati: II  TM distance: >3 FB  Neck ROM: full       Cardiovascular - normal exam  Rhythm: regular  Rate: normal  (-) murmur     Dental - normal exam           Pulmonary - normal exam  Breath sounds clear to auscultation  (+) decreased breath sounds     Abdominal   (+) obese     Neurological - normal exam                 Anesthesia Plan    ASA 4   ASA physical status 4 criteria: respiratory failure    Plan - general       Airway plan will be ETT        Induction: intravenous    Postoperative Plan: Postoperative administration of opioids is intended.    Pertinent diagnostic labs and testing reviewed    Informed Consent:    Anesthetic plan and risks discussed with patient.    Use of blood products discussed with: patient whom consented to blood products.

## 2021-01-26 NOTE — PROCEDURES
Vascular Access Team    Date of Insertion: 1/26/2021  Arm Circumference: n/a  Line Length: 10cm  Line Size: 18g  Vein Occupancy %: 30  Reason for Midline: access  Labs: WBC 6.8, , BUN 14, Cr 0.60, GFR >60, INR 0.86    Orders confirmed, vessel patency confirmed with ultrasound. Risks and benefits of procedure explained to patient and education regarding line associated bloodstream infections provided. Questions answered.     PowerGlide Midline placed in RUE per licensed provider order with ultrasound guidance. 18g, 10 cm line placed in cephalic vein after 1 attempt(s).  Catheter inserted with brisk blood return. Secured with 0cm external from insertion site.  Line flushed without resistance with 10 mL 0.9% normal saline.  Midline secured with Biopatch and Tegaderm.     Midline placement is confirmed by nurse using ultrasound and ability to flush and draw blood. Midline is appropriate for use at this time.  No X-ray is needed for placement confirmation. Pt tolerated procedure well.  Patient condition relayed to unit RN or ordering physician via this post procedure note in the EMR.    Ultrasound images uploaded to PACS and viewable in the EMR - yes  Ultrasound imaged printed and placed in paper chart - no      BARD PowerGlide Midline ref # [id number], Lot # [id number], Expiration Date 9/30/2021

## 2021-01-26 NOTE — OR NURSING
HOB at least 30 degrees per order.  Pt on 6 L oxy-mask, baseline 3 L NC 24/7.  No c/o nausea, tolerating PO fluids and medication.  Surgical pain tolerable per pt, 4/10.Posterior back surgical site CDI. Hemovac compressed, patent and draining dark red fluid. BUE strength 5, BLE strength 5.  Pt uses single point cane.   Cardoso patent and draining.  VSS, afebrile, VERONICA, A/O x4.    One personal blue bag and black metal cane on bed.  Glasses in place.     Requip tablets on pt chart.  PCA/tubbing on bed.  Oxygen tank greater than 75% full.  Transferred with surgical mask in place.     Bedside hand off to [place] DUDLEY.

## 2021-01-26 NOTE — OP REPORT
DATE OF SERVICE:  01/26/2021     PREOPERATIVE DIAGNOSIS:  Cervical stenosis with myelopathy.     POSTOPERATIVE DIAGNOSIS:  Cervical stenosis with myelopathy.     PROCEDURES:    1.  Decompressive cervical laminectomy, medial facetectomies inferior C2 to   superior C7 with decompression of cervical spinal cord.  2.  Posterior fusion C4-C7 with local bone graft.  3.  Harvesting preparation of local bone graft.  4.  C4-C7 lateral mass instrumentation.     SURGEON:  Jacob Nichols MD     ASSISTANT:  LETTY Colon.     ANESTHESIA:  General endotracheal.     ANESTHESIOLOGIST:  Anoop Briggs MD     PREPARATION:  ChloraPrep.     MEDICATIONS:  The patient given Ancef prior to incision.     INDICATIONS:  This woman born in 1955 with gait difficulty, intermittent   numbness in her hands.  The patient had an MRI, which revealed significant   stenosis.  The patient was felt to be a candidate for operative decompression   to prevent further loss of neurologic function and optimize the potential for   return of loss of function.  The patient understood major risks and   complications of paralysis relatively rare, roughly 5% risk of a deltoid   palsy, a small risk of wound infection, spinal fluid leak, chance to require   another operation 10-15%.  The patient is understanding and agreed to proceed   and signed consent.     NEED FOR SURGICAL ASSISTANT:  Surgical assistant was required for suction,   retraction, irrigation, cleaning of instruments, keep the case moving forward   to minimize operative time.     DESCRIPTION OF PROCEDURE:  The patient was brought to the operating room.    Peripheral venous lines were placed. General anesthesia was induced.  The   patient intubated.  Cardoso catheter was placed.  I asked Dr. Briggs to   maintain a mean arterial pressure of 80-85 throughout the case with pressors   if needed.  He, therefore, placed a radial arterial line.  The patient had a   Swartz head le applied,  was then carefully laid prone onto the Dorian   frame on the routine bed.  Neck was maintained in a neutral position.    Shoulders were partially taped down with a wide tape. Pressure points were   carefully padded.  The base of the head and neck was shaved.  Then, I   personally doubly prepped with ChloraPrep and draped.  Planned incision was   marked from C2-C7.  Skin was infiltrated with local and incised with scalpel   in a routine fashion.  We did a subperiosteal dissection to expose inferior C2   through C7.  Self-retaining retractors were placed.  Using a large Leksell, I   removed the spinous processes of C3 through C6.  All bone was collected,   cleansed of soft tissues, then placed through the bone mill.  Bone dust was   collected using the bone trap.  The lamina was then drilled to paper thin   shelf of bone in the lateral gutters, which was carefully elevated with 1 mm   Kerrison and then the midline structures fractured up away from the thecal sac   with a small knurled curette.  I removed the inferior aspect of C2, the   superior third of C7 maintaining the spinous process of C7.  Ultrasound was   brought in, which showed excellent decompression of the cord with   reestablishment of both ventral and dorsal subarachnoid space.  Throughout the   case, the patient's SSEPs and MEPs remained stable.  The medial facets were   taken with a 2 mm Kerrison to provide wide decompression of the spinal cord.    Next, the facet joints at C4-C5, C5-C6 and C6-C7 were decorticated   bilaterally.  A trajectories through the lateral masses of C4, C5, C6, and C7   were carried out using a small drill bit roughly the midportion of the lateral   mass aiming laterally 4 degrees and parallel to the facet joint to create a   trajectory through the lateral mass with a 14 mm drill bit.  Palpation with   the pedicle probe ensured no penetration outside the lateral mass. At all 8   sites, 3.5x14 mm screws were placed.  A 60 mm  lordosis agus was further   lordosed, placed in the heads of the screws, locking nuts were tightened.  The   lower three screws on either sides were tightened to factory torque setting   and at the C4-C5 level, applied roughly a millimeter of distraction and then   tightened the C4 locking nuts.  Grasping instrumentation C4 through C7 moved   as a single unit.  Next, we decorticated lateral masses at C4-C7 bilaterally   with the drill.  Then, the bone graft was placed posterolateral to the   hardware over the decorticated lateral masses.  Throughout the case, wound was   irrigated with antibiotic irrigation.  Muscle bleeders controlled with   bipolar electrocautery.  Minor bone bleeders controlled with bone wax.    Epidural bleeding was controlled with Gelfoam powder mixed with thrombin.    Once decompression was completed, medium Hemovac drain was placed in depth of   the wound, brought out through a separate stab incision.  Deep fascia was   closed with #1 Vicryl, subcutaneous fascia with 0 Vicryl, subcuticular closed   with 3-0 Vicryl and skin edges approximated with staples.  Sterile dressing   was placed.  The patient laid supine on the bed, extubated, head of bed   elevated, taken to recovery room in satisfactory condition.  The patient   tolerated the procedure well without apparent complications.     ESTIMATED BLOOD LOSS:  300 mL.        ______________________________  MD CEE HUGHES/CHINEDU/JUNE    DD:  01/26/2021 12:52  DT:  01/26/2021 14:29    Job#:  [id number]    CC:SULY JIANG MD

## 2021-01-27 LAB
ANION GAP SERPL CALC-SCNC: 9 MMOL/L (ref 7–16)
BUN SERPL-MCNC: 12 MG/DL (ref 8–22)
CALCIUM SERPL-MCNC: 9.2 MG/DL (ref 8.5–10.5)
CHLORIDE SERPL-SCNC: 98 MMOL/L (ref 96–112)
CO2 SERPL-SCNC: 28 MMOL/L (ref 20–33)
CREAT SERPL-MCNC: 0.72 MG/DL (ref 0.5–1.4)
ERYTHROCYTE [DISTWIDTH] IN BLOOD BY AUTOMATED COUNT: 49.6 FL (ref 35.9–50)
GLUCOSE SERPL-MCNC: 121 MG/DL (ref 65–99)
HCT VFR BLD AUTO: 47.7 % (ref 37–47)
HGB BLD-MCNC: 15 G/DL (ref 12–16)
MCH RBC QN AUTO: 29.9 PG (ref 27–33)
MCHC RBC AUTO-ENTMCNC: 31.4 G/DL (ref 33.6–35)
MCV RBC AUTO: 95 FL (ref 81.4–97.8)
PLATELET # BLD AUTO: 225 K/UL (ref 164–446)
PMV BLD AUTO: 11.6 FL (ref 9–12.9)
POTASSIUM SERPL-SCNC: 4.5 MMOL/L (ref 3.6–5.5)
RBC # BLD AUTO: 5.02 M/UL (ref 4.2–5.4)
SODIUM SERPL-SCNC: 135 MMOL/L (ref 135–145)
WBC # BLD AUTO: 12.3 K/UL (ref 4.8–10.8)

## 2021-01-27 PROCEDURE — 97161 PT EVAL LOW COMPLEX 20 MIN: CPT

## 2021-01-27 PROCEDURE — 80048 BASIC METABOLIC PNL TOTAL CA: CPT

## 2021-01-27 PROCEDURE — 700102 HCHG RX REV CODE 250 W/ 637 OVERRIDE(OP): Performed by: PHYSICIAN ASSISTANT

## 2021-01-27 PROCEDURE — 700102 HCHG RX REV CODE 250 W/ 637 OVERRIDE(OP): Performed by: NURSE PRACTITIONER

## 2021-01-27 PROCEDURE — 85027 COMPLETE CBC AUTOMATED: CPT

## 2021-01-27 PROCEDURE — 90471 IMMUNIZATION ADMIN: CPT

## 2021-01-27 PROCEDURE — 90662 IIV NO PRSV INCREASED AG IM: CPT | Performed by: NEUROLOGICAL SURGERY

## 2021-01-27 PROCEDURE — 97165 OT EVAL LOW COMPLEX 30 MIN: CPT

## 2021-01-27 PROCEDURE — G0378 HOSPITAL OBSERVATION PER HR: HCPCS

## 2021-01-27 PROCEDURE — 700111 HCHG RX REV CODE 636 W/ 250 OVERRIDE (IP): Performed by: NEUROLOGICAL SURGERY

## 2021-01-27 PROCEDURE — A9270 NON-COVERED ITEM OR SERVICE: HCPCS | Performed by: PHYSICIAN ASSISTANT

## 2021-01-27 PROCEDURE — A9270 NON-COVERED ITEM OR SERVICE: HCPCS | Performed by: NURSE PRACTITIONER

## 2021-01-27 RX ADMIN — CETIRIZINE HYDROCHLORIDE 10 MG: 10 TABLET, FILM COATED ORAL at 05:32

## 2021-01-27 RX ADMIN — OMEPRAZOLE 20 MG: 20 CAPSULE, DELAYED RELEASE ORAL at 17:30

## 2021-01-27 RX ADMIN — DOXEPIN HYDROCHLORIDE 50 MG: 50 CAPSULE ORAL at 21:04

## 2021-01-27 RX ADMIN — Medication 1500 MG: at 05:33

## 2021-01-27 RX ADMIN — ATORVASTATIN CALCIUM 10 MG: 10 TABLET, FILM COATED ORAL at 17:31

## 2021-01-27 RX ADMIN — ROPINIROLE HYDROCHLORIDE 4 MG: 2 TABLET, FILM COATED ORAL at 17:30

## 2021-01-27 RX ADMIN — DIAZEPAM 5 MG: 5 TABLET ORAL at 13:10

## 2021-01-27 RX ADMIN — HYDROCHLOROTHIAZIDE 12.5 MG: 12.5 TABLET ORAL at 05:33

## 2021-01-27 RX ADMIN — ROPINIROLE HYDROCHLORIDE 4 MG: 2 TABLET, FILM COATED ORAL at 21:03

## 2021-01-27 RX ADMIN — OMEPRAZOLE 20 MG: 20 CAPSULE, DELAYED RELEASE ORAL at 05:33

## 2021-01-27 RX ADMIN — DOCUSATE SODIUM 100 MG: 100 CAPSULE, LIQUID FILLED ORAL at 17:30

## 2021-01-27 RX ADMIN — ROPINIROLE HYDROCHLORIDE 4 MG: 2 TABLET, FILM COATED ORAL at 09:08

## 2021-01-27 RX ADMIN — MORPHINE SULFATE 30 MG: 15 TABLET, FILM COATED, EXTENDED RELEASE ORAL at 05:33

## 2021-01-27 RX ADMIN — ROPINIROLE HYDROCHLORIDE 4 MG: 2 TABLET, FILM COATED ORAL at 12:45

## 2021-01-27 RX ADMIN — GLYCOPYRROLATE 1 CAPSULE: 15.6 CAPSULE RESPIRATORY (INHALATION) at 21:09

## 2021-01-27 RX ADMIN — OXYCODONE HYDROCHLORIDE 20 MG: 10 TABLET ORAL at 16:29

## 2021-01-27 RX ADMIN — DOCUSATE SODIUM 50 MG AND SENNOSIDES 8.6 MG 1 TABLET: 8.6; 5 TABLET, FILM COATED ORAL at 21:04

## 2021-01-27 RX ADMIN — INFLUENZA A VIRUS A/MICHIGAN/45/2015 X-275 (H1N1) ANTIGEN (FORMALDEHYDE INACTIVATED), INFLUENZA A VIRUS A/SINGAPORE/INFIMH-16-0019/2016 IVR-186 (H3N2) ANTIGEN (FORMALDEHYDE INACTIVATED), INFLUENZA B VIRUS B/PHUKET/3073/2013 ANTIGEN (FORMALDEHYDE INACTIVATED), AND INFLUENZA B VIRUS B/MARYLAND/15/2016 BX-69A ANTIGEN (FORMALDEHYDE INACTIVATED) 0.7 ML: 60; 60; 60; 60 INJECTION, SUSPENSION INTRAMUSCULAR at 12:46

## 2021-01-27 RX ADMIN — MORPHINE SULFATE 30 MG: 15 TABLET, FILM COATED, EXTENDED RELEASE ORAL at 18:00

## 2021-01-27 RX ADMIN — OXYCODONE HYDROCHLORIDE 15 MG: 10 TABLET ORAL at 09:08

## 2021-01-27 RX ADMIN — DIAZEPAM 5 MG: 5 TABLET ORAL at 21:04

## 2021-01-27 RX ADMIN — OXYCODONE HYDROCHLORIDE 20 MG: 10 TABLET ORAL at 23:46

## 2021-01-27 RX ADMIN — DIAZEPAM 5 MG: 5 TABLET ORAL at 07:51

## 2021-01-27 RX ADMIN — PREGABALIN 300 MG: 150 CAPSULE ORAL at 17:30

## 2021-01-27 RX ADMIN — OXYCODONE HYDROCHLORIDE 15 MG: 10 TABLET ORAL at 03:11

## 2021-01-27 RX ADMIN — DILTIAZEM HYDROCHLORIDE 180 MG: 180 CAPSULE, COATED, EXTENDED RELEASE ORAL at 05:32

## 2021-01-27 RX ADMIN — DOCUSATE SODIUM 100 MG: 100 CAPSULE, LIQUID FILLED ORAL at 05:33

## 2021-01-27 ASSESSMENT — GAIT ASSESSMENTS
ASSISTIVE DEVICE: SINGLE POINT CANE
DEVIATION: OTHER (COMMENT)
GAIT LEVEL OF ASSIST: SUPERVISED
DISTANCE (FEET): 160

## 2021-01-27 ASSESSMENT — COGNITIVE AND FUNCTIONAL STATUS - GENERAL
SUGGESTED CMS G CODE MODIFIER DAILY ACTIVITY: CH
TURNING FROM BACK TO SIDE WHILE IN FLAT BAD: A LITTLE
MOVING FROM LYING ON BACK TO SITTING ON SIDE OF FLAT BED: A LITTLE
DAILY ACTIVITIY SCORE: 24
MOVING TO AND FROM BED TO CHAIR: A LITTLE
MOBILITY SCORE: 21
SUGGESTED CMS G CODE MODIFIER MOBILITY: CJ

## 2021-01-27 ASSESSMENT — ACTIVITIES OF DAILY LIVING (ADL): TOILETING: INDEPENDENT

## 2021-01-27 ASSESSMENT — PATIENT HEALTH QUESTIONNAIRE - PHQ9
1. LITTLE INTEREST OR PLEASURE IN DOING THINGS: NOT AT ALL
SUM OF ALL RESPONSES TO PHQ9 QUESTIONS 1 AND 2: 0
2. FEELING DOWN, DEPRESSED, IRRITABLE, OR HOPELESS: NOT AT ALL

## 2021-01-27 ASSESSMENT — PAIN DESCRIPTION - PAIN TYPE
TYPE: SURGICAL PAIN

## 2021-01-27 NOTE — PROGRESS NOTES
During shift change, day shift RN notified me that she was unaware of the last output of this patients hemovac. I looked in flowsheets to see if there was a recent output and there had been no output recorded this day since patient came up from surgery. I emptied the hemovac and the compression device was full and the output was at 190ml. Order is to drain every 8 hours so next output will be recorded then.

## 2021-01-27 NOTE — PROGRESS NOTES
Neurosurgery Progress Note    Subjective:  States Numbness and gait improved  Eating some, mullins just removed, pca stopped overnight  Not yet ambulating much    Exam:  BUE and BLE str intact- CDI with hvac 50    BP  Min: 115/66  Max: 169/99  Pulse  Av.7  Min: 64  Max: 91  Resp  Avg: 15.8  Min: 12  Max: 18  Temp  Av.6 °C (97.8 °F)  Min: 36.1 °C (97 °F)  Max: 37.1 °C (98.7 °F)  SpO2  Av.2 %  Min: 91 %  Max: 97 %    No data recorded    Recent Labs     21  0257   WBC 12.3*   RBC 5.02   HEMOGLOBIN 15.0   HEMATOCRIT 47.7*   MCV 95.0   MCH 29.9   MCHC 31.4*   RDW 49.6   PLATELETCT 225   MPV 11.6     Recent Labs     21  0257   SODIUM 135   POTASSIUM 4.5   CHLORIDE 98   CO2 28   GLUCOSE 121*   BUN 12   CREATININE 0.72   CALCIUM 9.2               Intake/Output       21 0700 - 21 0659 21 0700 - 21 0659      0560-3742 0529-3544 Total 5071-8664 6821-6817 Total       Intake    P.O.  100  -- 100  240  -- 240    P.O. 100 -- 100 240 -- 240    I.V.  1200  -- 1200  --  -- --    Volume (mL) (lactated ringers infusion) 1200 -- 1200 -- -- --    Total Intake 1300 -- 1300 240 -- 240       Output    Urine  925  2400 3325  --  -- --    Number of Times Voided -- 1 x 1 x -- -- --    Urine Void (mL) -- 800 800 -- -- --    Output (mL) ([REMOVED] Urethral Catheter Latex;Straight-tip 16 Fr.) 925 1600 2525 -- -- --    Drains  --  240 240  --  -- --    Output (mL) (Closed/Suction Drain 1 Posterior Neck Hemovac 10 Fr.) -- 240 240 -- -- --    Blood  100  -- 100  --  -- --    Est. Blood Loss 100 -- 100 -- -- --    Total Output 1025 2640 3665 -- -- --       Net I/O     275 -2640 -2365 240 -- 240            Intake/Output Summary (Last 24 hours) at 2021 1030  Last data filed at 2021 0900  Gross per 24 hour   Intake 1540 ml   Output 3665 ml   Net -2125 ml            • albuterol  2 Puff Q6HRS PRN   • atorvastatin  10 mg Q EVENING   • cetirizine  10 mg DAILY   • DILTIAZem CD  180 mg DAILY   •  doxepin  50 mg Nightly   • hydroCHLOROthiazide  12.5 mg DAILY   • magnesium gluconate  1,500 mg DAILY   • morphine ER  30 mg Q12HRS   • omeprazole  20 mg BID   • pregabalin  300 mg Q EVENING   • ROPINIRole  4 mg 4X/DAY   • glycopyrrolate  1 Cap BID (RT)   • Pharmacy Consult Request  1 Each PHARMACY TO DOSE   • MD ALERT...DO NOT ADMINISTER NSAIDS or ASPIRIN unless ORDERED By Neurosurgery  1 Each PRN   • docusate sodium  100 mg BID   • senna-docusate  1 Tab Nightly   • senna-docusate  1 Tab Q24HRS PRN   • polyethylene glycol/lytes  1 Packet BID PRN   • magnesium hydroxide  30 mL QDAY PRN   • bisacodyl  10 mg Q24HRS PRN   • fleet  1 Each Once PRN   • Respiratory Therapy Consult   Continuous RT   • 0.9 % NaCl with KCl 20 mEq 1,000 mL   Continuous   • ondansetron  4 mg Q4HRS PRN   • ondansetron  4 mg Q4HRS PRN   • diphenhydrAMINE  25 mg Q6HRS PRN    Or   • diphenhydrAMINE  25 mg Q6HRS PRN   • methocarbamol  750 mg Q8HRS PRN    Or   • cyclobenzaprine  10 mg Q8HRS PRN    Or   • diazePAM  5 mg Q4HRS PRN   • labetalol  10 mg Q HOUR PRN   • oxyCODONE immediate-release  15 mg Q6HRS PRN    Or   • oxyCODONE immediate-release  20 mg Q6HRS PRN       Assessment and Plan:  POD #C2-7 lami with 4-7 fusion  Prophylactic anticoagulation: no         Start date/time: tbd    Plan:  Continue pain control  PT- ambulate  Watch drain  Bladder protocol

## 2021-01-27 NOTE — CARE PLAN
Problem: Communication  Goal: The ability to communicate needs accurately and effectively will improve  Outcome: PROGRESSING AS EXPECTED     Problem: Infection  Goal: Will remain free from infection  Outcome: PROGRESSING AS EXPECTED     Problem: Venous Thromboembolism (VTW)/Deep Vein Thrombosis (DVT) Prevention:  Goal: Patient will participate in Venous Thrombosis (VTE)/Deep Vein Thrombosis (DVT)Prevention Measures  Outcome: PROGRESSING AS EXPECTED     Problem: Pain Management  Goal: Pain level will decrease to patient's comfort goal  Outcome: PROGRESSING AS EXPECTED  Patient had PCA pump but now is on oral pain meds

## 2021-01-27 NOTE — PROGRESS NOTES
Bedside report received. Assessment completed.  Pt is A&O x4. Pt IS ON 6L of oxygen.   Medicating for pain PRN per MAR Pain   Denies nausea.   - numbness, - tingling.  Skin dressing is intact and dry t cervical region   LDA draining   Last BM 1/26/21  +void.   diet. Tolerates well.   Pt up  To the bathroom, standby assist, sitting up for meals  Call light and belongings within reach. All needs met at this time. Fall Precautions and hourly rounding in place.

## 2021-01-27 NOTE — THERAPY
Physical Therapy   Initial Evaluation     Patient Name: Ariella Rendon  Age:  65 y.o., Sex:  female  Medical Record #: [id number]  Today's Date: 1/27/2021     Precautions: Fall Risk, Cervical Collar  (soft cervical collar PRN post cervical dec/fusion)    Assessment  After initial evaluation and pt education, pt has no further acute PT needs. She reports no functional concerns with dc to home once medically cleared and reports appropriate social supports and ADs as well. See below for details/recs.     Plan    Recommend Physical Therapy for Evaluation only    DC Equipment Recommendations: None  Discharge Recommendations: Recommend outpatient physical therapy services to address higher level deficits        01/27/21 1006   Prior Living Situation   Prior Services None   Housing / Facility 2 Story House  (staying on 1st floor)   Steps Into Home 2   Steps In Home   (FOS with rail)   Bathroom Set up Bathtub / Shower Combination;Shower Chair   Equipment Owned 4-Wheel Walker;Single Point Cane;Tub / Shower Seat;Oxygen   Lives with - Patient's Self Care Capacity Adult Children   Comments pt reports dtr will be staying with her during initial recovery   Prior Level of Functional Mobility   Bed Mobility Independent   Transfer Status Independent   Ambulation Independent   Distance Ambulation (Feet)   (community)   Assistive Devices Used Single Point Cane  (intermittent use)   Stairs Independent   Comments I with IADls and ADls prior   History of Falls   History of Falls No   Cognition    Cognition / Consciousness WDL   Level of Consciousness Alert   Comments very pleasant and cooperative   Passive ROM Lower Body   Passive ROM Lower Body WDL   Active ROM Lower Body    Active ROM Lower Body  WDL   Strength Lower Body   Lower Body Strength  WDL   Sensation Lower Body   Lower Extremity Sensation   WDL   Lower Body Muscle Tone   Lower Body Muscle Tone  X   Comments diminished sensation at distal RLE at medial side; pt reporst this is  long standing 2' to lumbar co-morbidities; may have some slight diminished sensation at BLEs at baseline as well (pt reports her sensation varies, neuropathy? as well)   Neurological Concerns   Neurological Concerns Yes   Comments given abnormal sensation at UE and LEs; improving since surgery at [facility] per pt   Balance Assessment   Sitting Balance (Static) Good   Sitting Balance (Dynamic) Good   Standing Balance (Static) Fair +   Standing Balance (Dynamic) Fair +   Weight Shift Sitting Good   Weight Shift Standing Good   Comments no chuck LOB druign ambulation with SPC or with stairs with SPC; note that pt able to stand at EOB and adjust sheets/bed wihout AD with no LOB as well   Gait Analysis   Gait Level Of Assist Supervised   Assistive Device Single Point Cane   Distance (Feet) 160   # of Times Distance was Traveled 1   Deviation Other (Comment)  (reciprocal gait; dec leodan)   # of Stairs Climbed 3   Level of Assist with Stairs Supervised   Weight Bearing Status no restrictions   Comments see balance   Bed Mobility    Supine to Sit Supervised   Sit to Supine Supervised   Scooting Supervised   Comments HOB slighlty elevated; pt utilizes neutral spine mechanics   Functional Mobility   Sit to Stand Supervised   Bed, Chair, Wheelchair Transfer Supervised   Transfer Method Other (Comments)  (walks to EOB)   Mobility with SPC   How much difficulty does the patient currently have...   Turning over in bed (including adjusting bedclothes, sheets and blankets)? 3   Sitting down on and standing up from a chair with arms (e.g., wheelchair, bedside commode, etc.) 3   Moving from lying on back to sitting on the side of the bed? 3   How much help from another person does the patient currently need...   Moving to and from a bed to a chair (including a wheelchair)? 4   Need to walk in a hospital room? 4   Climbing 3-5 steps with a railing? 4   6 clicks Mobility Score 21   Activity Tolerance   Sitting in Chair NT   Sitting Edge of  Bed functional   Standing at least 6 mins   Comments no overt/acute fatigue; tolerates well on 2L NC   Edema / Skin Assessment   Edema / Skin  Not Assessed   Education Group   Education Provided Role of Physical Therapist;Cervical Precautions   Cervical Precautions Patient Response Patient;Acceptance;Explanation;Verbal Demonstration;Action Demonstration   Role of Physical Therapist Patient Response Patient;Acceptance;Explanation;Verbal Demonstration   Additional Comments education re: acute helaing, possible outpatient PT post acute/subacute healing as precuations lifted

## 2021-01-27 NOTE — THERAPY
"Occupational Therapy   Initial Evaluation     Patient Name: Ariella Rendon  Age:  65 y.o., Sex:  female  Medical Record #: [id number]  Today's Date: 1/27/2021       Precautions: Fall Risk, Cervical Collar  , Spinal / Back Precautions   Comments: soft collar PRN    Assessment  Patient is 65 y.o. female with a diagnosis of Cervical myelopathy.  Pt is s/p C2-7 lami with 4-7 fusion.  Additional factors influencing patient status / progress: Pt has hx of multiple sx & also reports low back pain & is on Home O2.  Pt today is able to complete basic ADL's with supervision & extra time.  Pt is anxious to D/C home & make arrangement for her ride & to obtain her meds.  Pt educated on spinal precautions & provided with a written handout.   Pt has no further Acute OT needs.  Pt reports her daughter & grandson will be able to assist if needed.    Plan    Recommend Occupational Therapy for Evaluation only .    DC Equipment Recommendations: None  Discharge Recommendations: Anticipate that the patient will have no further occupational therapy needs after discharge from the hospital     Subjective    \"I just want to go home\"     Objective       01/27/21 1156   Prior Living Situation   Prior Services None   Housing / Facility 2 Story House   Steps Into Home 2   Bathroom Set up Bathtub / Shower Combination;Shower Chair   Equipment Owned 4-Wheel Walker;Tub / Shower Seat;Oxygen   Lives with - Patient's Self Care Capacity Adult Children   Comments pt reports her daughter & grandson can assist if needed   Cognition    Comments pleasant, a bit emotional & over whelmed with her pain, but did better with distraction   Balance Assessment   Sitting Balance (Static) Good   Sitting Balance (Dynamic) Good   Standing Balance (Static) Fair +   Standing Balance (Dynamic) Fair +   Weight Shift Sitting Good   Weight Shift Standing Good   Bed Mobility    Comments pt reminded to log roll for bed mobility'   ADL Assessment   Eating Modified Independent "   Grooming Supervision;Standing   Upper Body Dressing Modified Independent   Lower Body Dressing Modified Independent   Toileting Modified Independent   Functional Mobility   Sit to Stand Supervised   Bed, Chair, Wheelchair Transfer Supervised   Toilet Transfers Supervised

## 2021-01-27 NOTE — PROGRESS NOTES
"During shift change report, pt was in tears and explained to me that she was in a lot of pain. She is currently on a Morphine PCA pump but claims this does not help her pain. She says she takes Oxycodone and has for years. \"I havent had any pain relief. I have been on Oxy for years and if I dont get this I may go through withdrawal\". I spoke with the on-call PA of Dr. Nichols and explained the situation.  Said she would look into her chart and see if she can get her on oral Oxys and off the morphine PCA.  "

## 2021-01-28 VITALS
BODY MASS INDEX: 36.39 KG/M2 | SYSTOLIC BLOOD PRESSURE: 148 MMHG | TEMPERATURE: 98.1 F | WEIGHT: 226.41 LBS | HEART RATE: 71 BPM | RESPIRATION RATE: 18 BRPM | DIASTOLIC BLOOD PRESSURE: 86 MMHG | OXYGEN SATURATION: 91 % | HEIGHT: 66 IN

## 2021-01-28 LAB
ANION GAP SERPL CALC-SCNC: 13 MMOL/L (ref 7–16)
BUN SERPL-MCNC: 16 MG/DL (ref 8–22)
CALCIUM SERPL-MCNC: 9.4 MG/DL (ref 8.5–10.5)
CHLORIDE SERPL-SCNC: 97 MMOL/L (ref 96–112)
CO2 SERPL-SCNC: 32 MMOL/L (ref 20–33)
CREAT SERPL-MCNC: 0.81 MG/DL (ref 0.5–1.4)
ERYTHROCYTE [DISTWIDTH] IN BLOOD BY AUTOMATED COUNT: 52.5 FL (ref 35.9–50)
GLUCOSE SERPL-MCNC: 94 MG/DL (ref 65–99)
HCT VFR BLD AUTO: 48.9 % (ref 37–47)
HGB BLD-MCNC: 15.1 G/DL (ref 12–16)
MCH RBC QN AUTO: 30 PG (ref 27–33)
MCHC RBC AUTO-ENTMCNC: 30.9 G/DL (ref 33.6–35)
MCV RBC AUTO: 97 FL (ref 81.4–97.8)
PLATELET # BLD AUTO: 207 K/UL (ref 164–446)
PMV BLD AUTO: 12.1 FL (ref 9–12.9)
POTASSIUM SERPL-SCNC: 3.9 MMOL/L (ref 3.6–5.5)
RBC # BLD AUTO: 5.04 M/UL (ref 4.2–5.4)
SODIUM SERPL-SCNC: 142 MMOL/L (ref 135–145)
WBC # BLD AUTO: 11.1 K/UL (ref 4.8–10.8)

## 2021-01-28 PROCEDURE — 94760 N-INVAS EAR/PLS OXIMETRY 1: CPT

## 2021-01-28 PROCEDURE — 700102 HCHG RX REV CODE 250 W/ 637 OVERRIDE(OP): Performed by: NURSE PRACTITIONER

## 2021-01-28 PROCEDURE — 85027 COMPLETE CBC AUTOMATED: CPT

## 2021-01-28 PROCEDURE — G0378 HOSPITAL OBSERVATION PER HR: HCPCS

## 2021-01-28 PROCEDURE — A9270 NON-COVERED ITEM OR SERVICE: HCPCS | Performed by: PHYSICIAN ASSISTANT

## 2021-01-28 PROCEDURE — 700102 HCHG RX REV CODE 250 W/ 637 OVERRIDE(OP): Performed by: PHYSICIAN ASSISTANT

## 2021-01-28 PROCEDURE — A9270 NON-COVERED ITEM OR SERVICE: HCPCS | Performed by: NURSE PRACTITIONER

## 2021-01-28 PROCEDURE — 80048 BASIC METABOLIC PNL TOTAL CA: CPT

## 2021-01-28 RX ADMIN — OXYCODONE HYDROCHLORIDE 20 MG: 10 TABLET ORAL at 08:14

## 2021-01-28 RX ADMIN — ROPINIROLE HYDROCHLORIDE 4 MG: 2 TABLET, FILM COATED ORAL at 08:14

## 2021-01-28 RX ADMIN — DIAZEPAM 5 MG: 5 TABLET ORAL at 11:30

## 2021-01-28 RX ADMIN — CETIRIZINE HYDROCHLORIDE 10 MG: 10 TABLET, FILM COATED ORAL at 05:53

## 2021-01-28 RX ADMIN — HYDROCHLOROTHIAZIDE 12.5 MG: 12.5 TABLET ORAL at 05:54

## 2021-01-28 RX ADMIN — MORPHINE SULFATE 30 MG: 15 TABLET, FILM COATED, EXTENDED RELEASE ORAL at 05:54

## 2021-01-28 RX ADMIN — DIAZEPAM 5 MG: 5 TABLET ORAL at 03:31

## 2021-01-28 RX ADMIN — DILTIAZEM HYDROCHLORIDE 180 MG: 180 CAPSULE, COATED, EXTENDED RELEASE ORAL at 05:53

## 2021-01-28 RX ADMIN — GLYCOPYRROLATE 1 CAPSULE: 15.6 CAPSULE RESPIRATORY (INHALATION) at 07:24

## 2021-01-28 RX ADMIN — OMEPRAZOLE 20 MG: 20 CAPSULE, DELAYED RELEASE ORAL at 05:54

## 2021-01-28 RX ADMIN — Medication 1500 MG: at 05:53

## 2021-01-28 RX ADMIN — DOCUSATE SODIUM 100 MG: 100 CAPSULE, LIQUID FILLED ORAL at 05:55

## 2021-01-28 ASSESSMENT — PAIN DESCRIPTION - PAIN TYPE
TYPE: SURGICAL PAIN
TYPE: SURGICAL PAIN

## 2021-01-28 NOTE — PROGRESS NOTES
Informed Susy Denton, Neurosurgery APRN, about patient oxygen needs 5 lpm when sleeping but baseline oxygen is 3 lpm via nasal cannula, per APRN have patient check her oxygen with pulse ox when she gets home, informed patient about this and said to report this ASAP to own MD.

## 2021-01-28 NOTE — PROGRESS NOTES
Neurosurgery Progress Note    Subjective:  States Numbness and gait improved  Pain controlled, drain out, voiding, ambulating, wants to go home      Exam:  BUE and BLE str intact    BP  Min: 118/62  Max: 152/85  Pulse  Av.7  Min: 61  Max: 73  Resp  Av.3  Min: 16  Max: 20  Temp  Av.5 °C (97.7 °F)  Min: 36.2 °C (97.1 °F)  Max: 36.8 °C (98.2 °F)  SpO2  Av.5 %  Min: 91 %  Max: 96 %    No data recorded    Recent Labs     21  0257 21  0628   WBC 12.3* 11.1*   RBC 5.02 5.04   HEMOGLOBIN 15.0 15.1   HEMATOCRIT 47.7* 48.9*   MCV 95.0 97.0   MCH 29.9 30.0   MCHC 31.4* 30.9*   RDW 49.6 52.5*   PLATELETCT 225 207   MPV 11.6 12.1     Recent Labs     21  0257 21  0628   SODIUM 135 142   POTASSIUM 4.5 3.9   CHLORIDE 98 97   CO2 28 32   GLUCOSE 121* 94   BUN 12 16   CREATININE 0.72 0.81   CALCIUM 9.2 9.4               Intake/Output       21 0700 - 21 0659 21 07 - 21 0659      9256-6571 4517-6168 Total 1900-0659 Total       Intake    P.O.  240  -- 240  --  -- --    P.O. 240 -- 240 -- -- --    Total Intake 240 -- 240 -- -- --       Output    Urine  --  -- --  --  -- --    Number of Times Voided -- 1 x 1 x 1 x -- 1 x    Drains  50  40 90  --  -- --    Output (mL) ([REMOVED] Closed/Suction Drain 1 Posterior Neck Hemovac 10 Fr.) 50 40 90 -- -- --    Total Output 50 40 90 -- -- --       Net I/O     190 -40 150 -- -- --            Intake/Output Summary (Last 24 hours) at 2021 0909  Last data filed at 2021 0000  Gross per 24 hour   Intake --   Output 90 ml   Net -90 ml            • albuterol  2 Puff Q6HRS PRN   • atorvastatin  10 mg Q EVENING   • cetirizine  10 mg DAILY   • DILTIAZem CD  180 mg DAILY   • doxepin  50 mg Nightly   • hydroCHLOROthiazide  12.5 mg DAILY   • magnesium gluconate  1,500 mg DAILY   • morphine ER  30 mg Q12HRS   • omeprazole  20 mg BID   • pregabalin  300 mg Q EVENING   • ROPINIRole  4 mg 4X/DAY   • glycopyrrolate  1 Cap BID  (RT)   • Pharmacy Consult Request  1 Each PHARMACY TO DOSE   • MD ALERT...DO NOT ADMINISTER NSAIDS or ASPIRIN unless ORDERED By Neurosurgery  1 Each PRN   • docusate sodium  100 mg BID   • senna-docusate  1 Tab Nightly   • senna-docusate  1 Tab Q24HRS PRN   • polyethylene glycol/lytes  1 Packet BID PRN   • magnesium hydroxide  30 mL QDAY PRN   • bisacodyl  10 mg Q24HRS PRN   • fleet  1 Each Once PRN   • Respiratory Therapy Consult   Continuous RT   • 0.9 % NaCl with KCl 20 mEq 1,000 mL   Continuous   • ondansetron  4 mg Q4HRS PRN   • ondansetron  4 mg Q4HRS PRN   • diphenhydrAMINE  25 mg Q6HRS PRN    Or   • diphenhydrAMINE  25 mg Q6HRS PRN   • methocarbamol  750 mg Q8HRS PRN    Or   • cyclobenzaprine  10 mg Q8HRS PRN    Or   • diazePAM  5 mg Q4HRS PRN   • labetalol  10 mg Q HOUR PRN   • oxyCODONE immediate-release  15 mg Q6HRS PRN    Or   • oxyCODONE immediate-release  20 mg Q6HRS PRN       Assessment and Plan:  POD #C2-7 lami with 4-7 fusion  Prophylactic anticoagulation: no         Start date/time: tbd    Plan:  [place] home

## 2021-01-28 NOTE — PROGRESS NOTES
3780 Received report from night OCTAVIA Green, POC discussed. Call light in place, bed lowered and locked, bed alarm on. Encourage pt to call if needed anything. Pt A&Ox4, 5lpm via nasal cannula, dressing on back is CD&I with old drainage, with soft collar PRN    9785 Pt has inspiratory wheezes, no signs of respiratory distress, refuses breathing treatment

## 2021-01-28 NOTE — PROGRESS NOTES
Pt educated about not showering until 24 hours after the hemovac drain is out, don't shower with water straight to the wound and no rubbing, pt agreeable

## 2021-01-28 NOTE — DISCHARGE SUMMARY
DATE OF ADMISSION:  01/26/2021   DATE OF DISCHARGE:  01/28/2021     OPERATIONS PERFORMED:  Posterior laminectomy and fusion with Dr. Nichols on   01/26/2021.     HOSPITAL COURSE:  On date of admission, operation was performed.    Postoperatively, the patient does note that her gait is improved.  She does   feel her numbness is improved.  She is eating, ambulating and voiding.  She   wants to go home.  Her strength is intact.  Her incision is clean, dry and   intact.     Discharge instructions given to the patient in paper format.     MEDICATIONS:  The patient notes that Dr. Rodriguez is to do her postop pain   medications.  She refuses any pain meds.     ASSESSMENT AND PLAN:    1.  Status post above delineated surgery with Dr. Nichols on 01/26/2021.  2.  The patient will follow up in 4 weeks with x-rays.  3.  No NSAIDs or smoking for 4 months.     Should the patient have further questions or concerns, they will not hesitate   to give our office a call.        ______________________________  LETTY Du        ______________________________  MD SONIA HUGHES/NILE    DD:  01/28/2021 09:14  DT:  01/28/2021 11:09    Job#:  [id number]

## 2021-01-28 NOTE — CARE PLAN
Problem: Communication  Goal: The ability to communicate needs accurately and effectively will improve  Outcome: PROGRESSING AS EXPECTED     Problem: Safety  Goal: Will remain free from injury  Outcome: PROGRESSING AS EXPECTED     Problem: Venous Thromboembolism (VTW)/Deep Vein Thrombosis (DVT) Prevention:  Goal: Patient will participate in Venous Thrombosis (VTE)/Deep Vein Thrombosis (DVT)Prevention Measures  Outcome: PROGRESSING AS EXPECTED   Chandu hose and SCD's on and in use.    Problem: Pain Management  Goal: Pain level will decrease to patient's comfort goal  Outcome: PROGRESSING AS EXPECTED   Prn pain medications in use.

## 2021-01-28 NOTE — PROGRESS NOTES
Patient discharged today - patient agreeable to plan. D/c instructions reviewed with patient - ?'s/concerns answered. Patient educated on pain regimen, s&s of blood clots/infection, and mobility/weight restrictions.

## 2021-01-28 NOTE — CARE PLAN
Problem: Safety  Goal: Will remain free from falls  Outcome: PROGRESSING AS EXPECTED  Intervention: Assess risk factors for falls  Note: Assess LOC, assess environment. Maintain a clutter-free environment, bed alarm on, bed lowered and locked, non-skid socks in use, call light in place, personal belongings within reach. Offered toileting. Fall precautions in place.      Problem: Knowledge Deficit  Goal: Knowledge of disease process/condition, treatment plan, diagnostic tests, and medications will improve  Outcome: PROGRESSING AS EXPECTED  Intervention: Assess knowledge level of disease process/condition, treatment plan, diagnostic tests, and medications  Note: Update POC, encourage questions or to verbalize any concerns . Educated about s/s of infection and when to call MD., importance of handwashing and when to shower

## 2021-01-28 NOTE — DISCHARGE INSTRUCTIONS
Discharge Instructions    Discharged to home by car with relative. Discharged via wheelchair, hospital escort: Yes.  Special equipment needed: Not Applicable    Be sure to schedule a follow-up appointment with your primary care doctor or any specialists as instructed.     Discharge Plan:   Diet Plan: Discussed  Activity Level: Discussed  Confirmed Follow up Appointment: Appointment Scheduled  Confirmed Symptoms Management: Discussed  Medication Reconciliation Updated: Yes  Influenza Vaccine Given - only chart on this line when given: Influenza Vaccine Given (See MAR)    I understand that a diet low in cholesterol, fat, and sodium is recommended for good health. Unless I have been given specific instructions below for another diet, I accept this instruction as my diet prescription.   Other diet: Regular    Special Instructions: None    · Is patient discharged on Warfarin / Coumadin?   No     Depression / Suicide Risk    As you are discharged from this [facility], it is important to learn how to keep safe from harming yourself.    Recognize the warning signs:  · Abrupt changes in personality, positive or negative- including increase in energy   · Giving away possessions  · Change in eating patterns- significant weight changes-  positive or negative  · Change in sleeping patterns- unable to sleep or sleeping all the time   · Unwillingness or inability to communicate  · Depression  · Unusual sadness, discouragement and loneliness  · Talk of wanting to die  · Neglect of personal appearance   · Rebelliousness- reckless behavior  · Withdrawal from people/activities they love  · Confusion- inability to concentrate     If you or a loved one observes any of these behaviors or has concerns about self-harm, here's what you can do:  · Talk about it- your feelings and reasons for harming yourself  · Remove any means that you might use to hurt yourself (examples: pills, rope, extension cords, firearm)  · Get  professional help from the community (Mental Health, Substance Abuse, psychological counseling)  · Do not be alone:Call your Safe Contact- someone whom you trust who will be there for you.  · Call your local CRISIS HOTLINE 129-6506 or 927-149-5381  · Call your local Children's Mobile Crisis Response Team [place] (387) 184-5324 or www.[employer]  · Call the toll free National Suicide Prevention Hotlines   · National Suicide Prevention Lifeline 772-025-CNCM (2170)  · [employer] Line Network 800-SUICIDE (270-4849)        Laminectomy - Laminotomy - Discectomy  Your surgeon has decided that a laminectomy (entire lamina removal) or laminotomy (partial lamina removal) is the best treatment for your back problem. These procedures involve removal of bone to relieve pressure on nerve roots. It allows the surgeon access to parts of the spine where other problems are located. This could be an injured disc (the cartilage-like structures located between the bones of the back). In this surgery your surgeon removes a part of the sagrario arch that surrounds your spinal canal. This may be compressing nerve roots. In some cases, the surgeon will remove the disc and fuse (stick together) vertebral bodies (the bones of your back) to make the spine more stable. The type of procedure you will need is usually decided prior to surgery, however modifications may be necessary. The time in surgery depends on the findings in surgery and the procedure necessary to correct the problems.  DISCECTOMY  For people with disc problems, the surgeon removes the portion of the disc that is causing the pressure on the nerve root. Some surgeons perform a micro (small) discectomy, which may require removal of only a small portion of the lamina. A disc nucleus (center) may also be removed either through a needle (percutaneous discectomy) or by injecting an enzyme called chymopapain into the disc. Chymopapain is an enzyme that dissolves the disc.  For people with back instability, the surgeon fuses vertebrae that are next to each other with tiny pieces of bone. These are used as bone grafts on the facets, or between the vertebrae. When this heals, the bones will no longer be able to move. These bone chips are often taken from the pelvic bones. Bones and bone grafts grow into one unit, stabilizing the segments of the spinal column.  LET YOUR CAREGIVER KNOW ABOUT:  · Allergies.  · Medicines taken including herbs, eyedrops, over-the-counter medicines, and creams.  · Use of steroids (by mouth or creams).  · Previous problems with anesthetics or numbing medicine.  · Possibility of pregnancy, if this applies.  · History of blood clots (thrombophlebitis).  · History of bleeding or blood problems.  · Previous surgery.  · Other health problems.  RISKS AND COMPLICATIONS  Your caregiver will discuss possible risks and complications with you before surgery. In addition to the usual risks of anesthesia, other common risks and complications include:  · Blood loss and replacement.  · Temporary increase in pain due to surgery.  · Uncorrected back pain.  · Infection.  · New nerve damage (tingling, numbness, and pain).  BEFORE THE PROCEDURE  · Stop smoking at least 1 week prior to surgery. This lowers risk during surgery.  · Your caregiver may advise that you stop taking certain medicines that may affect the outcome of the surgery and your ability to heal. For example, you may need to stop taking anti-inflammatories, such as aspirin, because of possible bleeding problems. Other medicines may have interactions with anesthesia.  · Tell your caregiver if you have been on steroids for long periods of time. Often, additional steroids are administered intravenously before and during the procedure to prevent complications.  · You should be present 60 minutes prior to your procedure or as directed.  AFTER THE PROCEDURE  After surgery, you will be taken to the recovery area where a  nurse will watch and check your progress. Generally, you will be allowed to go home within 1 week barring other problems.  HOME CARE INSTRUCTIONS   · Check the surgical cut (incision) twice a day for signs of infection. Some signs may include a bad smelling, greenish or yellowish discharge from the wound; increased pain or increased redness over the incision site; an opening of the incision; flu-like symptoms; or a temperature above 101.5° F (38.6° C).  · Change your bandages in about 24 to 36 hours following surgery or as directed.  · You may shower once the bandage is removed or as directed. Avoid bathtubs, swimming pools, and hot tubs for 3 weeks or until your incision has healed completely. If you have stitches (sutures) or staples they may be removed 2 to 3 weeks after surgery, or as directed by your caregiver.  · Follow your caregiver's instructions for activities, exercises, and physical therapy.  · Weight reduction may be beneficial if you are overweight.  · Walking is permitted. You may use a treadmill without an incline. Cut down on activities if you have discomfort. You may also go up and down stairs as tolerated.  · Do not lift anything heavier than 10 to 15 pounds. Avoid bending or twisting at the waist. Always bend your knees.  · Maintain strength and range of motion as instructed.  · No driving is permitted for 2 to 3 weeks, or as directed by your caregiver. You may be a passenger for 20 to 30 minute trips. Lying back in the passenger seat may be more comfortable for you.  · Limit your sitting to 20 to 30 minute intervals. You should lie down or walk in between sitting periods. There are no limitations for sitting in a recliner chair.  · Only take over-the-counter or prescription medicines for pain, discomfort, or fever as directed by your caregiver.  SEEK MEDICAL CARE IF:   · There is increased bleeding (more than a small spot) from the wound.  · You notice redness, swelling, or increasing pain in  the wound.  · Pus is coming from the wound.  · An unexplained oral temperature above 102° F (38.9° C) develops.  · You notice a bad smell coming from the wound or dressing.  SEEK IMMEDIATE MEDICAL CARE IF:   · You develop a rash.  · You have difficulty breathing.  · You have any allergic problems.  Document Released: 12/15/2001 Document Revised: 03/11/2013 Document Reviewed: 10/13/2009  [employer] Patient Information ©2014 [employer].      Laminectomy, Care After  This sheet gives you information about how to care for yourself after your procedure. Your health care provider may also give you more specific instructions. If you have problems or questions, contact your health care provider.  What can I expect after the procedure?  After the procedure, it is common to have:  · Some pain around your incision area.  · Muscle tightening (spasms) across the back.  Follow these instructions at home:  Incision care    · Follow instructions from your health care provider about how to take care of your incision area. Make sure you:  ? Wash your hands with soap and water before and after you apply medicine to the area or change your bandage (dressing). If soap and water are not available, use hand .  ? Change your dressing as told by your health care provider.  ? Leave stitches (sutures), skin glue, or adhesive strips in place. These skin closures may need to stay in place for 2 weeks or longer. If adhesive strip edges start to loosen and curl up, you may trim the loose edges. Do not remove adhesive strips completely unless your health care provider tells you to do that.  · Check your incision area every day for signs of infection. Check for:  ? More redness, swelling, or pain.  ? More fluid or blood.  ? Warmth.  ? Pus or a bad smell.  Medicines  · Take over-the-counter and prescription medicines only as told by your health care provider.  · If you were prescribed an antibiotic medicine, use it as told by your health  care provider. Do not stop using the antibiotic even if you start to feel better.  Bathing  · Do not take baths, swim, or use a hot tub for 2 weeks, or until your incision has healed completely.  · If your health care provider approves, you may take showers after your dressing has been removed.  Activity    · Return to your normal activities as told by your health care provider. Ask your health care provider what activities are safe for you.  · Avoid bending or twisting at your waist. Always bend at your knees.  · Do not sit for more than 20-30 minutes at a time. Lie down or walk between periods of sitting.  · Do not lift anything that is heavier than 10 lb (4.5 kg) or the limit that your health care provider tells you, until he or she says that it is safe.  · Do not drive for 2 weeks after your procedure or for as long as your health care provider tells you.  · Do not drive or use heavy machinery while taking prescription pain medicine.  General instructions  · To prevent or treat constipation while you are taking prescription pain medicine, your health care provider may recommend that you:  ? Drink enough fluid to keep your urine clear or pale yellow.  ? Take over-the-counter or prescription medicines.  ? Eat foods that are high in fiber, such as fresh fruits and vegetables, whole grains, and beans.  ? Limit foods that are high in fat and processed sugars, such as fried and sweet foods.  · Do breathing exercises as told.  · Keep all follow-up visits as told by your health care provider. This is important.  Contact a health care provider if:  · You have more redness, swelling, or pain around your incision area.  · Your incision feels warm to the touch.  · You are not able to return to activities or do exercises as told by your health care provider.  Get help right away if:  · You have:  ? More fluid or blood coming from your incision area.  ? Pus or a bad smell coming from your incision area.  ? Chills or a  fever.  ? Episodes of dizziness or fainting while standing.  · You develop a rash.  · You develop shortness of breath or you have difficulty breathing.  · You cannot control when you urinate or have a bowel movement.  · You become weak.  · You are not able to use your legs.  Summary  · After the procedure, it is common to have some pain around your incision area. You may also have muscle tightening (spasms) across the back.  · Follow instructions from your health care provider about how to care for your incision.  · Do not lift anything that is heavier than 10 lb (4.5 kg) or the limit that your health care provider tells you, until he or she says that it is safe.  · Contact your health care provider if you have more redness, swelling, or pain around your incision area or if your incision feels warm to the touch. These can be signs of infection.  This information is not intended to replace advice given to you by your health care provider. Make sure you discuss any questions you have with your health care provider.  Document Released: 07/07/2006 Document Revised: 11/30/2018 Document Reviewed: 06/04/2017  [employer] Patient Education © 2020 [employer]      How to Use a Soft Cervical Collar  A soft cervical collar is a padded brace that wraps around your neck and is held in place with fabric strip fasteners. A soft cervical collar is a short-term treatment for neck pain. It supports your neck because your chin and some of the weight of your head rest on the collar. It also helps prevent your head from moving as much as it usually does, which may reduce pain.  You may need to wear a soft cervical collar to limit neck motion and allow your neck muscles to rest. Your health care provider may recommend that you wear a soft cervical collar if you have:  · Neck pain from stress or strain.  · A mild neck injury.  · A pinched nerve in your neck.  · Pain after a whiplash injury.  What are the risks?  Wearing a cervical collar  is safe. However, problems may occur, such as skin irritation from the collar rubbing on the skin.  How to use a soft cervical collar  There are many types of soft cervical collars. Instructions may vary based on the . Follow the instructions specific to your device. In general:  · Wear the collar as told by your health care provider. Do not continue to wear the collar for longer than recommended by your health care provider.  · When putting on the collar:  ? Hold the collar out in front of you with one hand on each end.  ? Place the front parts of the collar under your chin. There may be a slight dip in the front for your chin.  ? Reach behind your head with both hands to fasten the collar in place. Most soft cervical collars fasten at the back.  ? If you have trouble reaching behind your head, ask someone to help you strap the collar in place. The collar should feel snug but not tight.  · Make sure the front of your chin is over the center of the collar in the front. You should feel as if your head is in a natural position, not stretched.  · Loosen the collar if it interferes with your breathing or makes your neck pain get worse. It should fit comfortably.  · Ask your health care provider if you should:  ? Wear the collar to bed.  ? Take the collar off to bathe or shower. Do not let your collar get wet. If you must keep the collar on while bathing, cover it with a watertight covering when you take a bath or a shower.  Follow these instructions at home:  · Return to your normal activities as told by your health care provider. Ask your health care provider what activities are safe for you.  · Do not lift anything that is heavier than 10 lb (4.5 kg), or the limit that you are told, until your health care provider says that it is safe.  · If directed, put ice on the back of your neck to help manage pain:  ? Remove the cervical collar, if your health care provider approves.  ? Put ice in a plastic  bag.  ? Place a towel between your skin and the bag.  ? Leave the ice on for 20 minutes, 2-3 times a day.  · Ask your health care provider when it is safe for you to drive.  · Check your skin daily for redness or irritation.  · To clean your collar:  ? Close the fasteners and hand wash with mild soap and warm water.  ? Rinse the collar and allow it to air-dry before putting it on.  ? Do not put the collar in a washing machine or dryer. Do not use bleach or fabric softener.  · Keep all follow-up visits as told by your health care provider. This is important.  Contact a health care provider if:  · Your neck pain gets worse at home.  · You have new symptoms, such as numbness, tingling, or weakness.  · You have skin irritation from the collar.  · You are not getting better at home.  Summary  · A soft cervical collar is a padded brace that wraps around your neck. It can be used as a short-term treatment for neck pain from minor injuries or a pinched nerve.  · The collar provides support and limits neck motion. This allows your neck muscles to rest.  · Wear a soft cervical collar only as told by your health care provider.  · Do not continue to wear the collar for longer than recommended by your health care provider.  This information is not intended to replace advice given to you by your health care provider. Make sure you discuss any questions you have with your health care provider.  Document Released: 08/21/2019 Document Revised: 08/21/2019 Document Reviewed: 08/21/2019  [employer] Patient Education © 2020 [employer]

## 2021-03-03 DIAGNOSIS — Z23 NEED FOR VACCINATION: ICD-10-CM

## 2021-03-22 ENCOUNTER — APPOINTMENT (OUTPATIENT)
Dept: ADMISSIONS | Facility: MEDICAL CENTER | Age: 66
End: 2021-03-22
Payer: MEDICARE

## 2021-04-08 NOTE — PROGRESS NOTES
"Chief Complaint   Patient presents with   • Tachycardia     F/V DX:SINUS TACHYCARDIA       Subjective:   Ariella Rendon is a 65 y.o. female patient of Dr. Jessica Diaz who presents today for perioperative evaluation for cervical neck fusion surgery with Dr. Nichols.     Patient was last seen by myself on 12/10/2020 for perioperative risk stratification for cervical neck fusion surgery. Her HR was elevated, her diltiazem was increased to 180 mg daily. Patient was evaluated to be low risk from a cardiac perspective for surgery due to recent low risk MPI and TTE showing preserved LVED and no significant valvular abnormalities.     Past medical history significant for HTN, HLD, EMILY on nocturnal O2, COPD and GIB in 2018 and December of 2019.      Patient states she is recovering well from her cervical neck surgery on 1/26/2021. She is now scheduled to lumbar back surgery on the 27 th of this month. Her HR and BP has been well controlled. She is overall feeling well and denies having any significant concerns or complaints to address from a cardiac perspective.     Past Medical History:   Diagnosis Date   • Anemia     past history   • Anesthesia 06/06/2019    reports \"breathing issues\", I was told    • Arthritis     generalized all over   • Breath shortness     uses oxygen at 3 liters at night   • Cancer (HCC)     cervical, endometrial   • COPD (chronic obstructive pulmonary disease) (HCC)    • Dental disorder     full denture on top   • Diverticulitis    • Emphysema of lung (HCC)    • Heart burn     lumbar, colon   • Hiatus hernia syndrome    • High cholesterol     no meds   • Hypertension    • Indigestion    • Other specified disorder of intestines     constipation and diarrhea   • Other specified symptom associated with female genital organs 1970    endometriosis   • Oxygen dependent 01/26/2021    3 LTR 24/7   • Psychiatric problem     depression   • Spinal stenosis of lumbar region      Past Surgical History:   Procedure " Laterality Date   • CERVICAL FUSION POSTERIOR N/A 1/26/2021    Procedure: FUSION, SPINE, CERVICAL, POSTERIOR [id number];  Surgeon: Jacob Nichols M.D.;  Location: SURGERY [facility];  Service: Neurosurgery   • CERVICAL LAMINECTOMY POSTERIOR N/A 1/26/2021    Procedure: LAMINECTOMY, SPINE, CERVICAL, POSTERIOR [id number];  Surgeon: Jacob Nichols M.D.;  Location: SURGERY [facility];  Service: Neurosurgery   • PB SURG DIAGNOSTIC EXAM, ANORECTAL  12/6/2019    Procedure: EXAM UNDER ANESTHESIA, RECTUM;  Surgeon: Tea Mendoza M.D.;  Location: SURGERY [facility];  Service: General   • HEMORRHOIDECTOMY N/A 12/6/2019    Procedure: HEMORRHOIDECTOMY;  Surgeon: Tea Mendoza M.D.;  Location: SURGERY [facility];  Service: General   • SIGMOIDOSCOPY FLEX N/A 12/4/2019    Procedure: SIGMOIDOSCOPY, FLEXIBLE;  Surgeon: Mario Holliday D.O.;  Location: [facility];  Service: Gastroenterology   • SIGMOIDOSCOPY FLEX N/A 11/23/2019    Procedure: SIGMOIDOSCOPY, FLEXIBLE.  HEMORRHOID BANDING.;  Surgeon: Umair Mills M.D.;  Location: SURGERY [facility];  Service: Gastroenterology   • GASTROSCOPY N/A 11/21/2019    Procedure: GASTROSCOPY;  Surgeon: Mario Holliday D.O.;  Location: SURGERY [facility];  Service: Gastroenterology   • COLONOSCOPY N/A 11/21/2019    Procedure: COLONOSCOPY;  Surgeon: Mario Holliday D.O.;  Location: SURGERY [facility];  Service: Gastroenterology   • LUMBAR FUSION ANTERIOR N/A 10/25/2019    Procedure: FUSION, SPINE, LUMBAR, ANTERIOR APPROACH- L4-S1 ALIF WITH PLATE;  Surgeon: Umair Lemons M.D.;  Location: [facility];  Service: Neurosurgery   • COLONOSCOPY N/A 7/3/2018    Procedure: COLONOSCOPY;  Surgeon: Cain Castillo M.D.;  Location: [facility];  Service: Gastroenterology   • GASTROSCOPY-ENDO N/A 7/3/2018    Procedure: GASTROSCOPY-ENDO;  Surgeon: Cain Castillo M.D.;  Location: SURGERY [facility];  Service: Gastroenterology   • LOW  ANTERIOR RESECTION LAPAROSCOPIC  2014    Performed by Nakul Beltran M.D. at SURGERY [facility] ORS   • GYN SURGERY  1970    hysterectomy   • GYN SURGERY  1970    scope x 4   • BOWEL RESECTION      generic cymbalta   • OTHER ABDOMINAL SURGERY      remove foreign object, age 7     Family History   Problem Relation Age of Onset   • Scoliosis Mother    • Cancer Father    • Lung Disease Father    • Hypertension Brother      Social History     Socioeconomic History   • Marital status:      Spouse name: Not on file   • Number of children: Not on file   • Years of education: Not on file   • Highest education level: Not on file   Occupational History   • Not on file   Tobacco Use   • Smoking status: Former Smoker     Packs/day: 0.50     Years: 50.00     Pack years: 25.00     Types: Cigarettes     Quit date: 3/22/2019     Years since quittin.0   • Smokeless tobacco: Never Used   Substance and Sexual Activity   • Alcohol use: No   • Drug use: Yes     Frequency: 7.0 times per week     Types: Marijuana, Inhaled     Comment: marijuana   • Sexual activity: Not on file   Other Topics Concern   • Not on file   Social History Narrative   • Not on file     Social Determinants of Health     Financial Resource Strain:    • Difficulty of Paying Living Expenses:    Food Insecurity:    • Worried About Running Out of Food in the Last Year:    • Ran Out of Food in the Last Year:    Transportation Needs:    • Lack of Transportation (Medical):    • Lack of Transportation (Non-Medical):    Physical Activity:    • Days of Exercise per Week:    • Minutes of Exercise per Session:    Stress:    • Feeling of Stress :    Social Connections:    • Frequency of Communication with Friends and Family:    • Frequency of Social Gatherings with Friends and Family:    • Attends [employer] Services:    • Active Member of Clubs or Organizations:    • Attends Club or Organization Meetings:    • Marital Status:    Intimate Partner Violence:    •  "Fear of Current or Ex-Partner:    • Emotionally Abused:    • Physically Abused:    • Sexually Abused:      Allergies   Allergen Reactions   • Other Environmental Rash     \"alloids in metal\"     Outpatient Encounter Medications as of 4/15/2021   Medication Sig Dispense Refill   • TRELEGY ELLIPTA 200-62.5-25 MCG/INH AEROSOL POWDER, BREATH ACTIVATED      • cetirizine (ZYRTEC) 10 MG Tab Take 10 mg by mouth every day.     • atorvastatin (LIPITOR) 10 MG Tab Take 10 mg by mouth every evening.     • pregabalin (LYRICA) 150 MG Cap Take 300 mg by mouth every evening.     • DILTIAZem CD (CARDIZEM CD) 180 MG CAPSULE SR 24 HR Take 1 Cap by mouth every day. 90 Cap 3   • doxepin (SINEQUAN) 50 MG Cap Take 75 mg by mouth every evening.     • magnesium gluconate (MAG-G) 500 MG tablet Take 1 Tab by mouth 2 times daily, before breakfast and dinner. (Patient taking differently: Take 1,500 mg by mouth every day.) 60 Tab 1   • omeprazole (PRILOSEC) 20 MG delayed-release capsule Take 1 Cap by mouth 2 times a day. 60 Cap 0   • morphine ER (MS CONTIN) 30 MG Tab CR tablet Take 30 mg by mouth every 12 hours. 30mg QAM and 15mg QPM     • polyethylene glycol/lytes (MIRALAX) Pack Take 17 g by mouth every day.     • hydroCHLOROthiazide (HYDRODIURIL) 12.5 MG tablet Take 25 mg by mouth every day.     • albuterol 108 (90 Base) MCG/ACT Aero Soln inhalation aerosol Inhale 2 Puffs by mouth every 6 hours as needed for Shortness of Breath.     • oxycodone (OXY-IR) 15 MG immediate release tablet Take 15 mg by mouth. Every 8 hours     • ropinirole (REQUIP) 4 MG tablet Take 4 mg by mouth 4 times a day.     • NYAMYC powder Apply 1 Application topically as needed.     • INCRUSE ELLIPTA 62.5 MCG/INH AEROSOL POWDER, BREATH ACTIVATED Inhale 1 Puff every day.     • tiotropium (SPIRIVA HANDIHALER) 18 MCG Cap Place 18 mcg into inhaler and inhale every day.       No facility-administered encounter medications on file as of 4/15/2021.     Review of Systems " "  Constitutional: Negative for malaise/fatigue and weight loss.   Respiratory: Positive for shortness of breath ([place]orn[place]).    Cardiovascular: Negative for chest pain, palpitations, orthopnea, claudication, leg swelling and PND.   Musculoskeletal: Positive for back pain and neck pain.   Neurological: Negative for dizziness and weakness.   All other systems reviewed and are negative.       Objective:   /74 (BP Location: Left arm, Patient Position: Sitting, BP Cuff Size: Adult)   Pulse 78   Ht 1.676 m (5' 6\")   Wt 102 kg (225 lb)   SpO2 93%   BMI 36.32 kg/m²     Physical Exam   Constitutional: She is oriented to person, place, and time. She appears well-developed and well-nourished. No distress.   HENT:   Head: Normocephalic.   Eyes: EOM are normal.   Neck: No JVD present.   Cardiovascular: Normal rate, regular rhythm and normal heart sounds.   Pulmonary/Chest: Breath sounds normal. No respiratory distress.   Abdominal: Soft. There is no abdominal tenderness.   Musculoskeletal:         General: No edema.   Neurological: She is alert and oriented to person, place, and time.   Skin: Skin is warm and dry.   Psychiatric: She has a normal mood and affect. Her behavior is normal.        NM Cardiac Stress Test 5/30/2019:  NUCLEAR IMAGING INTERPRETATION  Normal left ventricular perfusion with no fixed or reversible defects.   Normal left ventricular wall motion, with EF of 73%.   ECG INTERPRETATION  Negative stress ECG for ischemia.    Echocardiogram 2/3/2018:  CONCLUSIONS  No prior study is available for comparison.   Normal left ventricular systolic function.  Left ventricular ejection fraction is visually estimated to be 60%.  Grade I diastolic dysfunction.  Normal inferior vena cava size and inspiratory collapse.    FINDINGS  Left Ventricle  Normal left ventricular chamber size. Normal left ventricular wall thickness. Normal left ventricular systolic function. Left ventricular ejection fraction is visually " estimated to be 60%. Normal regional wall motion. Grade I diastolic dysfunction.     Right Ventricle  The right ventricle was normal in size and function.     Right Atrium  The right atrium is normal in size.normal inferior vena cava size and inspiratory collapse.     Left Atrium  The left atrium is normal in size.     Mitral Valve  Structurally normal mitral valve without significant stenosis or regurgitation.     Aortic Valve  Structurally normal aortic valve without significant stenosis or regurgitation.     Tricuspid Valve  Structurally normal tricuspid valve without significant stenosis or regurgitation.     Pulmonic Valve  Structurally normal pulmonic valve without significant stenosis or   regurgitation.     Pericardium  Trace anterior pericardial effusion without hemodynamic significance.   Fat pad present.     Aorta  The aortic root is normal.        Limited Echocardiogram 10/28/2019:  CONCLUSIONS  Compared to the images of the prior study done on 2/2/2018 there has been no significant change in left ventricular systolic function.Normal left ventricular size and systolic function. Limited study.    Assessment:     1. Essential hypertension     2. Other emphysema (HCC)     3. EMILY (obstructive sleep apnea)     4. Sinus tachycardia         Medical Decision Making:  Today's Assessment / Status / Plan:     Hypertension/Elevated Resting Pulse:  - Now well controlled.   - Continue HCTZ 12.5 mg daily and diltiazem 180 mg daily.   - Patient asked to continue to monitor her BP and pulse at home.     Hyperlipidemia:  Review of lipid panel in media tab from May of 2019:     HDL 51      -Re-printed the requisition for the lipid panel that was previously ordered and gave to patient to be done with her upcoming pre-op lab work.     EMILY:  - On nocturnal O2, refuses CPAP use.   - On 3 L O2 via NC.     COPD:  - Followed by pulmonary.     Patient will follow up with Dr. Jessica Diaz in 6 months or earlier if  needed. Encouraged patient to contact our office should any questions or concerns arise in the mean time. Patient understands and agrees with the plan of care.     Future Appointments   Date Time Provider Department Center   4/20/2021  1:15 PM P[facility] 2 [facility] None   10/6/2021 11:00 AM Jessica Diaz M.D. [facility] None

## 2021-04-15 ENCOUNTER — OFFICE VISIT (OUTPATIENT)
Dept: CARDIOLOGY | Facility: MEDICAL CENTER | Age: 66
End: 2021-04-15
Payer: MEDICARE

## 2021-04-15 VITALS
HEART RATE: 78 BPM | HEIGHT: 66 IN | DIASTOLIC BLOOD PRESSURE: 74 MMHG | SYSTOLIC BLOOD PRESSURE: 122 MMHG | WEIGHT: 225 LBS | BODY MASS INDEX: 36.16 KG/M2 | OXYGEN SATURATION: 93 %

## 2021-04-15 DIAGNOSIS — J43.8 OTHER EMPHYSEMA (HCC): ICD-10-CM

## 2021-04-15 DIAGNOSIS — I10 ESSENTIAL HYPERTENSION: ICD-10-CM

## 2021-04-15 DIAGNOSIS — G47.33 OSA (OBSTRUCTIVE SLEEP APNEA): ICD-10-CM

## 2021-04-15 DIAGNOSIS — R00.0 SINUS TACHYCARDIA: ICD-10-CM

## 2021-04-15 PROCEDURE — 99213 OFFICE O/P EST LOW 20 MIN: CPT | Performed by: NURSE PRACTITIONER

## 2021-04-15 ASSESSMENT — ENCOUNTER SYMPTOMS
PALPITATIONS: 0
ORTHOPNEA: 0
BACK PAIN: 1
PND: 0
DIZZINESS: 0
WEIGHT LOSS: 0
SHORTNESS OF BREATH: 1
WEAKNESS: 0
NECK PAIN: 1
CLAUDICATION: 0

## 2021-04-15 ASSESSMENT — FIBROSIS 4 INDEX: FIB4 SCORE: 1.15

## 2021-04-20 ENCOUNTER — HOSPITAL ENCOUNTER (OUTPATIENT)
Dept: RADIOLOGY | Facility: MEDICAL CENTER | Age: 66
End: 2021-04-20
Attending: NEUROLOGICAL SURGERY | Admitting: NEUROLOGICAL SURGERY
Payer: MEDICARE

## 2021-04-20 ENCOUNTER — APPOINTMENT (OUTPATIENT)
Dept: ADMISSIONS | Facility: MEDICAL CENTER | Age: 66
End: 2021-04-20
Payer: MEDICARE

## 2021-04-20 ENCOUNTER — HOSPITAL ENCOUNTER (OUTPATIENT)
Facility: MEDICAL CENTER | Age: 66
End: 2021-04-20
Attending: NURSE PRACTITIONER
Payer: MEDICARE

## 2021-04-20 ENCOUNTER — PRE-ADMISSION TESTING (OUTPATIENT)
Dept: ADMISSIONS | Facility: MEDICAL CENTER | Age: 66
End: 2021-04-20
Attending: NEUROLOGICAL SURGERY
Payer: MEDICARE

## 2021-04-20 ENCOUNTER — HOSPITAL ENCOUNTER (OUTPATIENT)
Facility: MEDICAL CENTER | Age: 66
End: 2021-04-20
Attending: FAMILY MEDICINE
Payer: MEDICARE

## 2021-04-20 DIAGNOSIS — Z01.812 PRE-OPERATIVE LABORATORY EXAMINATION: ICD-10-CM

## 2021-04-20 DIAGNOSIS — Z01.810 PRE-OPERATIVE CARDIOVASCULAR EXAMINATION: ICD-10-CM

## 2021-04-20 DIAGNOSIS — Z01.811 PRE-OPERATIVE RESPIRATORY EXAMINATION: ICD-10-CM

## 2021-04-20 LAB
ALBUMIN SERPL BCP-MCNC: 4.4 G/DL (ref 3.2–4.9)
ALBUMIN/GLOB SERPL: 1.2 G/DL
ALP SERPL-CCNC: 166 U/L (ref 30–99)
ALT SERPL-CCNC: 13 U/L (ref 2–50)
ANION GAP SERPL CALC-SCNC: 10 MMOL/L (ref 7–16)
ANION GAP SERPL CALC-SCNC: 11 MMOL/L (ref 7–16)
APTT PPP: 33 SEC (ref 24.7–36)
AST SERPL-CCNC: 7 U/L (ref 12–45)
BASOPHILS # BLD AUTO: 0.5 % (ref 0–1.8)
BASOPHILS # BLD AUTO: 0.9 % (ref 0–1.8)
BASOPHILS # BLD: 0.03 K/UL (ref 0–0.12)
BASOPHILS # BLD: 0.06 K/UL (ref 0–0.12)
BILIRUB SERPL-MCNC: 0.4 MG/DL (ref 0.1–1.5)
BUN SERPL-MCNC: 30 MG/DL (ref 8–22)
BUN SERPL-MCNC: 30 MG/DL (ref 8–22)
CALCIUM SERPL-MCNC: 9.6 MG/DL (ref 8.5–10.5)
CALCIUM SERPL-MCNC: 9.8 MG/DL (ref 8.5–10.5)
CHLORIDE SERPL-SCNC: 99 MMOL/L (ref 96–112)
CHLORIDE SERPL-SCNC: 99 MMOL/L (ref 96–112)
CHOLEST SERPL-MCNC: 183 MG/DL (ref 100–199)
CO2 SERPL-SCNC: 28 MMOL/L (ref 20–33)
CO2 SERPL-SCNC: 29 MMOL/L (ref 20–33)
CREAT SERPL-MCNC: 0.83 MG/DL (ref 0.5–1.4)
CREAT SERPL-MCNC: 0.86 MG/DL (ref 0.5–1.4)
EKG IMPRESSION: NORMAL
EOSINOPHIL # BLD AUTO: 0.11 K/UL (ref 0–0.51)
EOSINOPHIL # BLD AUTO: 0.13 K/UL (ref 0–0.51)
EOSINOPHIL NFR BLD: 1.7 % (ref 0–6.9)
EOSINOPHIL NFR BLD: 2 % (ref 0–6.9)
ERYTHROCYTE [DISTWIDTH] IN BLOOD BY AUTOMATED COUNT: 53 FL (ref 35.9–50)
ERYTHROCYTE [DISTWIDTH] IN BLOOD BY AUTOMATED COUNT: 53.1 FL (ref 35.9–50)
EST. AVERAGE GLUCOSE BLD GHB EST-MCNC: 137 MG/DL
GLOBULIN SER CALC-MCNC: 3.8 G/DL (ref 1.9–3.5)
GLUCOSE SERPL-MCNC: 104 MG/DL (ref 65–99)
GLUCOSE SERPL-MCNC: 104 MG/DL (ref 65–99)
HBA1C MFR BLD: 6.4 % (ref 4–5.6)
HCT VFR BLD AUTO: 52.8 % (ref 37–47)
HCT VFR BLD AUTO: 53.1 % (ref 37–47)
HDLC SERPL-MCNC: 47 MG/DL
HGB BLD-MCNC: 16.4 G/DL (ref 12–16)
HGB BLD-MCNC: 16.6 G/DL (ref 12–16)
IMM GRANULOCYTES # BLD AUTO: 0.02 K/UL (ref 0–0.11)
IMM GRANULOCYTES # BLD AUTO: 0.03 K/UL (ref 0–0.11)
IMM GRANULOCYTES NFR BLD AUTO: 0.3 % (ref 0–0.9)
IMM GRANULOCYTES NFR BLD AUTO: 0.5 % (ref 0–0.9)
INR PPP: 0.9 (ref 0.87–1.13)
LDLC SERPL CALC-MCNC: 91 MG/DL
LYMPHOCYTES # BLD AUTO: 0.89 K/UL (ref 1–4.8)
LYMPHOCYTES # BLD AUTO: 0.93 K/UL (ref 1–4.8)
LYMPHOCYTES NFR BLD: 13.7 % (ref 22–41)
LYMPHOCYTES NFR BLD: 14 % (ref 22–41)
MCH RBC QN AUTO: 28.3 PG (ref 27–33)
MCH RBC QN AUTO: 28.4 PG (ref 27–33)
MCHC RBC AUTO-ENTMCNC: 31.1 G/DL (ref 33.6–35)
MCHC RBC AUTO-ENTMCNC: 31.3 G/DL (ref 33.6–35)
MCV RBC AUTO: 90.9 FL (ref 81.4–97.8)
MCV RBC AUTO: 91.2 FL (ref 81.4–97.8)
MONOCYTES # BLD AUTO: 0.41 K/UL (ref 0–0.85)
MONOCYTES # BLD AUTO: 0.43 K/UL (ref 0–0.85)
MONOCYTES NFR BLD AUTO: 6.3 % (ref 0–13.4)
MONOCYTES NFR BLD AUTO: 6.5 % (ref 0–13.4)
NEUTROPHILS # BLD AUTO: 5 K/UL (ref 2–7.15)
NEUTROPHILS # BLD AUTO: 5.11 K/UL (ref 2–7.15)
NEUTROPHILS NFR BLD: 76.6 % (ref 44–72)
NEUTROPHILS NFR BLD: 77 % (ref 44–72)
NRBC # BLD AUTO: 0 K/UL
NRBC # BLD AUTO: 0 K/UL
NRBC BLD-RTO: 0 /100 WBC
NRBC BLD-RTO: 0 /100 WBC
PLATELET # BLD AUTO: 190 K/UL (ref 164–446)
PLATELET # BLD AUTO: 199 K/UL (ref 164–446)
PMV BLD AUTO: 11.3 FL (ref 9–12.9)
PMV BLD AUTO: 11.8 FL (ref 9–12.9)
POTASSIUM SERPL-SCNC: 5 MMOL/L (ref 3.6–5.5)
POTASSIUM SERPL-SCNC: 5.2 MMOL/L (ref 3.6–5.5)
PROT SERPL-MCNC: 8.2 G/DL (ref 6–8.2)
PROTHROMBIN TIME: 12.4 SEC (ref 12–14.6)
RBC # BLD AUTO: 5.79 M/UL (ref 4.2–5.4)
RBC # BLD AUTO: 5.84 M/UL (ref 4.2–5.4)
SARS-COV-2 RNA RESP QL NAA+PROBE: NOTDETECTED
SODIUM SERPL-SCNC: 138 MMOL/L (ref 135–145)
SODIUM SERPL-SCNC: 138 MMOL/L (ref 135–145)
SPECIMEN SOURCE: NORMAL
TRIGL SERPL-MCNC: 224 MG/DL (ref 0–149)
WBC # BLD AUTO: 6.5 K/UL (ref 4.8–10.8)
WBC # BLD AUTO: 6.7 K/UL (ref 4.8–10.8)

## 2021-04-20 PROCEDURE — 85610 PROTHROMBIN TIME: CPT

## 2021-04-20 PROCEDURE — U0005 INFEC AGEN DETEC AMPLI PROBE: HCPCS

## 2021-04-20 PROCEDURE — 85025 COMPLETE CBC W/AUTO DIFF WBC: CPT

## 2021-04-20 PROCEDURE — 80061 LIPID PANEL: CPT | Mod: GA

## 2021-04-20 PROCEDURE — 83036 HEMOGLOBIN GLYCOSYLATED A1C: CPT | Mod: GA

## 2021-04-20 PROCEDURE — U0003 INFECTIOUS AGENT DETECTION BY NUCLEIC ACID (DNA OR RNA); SEVERE ACUTE RESPIRATORY SYNDROME CORONAVIRUS 2 (SARS-COV-2) (CORONAVIRUS DISEASE [COVID-19]), AMPLIFIED PROBE TECHNIQUE, MAKING USE OF HIGH THROUGHPUT TECHNOLOGIES AS DESCRIBED BY CMS-2020-01-R: HCPCS

## 2021-04-20 PROCEDURE — 80053 COMPREHEN METABOLIC PANEL: CPT

## 2021-04-20 PROCEDURE — 93010 ELECTROCARDIOGRAM REPORT: CPT | Performed by: INTERNAL MEDICINE

## 2021-04-20 PROCEDURE — 71046 X-RAY EXAM CHEST 2 VIEWS: CPT

## 2021-04-20 PROCEDURE — 80048 BASIC METABOLIC PNL TOTAL CA: CPT

## 2021-04-20 PROCEDURE — 36415 COLL VENOUS BLD VENIPUNCTURE: CPT

## 2021-04-20 PROCEDURE — 85025 COMPLETE CBC W/AUTO DIFF WBC: CPT | Mod: 91

## 2021-04-20 PROCEDURE — C9803 HOPD COVID-19 SPEC COLLECT: HCPCS

## 2021-04-20 PROCEDURE — 93005 ELECTROCARDIOGRAM TRACING: CPT

## 2021-04-20 PROCEDURE — 85730 THROMBOPLASTIN TIME PARTIAL: CPT

## 2021-04-20 RX ORDER — TIZANIDINE 2 MG/1
2 TABLET ORAL EVERY 8 HOURS
COMMUNITY

## 2021-04-20 RX ORDER — DOXEPIN HYDROCHLORIDE 75 MG/1
75 CAPSULE ORAL NIGHTLY
COMMUNITY

## 2021-04-20 RX ORDER — HYDROCHLOROTHIAZIDE 25 MG/1
25 TABLET ORAL DAILY
COMMUNITY

## 2021-04-27 ENCOUNTER — APPOINTMENT (OUTPATIENT)
Dept: RADIOLOGY | Facility: MEDICAL CENTER | Age: 66
End: 2021-04-27
Attending: NEUROLOGICAL SURGERY
Payer: MEDICARE

## 2021-04-27 ENCOUNTER — ANESTHESIA EVENT (OUTPATIENT)
Dept: SURGERY | Facility: MEDICAL CENTER | Age: 66
End: 2021-04-27
Payer: MEDICARE

## 2021-04-27 ENCOUNTER — ANESTHESIA (OUTPATIENT)
Dept: SURGERY | Facility: MEDICAL CENTER | Age: 66
End: 2021-04-27
Payer: MEDICARE

## 2021-04-27 ENCOUNTER — HOSPITAL ENCOUNTER (OUTPATIENT)
Facility: MEDICAL CENTER | Age: 66
End: 2021-04-28
Attending: NEUROLOGICAL SURGERY | Admitting: NEUROLOGICAL SURGERY
Payer: MEDICARE

## 2021-04-27 PROCEDURE — 700101 HCHG RX REV CODE 250: Performed by: NEUROLOGICAL SURGERY

## 2021-04-27 PROCEDURE — 160029 HCHG SURGERY MINUTES - 1ST 30 MINS LEVEL 4: Performed by: NEUROLOGICAL SURGERY

## 2021-04-27 PROCEDURE — 160036 HCHG PACU - EA ADDL 30 MINS PHASE I: Performed by: NEUROLOGICAL SURGERY

## 2021-04-27 PROCEDURE — 110454 HCHG SHELL REV 250: Performed by: NEUROLOGICAL SURGERY

## 2021-04-27 PROCEDURE — A9270 NON-COVERED ITEM OR SERVICE: HCPCS | Performed by: NEUROLOGICAL SURGERY

## 2021-04-27 PROCEDURE — 160041 HCHG SURGERY MINUTES - EA ADDL 1 MIN LEVEL 4: Performed by: NEUROLOGICAL SURGERY

## 2021-04-27 PROCEDURE — 72020 X-RAY EXAM OF SPINE 1 VIEW: CPT

## 2021-04-27 PROCEDURE — 501838 HCHG SUTURE GENERAL: Performed by: NEUROLOGICAL SURGERY

## 2021-04-27 PROCEDURE — 700101 HCHG RX REV CODE 250: Performed by: ANESTHESIOLOGY

## 2021-04-27 PROCEDURE — 160035 HCHG PACU - 1ST 60 MINS PHASE I: Performed by: NEUROLOGICAL SURGERY

## 2021-04-27 PROCEDURE — 700102 HCHG RX REV CODE 250 W/ 637 OVERRIDE(OP): Performed by: NURSE PRACTITIONER

## 2021-04-27 PROCEDURE — 96375 TX/PRO/DX INJ NEW DRUG ADDON: CPT

## 2021-04-27 PROCEDURE — G0378 HOSPITAL OBSERVATION PER HR: HCPCS

## 2021-04-27 PROCEDURE — 700105 HCHG RX REV CODE 258: Performed by: NEUROLOGICAL SURGERY

## 2021-04-27 PROCEDURE — 160048 HCHG OR STATISTICAL LEVEL 1-5: Performed by: NEUROLOGICAL SURGERY

## 2021-04-27 PROCEDURE — A9270 NON-COVERED ITEM OR SERVICE: HCPCS | Performed by: ANESTHESIOLOGY

## 2021-04-27 PROCEDURE — A9270 NON-COVERED ITEM OR SERVICE: HCPCS | Performed by: NURSE PRACTITIONER

## 2021-04-27 PROCEDURE — 700102 HCHG RX REV CODE 250 W/ 637 OVERRIDE(OP): Performed by: NEUROLOGICAL SURGERY

## 2021-04-27 PROCEDURE — 96365 THER/PROPH/DIAG IV INF INIT: CPT

## 2021-04-27 PROCEDURE — 160009 HCHG ANES TIME/MIN: Performed by: NEUROLOGICAL SURGERY

## 2021-04-27 PROCEDURE — 700102 HCHG RX REV CODE 250 W/ 637 OVERRIDE(OP): Performed by: ANESTHESIOLOGY

## 2021-04-27 PROCEDURE — 700111 HCHG RX REV CODE 636 W/ 250 OVERRIDE (IP): Performed by: NEUROLOGICAL SURGERY

## 2021-04-27 PROCEDURE — 160002 HCHG RECOVERY MINUTES (STAT): Performed by: NEUROLOGICAL SURGERY

## 2021-04-27 PROCEDURE — 700101 HCHG RX REV CODE 250: Performed by: NURSE PRACTITIONER

## 2021-04-27 PROCEDURE — 700111 HCHG RX REV CODE 636 W/ 250 OVERRIDE (IP): Performed by: NURSE PRACTITIONER

## 2021-04-27 PROCEDURE — 700105 HCHG RX REV CODE 258: Performed by: NURSE PRACTITIONER

## 2021-04-27 PROCEDURE — 700111 HCHG RX REV CODE 636 W/ 250 OVERRIDE (IP): Performed by: ANESTHESIOLOGY

## 2021-04-27 PROCEDURE — 96376 TX/PRO/DX INJ SAME DRUG ADON: CPT

## 2021-04-27 RX ORDER — BUPIVACAINE HYDROCHLORIDE AND EPINEPHRINE 5; 5 MG/ML; UG/ML
INJECTION, SOLUTION PERINEURAL
Status: DISCONTINUED | OUTPATIENT
Start: 2021-04-27 | End: 2021-04-27 | Stop reason: HOSPADM

## 2021-04-27 RX ORDER — OXYCODONE HCL 5 MG/5 ML
5 SOLUTION, ORAL ORAL
Status: COMPLETED | OUTPATIENT
Start: 2021-04-27 | End: 2021-04-27

## 2021-04-27 RX ORDER — ENEMA 19; 7 G/133ML; G/133ML
1 ENEMA RECTAL
Status: DISCONTINUED | OUTPATIENT
Start: 2021-04-27 | End: 2021-04-28 | Stop reason: HOSPADM

## 2021-04-27 RX ORDER — SODIUM CHLORIDE, SODIUM LACTATE, POTASSIUM CHLORIDE, CALCIUM CHLORIDE 600; 310; 30; 20 MG/100ML; MG/100ML; MG/100ML; MG/100ML
INJECTION, SOLUTION INTRAVENOUS CONTINUOUS
Status: DISCONTINUED | OUTPATIENT
Start: 2021-04-27 | End: 2021-04-27 | Stop reason: HOSPADM

## 2021-04-27 RX ORDER — ONDANSETRON 2 MG/ML
4 INJECTION INTRAMUSCULAR; INTRAVENOUS
Status: DISCONTINUED | OUTPATIENT
Start: 2021-04-27 | End: 2021-04-27 | Stop reason: HOSPADM

## 2021-04-27 RX ORDER — BISACODYL 10 MG
10 SUPPOSITORY, RECTAL RECTAL
Status: DISCONTINUED | OUTPATIENT
Start: 2021-04-27 | End: 2021-04-28 | Stop reason: HOSPADM

## 2021-04-27 RX ORDER — HYDROMORPHONE HYDROCHLORIDE 1 MG/ML
0.1 INJECTION, SOLUTION INTRAMUSCULAR; INTRAVENOUS; SUBCUTANEOUS
Status: DISCONTINUED | OUTPATIENT
Start: 2021-04-27 | End: 2021-04-27 | Stop reason: HOSPADM

## 2021-04-27 RX ORDER — AMOXICILLIN 250 MG
1 CAPSULE ORAL NIGHTLY
Status: DISCONTINUED | OUTPATIENT
Start: 2021-04-27 | End: 2021-04-28 | Stop reason: HOSPADM

## 2021-04-27 RX ORDER — OXYCODONE HCL 5 MG/5 ML
10 SOLUTION, ORAL ORAL
Status: COMPLETED | OUTPATIENT
Start: 2021-04-27 | End: 2021-04-27

## 2021-04-27 RX ORDER — HYDRALAZINE HYDROCHLORIDE 20 MG/ML
5 INJECTION INTRAMUSCULAR; INTRAVENOUS
Status: DISCONTINUED | OUTPATIENT
Start: 2021-04-27 | End: 2021-04-27 | Stop reason: HOSPADM

## 2021-04-27 RX ORDER — MORPHINE SULFATE 15 MG/1
15 TABLET, FILM COATED, EXTENDED RELEASE ORAL EVERY EVENING
Status: DISCONTINUED | OUTPATIENT
Start: 2021-04-27 | End: 2021-04-28 | Stop reason: HOSPADM

## 2021-04-27 RX ORDER — MIDAZOLAM HYDROCHLORIDE 1 MG/ML
INJECTION INTRAMUSCULAR; INTRAVENOUS PRN
Status: DISCONTINUED | OUTPATIENT
Start: 2021-04-27 | End: 2021-04-27 | Stop reason: SURG

## 2021-04-27 RX ORDER — SODIUM CHLORIDE, SODIUM LACTATE, POTASSIUM CHLORIDE, CALCIUM CHLORIDE 600; 310; 30; 20 MG/100ML; MG/100ML; MG/100ML; MG/100ML
INJECTION, SOLUTION INTRAVENOUS CONTINUOUS
Status: ACTIVE | OUTPATIENT
Start: 2021-04-27 | End: 2021-04-27

## 2021-04-27 RX ORDER — DOXEPIN HYDROCHLORIDE 25 MG/1
75 CAPSULE ORAL NIGHTLY
Status: DISCONTINUED | OUTPATIENT
Start: 2021-04-27 | End: 2021-04-28 | Stop reason: HOSPADM

## 2021-04-27 RX ORDER — CETIRIZINE HYDROCHLORIDE 10 MG/1
10 TABLET ORAL DAILY
Status: DISCONTINUED | OUTPATIENT
Start: 2021-04-27 | End: 2021-04-28 | Stop reason: HOSPADM

## 2021-04-27 RX ORDER — AMOXICILLIN 250 MG
1 CAPSULE ORAL
Status: DISCONTINUED | OUTPATIENT
Start: 2021-04-27 | End: 2021-04-28 | Stop reason: HOSPADM

## 2021-04-27 RX ORDER — MIDAZOLAM HYDROCHLORIDE 1 MG/ML
1 INJECTION INTRAMUSCULAR; INTRAVENOUS
Status: DISCONTINUED | OUTPATIENT
Start: 2021-04-27 | End: 2021-04-27 | Stop reason: HOSPADM

## 2021-04-27 RX ORDER — PREGABALIN 150 MG/1
300 CAPSULE ORAL EVERY EVENING
Status: DISCONTINUED | OUTPATIENT
Start: 2021-04-27 | End: 2021-04-28 | Stop reason: HOSPADM

## 2021-04-27 RX ORDER — UREA 10 %
1500 LOTION (ML) TOPICAL DAILY
Status: DISCONTINUED | OUTPATIENT
Start: 2021-04-27 | End: 2021-04-28 | Stop reason: HOSPADM

## 2021-04-27 RX ORDER — ROPINIROLE 2 MG/1
4 TABLET, FILM COATED ORAL EVERY 6 HOURS
Status: DISCONTINUED | OUTPATIENT
Start: 2021-04-27 | End: 2021-04-28 | Stop reason: HOSPADM

## 2021-04-27 RX ORDER — HYDROCHLOROTHIAZIDE 25 MG/1
25 TABLET ORAL DAILY
Status: DISCONTINUED | OUTPATIENT
Start: 2021-04-27 | End: 2021-04-28 | Stop reason: HOSPADM

## 2021-04-27 RX ORDER — ONDANSETRON 4 MG/1
4 TABLET, ORALLY DISINTEGRATING ORAL EVERY 4 HOURS PRN
Status: DISCONTINUED | OUTPATIENT
Start: 2021-04-27 | End: 2021-04-28 | Stop reason: HOSPADM

## 2021-04-27 RX ORDER — DIPHENHYDRAMINE HYDROCHLORIDE 50 MG/ML
12.5 INJECTION INTRAMUSCULAR; INTRAVENOUS
Status: DISCONTINUED | OUTPATIENT
Start: 2021-04-27 | End: 2021-04-27 | Stop reason: HOSPADM

## 2021-04-27 RX ORDER — LABETALOL HYDROCHLORIDE 5 MG/ML
5 INJECTION, SOLUTION INTRAVENOUS
Status: DISCONTINUED | OUTPATIENT
Start: 2021-04-27 | End: 2021-04-27 | Stop reason: HOSPADM

## 2021-04-27 RX ORDER — ONDANSETRON 2 MG/ML
INJECTION INTRAMUSCULAR; INTRAVENOUS PRN
Status: DISCONTINUED | OUTPATIENT
Start: 2021-04-27 | End: 2021-04-27 | Stop reason: SURG

## 2021-04-27 RX ORDER — HYDROMORPHONE HYDROCHLORIDE 1 MG/ML
0.2 INJECTION, SOLUTION INTRAMUSCULAR; INTRAVENOUS; SUBCUTANEOUS
Status: DISCONTINUED | OUTPATIENT
Start: 2021-04-27 | End: 2021-04-27 | Stop reason: HOSPADM

## 2021-04-27 RX ORDER — DOCUSATE SODIUM 100 MG/1
100 CAPSULE, LIQUID FILLED ORAL 2 TIMES DAILY
Status: DISCONTINUED | OUTPATIENT
Start: 2021-04-27 | End: 2021-04-28 | Stop reason: HOSPADM

## 2021-04-27 RX ORDER — CEFAZOLIN SODIUM 2 G/100ML
2 INJECTION, SOLUTION INTRAVENOUS EVERY 8 HOURS
Status: COMPLETED | OUTPATIENT
Start: 2021-04-27 | End: 2021-04-27

## 2021-04-27 RX ORDER — DEXAMETHASONE SODIUM PHOSPHATE 4 MG/ML
INJECTION, SOLUTION INTRA-ARTICULAR; INTRALESIONAL; INTRAMUSCULAR; INTRAVENOUS; SOFT TISSUE PRN
Status: DISCONTINUED | OUTPATIENT
Start: 2021-04-27 | End: 2021-04-27 | Stop reason: SURG

## 2021-04-27 RX ORDER — CYCLOBENZAPRINE HCL 10 MG
10 TABLET ORAL EVERY 8 HOURS PRN
Status: DISCONTINUED | OUTPATIENT
Start: 2021-04-27 | End: 2021-04-28 | Stop reason: HOSPADM

## 2021-04-27 RX ORDER — OMEPRAZOLE 20 MG/1
20 CAPSULE, DELAYED RELEASE ORAL 2 TIMES DAILY
Status: DISCONTINUED | OUTPATIENT
Start: 2021-04-27 | End: 2021-04-28 | Stop reason: HOSPADM

## 2021-04-27 RX ORDER — METOPROLOL TARTRATE 1 MG/ML
1 INJECTION, SOLUTION INTRAVENOUS
Status: DISCONTINUED | OUTPATIENT
Start: 2021-04-27 | End: 2021-04-27 | Stop reason: HOSPADM

## 2021-04-27 RX ORDER — FLUTICASONE PROPIONATE 110 UG/1
2 AEROSOL, METERED RESPIRATORY (INHALATION) DAILY
Status: DISCONTINUED | OUTPATIENT
Start: 2021-04-28 | End: 2021-04-28 | Stop reason: HOSPADM

## 2021-04-27 RX ORDER — MEPERIDINE HYDROCHLORIDE 25 MG/ML
12.5 INJECTION INTRAMUSCULAR; INTRAVENOUS; SUBCUTANEOUS
Status: DISCONTINUED | OUTPATIENT
Start: 2021-04-27 | End: 2021-04-27 | Stop reason: HOSPADM

## 2021-04-27 RX ORDER — MORPHINE SULFATE 15 MG/1
15-30 TABLET, FILM COATED, EXTENDED RELEASE ORAL EVERY 12 HOURS
Status: DISCONTINUED | OUTPATIENT
Start: 2021-04-27 | End: 2021-04-27

## 2021-04-27 RX ORDER — TIZANIDINE 4 MG/1
2 TABLET ORAL EVERY 8 HOURS
Status: DISCONTINUED | OUTPATIENT
Start: 2021-04-27 | End: 2021-04-28 | Stop reason: HOSPADM

## 2021-04-27 RX ORDER — ROCURONIUM BROMIDE 10 MG/ML
INJECTION, SOLUTION INTRAVENOUS PRN
Status: DISCONTINUED | OUTPATIENT
Start: 2021-04-27 | End: 2021-04-27 | Stop reason: SURG

## 2021-04-27 RX ORDER — ONDANSETRON 2 MG/ML
4 INJECTION INTRAMUSCULAR; INTRAVENOUS EVERY 4 HOURS PRN
Status: DISCONTINUED | OUTPATIENT
Start: 2021-04-27 | End: 2021-04-28 | Stop reason: HOSPADM

## 2021-04-27 RX ORDER — ACETAMINOPHEN 500 MG
1000 TABLET ORAL ONCE
Status: COMPLETED | OUTPATIENT
Start: 2021-04-27 | End: 2021-04-27

## 2021-04-27 RX ORDER — SUCCINYLCHOLINE/SOD CL,ISO/PF 200MG/10ML
SYRINGE (ML) INTRAVENOUS PRN
Status: DISCONTINUED | OUTPATIENT
Start: 2021-04-27 | End: 2021-04-27 | Stop reason: SURG

## 2021-04-27 RX ORDER — MORPHINE SULFATE 30 MG/1
30 TABLET, FILM COATED, EXTENDED RELEASE ORAL EVERY MORNING
Status: DISCONTINUED | OUTPATIENT
Start: 2021-04-28 | End: 2021-04-28 | Stop reason: HOSPADM

## 2021-04-27 RX ORDER — ROPINIROLE 2 MG/1
4 TABLET, FILM COATED ORAL ONCE
Status: COMPLETED | OUTPATIENT
Start: 2021-04-27 | End: 2021-04-27

## 2021-04-27 RX ORDER — SODIUM CHLORIDE AND POTASSIUM CHLORIDE 150; 900 MG/100ML; MG/100ML
INJECTION, SOLUTION INTRAVENOUS CONTINUOUS
Status: DISCONTINUED | OUTPATIENT
Start: 2021-04-27 | End: 2021-04-28 | Stop reason: HOSPADM

## 2021-04-27 RX ORDER — LIDOCAINE HYDROCHLORIDE 20 MG/ML
INJECTION, SOLUTION EPIDURAL; INFILTRATION; INTRACAUDAL; PERINEURAL PRN
Status: DISCONTINUED | OUTPATIENT
Start: 2021-04-27 | End: 2021-04-27 | Stop reason: SURG

## 2021-04-27 RX ORDER — CEFAZOLIN SODIUM 1 G/3ML
INJECTION, POWDER, FOR SOLUTION INTRAMUSCULAR; INTRAVENOUS
Status: DISCONTINUED | OUTPATIENT
Start: 2021-04-27 | End: 2021-04-27 | Stop reason: HOSPADM

## 2021-04-27 RX ORDER — DILTIAZEM HYDROCHLORIDE 180 MG/1
180 CAPSULE, COATED, EXTENDED RELEASE ORAL DAILY
Status: DISCONTINUED | OUTPATIENT
Start: 2021-04-28 | End: 2021-04-28 | Stop reason: HOSPADM

## 2021-04-27 RX ORDER — ATORVASTATIN CALCIUM 10 MG/1
10 TABLET, FILM COATED ORAL DAILY
Status: DISCONTINUED | OUTPATIENT
Start: 2021-04-27 | End: 2021-04-28 | Stop reason: HOSPADM

## 2021-04-27 RX ORDER — DIAZEPAM 5 MG/1
5 TABLET ORAL EVERY 4 HOURS PRN
Status: DISCONTINUED | OUTPATIENT
Start: 2021-04-27 | End: 2021-04-28 | Stop reason: HOSPADM

## 2021-04-27 RX ORDER — HALOPERIDOL 5 MG/ML
1 INJECTION INTRAMUSCULAR
Status: DISCONTINUED | OUTPATIENT
Start: 2021-04-27 | End: 2021-04-27 | Stop reason: HOSPADM

## 2021-04-27 RX ORDER — ALBUTEROL SULFATE 90 UG/1
2 AEROSOL, METERED RESPIRATORY (INHALATION) EVERY 6 HOURS PRN
COMMUNITY

## 2021-04-27 RX ORDER — HYDRALAZINE HYDROCHLORIDE 20 MG/ML
10 INJECTION INTRAMUSCULAR; INTRAVENOUS
Status: DISCONTINUED | OUTPATIENT
Start: 2021-04-27 | End: 2021-04-28 | Stop reason: HOSPADM

## 2021-04-27 RX ORDER — POLYETHYLENE GLYCOL 3350 17 G/17G
1 POWDER, FOR SOLUTION ORAL 2 TIMES DAILY PRN
Status: DISCONTINUED | OUTPATIENT
Start: 2021-04-27 | End: 2021-04-28 | Stop reason: HOSPADM

## 2021-04-27 RX ORDER — HYDROMORPHONE HYDROCHLORIDE 1 MG/ML
0.4 INJECTION, SOLUTION INTRAMUSCULAR; INTRAVENOUS; SUBCUTANEOUS
Status: DISCONTINUED | OUTPATIENT
Start: 2021-04-27 | End: 2021-04-27 | Stop reason: HOSPADM

## 2021-04-27 RX ORDER — LIDOCAINE HYDROCHLORIDE 40 MG/ML
SOLUTION TOPICAL PRN
Status: DISCONTINUED | OUTPATIENT
Start: 2021-04-27 | End: 2021-04-27 | Stop reason: SURG

## 2021-04-27 RX ORDER — LIDOCAINE HYDROCHLORIDE 20 MG/ML
JELLY TOPICAL PRN
Status: DISCONTINUED | OUTPATIENT
Start: 2021-04-27 | End: 2021-04-27 | Stop reason: SURG

## 2021-04-27 RX ORDER — LABETALOL HYDROCHLORIDE 5 MG/ML
10 INJECTION, SOLUTION INTRAVENOUS
Status: DISCONTINUED | OUTPATIENT
Start: 2021-04-27 | End: 2021-04-28 | Stop reason: HOSPADM

## 2021-04-27 RX ORDER — METHOCARBAMOL 750 MG/1
750 TABLET, FILM COATED ORAL EVERY 8 HOURS PRN
Status: DISCONTINUED | OUTPATIENT
Start: 2021-04-27 | End: 2021-04-28 | Stop reason: HOSPADM

## 2021-04-27 RX ADMIN — PREGABALIN 300 MG: 150 CAPSULE ORAL at 17:36

## 2021-04-27 RX ADMIN — CEFAZOLIN SODIUM 2 G: 2 INJECTION, SOLUTION INTRAVENOUS at 15:20

## 2021-04-27 RX ADMIN — ROPINIROLE HYDROCHLORIDE 4 MG: 2 TABLET, FILM COATED ORAL at 12:33

## 2021-04-27 RX ADMIN — ROPINIROLE HYDROCHLORIDE 4 MG: 2 TABLET, FILM COATED ORAL at 17:37

## 2021-04-27 RX ADMIN — POVIDONE IODINE 15 ML: 100 SOLUTION TOPICAL at 07:01

## 2021-04-27 RX ADMIN — FENTANYL CITRATE 100 MCG: 50 INJECTION, SOLUTION INTRAMUSCULAR; INTRAVENOUS at 09:06

## 2021-04-27 RX ADMIN — TIZANIDINE 2 MG: 4 TABLET ORAL at 14:01

## 2021-04-27 RX ADMIN — CEFAZOLIN SODIUM 2 G: 2 INJECTION, SOLUTION INTRAVENOUS at 23:16

## 2021-04-27 RX ADMIN — SODIUM CHLORIDE, POTASSIUM CHLORIDE, SODIUM LACTATE AND CALCIUM CHLORIDE: 600; 310; 30; 20 INJECTION, SOLUTION INTRAVENOUS at 07:30

## 2021-04-27 RX ADMIN — DOXEPIN HYDROCHLORIDE 75 MG: 25 CAPSULE ORAL at 23:32

## 2021-04-27 RX ADMIN — FENTANYL CITRATE 100 MCG: 50 INJECTION, SOLUTION INTRAMUSCULAR; INTRAVENOUS at 08:04

## 2021-04-27 RX ADMIN — FENTANYL CITRATE 100 MCG: 50 INJECTION, SOLUTION INTRAMUSCULAR; INTRAVENOUS at 07:35

## 2021-04-27 RX ADMIN — MORPHINE SULFATE 15 MG: 15 TABLET, FILM COATED, EXTENDED RELEASE ORAL at 17:37

## 2021-04-27 RX ADMIN — ROPINIROLE HYDROCHLORIDE 4 MG: 2 TABLET, FILM COATED ORAL at 23:32

## 2021-04-27 RX ADMIN — EPHEDRINE SULFATE 25 MG: 50 INJECTION, SOLUTION INTRAVENOUS at 08:18

## 2021-04-27 RX ADMIN — LIDOCAINE HYDROCHLORIDE 4 ML: 20 JELLY TOPICAL at 07:39

## 2021-04-27 RX ADMIN — LIDOCAINE HYDROCHLORIDE 0.5 ML: 10 INJECTION, SOLUTION EPIDURAL; INFILTRATION; INTRACAUDAL; PERINEURAL at 07:30

## 2021-04-27 RX ADMIN — FENTANYL CITRATE 50 MCG: 50 INJECTION, SOLUTION INTRAMUSCULAR; INTRAVENOUS at 09:24

## 2021-04-27 RX ADMIN — POTASSIUM CHLORIDE AND SODIUM CHLORIDE: 900; 150 INJECTION, SOLUTION INTRAVENOUS at 12:10

## 2021-04-27 RX ADMIN — CEFAZOLIN 2 G: 330 INJECTION, POWDER, FOR SOLUTION INTRAMUSCULAR; INTRAVENOUS at 07:45

## 2021-04-27 RX ADMIN — OXYCODONE HYDROCHLORIDE 10 MG: 5 SOLUTION ORAL at 10:03

## 2021-04-27 RX ADMIN — CYCLOBENZAPRINE 10 MG: 10 TABLET, FILM COATED ORAL at 10:11

## 2021-04-27 RX ADMIN — Medication 200 MG: at 07:37

## 2021-04-27 RX ADMIN — TIZANIDINE 2 MG: 4 TABLET ORAL at 22:02

## 2021-04-27 RX ADMIN — MIDAZOLAM HYDROCHLORIDE 2 MG: 1 INJECTION, SOLUTION INTRAMUSCULAR; INTRAVENOUS at 07:35

## 2021-04-27 RX ADMIN — ATORVASTATIN CALCIUM 10 MG: 10 TABLET, FILM COATED ORAL at 12:11

## 2021-04-27 RX ADMIN — FENTANYL CITRATE 100 MCG: 50 INJECTION, SOLUTION INTRAMUSCULAR; INTRAVENOUS at 07:56

## 2021-04-27 RX ADMIN — DOCUSATE SODIUM 50 MG AND SENNOSIDES 8.6 MG 1 TABLET: 8.6; 5 TABLET, FILM COATED ORAL at 22:02

## 2021-04-27 RX ADMIN — ROPINIROLE HYDROCHLORIDE 4 MG: 2 TABLET, FILM COATED ORAL at 10:26

## 2021-04-27 RX ADMIN — ACETAMINOPHEN 1000 MG: 500 TABLET ORAL at 07:01

## 2021-04-27 RX ADMIN — HYDROCHLOROTHIAZIDE 25 MG: 25 TABLET ORAL at 12:11

## 2021-04-27 RX ADMIN — CETIRIZINE HYDROCHLORIDE 10 MG: 10 TABLET, FILM COATED ORAL at 12:11

## 2021-04-27 RX ADMIN — LIDOCAINE HYDROCHLORIDE 100 MG: 20 INJECTION, SOLUTION EPIDURAL; INFILTRATION; INTRACAUDAL at 07:37

## 2021-04-27 RX ADMIN — ROCURONIUM BROMIDE 50 MG: 10 INJECTION, SOLUTION INTRAVENOUS at 07:37

## 2021-04-27 RX ADMIN — LIDOCAINE HYDROCHLORIDE 4 ML: 40 SOLUTION TOPICAL at 07:39

## 2021-04-27 RX ADMIN — ONDANSETRON 8 MG: 2 INJECTION INTRAMUSCULAR; INTRAVENOUS at 07:55

## 2021-04-27 RX ADMIN — PROPOFOL 200 MG: 10 INJECTION, EMULSION INTRAVENOUS at 07:37

## 2021-04-27 RX ADMIN — OMEPRAZOLE 20 MG: 20 CAPSULE, DELAYED RELEASE ORAL at 17:36

## 2021-04-27 RX ADMIN — FENTANYL CITRATE 50 MCG: 50 INJECTION, SOLUTION INTRAMUSCULAR; INTRAVENOUS at 07:37

## 2021-04-27 RX ADMIN — POTASSIUM CHLORIDE AND SODIUM CHLORIDE: 900; 150 INJECTION, SOLUTION INTRAVENOUS at 23:16

## 2021-04-27 RX ADMIN — DEXAMETHASONE SODIUM PHOSPHATE 8 MG: 4 INJECTION, SOLUTION INTRA-ARTICULAR; INTRALESIONAL; INTRAMUSCULAR; INTRAVENOUS; SOFT TISSUE at 07:55

## 2021-04-27 RX ADMIN — MORPHINE SULFATE: 50 INJECTION, SOLUTION, CONCENTRATE INTRAVENOUS at 12:25

## 2021-04-27 ASSESSMENT — LIFESTYLE VARIABLES
DOES PATIENT WANT TO STOP DRINKING: NO
ALCOHOL_USE: NO

## 2021-04-27 ASSESSMENT — PAIN DESCRIPTION - PAIN TYPE
TYPE: ACUTE PAIN
TYPE: CHRONIC PAIN;SURGICAL PAIN
TYPE: ACUTE PAIN
TYPE: CHRONIC PAIN;SURGICAL PAIN
TYPE: CHRONIC PAIN;SURGICAL PAIN
TYPE: ACUTE PAIN;CHRONIC PAIN;SURGICAL PAIN

## 2021-04-27 ASSESSMENT — FIBROSIS 4 INDEX: FIB4 SCORE: 0.66

## 2021-04-27 ASSESSMENT — PAIN SCALES - GENERAL: PAIN_LEVEL: 4

## 2021-04-27 NOTE — ANESTHESIA TIME REPORT
Anesthesia Start and Stop Event Times     Date Time Event    4/27/2021 0638 Ready for Procedure     0735 Anesthesia Start        Responsible Staff  04/27/21    Name Role Begin End    Umair Taveras D.O. Anesth 0735         Preop Diagnosis (Free Text):  Pre-op Diagnosis     SPINAL STENOSIS OF LUMBAR REGION        Preop Diagnosis (Codes):    Post op Diagnosis  Lumbar spinal stenosis      Premium Reason  Non-Premium    Comments:

## 2021-04-27 NOTE — PROGRESS NOTES
Med rec updated and complete  Allergies reviewed  Pt report no vitamins   Pt reports no antibiotics in the last 2 weeks

## 2021-04-27 NOTE — ANESTHESIA PREPROCEDURE EVALUATION
Relevant Problems   ANESTHESIA   (+) History of anesthesia complications   (+) EMILY (obstructive sleep apnea)      PULMONARY   (+) COPD (chronic obstructive pulmonary disease) (HCC)      NEURO   (+) History of anesthesia complications      CARDIAC   (+) Hypertension      GI   (+) GERD (gastroesophageal reflux disease)       Physical Exam    Airway   Mallampati: II  TM distance: >3 FB  Neck ROM: full       Cardiovascular - normal exam  Rhythm: regular  Rate: normal  (-) murmur     Dental - normal exam           Pulmonary - normal exam  Breath sounds clear to auscultation     Abdominal    Neurological - normal exam                 Anesthesia Plan    ASA 3   ASA physical status 3 criteria: COPD    Plan - general       Airway plan will be ETT          Induction: intravenous    Postoperative Plan: Postoperative administration of opioids is intended.    Pertinent diagnostic labs and testing reviewed    Informed Consent:    Anesthetic plan and risks discussed with patient.    Use of blood products discussed with: patient whom consented to blood products.

## 2021-04-27 NOTE — ANESTHESIA PROCEDURE NOTES
Airway    Date/Time: 4/27/2021 7:39 AM  Performed by: Umair Taveras D.O.  Authorized by: Umair Taveras D.O.     Location:  OR  Urgency:  Elective  Indications for Airway Management:  Anesthesia      Spontaneous Ventilation: absent    Sedation Level:  Deep  Preoxygenated: Yes    Patient Position:  Sniffing  Final Airway Type:  Endotracheal airway  Final Endotracheal Airway:  ETT  Cuffed: Yes    Technique Used for Successful ETT Placement:  Direct laryngoscopy    Insertion Site:  Oral  Blade Type:  Valerie  Laryngoscope Blade/Videolaryngoscope Blade Size:  3  ETT Size (mm):  7.0  Measured from:  Teeth  ETT to Teeth (cm):  23  Placement Verified by: auscultation and capnometry    Cormack-Lehane Classification:  Grade I - full view of glottis  Number of Attempts at Approach:  1

## 2021-04-27 NOTE — ANESTHESIA POSTPROCEDURE EVALUATION
Patient: Ariella Rendon    Procedure Summary     Date: 04/27/21 Room / Location: [facility] 05 / SURGERY [facility]    Anesthesia Start: 0735 Anesthesia Stop: 0944    Procedure: LAMINECTOMY, SPINE, LUMBAR, WITH DISCECTOMY - L3-S1 LAMI. (Back) Diagnosis: (LUMBAR SPINAL STENOSIS)    Surgeons: Jacob Nichols M.D. Responsible Provider: Umair Taveras D.O.    Anesthesia Type: general ASA Status: 3          Final Anesthesia Type: general  Last vitals  BP   Blood Pressure : 160/101(pt very anxious and tearful)    Temp   36.6 °C (97.9 °F)    Pulse   (!) 112   Resp   18    SpO2   93 %      Anesthesia Post Evaluation    Patient location during evaluation: PACU  Patient participation: complete - patient participated  Level of consciousness: awake and alert  Pain score: 4    Airway patency: patent  Anesthetic complications: no  Cardiovascular status: hemodynamically stable  Respiratory status: acceptable  Hydration status: euvolemic    PONV: none          There were no known complications for this encounter.     Nurse Pain Score: 7 (NPRS)

## 2021-04-27 NOTE — OR NURSING
Report given to Joe RN via SBAR reviewed orders and patient history, no questions at this time.  Pt alert and oriented, resp even and nonlabored, in NAD, pt denies pain/nausea at this time. Pt moves all ext,+cms, follows commands and verbalized understanding  of poc and admission. Family notified via text/phone patient is moving to room. Will con't to monitor until patient has transitioned.  Belongings on bed. pca on bed for receiving nurse

## 2021-04-27 NOTE — OP REPORT
DATE OF SERVICE:  04/27/2021     PREOPERATIVE DIAGNOSIS:  L3-S1 stenosis with lateral right L5 disk.     POSTOPERATIVE DIAGNOSIS:  L3-S1 stenosis with lateral right L5 disk.     OPERATIONS:  1.  Bilateral L5-S1 redo laminectomy, medial facetectomy, bilateral   foraminotomies, excision of right lateral L5 disk, decompression of bilateral   distal L5 and bilateral S1 nerve roots.  2.  Bilateral L4-L5 laminectomy, medial facetectomy, decompression of thecal   sac and bilateral proximal L5 nerve roots.  3.  Bilateral L3-L4 partial laminectomy, medial facetectomy, bilateral   foraminotomies, decompression of thecal sac and bilateral L4 nerve roots.     SURGEON:  Jacob Nichols MD     ASSISTANT:  LETTY Colon     ANESTHESIA:  General endotracheal.     ANESTHESIOLOGIST:  Umair Taveras DO.     PREPARATION:  ChloraPrep.     MEDICATIONS:  The patient given Ancef prior to incision.     INDICATIONS:  This is a woman with back and radicular symptomatology   refractory to nonoperative therapies.  She had previous surgery at L5-S1 and   anterior fusion L4-S1.  She had persistent stenosis at L4-L5, L5-S1 plus new   stenosis at the L3-L4 level.  The patient was felt to be a candidate for   operative decompression to attempt to improve back and radicular symptoms.    The patient understood major risks and complications such as paralysis   exceedingly rare, biggest risk of surgery is nonresponse which is 15%, small   risk of wound infection, spinal fluid leak, the chance to require another   operation rest of her life 15%-20%.  Patient is understanding and agreed to   proceed and signed consent.     DESCRIPTION OF PROCEDURE:  The patient was brought to the operating room.    Peripheral venous lines in place.  General anesthesia was induced.  The   patient intubated.  No Cardoso catheter was placed.  The patient laid prone onto   the OSI table using six posts.  Pressure points were carefully padded.  The   back was shaved,  sterilely prepped and draped.  Planned incision was opened in   the midline going through her previous incision extending it superiorly   roughly an inch.  We dissected out the spinous processes, lamina of L3 through   S1, working through scar tissue.  The patient had a remnant of the L5 lamina   still present.  We followed that bone laterally to the L5-S1 facet joints.    Using a large Leksell, I removed the spinous process of L4 and inferior half   of L3.  Then using Midas Jonah drill, inferior portion of lamina of L3, all of   L4, the remaining portion of L5 plus the medial facets bilaterally at L3-L4,   L4-L5 and L5-S1 were drilled to paper thin shelf of bone, undercutting the   facet joints medial to lateral bilaterally and then resecting the thin shelf   of bone along with thickened ligamentum flavum in a piecemeal fashion with 2,   3 and 4 mm Kerrisons, I worked to the medial aspect of the L4, L5 and S1   pedicles bilaterally.  I did extensive foraminotomies over the right L4 and L5   nerve roots as well as the left L5 root.  There was no foraminal stenosis for   the L4 root on the left side.  In the axilla of the L5 root was a calcified   disk, which was removed with the drill, down biting curettes and pituitary   rongeurs. That with foraminotomy using straight and angled 2 mm Kerrisons, the   right L5 root was freely decompressed.  At the end of the decompression, I   could readily slide the Kinney around the bilateral L4, L5 and S1 pedicles and   the thecal sac was well decompressed.  Throughout the case, minor bone   bleeders were controlled with bone wax, epidural bleeding controlled with   bipolar electrocautery plus Gelfoam powder mixed with thrombin.  Muscle   bleeders controlled with bipolar electrocautery.  Wound was irrigated with   antibiotic irrigation.  Medium Hemovac drain was placed in depth of the wound,   brought out through separate stab incision.  Deep fascia was closed with 0   Vicryl,  subcutaneous fascia with 0 Vicryl, subcuticular closed with 3-0   Vicryl, and skin edges approximated with quarter-inch Steri-Strips.  Drain was   sutured in place at the exit site with 0 Vicryl, connected to closed drainage   system.  Sterile dressing was placed.  The patient laid supine on the bed,   extubated, taken to recovery room in satisfactory condition.  She tolerated   the procedure well without apparent complication.     ESTIMATED BLOOD LOSS:  250 mL.        ______________________________  MD CEE HUGHES/NARESH/JUNE    DD:  04/27/2021 09:57  DT:  04/27/2021 10:24    Job#:  [id number]    CC:Umair Taveras DO(User)

## 2021-04-27 NOTE — RESPIRATORY CARE
COPD EDUCATION by COPD CLINICAL EDUCATOR  4/27/2021 at 2:46 PM by Noelle Cm, RRT     Patient too sleepy post op will revisit

## 2021-04-28 VITALS
SYSTOLIC BLOOD PRESSURE: 110 MMHG | TEMPERATURE: 98.1 F | HEART RATE: 79 BPM | BODY MASS INDEX: 36.03 KG/M2 | OXYGEN SATURATION: 98 % | WEIGHT: 224.21 LBS | RESPIRATION RATE: 16 BRPM | DIASTOLIC BLOOD PRESSURE: 68 MMHG | HEIGHT: 66 IN

## 2021-04-28 LAB
ANION GAP SERPL CALC-SCNC: 9 MMOL/L (ref 7–16)
BUN SERPL-MCNC: 13 MG/DL (ref 8–22)
CALCIUM SERPL-MCNC: 9 MG/DL (ref 8.5–10.5)
CHLORIDE SERPL-SCNC: 103 MMOL/L (ref 96–112)
CO2 SERPL-SCNC: 26 MMOL/L (ref 20–33)
CREAT SERPL-MCNC: 0.74 MG/DL (ref 0.5–1.4)
ERYTHROCYTE [DISTWIDTH] IN BLOOD BY AUTOMATED COUNT: 53.5 FL (ref 35.9–50)
GLUCOSE SERPL-MCNC: 119 MG/DL (ref 65–99)
HCT VFR BLD AUTO: 44.2 % (ref 37–47)
HGB BLD-MCNC: 13.9 G/DL (ref 12–16)
MCH RBC QN AUTO: 28.5 PG (ref 27–33)
MCHC RBC AUTO-ENTMCNC: 31.4 G/DL (ref 33.6–35)
MCV RBC AUTO: 90.8 FL (ref 81.4–97.8)
PLATELET # BLD AUTO: 172 K/UL (ref 164–446)
PMV BLD AUTO: 11.8 FL (ref 9–12.9)
POTASSIUM SERPL-SCNC: 4.7 MMOL/L (ref 3.6–5.5)
RBC # BLD AUTO: 4.87 M/UL (ref 4.2–5.4)
SODIUM SERPL-SCNC: 138 MMOL/L (ref 135–145)
WBC # BLD AUTO: 11.7 K/UL (ref 4.8–10.8)

## 2021-04-28 PROCEDURE — 700105 HCHG RX REV CODE 258: Performed by: NURSE PRACTITIONER

## 2021-04-28 PROCEDURE — 700102 HCHG RX REV CODE 250 W/ 637 OVERRIDE(OP): Performed by: NURSE PRACTITIONER

## 2021-04-28 PROCEDURE — 85027 COMPLETE CBC AUTOMATED: CPT

## 2021-04-28 PROCEDURE — 700111 HCHG RX REV CODE 636 W/ 250 OVERRIDE (IP): Performed by: NURSE PRACTITIONER

## 2021-04-28 PROCEDURE — 96376 TX/PRO/DX INJ SAME DRUG ADON: CPT

## 2021-04-28 PROCEDURE — 97161 PT EVAL LOW COMPLEX 20 MIN: CPT

## 2021-04-28 PROCEDURE — 36415 COLL VENOUS BLD VENIPUNCTURE: CPT

## 2021-04-28 PROCEDURE — G0378 HOSPITAL OBSERVATION PER HR: HCPCS

## 2021-04-28 PROCEDURE — A9270 NON-COVERED ITEM OR SERVICE: HCPCS | Performed by: NURSE PRACTITIONER

## 2021-04-28 PROCEDURE — 80048 BASIC METABOLIC PNL TOTAL CA: CPT

## 2021-04-28 RX ADMIN — OMEPRAZOLE 20 MG: 20 CAPSULE, DELAYED RELEASE ORAL at 05:35

## 2021-04-28 RX ADMIN — TIZANIDINE 2 MG: 4 TABLET ORAL at 05:34

## 2021-04-28 RX ADMIN — ATORVASTATIN CALCIUM 10 MG: 10 TABLET, FILM COATED ORAL at 05:36

## 2021-04-28 RX ADMIN — CETIRIZINE HYDROCHLORIDE 10 MG: 10 TABLET, FILM COATED ORAL at 05:35

## 2021-04-28 RX ADMIN — MORPHINE SULFATE 30 MG: 30 TABLET, FILM COATED, EXTENDED RELEASE ORAL at 05:36

## 2021-04-28 RX ADMIN — UMECLIDINIUM BROMIDE AND VILANTEROL TRIFENATATE 1 PUFF: 62.5; 25 POWDER RESPIRATORY (INHALATION) at 07:58

## 2021-04-28 RX ADMIN — ROPINIROLE HYDROCHLORIDE 4 MG: 2 TABLET, FILM COATED ORAL at 05:35

## 2021-04-28 RX ADMIN — DILTIAZEM HYDROCHLORIDE 180 MG: 180 CAPSULE, COATED, EXTENDED RELEASE ORAL at 05:35

## 2021-04-28 RX ADMIN — HYDROCHLOROTHIAZIDE 25 MG: 25 TABLET ORAL at 05:34

## 2021-04-28 RX ADMIN — MORPHINE SULFATE: 50 INJECTION, SOLUTION, CONCENTRATE INTRAVENOUS at 07:58

## 2021-04-28 RX ADMIN — Medication 1500 MG: at 05:35

## 2021-04-28 RX ADMIN — DOCUSATE SODIUM 100 MG: 100 CAPSULE, LIQUID FILLED ORAL at 05:34

## 2021-04-28 RX ADMIN — TIZANIDINE 2 MG: 4 TABLET ORAL at 14:05

## 2021-04-28 RX ADMIN — ROPINIROLE HYDROCHLORIDE 4 MG: 2 TABLET, FILM COATED ORAL at 12:32

## 2021-04-28 ASSESSMENT — COGNITIVE AND FUNCTIONAL STATUS - GENERAL
STANDING UP FROM CHAIR USING ARMS: A LITTLE
WALKING IN HOSPITAL ROOM: A LITTLE
SUGGESTED CMS G CODE MODIFIER MOBILITY: CJ
CLIMB 3 TO 5 STEPS WITH RAILING: A LITTLE
MOBILITY SCORE: 21

## 2021-04-28 ASSESSMENT — GAIT ASSESSMENTS
ASSISTIVE DEVICE: OTHER (COMMENTS)
GAIT LEVEL OF ASSIST: SUPERVISED
DEVIATION: INCREASED BASE OF SUPPORT
DISTANCE (FEET): 250

## 2021-04-28 NOTE — CARE PLAN
Problem: Safety  Goal: Will remain free from falls  Outcome: PROGRESSING AS EXPECTED     Problem: Pain Management  Goal: Pain level will decrease to patient's comfort goal  Outcome: PROGRESSING AS EXPECTED  Flowsheets (Taken 4/28/2021 0544)  Comfort Goal: 2  Pain Rating Scale (NPRS): 4  Intervention: Educate and implement non-pharmacologic comfort measures. Examples: relaxation, distration, play therapy, activity therapy, massage, etc.  Flowsheets (Taken 4/28/2021 0544)  Intervention:   Medication (see MAR)   Ambulation / Increased Activity   Repositioned  Note: Morphine PCA in place. Pain goals discussed as well as multimodal interventions

## 2021-04-28 NOTE — PROGRESS NOTES
2 RN Skin Check    2 RN skin check complete.   Devices in place: SCDs.  Skin assessed under devices: yes.  Confirmed pressure ulcers found on: none.  New potential pressure ulcers noted on . Wound consult placed No.  The following interventions in place Pillows.    R dorsal foot has red spot, blanching. Dressing on back CDI, hemevac in place. Pillows used for positioning. Patent ambulatory.

## 2021-04-28 NOTE — DISCHARGE INSTRUCTIONS
Discharge Instructions    Discharged to home by car with relative. Discharged via wheelchair, hospital escort: Yes.  Special equipment needed: Not Applicable    Be sure to schedule a follow-up appointment with your primary care doctor or any specialists as instructed.     Discharge Plan:        I understand that a diet low in cholesterol, fat, and sodium is recommended for good health. Unless I have been given specific instructions below for another diet, I accept this instruction as my diet prescription.   Other diet: reg    Special Instructions: None    · Is patient discharged on Warfarin / Coumadin?   No     Depression / Suicide Risk    As you are discharged from this [facility] facility, it is important to learn how to keep safe from harming yourself.    Recognize the warning signs:  · Abrupt changes in personality, positive or negative- including increase in energy   · Giving away possessions  · Change in eating patterns- significant weight changes-  positive or negative  · Change in sleeping patterns- unable to sleep or sleeping all the time   · Unwillingness or inability to communicate  · Depression  · Unusual sadness, discouragement and loneliness  · Talk of wanting to die  · Neglect of personal appearance   · Rebelliousness- reckless behavior  · Withdrawal from people/activities they love  · Confusion- inability to concentrate     If you or a loved one observes any of these behaviors or has concerns about self-harm, here's what you can do:  · Talk about it- your feelings and reasons for harming yourself  · Remove any means that you might use to hurt yourself (examples: pills, rope, extension cords, firearm)  · Get professional help from the community (Mental Health, Substance Abuse, psychological counseling)  · Do not be alone:Call your Safe Contact- someone whom you trust who will be there for you.  · Call your local CRISIS HOTLINE 234-5776 or 511-167-1588  · Call your local Children's Mobile Crisis  Response Team [facility] (694) 632-3200 or www.[employer]  · Call the toll free National Suicide Prevention Hotlines   · National Suicide Prevention Lifeline 039-281-PHXU (1122)  · National Hope Line Network 800-SUICIDE (675-6907)    Laminectomy, Care After  This sheet gives you information about how to care for yourself after your procedure. Your health care provider may also give you more specific instructions. If you have problems or questions, contact your health care provider.  What can I expect after the procedure?  After the procedure, it is common to have:  · Some pain around your incision area.  · Muscle tightening (spasms) across the back.  Follow these instructions at home:  Incision care    · Follow instructions from your health care provider about how to take care of your incision area. Make sure you:  ? Wash your hands with soap and water before and after you apply medicine to the area or change your bandage (dressing). If soap and water are not available, use hand .  ? Change your dressing as told by your health care provider.  ? Leave stitches (sutures), skin glue, or adhesive strips in place. These skin closures may need to stay in place for 2 weeks or longer. If adhesive strip edges start to loosen and curl up, you may trim the loose edges. Do not remove adhesive strips completely unless your health care provider tells you to do that.  · Check your incision area every day for signs of infection. Check for:  ? More redness, swelling, or pain.  ? More fluid or blood.  ? Warmth.  ? Pus or a bad smell.  Medicines  · Take over-the-counter and prescription medicines only as told by your health care provider.  · If you were prescribed an antibiotic medicine, use it as told by your health care provider. Do not stop using the antibiotic even if you start to feel better.  Bathing  · Do not take baths, swim, or use a hot tub for 2 weeks, or until your incision has healed completely.  · If  your health care provider approves, you may take showers after your dressing has been removed.  Activity    · Return to your normal activities as told by your health care provider. Ask your health care provider what activities are safe for you.  · Avoid bending or twisting at your waist. Always bend at your knees.  · Do not sit for more than 20-30 minutes at a time. Lie down or walk between periods of sitting.  · Do not lift anything that is heavier than 10 lb (4.5 kg) or the limit that your health care provider tells you, until he or she says that it is safe.  · Do not drive for 2 weeks after your procedure or for as long as your health care provider tells you.  · Do not drive or use heavy machinery while taking prescription pain medicine.  General instructions  · To prevent or treat constipation while you are taking prescription pain medicine, your health care provider may recommend that you:  ? Drink enough fluid to keep your urine clear or pale yellow.  ? Take over-the-counter or prescription medicines.  ? Eat foods that are high in fiber, such as fresh fruits and vegetables, whole grains, and beans.  ? Limit foods that are high in fat and processed sugars, such as fried and sweet foods.  · Do breathing exercises as told.  · Keep all follow-up visits as told by your health care provider. This is important.  Contact a health care provider if:  · You have more redness, swelling, or pain around your incision area.  · Your incision feels warm to the touch.  · You are not able to return to activities or do exercises as told by your health care provider.  Get help right away if:  · You have:  ? More fluid or blood coming from your incision area.  ? Pus or a bad smell coming from your incision area.  ? Chills or a fever.  ? Episodes of dizziness or fainting while standing.  · You develop a rash.  · You develop shortness of breath or you have difficulty breathing.  · You cannot control when you urinate or have a bowel  movement.  · You become weak.  · You are not able to use your legs.  Summary  · After the procedure, it is common to have some pain around your incision area. You may also have muscle tightening (spasms) across the back.  · Follow instructions from your health care provider about how to care for your incision.  · Do not lift anything that is heavier than 10 lb (4.5 kg) or the limit that your health care provider tells you, until he or she says that it is safe.  · Contact your health care provider if you have more redness, swelling, or pain around your incision area or if your incision feels warm to the touch. These can be signs of infection.  This information is not intended to replace advice given to you by your health care provider. Make sure you discuss any questions you have with your health care provider.  Document Released: 07/07/2006 Document Revised: 11/30/2018 Document Reviewed: 06/04/2017  Elsevier Patient Education © 2020 [employer]  Laminectomy - Laminotomy - Discectomy  Your surgeon has decided that a laminectomy (entire lamina removal) or laminotomy (partial lamina removal) is the best treatment for your back problem. These procedures involve removal of bone to relieve pressure on nerve roots. It allows the surgeon access to parts of the spine where other problems are located. This could be an injured disc (the cartilage-like structures located between the bones of the back). In this surgery your surgeon removes a part of the sagrario arch that surrounds your spinal canal. This may be compressing nerve roots. In some cases, the surgeon will remove the disc and fuse (stick together) vertebral bodies (the bones of your back) to make the spine more stable. The type of procedure you will need is usually decided prior to surgery, however modifications may be necessary. The time in surgery depends on the findings in surgery and the procedure necessary to correct the problems.  DISCECTOMY  For people with  disc problems, the surgeon removes the portion of the disc that is causing the pressure on the nerve root. Some surgeons perform a micro (small) discectomy, which may require removal of only a small portion of the lamina. A disc nucleus (center) may also be removed either through a needle (percutaneous discectomy) or by injecting an enzyme called chymopapain into the disc. Chymopapain is an enzyme that dissolves the disc. For people with back instability, the surgeon fuses vertebrae that are next to each other with tiny pieces of bone. These are used as bone grafts on the facets, or between the vertebrae. When this heals, the bones will no longer be able to move. These bone chips are often taken from the pelvic bones. Bones and bone grafts grow into one unit, stabilizing the segments of the spinal column.  LET YOUR CAREGIVER KNOW ABOUT:  · Allergies.  · Medicines taken including herbs, eyedrops, over-the-counter medicines, and creams.  · Use of steroids (by mouth or creams).  · Previous problems with anesthetics or numbing medicine.  · Possibility of pregnancy, if this applies.  · History of blood clots (thrombophlebitis).  · History of bleeding or blood problems.  · Previous surgery.  · Other health problems.  RISKS AND COMPLICATIONS  Your caregiver will discuss possible risks and complications with you before surgery. In addition to the usual risks of anesthesia, other common risks and complications include:  · Blood loss and replacement.  · Temporary increase in pain due to surgery.  · Uncorrected back pain.  · Infection.  · New nerve damage (tingling, numbness, and pain).  BEFORE THE PROCEDURE  · Stop smoking at least 1 week prior to surgery. This lowers risk during surgery.  · Your caregiver may advise that you stop taking certain medicines that may affect the outcome of the surgery and your ability to heal. For example, you may need to stop taking anti-inflammatories, such as aspirin, because of possible  bleeding problems. Other medicines may have interactions with anesthesia.  · Tell your caregiver if you have been on steroids for long periods of time. Often, additional steroids are administered intravenously before and during the procedure to prevent complications.  · You should be present 60 minutes prior to your procedure or as directed.  AFTER THE PROCEDURE  After surgery, you will be taken to the recovery area where a nurse will watch and check your progress. Generally, you will be allowed to go home within 1 week barring other problems.  HOME CARE INSTRUCTIONS   · Check the surgical cut (incision) twice a day for signs of infection. Some signs may include a bad smelling, greenish or yellowish discharge from the wound; increased pain or increased redness over the incision site; an opening of the incision; flu-like symptoms; or a temperature above 101.5° F (38.6° C).  · Change your bandages in about 24 to 36 hours following surgery or as directed.  · You may shower once the bandage is removed or as directed. Avoid bathtubs, swimming pools, and hot tubs for 3 weeks or until your incision has healed completely. If you have stitches (sutures) or staples they may be removed 2 to 3 weeks after surgery, or as directed by your caregiver.  · Follow your caregiver's instructions for activities, exercises, and physical therapy.  · Weight reduction may be beneficial if you are overweight.  · Walking is permitted. You may use a treadmill without an incline. Cut down on activities if you have discomfort. You may also go up and down stairs as tolerated.  · Do not lift anything heavier than 10 to 15 pounds. Avoid bending or twisting at the waist. Always bend your knees.  · Maintain strength and range of motion as instructed.  · No driving is permitted for 2 to 3 weeks, or as directed by your caregiver. You may be a passenger for 20 to 30 minute trips. Lying back in the passenger seat may be more comfortable for you.  · Limit  your sitting to 20 to 30 minute intervals. You should lie down or walk in between sitting periods. There are no limitations for sitting in a recliner chair.  · Only take over-the-counter or prescription medicines for pain, discomfort, or fever as directed by your caregiver.  SEEK MEDICAL CARE IF:   · There is increased bleeding (more than a small spot) from the wound.  · You notice redness, swelling, or increasing pain in the wound.  · Pus is coming from the wound.  · An unexplained oral temperature above 102° F (38.9° C) develops.  · You notice a bad smell coming from the wound or dressing.  SEEK IMMEDIATE MEDICAL CARE IF:   · You develop a rash.  · You have difficulty breathing.  · You have any allergic problems.  Document Released: 12/15/2001 Document Revised: 03/11/2013 Document Reviewed: 10/13/2009  [employer]® Patient Information ©2014 [employer].

## 2021-04-28 NOTE — THERAPY
"Physical Therapy   Initial Evaluation     Patient Name: Ariella Rendon  Age:  65 y.o., Sex:  female  Medical Record #: [id number]  Today's Date: 4/28/2021     Precautions: Spinal / Back Precautions (no brace ordered)    Assessment  Patient is 65 y.o. female that is s/p L3-S1 lami and discectomy with Dr. Nichols 4/27. She performed bed mobility, transfers, and ambulation at supervision level. She reached out for support from rail as available during ambulation in unit; recommend walker for balance and pain management upon return home and patient agreeable to recommendation and has AD already at home. Provided patient education regarding spinal precautions, log roll, use of AD, and pain management techniques, patient with good return demo and verbalized understanding. Recommend outpatient PT following medical clearance to optimize return to PLOF. Patient will not be actively followed for physical therapy services at this time, however may be seen if requested by physician for 1 more visit within 30 days to address any discharge or equipment needs.    Plan    Recommend Physical Therapy for Evaluation only    DC Equipment Recommendations: None  Discharge Recommendations: Recommend outpatient physical therapy services to address higher level deficits       Subjective    \"This is my third back surgery. I know how to get out of bed, no lifting, or turning. I forgot one. Bending!\"     Objective       04/28/21 1101   Total Time Spent   Total Time Spent (Mins) 22   Charge Group   PT Evaluation PT Evaluation Low   Initial Contact Note    Initial Contact Note Order Received and Verified, Evaluation Only - Patient Does Not Require Further Acute Physical Therapy at this Time.  However, May Benefit from Post Acute Therapy for Higher Level Functional Deficits.   Precautions   Precautions Spinal / Back Precautions   (no brace ordered)   Vitals   O2 (LPM) 2   O2 Delivery Device Silicone Nasal Cannula   Pain 0 - 10 Group   Therapist Pain " Assessment   (no pain complaint)   Prior Living Situation   Prior Services None   Housing / Facility 2 Story House   Steps Into Home 2   Steps In Home   (FOS)   Equipment Owned Single Point Cane;Front-Wheel Walker;4-Wheel Walker   Lives with - Patient's Self Care Capacity Alone and Able to Care For Self   Comments Patient reported adequate social support to assist following DC; plans to stay on first floor   Prior Level of Functional Mobility   Bed Mobility Independent   Transfer Status Independent   Ambulation Independent   Distance Ambulation (Feet)   (community)   Assistive Devices Used None   Stairs Independent   Cognition    Cognition / Consciousness WDL   Level of Consciousness Alert   Comments very pleasant and cooperative   Passive ROM Lower Body   Passive ROM Lower Body WDL   Comments not formally tested, WFL for mobility   Active ROM Lower Body    Active ROM Lower Body  WDL   Comments as above   Strength Lower Body   Lower Body Strength  WDL   Comments as above   Sensation Lower Body   Lower Extremity Sensation   Not Tested   Comments patient reported improvement in numbess in R foot following surgery however stated some permanent nerve damage   Lower Body Muscle Tone   Lower Body Muscle Tone  WDL   Neurological Concerns   Neurological Concerns No   Coordination Lower Body    Coordination Lower Body  WDL   Balance Assessment   Sitting Balance (Static) Good   Sitting Balance (Dynamic) Good   Standing Balance (Static) Fair   Standing Balance (Dynamic) Fair -   Weight Shift Sitting Good   Weight Shift Standing Good   Comments reached out for support from rail when available; recommend walker and patient agreeable to use   Gait Analysis   Gait Level Of Assist Supervised   Assistive Device Other (Comments)  (rail as available)   Distance (Feet) 250   # of Times Distance was Traveled 1   Deviation Increased Base Of Support  (decreased leodan)   # of Stairs Climbed 0   Weight Bearing Status no restrictions    Vision Deficits Impacting Mobility NT   Bed Mobility    Supine to Sit Supervised  (log roll with HOB slightly elevated, has adjustable bed)   Sit to Supine Supervised   Scooting Supervised  (seated)   Rolling Supervised   Functional Mobility   Sit to Stand Supervised   Bed, Chair, Wheelchair Transfer Supervised   Transfer Method Stand Step   How much difficulty does the patient currently have...   Turning over in bed (including adjusting bedclothes, sheets and blankets)? 4   Sitting down on and standing up from a chair with arms (e.g., wheelchair, bedside commode, etc.) 4   Moving from lying on back to sitting on the side of the bed? 4   How much help from another person does the patient currently need...   Moving to and from a bed to a chair (including a wheelchair)? 3   Need to walk in a hospital room? 3   Climbing 3-5 steps with a railing? 3   6 clicks Mobility Score 21   Activity Tolerance   Sitting in Chair declined   Sitting Edge of Bed 5 min   Standing 10 min   Comments no overt pain, fatigue, SOB, dizziness   Edema / Skin Assessment   Edema / Skin  Not Assessed   Education Group   Education Provided Role of Physical Therapist;Spine Precautions;Use of Assistive Device   Spine Precautions Patient Response Patient;Acceptance;Explanation;Demonstration;Verbal Demonstration;Action Demonstration   Role of Physical Therapist Patient Response Patient;Acceptance;Explanation;Verbal Demonstration   Use of Assistive Device Patient Response Patient;Acceptance;Explanation;Verbal Demonstration   Anticipated Discharge Equipment and Recommendations   DC Equipment Recommendations None   Discharge Recommendations Recommend outpatient physical therapy services to address higher level deficits   Interdisciplinary Plan of Care Collaboration   IDT Collaboration with  Nursing   Patient Position at End of Therapy In Bed;Call Light within Reach;Tray Table within Reach;Phone within Reach   Collaboration Comments RN aware of visit,  response, DC recs   Session Information   Date / Session Number  4/28 - 1x only   Priority 0

## 2021-04-28 NOTE — RESPIRATORY CARE
COPD EDUCATION by COPD CLINICAL EDUCATOR  4/28/2021 at 3:06 PM by Noelle Cm, RRT     Patient interviewed by COPD education team. Patient actively discharging and declined conversation

## 2021-04-29 NOTE — DISCHARGE SUMMARY
DATE OF ADMISSION:  04/27/2021   DATE OF DISCHARGE:  04/28/2021     OPERATION PERFORMED: L3 through S1 laminectomy with Dr. Nichols on 04/27/2021.     HOSPITAL COURSE:  On date of admission operation was performed.    Postoperatively she has done well.  She does state her numbness has improved.    She is eating.  She is ambulating.  She is voiding.  Her Hemovac had 60 at   last output; however, has not been drained in some time.  The nurse is going   to call me with output at noon.  If improved, she will likely discharge home.     Bilateral lower extremity strength is intact, 5/5 including bilateral IP, quad   dorsi and plantarflexion.  Sensation is equal.  Her incision is clean, dry,   and intact.     DISCHARGE INSTRUCTIONS: Given to patient in paper format.     DISCHARGE MEDICATIONS:  She is a patient of Dr. Rodriguez.  He has done her postop   pain management for her with her previous surgeries.  She will obtain her   pain meds from him.     ASSESSMENT AND PLAN:  1.  Status post above delineated surgery with Dr. Nichols on 04/27/2021.  2.  The patient will follow up in 4 weeks.  3.  The patient will keep Steri-Strips for 10-12 days.     Should the patient have further questions or concerns, they will not hesitate   to give our office a call.        ______________________________  LETTY Du        ______________________________  MD SONIA HUGHES/JAIDEN/LOIDA    DD:  04/28/2021 10:53  DT:  04/28/2021 17:25    Job#:  [id number]

## 2022-07-24 ENCOUNTER — OFFICE VISIT (OUTPATIENT)
Dept: URGENT CARE | Facility: PHYSICIAN GROUP | Age: 67
End: 2022-07-24
Payer: MEDICARE

## 2022-07-24 VITALS
HEIGHT: 66 IN | WEIGHT: 238 LBS | SYSTOLIC BLOOD PRESSURE: 136 MMHG | BODY MASS INDEX: 38.25 KG/M2 | RESPIRATION RATE: 20 BRPM | DIASTOLIC BLOOD PRESSURE: 64 MMHG | HEART RATE: 94 BPM | TEMPERATURE: 97.5 F

## 2022-07-24 DIAGNOSIS — H10.33 ACUTE BACTERIAL CONJUNCTIVITIS OF BOTH EYES: ICD-10-CM

## 2022-07-24 PROCEDURE — 99213 OFFICE O/P EST LOW 20 MIN: CPT | Performed by: NURSE PRACTITIONER

## 2022-07-24 RX ORDER — POLYMYXIN B SULFATE AND TRIMETHOPRIM 1; 10000 MG/ML; [USP'U]/ML
1 SOLUTION OPHTHALMIC EVERY 4 HOURS
Qty: 10 ML | Refills: 0 | Status: SHIPPED | OUTPATIENT
Start: 2022-07-24 | End: 2022-07-31

## 2022-07-24 ASSESSMENT — ENCOUNTER SYMPTOMS
EYE ITCHING: 1
EYE DISCHARGE: 1
EYE PAIN: 1
EYE REDNESS: 1
CONSTITUTIONAL NEGATIVE: 1

## 2022-07-24 ASSESSMENT — VISUAL ACUITY: OU: 1

## 2022-07-24 ASSESSMENT — FIBROSIS 4 INDEX: FIB4 SCORE: 0.76

## 2022-07-24 NOTE — PROGRESS NOTES
Subjective:     Ariella Rendon is a 67 y.o. female who presents for Eye Drainage (Redness, swollen x3 weeks )       Eye Problem   Both eyes are affected.This is a new problem. The problem has been gradually worsening. There was no injury mechanism. Associated symptoms include an eye discharge, eye redness and itching. Associated symptoms comments: Swelling. She has tried eye drops (OTC, wet rag) for the symptoms. The treatment provided no relief.     Review of Systems   Constitutional: Negative.    Eyes: Positive for pain, discharge, redness and itching.   Skin: Positive for itching.   All other systems reviewed and are negative.    Refer to [facility] for additional details.    During this visit, appropriate PPE was worn, hand hygiene was performed, and the patient and any visitors were masked.    PMH:  has a past medical history of Anemia, Anesthesia (06/06/2019), Arthritis, Breath shortness, Cancer (HCC), COPD (chronic obstructive pulmonary disease) (HCC), Dental disorder, Diverticulitis, Emphysema of lung (HCC), Heart burn, Hiatus hernia syndrome, High cholesterol, Hypertension, Indigestion, Other specified disorder of intestines, Other specified symptom associated with female genital organs (1970), Oxygen dependent (01/26/2021), Psychiatric problem, and Spinal stenosis of lumbar region.    MEDS:   Current Outpatient Medications:   •  polymixin-trimethoprim (POLYTRIM) 65382-3.1 UNIT/ML-% Solution, Administer 1 Drop into both eyes every 4 hours for 7 days., Disp: 10 mL, Rfl: 0  •  magnesium hydroxide (MILK OF MAGNESIA) 400 MG/5ML Suspension, Take 30 mL by mouth 1 time a day as needed (For constipation)., Disp: , Rfl:   •  albuterol 108 (90 Base) MCG/ACT Aero Soln inhalation aerosol, Inhale 2 Puffs every 6 hours as needed for Shortness of Breath., Disp: , Rfl:   •  doxepin (SINEQUAN) 75 MG capsule, Take 75 mg by mouth every evening., Disp: , Rfl:   •  hydroCHLOROthiazide (HYDRODIURIL) 25 MG Tab, Take 25 mg by mouth  "every day., Disp: , Rfl:   •  tizanidine (ZANAFLEX) 2 MG tablet, Take 2 mg by mouth every 8 hours., Disp: , Rfl:   •  TRELEGY ELLIPTA 200-62.5-25 MCG/INH AEROSOL POWDER, BREATH ACTIVATED, Inhale 1 Puff every day., Disp: , Rfl:   •  cetirizine (ZYRTEC) 10 MG Tab, Take 10 mg by mouth every day., Disp: , Rfl:   •  atorvastatin (LIPITOR) 10 MG Tab, Take 10 mg by mouth every day., Disp: , Rfl:   •  pregabalin (LYRICA) 150 MG Cap, Take 300 mg by mouth every evening., Disp: , Rfl:   •  magnesium gluconate (MAG-G) 500 MG tablet, Take 1 Tab by mouth 2 times daily, before breakfast and dinner. (Patient taking differently: Take 1,500 mg by mouth every day.), Disp: 60 Tab, Rfl: 1  •  omeprazole (PRILOSEC) 20 MG delayed-release capsule, Take 1 Cap by mouth 2 times a day., Disp: 60 Cap, Rfl: 0  •  ropinirole (REQUIP) 4 MG tablet, Take 4 mg by mouth every 6 hours., Disp: , Rfl:   •  morphine ER (MS CONTIN) 30 MG Tab CR tablet, Take 15-30 mg by mouth every 12 hours. 30mg QAM and 15mg QPM, Disp: , Rfl:   •  oxycodone (OXY-IR) 15 MG immediate release tablet, Take 15 mg by mouth every 8 hours. Every 8 hours, Disp: , Rfl:     ALLERGIES:   Allergies   Allergen Reactions   • Other Environmental Rash     \"alloids in metal\"     SURGHX:   Past Surgical History:   Procedure Laterality Date   • PB LAMINOTOMY,LUMBAR DISK,1 INTRSP  4/27/2021    Procedure: LAMINECTOMY, SPINE, LUMBAR, WITH DISCECTOMY - L3-S1 LAMI.;  Surgeon: Jacob Nichols M.D.;  Location: SURGERY [facility];  Service: Neurosurgery   • CERVICAL FUSION POSTERIOR N/A 1/26/2021    Procedure: FUSION, SPINE, CERVICAL, POSTERIOR [id number];  Surgeon: Jacob Nichols M.D.;  Location: SURGERY [facility];  Service: Neurosurgery   • CERVICAL LAMINECTOMY POSTERIOR N/A 1/26/2021    Procedure: LAMINECTOMY, SPINE, CERVICAL, POSTERIOR [id number];  Surgeon: Jacob Nichols M.D.;  Location: SURGERY [facility];  Service: Neurosurgery   • UT SURG DIAGNOSTIC EXAM, ANORECTAL  12/6/2019    " Procedure: EXAM UNDER ANESTHESIA, RECTUM;  Surgeon: Tea Mendoza M.D.;  Location: SURGERY [facility];  Service: General   • HEMORRHOIDECTOMY N/A 12/6/2019    Procedure: HEMORRHOIDECTOMY;  Surgeon: Tea Mendoza M.D.;  Location: SURGERY [facility];  Service: General   • SIGMOIDOSCOPY FLEX N/A 12/4/2019    Procedure: SIGMOIDOSCOPY, FLEXIBLE;  Surgeon: Mario Holliday D.O.;  Location: ENDOSCOPY [facility];  Service: Gastroenterology   • SIGMOIDOSCOPY FLEX N/A 11/23/2019    Procedure: SIGMOIDOSCOPY, FLEXIBLE.  HEMORRHOID BANDING.;  Surgeon: Umair Mills M.D.;  Location: SURGERY [facility];  Service: Gastroenterology   • GASTROSCOPY N/A 11/21/2019    Procedure: GASTROSCOPY;  Surgeon: Mario Holliday D.O.;  Location: SURGERY [facility];  Service: Gastroenterology   • COLONOSCOPY N/A 11/21/2019    Procedure: COLONOSCOPY;  Surgeon: Mario Holliday D.O.;  Location: SURGERY [facility];  Service: Gastroenterology   • LUMBAR FUSION ANTERIOR N/A 10/25/2019    Procedure: FUSION, SPINE, LUMBAR, ANTERIOR APPROACH- L4-S1 ALIF WITH PLATE;  Surgeon: Umair Lemons M.D.;  Location: SURGERY [facility];  Service: Neurosurgery   • COLONOSCOPY N/A 7/3/2018    Procedure: COLONOSCOPY;  Surgeon: Cain Castillo M.D.;  Location: SURGERY [facility];  Service: Gastroenterology   • GASTROSCOPY-ENDO N/A 7/3/2018    Procedure: GASTROSCOPY-ENDO;  Surgeon: Cain Castillo M.D.;  Location: SURGERY [facility];  Service: Gastroenterology   • LOW ANTERIOR RESECTION LAPAROSCOPIC  9/12/2014    Performed by Nakul Beltran M.D. at [facility]   • GYN SURGERY  1970    hysterectomy   • GYN SURGERY  1970    scope x 4   • BOWEL RESECTION      generic cymbalta   • OTHER ABDOMINAL SURGERY      remove foreign object, age 7     SOCHX:  reports that she quit smoking about 3 years ago. Her smoking use included cigarettes. She has a 25.00 pack-year smoking history. She has never used smokeless tobacco. She  "reports previous drug use. Frequency: 7.00 times per week. Drugs: Marijuana and Inhaled. She reports that she does not drink alcohol.    FH: Per HPI as applicable/pertinent.      Objective:     /64   Pulse 94   Temp 36.4 °C (97.5 °F) (Temporal)   Resp 20   Ht 1.676 m (5' 6\")   Wt 108 kg (238 lb)   BMI 38.41 kg/m²     Physical Exam  Nursing note reviewed.   Constitutional:       General: She is not in acute distress.     Appearance: She is well-developed. She is not ill-appearing or toxic-appearing.   Eyes:      General: Vision grossly intact.      Conjunctiva/sclera:      Right eye: Right conjunctiva is injected.      Left eye: Left conjunctiva is injected.   Cardiovascular:      Rate and Rhythm: Normal rate.   Pulmonary:      Effort: Pulmonary effort is normal. No respiratory distress.   Musculoskeletal:         General: No deformity. Normal range of motion.   Skin:     Coloration: Skin is not pale.   Neurological:      Mental Status: She is alert and oriented to person, place, and time.      Motor: No weakness.   Psychiatric:         Behavior: Behavior normal. Behavior is cooperative.       Assessment/Plan:     1. Acute bacterial conjunctivitis of both eyes  - polymixin-trimethoprim (POLYTRIM) 26784-7.1 UNIT/ML-% Solution; Administer 1 Drop into both eyes every 4 hours for 7 days.  Dispense: 10 mL; Refill: 0    Rx as above sent electronically.     Differential diagnosis, natural history, supportive care, over-the-counter symptom management per 's instructions, close monitoring, and indications for immediate follow-up discussed.     All questions answered. Patient agrees with the plan of care.  "

## 2022-07-29 NOTE — PROGRESS NOTES
Med rec complete per pt at bedside  Interviewed pt with family at bedside with permission from pt  Allergies reviewed and updated.       Secondary Defect Width In Cm (Required For Flaps): 2

## 2022-11-03 ENCOUNTER — PATIENT MESSAGE (OUTPATIENT)
Dept: HEALTH INFORMATION MANAGEMENT | Facility: OTHER | Age: 67
End: 2022-11-03

## 2022-11-18 NOTE — DISCHARGE SUMMARY
DATE OF ADMISSION:  10/25/2019    DATE OF DISCHARGE:  10/28/2019    ADMITTING DIAGNOSES:  Severe degenerative disk disease L4-L5, L5-S1, status   post open anterior L4-L5 and L5-S1 lumbar interbody fusion without   complications.    COURSE OF HOSPITALIZATION:  This patient was admitted to the [facility] to undergo an anterior L4-L5, L5-S1 anterior lumbar interbody   fusion.  There were no intraoperative or postoperative complications.    He has been noted  to be tachycardic with HR ranging between 100 and 125 beats per minute. Subjectively, the patient is asymptoamtic. She denies chest pain, chest pressure, SOB, ALYSHA. and a hospitalist consult was requested.  She is scheduled to undergo a CTA to r/o a pulmonary embolism.  Her blood work overall is consistent with her postoperative state. Particularly her hemoglobin is 11.0. Her room air O2sat%  is 93% with ambulation, 90% at rest. She has known EMILY and has been using her CPAP with night time oxygen.  She may be discharged home if cleared by the hospitalist teaml    At the time of dictation, the patient is ambulatory.  She does have a walker   at home.  Her pain is well controlled using oxycodone 15 mg 4 times a day.    This is her usual regimen at home.  She also was given extra doses of 10 mg   every 8 hours.  She does have a prescription for 10 mg 4 times a day through   Dr. Rodriguez.  In recent days, she has not required any additional morphine.    Please note that prior to surgery, she was on MS Contin.  According to her,   Dr. Rodriguez as provided her with a small prescription of MS Contin 15 mg at   nighttime for the immediate postoperative course.  She has no motor deficits.    Prevena is in place without any drainage.    IMPRESSION AND PLAN:  1.  Lumbar stenosis with degenerative disk disease status post successful   L4-L5, L5-S1 anterior lumbar interbody fusion without complications.  2.  Acute on chronic pain.  The patient will be covered  during her acute   postoperative course by Dr. Rodriguez, her chronic pain management specialist.  No   prescriptions were provided at the time of discharge.  3) Tachycardia: pending CTA and hospitalist clearance.   3.  Follow up with Dr. Rodriguez as directed.  4.  Follow up with Dr. Lemons at 2 weeks postop.       ____________________________________     LETTY VALENZUELA / JEFFERSON    DD:  10/28/2019 09:37:31  DT:  10/28/2019 10:01:32    D#:  [id number]  Job#:  [id number]    cc: Brianne Marti MD, Dr. Rodriguez _____     Methotrexate Counseling:  Patient counseled regarding adverse effects of methotrexate including but not limited to nausea, vomiting, abnormalities in liver function tests. Patients may develop mouth sores, rash, diarrhea, and abnormalities in blood counts. The patient understands that monitoring is required including LFT's and blood counts.  There is a rare possibility of scarring of the liver and lung problems that can occur when taking methotrexate. Persistent nausea, loss of appetite, pale stools, dark urine, cough, and shortness of breath should be reported immediately. Patient advised to discontinue methotrexate treatment at least three months before attempting to become pregnant.  I discussed the need for folate supplements while taking methotrexate.  These supplements can decrease side effects during methotrexate treatment. The patient verbalized understanding of the proper use and possible adverse effects of methotrexate.  All of the patient's questions and concerns were addressed.

## 2024-01-27 NOTE — PROGRESS NOTES
Pt left after CT resulted.  D/C paperwork explained, daughter picked up pt.  Pt taken by w/c to car.   negative...

## 2024-01-28 ENCOUNTER — APPOINTMENT (OUTPATIENT)
Dept: RADIOLOGY | Facility: MEDICAL CENTER | Age: 69
End: 2024-01-28
Attending: EMERGENCY MEDICINE
Payer: MEDICARE

## 2024-01-28 ENCOUNTER — OFFICE VISIT (OUTPATIENT)
Dept: URGENT CARE | Facility: PHYSICIAN GROUP | Age: 69
End: 2024-01-28
Payer: MEDICARE

## 2024-01-28 ENCOUNTER — HOSPITAL ENCOUNTER (EMERGENCY)
Facility: MEDICAL CENTER | Age: 69
End: 2024-01-28
Attending: EMERGENCY MEDICINE
Payer: MEDICARE

## 2024-01-28 VITALS
WEIGHT: 222.22 LBS | HEART RATE: 98 BPM | OXYGEN SATURATION: 96 % | SYSTOLIC BLOOD PRESSURE: 176 MMHG | DIASTOLIC BLOOD PRESSURE: 82 MMHG | HEIGHT: 66 IN | RESPIRATION RATE: 20 BRPM | BODY MASS INDEX: 35.71 KG/M2 | TEMPERATURE: 97.8 F

## 2024-01-28 VITALS
RESPIRATION RATE: 14 BRPM | WEIGHT: 223 LBS | BODY MASS INDEX: 35.84 KG/M2 | TEMPERATURE: 98.3 F | SYSTOLIC BLOOD PRESSURE: 132 MMHG | HEIGHT: 66 IN | OXYGEN SATURATION: 93 % | DIASTOLIC BLOOD PRESSURE: 80 MMHG | HEART RATE: 110 BPM

## 2024-01-28 DIAGNOSIS — R00.0 TACHYCARDIA: ICD-10-CM

## 2024-01-28 DIAGNOSIS — Z87.19 HISTORY OF FECAL IMPACTION: ICD-10-CM

## 2024-01-28 DIAGNOSIS — J43.8 OTHER EMPHYSEMA (HCC): Chronic | ICD-10-CM

## 2024-01-28 DIAGNOSIS — K59.1 FUNCTIONAL DIARRHEA: ICD-10-CM

## 2024-01-28 DIAGNOSIS — I71.61 SUPRACELIAC ABDOMINAL AORTIC ANEURYSM (AAA) WITHOUT RUPTURE (HCC): ICD-10-CM

## 2024-01-28 DIAGNOSIS — J96.11 CHRONIC RESPIRATORY FAILURE WITH HYPOXIA (HCC): Chronic | ICD-10-CM

## 2024-01-28 DIAGNOSIS — R15.9 FULL INCONTINENCE OF FECES: ICD-10-CM

## 2024-01-28 DIAGNOSIS — K56.41 FECAL IMPACTION (HCC): ICD-10-CM

## 2024-01-28 DIAGNOSIS — R19.7 DIARRHEA, UNSPECIFIED TYPE: ICD-10-CM

## 2024-01-28 DIAGNOSIS — Z99.81 OXYGEN DEPENDENT: ICD-10-CM

## 2024-01-28 LAB
ALBUMIN SERPL BCP-MCNC: 3.8 G/DL (ref 3.2–4.9)
ALBUMIN/GLOB SERPL: 1.1 G/DL
ALP SERPL-CCNC: 96 U/L (ref 30–99)
ALT SERPL-CCNC: 32 U/L (ref 2–50)
ANION GAP SERPL CALC-SCNC: 14 MMOL/L (ref 7–16)
AST SERPL-CCNC: 40 U/L (ref 12–45)
BASOPHILS # BLD AUTO: 0.6 % (ref 0–1.8)
BASOPHILS # BLD: 0.05 K/UL (ref 0–0.12)
BILIRUB SERPL-MCNC: 0.2 MG/DL (ref 0.1–1.5)
BUN SERPL-MCNC: 14 MG/DL (ref 8–22)
CALCIUM ALBUM COR SERPL-MCNC: 9.7 MG/DL (ref 8.5–10.5)
CALCIUM SERPL-MCNC: 9.5 MG/DL (ref 8.5–10.5)
CHLORIDE SERPL-SCNC: 102 MMOL/L (ref 96–112)
CO2 SERPL-SCNC: 27 MMOL/L (ref 20–33)
CREAT SERPL-MCNC: 0.75 MG/DL (ref 0.5–1.4)
EOSINOPHIL # BLD AUTO: 0.05 K/UL (ref 0–0.51)
EOSINOPHIL NFR BLD: 0.6 % (ref 0–6.9)
ERYTHROCYTE [DISTWIDTH] IN BLOOD BY AUTOMATED COUNT: 49.6 FL (ref 35.9–50)
GFR SERPLBLD CREATININE-BSD FMLA CKD-EPI: 86 ML/MIN/1.73 M 2
GLOBULIN SER CALC-MCNC: 3.6 G/DL (ref 1.9–3.5)
GLUCOSE SERPL-MCNC: 124 MG/DL (ref 65–99)
HCT VFR BLD AUTO: 48 % (ref 37–47)
HGB BLD-MCNC: 14.8 G/DL (ref 12–16)
IMM GRANULOCYTES # BLD AUTO: 0.03 K/UL (ref 0–0.11)
IMM GRANULOCYTES NFR BLD AUTO: 0.4 % (ref 0–0.9)
LIPASE SERPL-CCNC: 17 U/L (ref 11–82)
LYMPHOCYTES # BLD AUTO: 0.69 K/UL (ref 1–4.8)
LYMPHOCYTES NFR BLD: 9 % (ref 22–41)
MCH RBC QN AUTO: 30.1 PG (ref 27–33)
MCHC RBC AUTO-ENTMCNC: 30.8 G/DL (ref 32.2–35.5)
MCV RBC AUTO: 97.8 FL (ref 81.4–97.8)
MONOCYTES # BLD AUTO: 0.4 K/UL (ref 0–0.85)
MONOCYTES NFR BLD AUTO: 5.2 % (ref 0–13.4)
NEUTROPHILS # BLD AUTO: 6.48 K/UL (ref 1.82–7.42)
NEUTROPHILS NFR BLD: 84.2 % (ref 44–72)
NRBC # BLD AUTO: 0 K/UL
NRBC BLD-RTO: 0 /100 WBC (ref 0–0.2)
PLATELET # BLD AUTO: 235 K/UL (ref 164–446)
PMV BLD AUTO: 11.2 FL (ref 9–12.9)
POTASSIUM SERPL-SCNC: 3.8 MMOL/L (ref 3.6–5.5)
PROT SERPL-MCNC: 7.4 G/DL (ref 6–8.2)
RBC # BLD AUTO: 4.91 M/UL (ref 4.2–5.4)
SODIUM SERPL-SCNC: 143 MMOL/L (ref 135–145)
WBC # BLD AUTO: 7.7 K/UL (ref 4.8–10.8)

## 2024-01-28 PROCEDURE — 700117 HCHG RX CONTRAST REV CODE 255: Mod: UD | Performed by: EMERGENCY MEDICINE

## 2024-01-28 PROCEDURE — 3075F SYST BP GE 130 - 139MM HG: CPT | Performed by: NURSE PRACTITIONER

## 2024-01-28 PROCEDURE — 99215 OFFICE O/P EST HI 40 MIN: CPT | Mod: GW | Performed by: NURSE PRACTITIONER

## 2024-01-28 PROCEDURE — 700105 HCHG RX REV CODE 258: Mod: UD | Performed by: EMERGENCY MEDICINE

## 2024-01-28 PROCEDURE — 80053 COMPREHEN METABOLIC PANEL: CPT

## 2024-01-28 PROCEDURE — 74177 CT ABD & PELVIS W/CONTRAST: CPT

## 2024-01-28 PROCEDURE — 700111 HCHG RX REV CODE 636 W/ 250 OVERRIDE (IP): Mod: JZ,JG,UD | Performed by: EMERGENCY MEDICINE

## 2024-01-28 PROCEDURE — 83690 ASSAY OF LIPASE: CPT

## 2024-01-28 PROCEDURE — 96374 THER/PROPH/DIAG INJ IV PUSH: CPT | Mod: XU

## 2024-01-28 PROCEDURE — 99285 EMERGENCY DEPT VISIT HI MDM: CPT

## 2024-01-28 PROCEDURE — 3079F DIAST BP 80-89 MM HG: CPT | Performed by: NURSE PRACTITIONER

## 2024-01-28 PROCEDURE — 85025 COMPLETE CBC W/AUTO DIFF WBC: CPT

## 2024-01-28 PROCEDURE — 36415 COLL VENOUS BLD VENIPUNCTURE: CPT

## 2024-01-28 PROCEDURE — 700101 HCHG RX REV CODE 250: Mod: UD | Performed by: EMERGENCY MEDICINE

## 2024-01-28 RX ORDER — POLYETHYLENE GLYCOL 3350 17 G/17G
17 POWDER, FOR SOLUTION ORAL DAILY
Qty: 255 G | Refills: 0 | Status: SHIPPED | OUTPATIENT
Start: 2024-01-28

## 2024-01-28 RX ORDER — DOCUSATE SODIUM 100 MG/1
100 CAPSULE, LIQUID FILLED ORAL 2 TIMES DAILY
Qty: 60 CAPSULE | Refills: 0 | Status: SHIPPED | OUTPATIENT
Start: 2024-01-28

## 2024-01-28 RX ORDER — MORPHINE SULFATE 4 MG/ML
4 INJECTION INTRAVENOUS ONCE
Status: COMPLETED | OUTPATIENT
Start: 2024-01-28 | End: 2024-01-28

## 2024-01-28 RX ORDER — MORPHINE SULFATE 15 MG/1
TABLET, FILM COATED, EXTENDED RELEASE ORAL
COMMUNITY
Start: 2024-01-17

## 2024-01-28 RX ORDER — SODIUM CHLORIDE, SODIUM LACTATE, POTASSIUM CHLORIDE, CALCIUM CHLORIDE 600; 310; 30; 20 MG/100ML; MG/100ML; MG/100ML; MG/100ML
1000 INJECTION, SOLUTION INTRAVENOUS ONCE
Status: COMPLETED | OUTPATIENT
Start: 2024-01-28 | End: 2024-01-28

## 2024-01-28 RX ORDER — ENEMA 19; 7 G/133ML; G/133ML
1 ENEMA RECTAL ONCE
Status: COMPLETED | OUTPATIENT
Start: 2024-01-28 | End: 2024-01-28

## 2024-01-28 RX ADMIN — SODIUM PHOSPHATE 133 ML: 7; 19 ENEMA RECTAL at 19:23

## 2024-01-28 RX ADMIN — SODIUM CHLORIDE, POTASSIUM CHLORIDE, SODIUM LACTATE AND CALCIUM CHLORIDE 1000 ML: 600; 310; 30; 20 INJECTION, SOLUTION INTRAVENOUS at 18:12

## 2024-01-28 RX ADMIN — IOHEXOL 100 ML: 350 INJECTION, SOLUTION INTRAVENOUS at 19:15

## 2024-01-28 RX ADMIN — MORPHINE SULFATE 4 MG: 4 INJECTION, SOLUTION INTRAMUSCULAR; INTRAVENOUS at 18:15

## 2024-01-29 NOTE — PROGRESS NOTES
"Ariella Rendon is a 68 y.o. female who presents for Diarrhea (X 3 months diarrhea, rectum raw/irritated. )      HPI  This is a new problem. Ariella Rendon is a 68 y.o. patient who presents to urgent care with c/o: using 28 pull up pants a day due to diarrhea. Diarrhea occurs all the time for 3 months. Her bottom is very sore due to diarrhea.   Has not mentioned this problem to her PCP.   Was on hospice care. Hx of impaction. Took magnesium and lynsees and senna and mineral oil enema and was able to get the impaction out. Then she started to have constant diarrhea.  She is having some rectal pain. Occ has some formed stool.    She is on 3 liters NC.   Hx of anal surgery/ hemorroidectomy says that it is always a challenge to have her stool soft enough to come out of her anus because it does not stretch anymore.   Tx tried: immodium AD ( taking 8 tablets/ day), flagyl ( from the hospice doctor). This did give her some relief for a short time. She asked for a refill of medication and he told her no. She was then \"kicked off hospice\". She needs to re-establish with her doctors     ROS See HPI    Allergies: No      Allergies   Allergen Reactions    Other Environmental Rash     \"alloids in metal\"       PMSFS Hx:  Past Medical History:   Diagnosis Date    Anemia     past history    Anesthesia 06/06/2019    reports \"breathing issues\", I was told     Arthritis     generalized all over    Breath shortness     uses oxygen at 3 liters at night    Cancer (HCC)     cervical, endometrial    COPD (chronic obstructive pulmonary disease) ([facility])     Dental disorder     full denture on top    Diverticulitis     Emphysema of lung (HCC)     Heart burn     lumbar, colon    Hiatus hernia syndrome     High cholesterol     no meds    Hypertension     Indigestion     Other specified disorder of intestines     constipation and diarrhea    Other specified symptom associated with female genital organs 1970    endometriosis    Oxygen dependent " 01/26/2021    3 LTR 24/7    Psychiatric problem     depression    Spinal stenosis of lumbar region      Past Surgical History:   Procedure Laterality Date    PB LAMINOTOMY,LUMBAR DISK,1 INTRSP  4/27/2021    Procedure: LAMINECTOMY, SPINE, LUMBAR, WITH DISCECTOMY - L3-S1 LAMI.;  Surgeon: Jacob Nichols M.D.;  Location: [facility];  Service: Neurosurgery    CERVICAL FUSION POSTERIOR N/A 1/26/2021    Procedure: FUSION, SPINE, CERVICAL, POSTERIOR [id number];  Surgeon: Jacob Nichols M.D.;  Location: SURGERY [facility];  Service: Neurosurgery    CERVICAL LAMINECTOMY POSTERIOR N/A 1/26/2021    Procedure: LAMINECTOMY, SPINE, CERVICAL, POSTERIOR [id number];  Surgeon: Jacob Nichols M.D.;  Location: [facility];  Service: Neurosurgery    DE SURG DIAGNOSTIC EXAM, ANORECTAL  12/6/2019    Procedure: EXAM UNDER ANESTHESIA, RECTUM;  Surgeon: Tea Mendoza M.D.;  Location: SURGERY [facility];  Service: General    HEMORRHOIDECTOMY N/A 12/6/2019    Procedure: HEMORRHOIDECTOMY;  Surgeon: Tea Mendoza M.D.;  Location: SURGERY [facility];  Service: General    SIGMOIDOSCOPY FLEX N/A 12/4/2019    Procedure: SIGMOIDOSCOPY, FLEXIBLE;  Surgeon: Mario Holliday D.O.;  Location: [facility];  Service: Gastroenterology    SIGMOIDOSCOPY FLEX N/A 11/23/2019    Procedure: SIGMOIDOSCOPY, FLEXIBLE.  HEMORRHOID BANDING.;  Surgeon: Umair Mills M.D.;  Location: SURGERY [facility];  Service: Gastroenterology    GASTROSCOPY N/A 11/21/2019    Procedure: GASTROSCOPY;  Surgeon: Mario Holliday D.O.;  Location: SURGERY [facility];  Service: Gastroenterology    COLONOSCOPY N/A 11/21/2019    Procedure: COLONOSCOPY;  Surgeon: Mario Holliday D.O.;  Location: SURGERY [facility];  Service: Gastroenterology    LUMBAR FUSION ANTERIOR N/A 10/25/2019    Procedure: FUSION, SPINE, LUMBAR, ANTERIOR APPROACH- L4-S1 ALIF WITH PLATE;  Surgeon: Umair Lemons M.D.;  Location: SURGERY [facility];  Service:  Neurosurgery    COLONOSCOPY N/A 7/3/2018    Procedure: COLONOSCOPY;  Surgeon: Cain Castillo M.D.;  Location: SURGERY [facility];  Service: Gastroenterology    GASTROSCOPY-ENDO N/A 7/3/2018    Procedure: GASTROSCOPY-ENDO;  Surgeon: Cain Castillo M.D.;  Location: SURGERY [facility];  Service: Gastroenterology    LOW ANTERIOR RESECTION LAPAROSCOPIC  2014    Performed by Nakul Beltran M.D. at SURGERY [facility]    GYN SURGERY      hysterectomy    GYN SURGERY      scope x 4    BOWEL RESECTION      generic cymbalta    OTHER ABDOMINAL SURGERY      remove foreign object, age 7     Family History   Problem Relation Age of Onset    Scoliosis Mother     Cancer Father     Lung Disease Father     Hypertension Brother      Social History     Tobacco Use    Smoking status: Former     Current packs/day: 0.00     Average packs/day: 0.5 packs/day for 50.0 years (25.0 ttl pk-yrs)     Types: Cigarettes     Start date: 3/22/1969     Quit date: 3/22/2019     Years since quittin.8    Smokeless tobacco: Never   Substance Use Topics    Alcohol use: No       Problems:   Patient Active Problem List   Diagnosis    Diverticulitis of colon    Hypertension    EMILY (obstructive sleep apnea)    GI bleed    Microcytic anemia    COPD (chronic obstructive pulmonary disease) (HCC)    Chronic respiratory failure (HCC)    Diastolic dysfunction    Thrombocytopenia (HCC)    Abnormal liver enzymes    Status post lumbar spine surgery for decompression of spinal cord    Restless leg syndrome    GERD (gastroesophageal reflux disease)    Sinus tachycardia    History of anesthesia complications    Chronic pain    Oxygen dependent       Medications:   Current Outpatient Medications on File Prior to Visit   Medication Sig Dispense Refill    morphine ER (MS CONTIN) 15 MG Tab CR tablet TAKE 1 TABLET BY MOUTH TWICE DAILY FOR TERMINAL PAIN      magnesium hydroxide (MILK OF MAGNESIA) 400 MG/5ML Suspension Take 30 mL by mouth 1  "time a day as needed (For constipation).      albuterol 108 (90 Base) MCG/ACT Aero Soln inhalation aerosol Inhale 2 Puffs every 6 hours as needed for Shortness of Breath.      doxepin (SINEQUAN) 75 MG capsule Take 75 mg by mouth every evening.      hydroCHLOROthiazide (HYDRODIURIL) 25 MG Tab Take 25 mg by mouth every day.      tizanidine (ZANAFLEX) 2 MG tablet Take 2 mg by mouth every 8 hours.      TRELEGY ELLIPTA 200-62.5-25 MCG/INH AEROSOL POWDER, BREATH ACTIVATED Inhale 1 Puff every day.      cetirizine (ZYRTEC) 10 MG Tab Take 10 mg by mouth every day.      atorvastatin (LIPITOR) 10 MG Tab Take 10 mg by mouth every day.      pregabalin (LYRICA) 150 MG Cap Take 300 mg by mouth every evening.      omeprazole (PRILOSEC) 20 MG delayed-release capsule Take 1 Cap by mouth 2 times a day. 60 Cap 0    ropinirole (REQUIP) 4 MG tablet Take 4 mg by mouth every 6 hours.       No current facility-administered medications on file prior to visit.        Objective:     /80   Pulse (!) 110   Temp 36.8 °C (98.3 °F) (Temporal)   Resp 14   Ht 1.676 m (5' 6\")   Wt 101 kg (223 lb)   SpO2 93% Comment: on 3 L nc  BMI 35.99 kg/m²     Physical Exam  Vitals and nursing note reviewed.   Constitutional:       Appearance: Normal appearance. She is normal weight. She is ill-appearing.   Cardiovascular:      Rate and Rhythm: Tachycardia present.      Pulses: Normal pulses.   Pulmonary:      Effort: Pulmonary effort is normal.      Comments: Appears dyspneic with talking. Talks in short sentences. On 3 L NC    Musculoskeletal:      Comments: Ambulates independently without difficulty    Skin:     General: Skin is warm.      Capillary Refill: Capillary refill takes less than 2 seconds.   Neurological:      Mental Status: She is alert and oriented to person, place, and time.   Psychiatric:         Attention and Perception: Attention normal.         Mood and Affect: Affect is tearful.         Behavior: Behavior normal.         Thought " "Content: Thought content normal.         Assessment /Associated Orders:      1. Diarrhea, unspecified type      liquid diarrhea for 3 months      2. History of fecal impaction        3. Full incontinence of feces        4. Chronic respiratory failure with hypoxia (HCC)        5. Other emphysema (HCC)        6. Oxygen dependent        7. Tachycardia              Medical Decision Making:    Pt's clinical presentation and exam today indicate a need for higher level of care with further evaluation and/or diagnostics. 3 months of watery, liquid diarrhea with incontinence and hx of impaction.  I am concerned for possible electrolyte imbalance as well as possible bowel obstruction or impaction.  She reports anal stricture history.  She was on hospice care until yesterday when she says she was \"kicked out\" of hospice care.  She has to reestablish with all of her primary doctors.  She is tachycardic today.  She is tearful.  She denies abdominal pain.  [facility] transfer center was  called to arrange transfer to higher level of care in ER.  Pt is to be transported via POV with her granddaughter. Declines ambulance transport.   I have reiterated to patient that although an Urgent Care to ER transfer was made this will not necessarily expedite the ER process        Please note that this dictation was created using voice recognition software. I have worked with consultants from the vendor as well as technical experts from [facility], [facility] to optimize the interface. I have made every reasonable attempt to correct obvious errors, but I expect that there are errors of grammar and possibly content that I did not discover before finalizing the note.  This note was electronically signed by provider      "

## 2024-01-29 NOTE — ED NOTES
Bedside report received from off going RN/tech: Meron DUDLEY, assumed care of patient.  POC discussed with patient. Call light within reach, all needs addressed at this time.     Move the patient closer to the nurse's station, Patient's personal possessions are with in their safe reach, Place socks on patient, Place fall risk sign on patient's door, Keep floor surfaces clean and dry, and Accompanied to restroom  Fall risk interventions in place: Move the patient closer to the nurse's station, Patient's personal possessions are with in their safe reach, Place socks on patient, Place fall risk sign on patient's door, Keep floor surfaces clean and dry, and Accompanied to restroom (all applicable per [place] risk assessment)   Continuous monitoring: Cardiac Leads, Pulse Ox, or Blood Pressure  IVF/IV medications: Not Applicable   Oxygen: 2lpm nasal cannula   Bedside sitter: Not Applicable   Isolation: Not Applicable

## 2024-01-29 NOTE — ED NOTES
Patient contacting daughter for a ride, unable to have second bowel movement, wishes to proceed with discharge, provided with supplies to perform cherry care, offered assistance. Patient verbalizes understanding, call light within reach.

## 2024-01-29 NOTE — ED PROVIDER NOTES
"  ER Provider Note    Scribed for Chilango Santoro M.d. by Lynne Randall. 1/28/2024  5:36 PM    Primary Care Provider: Brianne Marti M.D.    CHIEF COMPLAINT  Chief Complaint   Patient presents with    Sent from Urgent Care     Patient seen at  today for 3 months of diarrhea. She has hx of COPD on 3L NC. She was on hospice then started having diarrhea. She was then released from hospice and is in between insurance. She reports diarrhea is a constant leak.      EXTERNAL RECORDS REVIEWED  Outpatient Notes   outpatient urgent note shows patient had tachycardia and diarrhea.  They are worried about dehydration and electrolyte abnormalities.    HPI/ROS  LIMITATION TO HISTORY   Select: : None  OUTSIDE HISTORIAN(S):  None    Ariella Rendon is a 68 y.o. female who presents to the ED complaining of constant diarrhea onset 3 months ago. The patient states that she has diverticulosis and has to \"work\" to get a stool out. The patient has abdominal pain that feels similar to menstrual cramping and reports that she has been constipated. She also states that sitting exacerbates her pain and if feels as though she \"is sliding down a \" when she tries to have a bowel movement. The patient reports that her urine frequency has changed from going multiple times a day to 2x a day and reports that this began 2 weeks ago. She has been drinking fluids. She adds that her daughter recommended that she report to the hospital today. She reports that she went to urgent care today and was referred to report to the ED. The patient reports that she was on hospice care for her COPD, however was \"kicked off\" when she reported to the hospital and adds that she would not have done hospice if she knew this. She denies any fever or melena. She denies having C. diff. before. The patient notes that she was taking antibiotics 10 days ago that were helping, however states that she ran out. She denies any history of CHF, and adds that " "she is a \"hard stick.\" Denies any kidney problems. The patient is DNR only if she has to be put on a respirator. The patient also has a history of hypertension and cervical cancer.     PAST MEDICAL HISTORY  Past Medical History:   Diagnosis Date    Anemia     past history    Anesthesia 06/06/2019    reports \"breathing issues\", I was told     Arthritis     generalized all over    Breath shortness     uses oxygen at 3 liters at night    Cancer (HCC)     cervical, endometrial    COPD (chronic obstructive pulmonary disease) ([facility])     Dental disorder     full denture on top    Diverticulitis     Emphysema of lung (HCC)     Heart burn     lumbar, colon    Hiatus hernia syndrome     High cholesterol     no meds    Hypertension     Indigestion     Other specified disorder of intestines     constipation and diarrhea    Other specified symptom associated with female genital organs 1970    endometriosis    Oxygen dependent 01/26/2021    3 LTR 24/7    Psychiatric problem     depression    Spinal stenosis of lumbar region        SURGICAL HISTORY  Past Surgical History:   Procedure Laterality Date    PB LAMINOTOMY,LUMBAR DISK,1 INTRSP  4/27/2021    Procedure: LAMINECTOMY, SPINE, LUMBAR, WITH DISCECTOMY - L3-S1 LAMI.;  Surgeon: Jacob Nichols M.D.;  Location: [facility];  Service: Neurosurgery    CERVICAL FUSION POSTERIOR N/A 1/26/2021    Procedure: FUSION, SPINE, CERVICAL, POSTERIOR [id number];  Surgeon: Jacob Nichols M.D.;  Location: [facility];  Service: Neurosurgery    CERVICAL LAMINECTOMY POSTERIOR N/A 1/26/2021    Procedure: LAMINECTOMY, SPINE, CERVICAL, POSTERIOR [id number];  Surgeon: Jacob Nichols M.D.;  Location: [facility];  Service: Neurosurgery    NH SURG DIAGNOSTIC EXAM, ANORECTAL  12/6/2019    Procedure: EXAM UNDER ANESTHESIA, RECTUM;  Surgeon: Tea Mendoza M.D.;  Location: [facility];  Service: General    HEMORRHOIDECTOMY N/A 12/6/2019    Procedure: HEMORRHOIDECTOMY;  " Surgeon: Tea Mendoza M.D.;  Location: SURGERY [facility];  Service: General    SIGMOIDOSCOPY FLEX N/A 12/4/2019    Procedure: SIGMOIDOSCOPY, FLEXIBLE;  Surgeon: Mario Holliday D.O.;  Location: ENDOSCOPY [facility];  Service: Gastroenterology    SIGMOIDOSCOPY FLEX N/A 11/23/2019    Procedure: SIGMOIDOSCOPY, FLEXIBLE.  HEMORRHOID BANDING.;  Surgeon: Umair Mills M.D.;  Location: SURGERY [facility];  Service: Gastroenterology    GASTROSCOPY N/A 11/21/2019    Procedure: GASTROSCOPY;  Surgeon: Mario Holliday D.O.;  Location: SURGERY [facility];  Service: Gastroenterology    COLONOSCOPY N/A 11/21/2019    Procedure: COLONOSCOPY;  Surgeon: Mario Holliday D.O.;  Location: SURGERY [facility];  Service: Gastroenterology    LUMBAR FUSION ANTERIOR N/A 10/25/2019    Procedure: FUSION, SPINE, LUMBAR, ANTERIOR APPROACH- L4-S1 ALIF WITH PLATE;  Surgeon: Umair Lemons M.D.;  Location: SURGERY [facility];  Service: Neurosurgery    COLONOSCOPY N/A 7/3/2018    Procedure: COLONOSCOPY;  Surgeon: Cain Castillo M.D.;  Location: SURGERY [facility];  Service: Gastroenterology    GASTROSCOPY-ENDO N/A 7/3/2018    Procedure: GASTROSCOPY-ENDO;  Surgeon: Cain Castillo M.D.;  Location: SURGERY [facility];  Service: Gastroenterology    LOW ANTERIOR RESECTION LAPAROSCOPIC  9/12/2014    Performed by Nakul Beltran M.D. at [facility]    GYN SURGERY  1970    hysterectomy    GYN SURGERY  1970    scope x 4    BOWEL RESECTION      generic cymbalta    OTHER ABDOMINAL SURGERY      remove foreign object, age 7       FAMILY HISTORY  Family History   Problem Relation Age of Onset    Scoliosis Mother     Cancer Father     Lung Disease Father     Hypertension Brother        SOCIAL HISTORY   reports that she quit smoking about 4 years ago. Her smoking use included cigarettes. She started smoking about 54 years ago. She has a 25.0 pack-year smoking history. She has never used smokeless tobacco. She  "reports current drug use. Frequency: 7.00 times per week. Drugs: Marijuana and Inhaled. She reports that she does not drink alcohol.    CURRENT MEDICATIONS  Previous Medications    ALBUTEROL 108 (90 BASE) MCG/ACT AERO SOLN INHALATION AEROSOL    Inhale 2 Puffs every 6 hours as needed for Shortness of Breath.    ATORVASTATIN (LIPITOR) 10 MG TAB    Take 10 mg by mouth every day.    CETIRIZINE (ZYRTEC) 10 MG TAB    Take 10 mg by mouth every day.    DOXEPIN (SINEQUAN) 75 MG CAPSULE    Take 75 mg by mouth every evening.    HYDROCHLOROTHIAZIDE (HYDRODIURIL) 25 MG TAB    Take 25 mg by mouth every day.    MAGNESIUM HYDROXIDE (MILK OF MAGNESIA) 400 MG/5ML SUSPENSION    Take 30 mL by mouth 1 time a day as needed (For constipation).    MORPHINE ER (MS CONTIN) 15 MG TAB CR TABLET    TAKE 1 TABLET BY MOUTH TWICE DAILY FOR TERMINAL PAIN    OMEPRAZOLE (PRILOSEC) 20 MG DELAYED-RELEASE CAPSULE    Take 1 Cap by mouth 2 times a day.    PREGABALIN (LYRICA) 150 MG CAP    Take 300 mg by mouth every evening.    ROPINIROLE (REQUIP) 4 MG TABLET    Take 4 mg by mouth every 6 hours.    TIZANIDINE (ZANAFLEX) 2 MG TABLET    Take 2 mg by mouth every 8 hours.    TRELEGY ELLIPTA 200-62.5-25 MCG/INH AEROSOL POWDER, BREATH ACTIVATED    Inhale 1 Puff every day.       ALLERGIES  Other environmental    PHYSICAL EXAM  BP (!) 138/102   Pulse (!) 115   Temp 36.9 °C (98.4 °F) (Temporal)   Resp 20   Ht 1.676 m (5' 6\")   Wt 101 kg (222 lb 3.6 oz)   SpO2 92% Comment: 88% on baseline 3lpm NC  BMI 35.87 kg/m²   Constitutional: Well developed, Well nourished, mild distress.   HENT: Normocephalic, Atraumatic, No oral exudates. Dry mucous membranes.   Eyes: Conjunctiva normal, No discharge.   Neck: Supple, No stridor  Cardiovascular: Tachycardic. Normal rhythm, No murmurs, equal pulses.   Pulmonary: Normal breath sounds, No respiratory distress, No wheezing, No rales, No rhonchi.  Chest: No chest wall tenderness or deformity.   Abdomen: Mild tenderness in " the left lower quadrant. Soft, No masses, no rebound, no guarding.   Rectal: With nurse chaperone, still having soft stool in the rectal vault, no gross blood  Musculoskeletal: No major deformities noted, No tenderness.   Skin: Warm, Dry, No erythema, No rash.   Neurologic: Alert & oriented x 3, Normal motor function,  No focal deficits noted.   Psychiatric: Affect normal, Judgment normal, Mood normal.      DIAGNOSTIC STUDIES    Labs:   I have independently interpreted these labs.    Results for orders placed or performed during the hospital encounter of 01/28/24   CBC with Differential   Result Value Ref Range    WBC 7.7 4.8 - 10.8 K/uL    RBC 4.91 4.20 - 5.40 M/uL    Hemoglobin 14.8 12.0 - 16.0 g/dL    Hematocrit 48.0 (H) 37.0 - 47.0 %    MCV 97.8 81.4 - 97.8 fL    MCH 30.1 27.0 - 33.0 pg    MCHC 30.8 (L) 32.2 - 35.5 g/dL    RDW 49.6 35.9 - 50.0 fL    Platelet Count 235 164 - 446 K/uL    MPV 11.2 9.0 - 12.9 fL    Neutrophils-Polys 84.20 (H) 44.00 - 72.00 %    Lymphocytes 9.00 (L) 22.00 - 41.00 %    Monocytes 5.20 0.00 - 13.40 %    Eosinophils 0.60 0.00 - 6.90 %    Basophils 0.60 0.00 - 1.80 %    Immature Granulocytes 0.40 0.00 - 0.90 %    Nucleated RBC 0.00 0.00 - 0.20 /100 WBC    Neutrophils (Absolute) 6.48 1.82 - 7.42 K/uL    Lymphs (Absolute) 0.69 (L) 1.00 - 4.80 K/uL    Monos (Absolute) 0.40 0.00 - 0.85 K/uL    Eos (Absolute) 0.05 0.00 - 0.51 K/uL    Baso (Absolute) 0.05 0.00 - 0.12 K/uL    Immature Granulocytes (abs) 0.03 0.00 - 0.11 K/uL    NRBC (Absolute) 0.00 K/uL   Complete Metabolic Panel   Result Value Ref Range    Sodium 143 135 - 145 mmol/L    Potassium 3.8 3.6 - 5.5 mmol/L    Chloride 102 96 - 112 mmol/L    Co2 27 20 - 33 mmol/L    Anion Gap 14.0 7.0 - 16.0    Glucose 124 (H) 65 - 99 mg/dL    Bun 14 8 - 22 mg/dL    Creatinine 0.75 0.50 - 1.40 mg/dL    Calcium 9.5 8.5 - 10.5 mg/dL    Correct Calcium 9.7 8.5 - 10.5 mg/dL    AST(SGOT) 40 12 - 45 U/L    ALT(SGPT) 32 2 - 50 U/L    Alkaline Phosphatase 96  30 - 99 U/L    Total Bilirubin 0.2 0.1 - 1.5 mg/dL    Albumin 3.8 3.2 - 4.9 g/dL    Total Protein 7.4 6.0 - 8.2 g/dL    Globulin 3.6 (H) 1.9 - 3.5 g/dL    A-G Ratio 1.1 g/dL   Lipase   Result Value Ref Range    Lipase 17 11 - 82 U/L   ESTIMATED GFR   Result Value Ref Range    GFR (CKD-EPI) 86 >60 mL/min/1.73 m 2        Radiology:   The attending emergency physician has independently interpreted the diagnostic imaging associated with this visit and am waiting the final reading from the radiologist.   Preliminary interpretation is a follows: Large rectal stool volume  Radiologist interpretation:   CT-ABDOMEN-PELVIS WITH   Final Result      1.  There is no acute inflammatory process within the abdomen or pelvis.   2.  There is a moderate to large amount of diffuse colonic stool particularly in the rectal vault.   3.  There is aneurysmal dilatation of the descending thoracic aorta and thoracoabdominal junction with atherosclerosis.   4.  There is probably mild hepatic steatosis.          Bowel Disimpaction Procedure:   RN chaperone at bedside  Disimpaction performed by me with large amount of formed soft stool in rectal vault  Lubricant used to help aid in disimpaction process.  Small amount was removed.  Will attempt another enema.    COURSE & MEDICAL DECISION MAKING     ED Observation Status? No; Patient does not meet criteria for ED Observation.     INITIAL ASSESSMENT, COURSE AND PLAN  Care Narrative:     5:36 PM - Patient presents to the ED with constant diarrhea, abdominal pain, constipation and a change in urine frequency. She denies any fever or melena. See HPI for further details. Discussed the plan of care, including ordering labs and imaging to further evaluate. Patient verbalizes understanding and agreement to this plan of care. Patient will be treated with morphine 4 mg. Ordered for CT-abdomen-pelvis with, C Diff by PCR rflx toxin, eGFR, CBC with diff, CMP, Lipase, UA to evaluate her symptoms. Differentials  include, but are not limited to: C. Diff, fecal impaction with diarrhea around the stool ball, dehydration, electrolyte abnormality    6:53 PM - Patient was reevaluated at bedside. Patient reports that she still has abdominal pain. Ordered Fleet enema 133 mL.     7:32 PM - Patient was reevaluated at bedside. Patient reports still feeling constipated.    8:34 PM - Patient was reevaluated at bedside. Patient states that she does not want the second enema and notes that she is feeling better now. Performed bowel disimpaction. Please seen procedure note above.     9:40 PM performed rectal exam patient still has a large amount of stool in the rectal vault.  Discussed doing another enema to help clear this.  Will also start the patient on MiraLAX, docusate and magnesium citrate to help her have soft stool to make it easier to pass.    HYDRATION: Based on the patient's presentation of Acute Diarrhea the patient was given IV fluids. IV Hydration was used because oral hydration was not adequate alone. Upon recheck following hydration, the patient was improved.   PROBLEM LIST  Problem #1 diarrhea at this point time I think this was liquid stool coming around a large stool ball.  Patient has been given an enema and has had a rather large bowel movement.  I did a rectal exam on her and she still has a large amount of soft stool in the rectal vault.  At this point time we will repeat a soapsuds enema.  I do not think the patient has C. difficile as I think this was just functional liquid stool around the stool ball.  Patient does not appear to be dehydrated no significant electrolyte abnormalities.  Patient was informed of her aortic aneurysm    DISPOSITION AND DISCUSSIONS  I have discussed management of the patient with the following physicians and PILO's: None    Discussion of management with other QHP or appropriate source(s): None      Barriers to care at this time, including but not limited to:  None .     Decision tools and  prescription drugs considered including, but not limited to:  MiraLAX, docusate, magnesium citrate .   The patient will return for new or worsening symptoms and is stable at the time of discharge.    The patient is referred to a primary physician for blood pressure management, diabetic screening, and for all other preventative health concerns.        DISPOSITION:  Patient will be discharged home in stable condition.    FOLLOW UP:  Brianne Marti M.D.  [address]  [facility] [id number] [phone]    Schedule an appointment as soon as possible for a visit in 1 week      Shauna Olivo M.D.  [address]  [address]  [facility] [id number] [phone]    Schedule an appointment as soon as possible for a visit   To discuss your aortic aneurysm      OUTPATIENT MEDICATIONS:  New Prescriptions    DOCUSATE SODIUM (COLACE) 100 MG CAP    Take 1 Capsule by mouth 2 times a day.    MAGNESIUM CITRATE SOLUTION    Take 150 mL by mouth every 6 hours.    POLYETHYLENE GLYCOL 3350 (MIRALAX) 17 GM/SCOOP POWDER    Take 17 g by mouth every day.       FINAL DIANGOSIS  1. Fecal impaction (HCC)    2. Functional diarrhea    3. 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    Electronically signed by: Lynne Randall (Ubaldoiblianne), 1/28/2024    Chilango PELAEZ M.* personally performed the services described in this documentation, as scribed by Lynne Randall in my presence, and it is both accurate and complete.      The note accurately reflects work and decisions made by me.  Chilango Santoro M.D.  1/28/2024  10:01 PM

## 2024-01-29 NOTE — ED NOTES
"Patient continues sitting on commode, refused 2nd enema, states \"it's coming out real well\", family at bedside.   "

## 2024-01-29 NOTE — ED TRIAGE NOTES
"Chief Complaint   Patient presents with    Sent from Urgent Care     Patient seen at  today for 3 months of diarrhea. She has hx of COPD on 3L NC. She was on hospice then started having diarrhea. She was then released from hospice and is in between insurance. She reports diarrhea is a constant leak.        Patient to triage ambulatory with a steady gait, AAOx4, Appropriate precautions in place.     Explained wait time and triage process. Placed back in lobby. Told to notify ED tech or RN of any changes, verbalized understanding.    BP (!) 138/102   Pulse (!) 115   Temp 36.9 °C (98.4 °F) (Temporal)   Resp 20   Ht 1.676 m (5' 6\")   Wt 101 kg (222 lb 3.6 oz)   SpO2 92% Comment: 88% on baseline 3lpm NC  BMI 35.87 kg/m²     "

## 2024-01-29 NOTE — ED NOTES
Patient discharged home to self care, provided with paper copy of discharge instructions. Discussed discharge instructions and follow up appointments, patient verbalizes understanding. Vital signs stable, escorted out to ED lobby and assisted into her daughters car with her home oxygen

## 2024-01-29 NOTE — ED NOTES
Enema administered patient sitting on commode attempting to have bowel movement, educated on fall risk precautions, call light within reach, fall precautions in place

## 2024-01-29 NOTE — DISCHARGE INSTRUCTIONS
Use the MiraLAX and docusate daily to have soft stool but not diarrhea.  Use the magnesium citrate to help clear your constipation.  You can also use a Fleet enema as needed.  Return the emergency department if you are unable to have a bowel movement, worsening abdominal pain, fever or significant blood in stool.

## 2024-03-12 ENCOUNTER — APPOINTMENT (OUTPATIENT)
Dept: VASCULAR SURGERY | Facility: MEDICAL CENTER | Age: 69
End: 2024-03-12
Payer: MEDICARE

## 2024-03-14 ENCOUNTER — OFFICE VISIT (OUTPATIENT)
Dept: VASCULAR SURGERY | Facility: MEDICAL CENTER | Age: 69
End: 2024-03-14
Payer: MEDICARE

## 2024-03-14 VITALS
OXYGEN SATURATION: 91 % | HEART RATE: 93 BPM | SYSTOLIC BLOOD PRESSURE: 132 MMHG | DIASTOLIC BLOOD PRESSURE: 68 MMHG | TEMPERATURE: 98.1 F | BODY MASS INDEX: 35.68 KG/M2 | HEIGHT: 66 IN | WEIGHT: 222 LBS

## 2024-03-14 DIAGNOSIS — I71.23 ANEURYSM OF DESCENDING THORACIC AORTA WITHOUT RUPTURE (HCC): ICD-10-CM

## 2024-03-14 DIAGNOSIS — E78.5 DYSLIPIDEMIA: ICD-10-CM

## 2024-03-14 DIAGNOSIS — J44.9 CHRONIC OBSTRUCTIVE PULMONARY DISEASE, UNSPECIFIED COPD TYPE (HCC): ICD-10-CM

## 2024-03-14 DIAGNOSIS — E66.9 OBESITY (BMI 35.0-39.9 WITHOUT COMORBIDITY): ICD-10-CM

## 2024-03-14 DIAGNOSIS — I10 PRIMARY HYPERTENSION: ICD-10-CM

## 2024-03-14 PROCEDURE — 3075F SYST BP GE 130 - 139MM HG: CPT | Performed by: SURGERY

## 2024-03-14 PROCEDURE — 99204 OFFICE O/P NEW MOD 45 MIN: CPT | Performed by: SURGERY

## 2024-03-14 PROCEDURE — 3078F DIAST BP <80 MM HG: CPT | Performed by: SURGERY

## 2024-03-14 ASSESSMENT — FIBROSIS 4 INDEX: FIB4 SCORE: 2.05

## 2024-03-14 NOTE — PROGRESS NOTES
"  VASCULAR SURGERY SERVICE  CONSULT NOTE      Date: 3/14/2024    Referring Provider: Brianne Marti MD    Consulting Physician: Shauna Olivo MD - [facility]     -------------------------------------------------------------------------------------------------    Reason for consultation:  Aortic aneurysm.    HPI:  This is a 68 y.o. female who on recent CT scan done for left lower abdominal pain and diarrhea was found to have 4.1 cm aneurysm at the distal descending aorta.  Patient is referred to vascular service.  She denies current abdominal pain.  She has chronic lumbar pain and neck pain with multiple back surgeries and neck surgery in the past.  She has COPD and on 3 L oxygen at all time.  She has been tobacco free for 4-5 years.    Past Medical History:   Diagnosis Date    Anemia     past history    Anesthesia 06/06/2019    reports \"breathing issues\", I was told     Arthritis     generalized all over    Breath shortness     uses oxygen at 3 liters at night    Cancer (HCC)     cervical, endometrial    COPD (chronic obstructive pulmonary disease) (HCC)     Dental disorder     full denture on top    Diverticulitis     Emphysema of lung (HCC)     Heart burn     lumbar, colon    Hiatus hernia syndrome     High cholesterol     no meds    Hypertension     Indigestion     Other specified disorder of intestines     constipation and diarrhea    Other specified symptom associated with female genital organs 1970    endometriosis    Oxygen dependent 01/26/2021    3 LTR 24/7    Psychiatric problem     depression    Spinal stenosis of lumbar region        Past Surgical History:   Procedure Laterality Date    PB LAMINOTOMY,LUMBAR DISK,1 INTRSP  4/27/2021    Procedure: LAMINECTOMY, SPINE, LUMBAR, WITH DISCECTOMY - L3-S1 LAMI.;  Surgeon: Jacob Nichols M.D.;  Location: SURGERY [facility];  Service: Neurosurgery    CERVICAL FUSION POSTERIOR N/A 1/26/2021    Procedure: FUSION, SPINE, CERVICAL, POSTERIOR [id number];  " Surgeon: Jacob Nichols M.D.;  Location: SURGERY [facility];  Service: Neurosurgery    CERVICAL LAMINECTOMY POSTERIOR N/A 1/26/2021    Procedure: LAMINECTOMY, SPINE, CERVICAL, POSTERIOR [id number];  Surgeon: Jacob Nichols M.D.;  Location: SURGERY [facility];  Service: Neurosurgery    [place] SURG DIAGNOSTIC EXAM, ANORECTAL  12/6/2019    Procedure: EXAM UNDER ANESTHESIA, RECTUM;  Surgeon: Tea Mendoza M.D.;  Location: SURGERY [facility];  Service: General    HEMORRHOIDECTOMY N/A 12/6/2019    Procedure: HEMORRHOIDECTOMY;  Surgeon: Tea Mendoza M.D.;  Location: SURGERY [facility];  Service: General    SIGMOIDOSCOPY FLEX N/A 12/4/2019    Procedure: SIGMOIDOSCOPY, FLEXIBLE;  Surgeon: Mario Holliday D.O.;  Location: [facility];  Service: Gastroenterology    SIGMOIDOSCOPY FLEX N/A 11/23/2019    Procedure: SIGMOIDOSCOPY, FLEXIBLE.  HEMORRHOID BANDING.;  Surgeon: Umair Mills M.D.;  Location: SURGERY [facility];  Service: Gastroenterology    GASTROSCOPY N/A 11/21/2019    Procedure: GASTROSCOPY;  Surgeon: Mario Holliday D.O.;  Location: SURGERY [facility];  Service: Gastroenterology    COLONOSCOPY N/A 11/21/2019    Procedure: COLONOSCOPY;  Surgeon: Mario Holliday D.O.;  Location: [facility];  Service: Gastroenterology    LUMBAR FUSION ANTERIOR N/A 10/25/2019    Procedure: FUSION, SPINE, LUMBAR, ANTERIOR APPROACH- L4-S1 ALIF WITH PLATE;  Surgeon: Umair Lemons M.D.;  Location: SURGERY [facility];  Service: Neurosurgery    COLONOSCOPY N/A 7/3/2018    Procedure: COLONOSCOPY;  Surgeon: Cain Castillo M.D.;  Location: [facility];  Service: Gastroenterology    GASTROSCOPY-ENDO N/A 7/3/2018    Procedure: GASTROSCOPY-ENDO;  Surgeon: Cain Castillo M.D.;  Location: SURGERY [facility];  Service: Gastroenterology    LOW ANTERIOR RESECTION LAPAROSCOPIC  9/12/2014    Performed by Nakul Beltran M.D. at SURGERY [facility]    GYN SURGERY  [facility]     hysterectomy    GYN SURGERY  1970    scope x 4    BOWEL RESECTION      generic cymbalta    OTHER ABDOMINAL SURGERY      remove foreign object, age 7       Current Outpatient Medications   Medication Sig Dispense Refill    morphine ER (MS CONTIN) 15 MG Tab CR tablet TAKE 1 TABLET BY MOUTH TWICE DAILY FOR TERMINAL PAIN      polyethylene glycol 3350 (MIRALAX) 17 GM/SCOOP Powder Take 17 g by mouth every day. 255 g 0    docusate sodium (COLACE) 100 MG Cap Take 1 Capsule by mouth 2 times a day. 60 Capsule 0    magnesium citrate Solution Take 150 mL by mouth every 6 hours. 300 mL 0    magnesium hydroxide (MILK OF MAGNESIA) 400 MG/5ML Suspension Take 30 mL by mouth 1 time a day as needed (For constipation).      albuterol 108 (90 Base) MCG/ACT Aero Soln inhalation aerosol Inhale 2 Puffs every 6 hours as needed for Shortness of Breath.      doxepin (SINEQUAN) 75 MG capsule Take 75 mg by mouth every evening.      hydroCHLOROthiazide (HYDRODIURIL) 25 MG Tab Take 25 mg by mouth every day.      tizanidine (ZANAFLEX) 2 MG tablet Take 2 mg by mouth every 8 hours.      TRELEGY ELLIPTA 200-62.5-25 MCG/INH AEROSOL POWDER, BREATH ACTIVATED Inhale 1 Puff every day.      cetirizine (ZYRTEC) 10 MG Tab Take 10 mg by mouth every day.      atorvastatin (LIPITOR) 10 MG Tab Take 10 mg by mouth every day.      pregabalin (LYRICA) 150 MG Cap Take 300 mg by mouth every evening.      omeprazole (PRILOSEC) 20 MG delayed-release capsule Take 1 Cap by mouth 2 times a day. 60 Cap 0    ropinirole (REQUIP) 4 MG tablet Take 4 mg by mouth every 6 hours.       No current facility-administered medications for this visit.       Social History     Socioeconomic History    Marital status:      Spouse name: Not on file    Number of children: Not on file    Years of education: Not on file    Highest education level: Not on file   Occupational History    Not on file   Tobacco Use    Smoking status: Former     Current packs/day: 0.00     Average  packs/day: 0.5 packs/day for 50.0 years (25.0 ttl pk-yrs)     Types: Cigarettes     Start date: 3/22/1969     Quit date: 3/22/2019     Years since quittin.9    Smokeless tobacco: Never   Vaping Use    Vaping Use: Some days   Substance and Sexual Activity    Alcohol use: No    Drug use: Yes     Frequency: 7.0 times per week     Types: Marijuana, Inhaled     Comment: marijuana - daily    Sexual activity: Not on file   Other Topics Concern    Not on file   Social History Narrative    Not on file     Social Determinants of Health     Financial Resource Strain: Not on file   Food Insecurity: Not on file   Transportation Needs: Not on file   Physical Activity: Not on file   Stress: Not on file   Social Connections: Not on file   Intimate Partner Violence: Not on file   Housing Stability: Not on file       Family History   Problem Relation Age of Onset    Scoliosis Mother     Cancer Father     Lung Disease Father     Hypertension Brother        Allergies:  Other environmental    Review of Systems:    Constitutional: Negative for fever, chills, weight loss,   HENT:    Negative for hearing loss or tinnitus    Eyes:    Negative for blurred vision, double vision, or loss of vision  Respiratory:  Positive for shortness of breath on exertion cardiac:  Negative for chest pain or palpitations or orthopnea  Vascular:  Negative for claudication or rest pain   Gastrointestinal: Negative for nausea, vomiting, or abdominal pain     Negative for hematochezia or melena   Genitourinary: Negative for dysuria, frequency, or hematuria   Musculoskeletal: Chronic neck and lower back pain  Skin:   Negative for itching or rash  Neurological:  Negative for dizziness, headaches, or tremors     Negative for speech disturbance     Negative for extremity weakness or paresthesias  Endo/Heme:  Negative for easy bruising or bleeding  Psychiatric:  Negative for depression, suicidal ideas, or hallucinations    Physical Exam:  /68 (BP Location:  "Left arm, Patient Position: Sitting, BP Cuff Size: Adult)   Pulse 93   Temp 36.7 °C (98.1 °F) (Temporal)   Ht 1.676 m (5' 6\")   Wt 101 kg (222 lb)   SpO2 91%     Constitutional: Alert, oriented, no acute distress, on oxygen.  HEENT:  Normocephalic and atraumatic, EOMI  Neck:   Reduced range of motion due to previous fusion no JVD  Cardiovascular: Regular rate and rhythm  Pulmonary:  Good air entry bilaterally  Abdominal:  Soft, non-tender, non-distended     Protuberant  Musculoskeletal: No edema, no tenderness  Neurological:  CN II-XII grossly intact, no focal deficits  Skin:   Skin is warm and dry. No rash noted.  Psychiatric:  Normal mood and affect.  Lower extremities:    No pulsatile masses in femoral or popliteal areas.  Feet are warm, well-perfused, 1+ bilateral edema.    Labs:  Reviewed.    Radiology:  CT images were reviewed.  I also reviewed the CT of the chest which was done in October 2019.  There was evidence of 4.1 cm distal descending aortic aneurysm on that CT scan as well.    Assessment:  -4.1 cm distal descending thoracic aortic aneurysm.  -COPD on oxygen.  -Hypertension.  -Dyslipidemia.  -Obesity.    Plan:  I had a long discussion with patient.  CT images were reviewed with her.  Since the aneurysm is small and asymptomatic, no invasive intervention is indicated at this point.  It appears that the aneurysm has been present at least since December 2019, as seen on CT of the chest, and has not changed in size.  I ordered follow-up CTA of the chest to be done in 1 year and asked patient to see me after the study is done.  Patient indicated understanding and agreed with plan.  All questions were answered.  Patient knows to call 911 and be taken to the emergency room if she developed sudden abdominal or new back pain at any time.      Shauna Olivo MD  [facility]   [place] preferred or call my office " [phone]  __________________________________________________________________  Patient:Ariella Rendon   MRN:[id number]   CSN:[id number]

## 2024-12-02 ENCOUNTER — HOSPITAL ENCOUNTER (EMERGENCY)
Facility: MEDICAL CENTER | Age: 69
End: 2024-12-02
Attending: EMERGENCY MEDICINE
Payer: MEDICARE

## 2024-12-02 ENCOUNTER — APPOINTMENT (OUTPATIENT)
Dept: RADIOLOGY | Facility: MEDICAL CENTER | Age: 69
End: 2024-12-02
Attending: EMERGENCY MEDICINE
Payer: MEDICARE

## 2024-12-02 VITALS
WEIGHT: 222 LBS | DIASTOLIC BLOOD PRESSURE: 88 MMHG | OXYGEN SATURATION: 93 % | HEIGHT: 66 IN | RESPIRATION RATE: 16 BRPM | TEMPERATURE: 97.2 F | BODY MASS INDEX: 35.68 KG/M2 | HEART RATE: 88 BPM | SYSTOLIC BLOOD PRESSURE: 175 MMHG

## 2024-12-02 DIAGNOSIS — R10.2 PELVIC CRAMPING: ICD-10-CM

## 2024-12-02 PROCEDURE — 99284 EMERGENCY DEPT VISIT MOD MDM: CPT

## 2024-12-02 PROCEDURE — 72170 X-RAY EXAM OF PELVIS: CPT

## 2024-12-02 ASSESSMENT — FIBROSIS 4 INDEX: FIB4 SCORE: 2.08

## 2024-12-02 NOTE — ED NOTES
Pt. Verbalized understanding of discharge instructions.  Pt. To lobby via w/c with home oxygen in use.  No distress noted.

## 2024-12-02 NOTE — ED PROVIDER NOTES
"ED Provider Note    CHIEF COMPLAINT  Chief Complaint   Patient presents with    Sent by MD     Xray completed about one month ago showing pt has ring in rectum. Pt has not passed.     Pt denies abdominal pain/pt reports chronic diarrhea        EXTERNAL RECORDS REVIEWED  Outpatient labs & studies CT scan abdomen pelvis from January 2024 showed evidence of constipation, no foreign body    HPI/ROS  LIMITATION TO HISTORY   Select: : None  OUTSIDE HISTORIAN(S):  None    Ariella Rendon is a 69 y.o. female who presents complaining of rectal foreign body, she states it is a ring she had worn on her finger.  She states she is unsure how it got there.  Patient has a history of fecal impaction, states she uses a metallic object to help disimpact herself.  She felt the click of metal-on-metal during one of her self disimpacting treatments, went to her doctor stating x-ray showed a metallic foreign body in her rectum acting as a \"trapdoor\".  She was to have it removed by her gastroenterologist last Wednesday however secondary to COPD, was told they would not perform this as an outpatient.    PAST MEDICAL HISTORY   has a past medical history of Anemia, Anesthesia (06/06/2019), Arthritis, Breath shortness, Cancer (HCC), COPD (chronic obstructive pulmonary disease) (HCC), Dental disorder, Diverticulitis, Emphysema of lung (HCC), Heart burn, Hiatus hernia syndrome, High cholesterol, Hypertension, Indigestion, Other specified disorder of intestines, Other specified symptom associated with female genital organs (1970), Oxygen dependent (01/26/2021), Psychiatric problem, and Spinal stenosis of lumbar region.    SURGICAL HISTORY   has a past surgical history that includes gyn surgery (1970); gyn surgery (1970); low anterior resection laparoscopic (9/12/2014); other abdominal surgery; bowel resection; colonoscopy (N/A, 7/3/2018); gastroscopy-endo (N/A, 7/3/2018); lumbar fusion anterior (N/A, 10/25/2019); gastroscopy (N/A, 11/21/2019); " colonoscopy (N/A, 2019); sigmoidoscopy flex (N/A, 2019); sigmoidoscopy flex (N/A, 2019); surg diagnostic exam, anorectal (2019); hemorrhoidectomy (N/A, 2019); cervical fusion posterior (N/A, 2021); cervical laminectomy posterior (N/A, 2021); and laminotomy,lumbar disk,1 intrsp (2021).    FAMILY HISTORY  Family History   Problem Relation Age of Onset    Scoliosis Mother     Cancer Father     Lung Disease Father     Hypertension Brother        SOCIAL HISTORY  Social History     Tobacco Use    Smoking status: Former     Current packs/day: 0.00     Average packs/day: 0.5 packs/day for 50.0 years (25.0 ttl pk-yrs)     Types: Cigarettes     Start date: 3/22/1969     Quit date: 3/22/2019     Years since quittin.7    Smokeless tobacco: Never   Vaping Use    Vaping status: Some Days   Substance and Sexual Activity    Alcohol use: No    Drug use: Yes     Frequency: 7.0 times per week     Types: Marijuana, Inhaled     Comment: marijuana - daily    Sexual activity: Not on file       CURRENT MEDICATIONS  Home Medications       Reviewed by Lisa Carl R.N. (Registered Nurse) on 24 at 1014  Med List Status: Partial     Medication Last Dose Status   albuterol 108 (90 Base) MCG/ACT Aero Soln inhalation aerosol  Active   atorvastatin (LIPITOR) 10 MG Tab  Active   cetirizine (ZYRTEC) 10 MG Tab  Active   docusate sodium (COLACE) 100 MG Cap  Active   doxepin (SINEQUAN) 75 MG capsule  Active   hydroCHLOROthiazide (HYDRODIURIL) 25 MG Tab  Active   magnesium citrate Solution  Active   magnesium hydroxide (MILK OF MAGNESIA) 400 MG/5ML Suspension  Active   morphine ER (MS CONTIN) 15 MG Tab CR tablet  Active   omeprazole (PRILOSEC) 20 MG delayed-release capsule  Active   polyethylene glycol 3350 (MIRALAX) 17 GM/SCOOP Powder  Active   pregabalin (LYRICA) 150 MG Cap  Active   ropinirole (REQUIP) 4 MG tablet  Active   tizanidine (ZANAFLEX) 2 MG tablet  Active   TRELEGY ELLIPTA  "200-62.5-25 MCG/INH AEROSOL POWDER, BREATH ACTIVATED  Active                    ALLERGIES  Allergies   Allergen Reactions    Other Environmental Rash     \"alloids in metal\"       PHYSICAL EXAM  VITAL SIGNS: BP (!) 172/84   Pulse 90   Temp 36.6 °C (97.9 °F) (Temporal)   Resp 16   Ht 1.676 m (5' 6\")   Wt 101 kg (222 lb)   SpO2 92%   BMI 35.83 kg/m²    GI: Abdomen is soft and nontender, no guarding, mild distention.  Musculoskeletal: No flank tenderness  Neurologic: Sensation normal, speech clear  Psychiatric: Normal mood      RADIOLOGY/PROCEDURES   I have independently interpreted the diagnostic imaging associated with this visit and am waiting the final reading from the radiologist.   My preliminary interpretation is as follows: X-ray of the pelvis shows no evidence of radiopaque foreign body    Radiologist interpretation:  DX-PELVIS-1 OR 2 VIEWS   Final Result      No radiographic evidence of foreign body.          COURSE & MEDICAL DECISION MAKING    ASSESSMENT, COURSE AND PLAN  Care Narrative: Patient stated visualized in her primary doctor's office a metallic foreign body consistent with a finger ring.  She is vague as to how it got there.  She was to have this out by procedure as an outpatient and her GI however the decision was made to have her come to the emergency department for this.  I repeated an x-ray of her pelvis, there is no evidence of foreign body at this time.  Patient then informed me she had drank GoLytely from the day before at the direction of her gastroenterologist which may have cleared out the foreign body.  Regardless, patient's exam is benign, x-ray negative, she is discharged in good condition.  Suspect her pelvic cramping was either residual from her foreign body or more likely her history of chronic constipation with subsequent large bowel movement from the GoLytely.        DISPOSITION AND DISCUSSIONS    Escalation of care considered, and ultimately not performed:Laboratory " analysis        FINAL DIAGNOSIS  1. Pelvic cramping         Electronically signed by: Sunil Morrissey M.D., 12/2/2024 10:54 AM

## 2024-12-02 NOTE — ED TRIAGE NOTES
".  Chief Complaint   Patient presents with    Sent by MD Burgos completed about one month ago showing pt has ring in rectum. Pt has not passed.     Pt denies abdominal pain/pt reports chronic diarrhea      Patient wheeled to triage for above complaint. Patient aware to notify RN of any changes in symptoms. Patient educated on triage process. A&O x 4, pt wears 3 liters at baseline    .BP (!) 172/84   Pulse 90   Temp 36.6 °C (97.9 °F) (Temporal)   Resp 16   Ht 1.676 m (5' 6\")   Wt 101 kg (222 lb)   SpO2 92%   BMI 35.83 kg/m²       "

## 2025-01-01 ENCOUNTER — APPOINTMENT (OUTPATIENT)
Dept: RADIOLOGY | Facility: MEDICAL CENTER | Age: 70
End: 2025-01-01
Attending: EMERGENCY MEDICINE
Payer: MEDICARE

## 2025-01-01 ENCOUNTER — HOSPITAL ENCOUNTER (EMERGENCY)
Facility: MEDICAL CENTER | Age: 70
End: 2025-03-15
Attending: EMERGENCY MEDICINE
Payer: MEDICARE

## 2025-01-01 VITALS
OXYGEN SATURATION: 100 % | RESPIRATION RATE: 20 BRPM | HEART RATE: 55 BPM | WEIGHT: 222 LBS | TEMPERATURE: 96.4 F | SYSTOLIC BLOOD PRESSURE: 172 MMHG | HEIGHT: 66 IN | DIASTOLIC BLOOD PRESSURE: 94 MMHG | BODY MASS INDEX: 35.68 KG/M2

## 2025-01-01 DIAGNOSIS — I71.23 ANEURYSM OF DESCENDING THORACIC AORTA WITHOUT RUPTURE (HCC): ICD-10-CM

## 2025-01-01 DIAGNOSIS — I46.9 CARDIAC ARREST (HCC): ICD-10-CM

## 2025-01-01 DIAGNOSIS — Z87.09 HISTORY OF COPD: ICD-10-CM

## 2025-01-01 LAB — EKG IMPRESSION: NORMAL

## 2025-01-01 PROCEDURE — 700111 HCHG RX REV CODE 636 W/ 250 OVERRIDE (IP): Mod: JZ,UD | Performed by: EMERGENCY MEDICINE

## 2025-01-01 PROCEDURE — 93005 ELECTROCARDIOGRAM TRACING: CPT | Mod: TC | Performed by: EMERGENCY MEDICINE

## 2025-01-01 PROCEDURE — 36415 COLL VENOUS BLD VENIPUNCTURE: CPT

## 2025-01-01 PROCEDURE — 99285 EMERGENCY DEPT VISIT HI MDM: CPT

## 2025-01-01 PROCEDURE — 96374 THER/PROPH/DIAG INJ IV PUSH: CPT

## 2025-01-01 PROCEDURE — 96375 TX/PRO/DX INJ NEW DRUG ADDON: CPT

## 2025-01-01 PROCEDURE — 99284 EMERGENCY DEPT VISIT MOD MDM: CPT

## 2025-01-01 PROCEDURE — 94002 VENT MGMT INPAT INIT DAY: CPT

## 2025-01-01 RX ORDER — MIDAZOLAM HYDROCHLORIDE 1 MG/ML
5 INJECTION INTRAMUSCULAR; INTRAVENOUS ONCE
Status: COMPLETED | OUTPATIENT
Start: 2025-01-01 | End: 2025-01-01

## 2025-01-01 RX ORDER — MORPHINE SULFATE 4 MG/ML
4 INJECTION INTRAVENOUS ONCE
Status: COMPLETED | OUTPATIENT
Start: 2025-01-01 | End: 2025-01-01

## 2025-01-01 RX ORDER — SODIUM CHLORIDE 9 MG/ML
30 INJECTION, SOLUTION INTRAVENOUS ONCE
Status: DISCONTINUED | OUTPATIENT
Start: 2025-01-01 | End: 2025-01-01 | Stop reason: HOSPADM

## 2025-01-01 RX ADMIN — MORPHINE SULFATE 4 MG: 4 INJECTION, SOLUTION INTRAMUSCULAR; INTRAVENOUS at 15:54

## 2025-01-01 RX ADMIN — MIDAZOLAM HYDROCHLORIDE 5 MG: 1 INJECTION, SOLUTION INTRAMUSCULAR; INTRAVENOUS at 15:00

## 2025-01-01 ASSESSMENT — FIBROSIS 4 INDEX: FIB4 SCORE: 2.08

## 2025-03-15 NOTE — ED NOTES
Bedside report received from off going RN/tech: OCTAVIA Maloney assumed care of patient.      Fall risk interventions in place: Keep floor surfaces clean and dry (all applicable per [place] Fall risk assessment)   Continuous monitoring: Cardiac Leads, Pulse Ox, or Blood Pressure  IVF/IV medications: Not Applicable   Oxygen: How many liters intubatedL  Bedside sitter: Not Applicable   Isolation: Not Applicable

## 2025-03-15 NOTE — ED NOTES
Pt cardiac monitor asystole, no pulse palpated. Dr. Jefferson at bedside to confirm time of death.

## 2025-03-15 NOTE — DISCHARGE PLANNING
MSW responded to cardiac arrest in [place]. Pt was BIB [facility] from home. MSW and ERP met with pt's daughter Marilu for POC. Advanced directive on filed lists pt's daughter as decision maker. Pt's daughter is electing for comfort measures.     TOD called by ERP. MSW met with pt's daughter and went over mortuary information. Per daughter, pt already has arrangements with [facility]. MSW updated bedside RN and NAM. Will remain available for support.

## 2025-03-15 NOTE — ED PROVIDER NOTES
ED Provider Note    Primary care provider: Brianne Marti M.D.    CHIEF COMPLAINT  Chief Complaint   Patient presents with    Cardiac Arrest       Additional history obtained from: EMS reports that the patient was acting oddly today and then the family heard a crash, the patient was found on the ground pulseless and apneic.  EMS arrived to find the patient in asystole.  They gave 2 rounds of epinephrine, had ROSC after 7 minutes.  Received a albuterol treatment via endotracheal tube in route.  Family reportedly stated that they were a DNR but did not have any advance directives on hand.  Limitation to History:  Select: Status post arrest, intubated    HPI  Ariella Rendon is a 69 y.o. female who presents to the Emergency Department after cardiac arrest.  All the history is obtained from EMS as well as the daughter.  The daughter states that they had lengthy discussions about the patient being a DNR.  They did not want to have any type of endotracheal intubation.  She states that the patient was in her usual state of health yesterday but today was confused, pertain to put on close that were not visible, after that she had a collapse and she called 911.      External Record Review: Patient is followed by Dr. Olivo for a history of aneurysm of the descending thoracic aorta.  There is a prior note in January 2020 for that states that the patient was on hospice.  There is a note on that ER visit that states that she is a DNR only if she has to put on a ventilator.  Apparently the patient was removed from hospice as she went to the hospital according to that note and she had known that she would not have gone to the hospital for supplies.    PAST MEDICAL HISTORY   has a past medical history of Anemia, Anesthesia (06/06/2019), Arthritis, Breath shortness, Cancer ([facility]), COPD (chronic obstructive pulmonary disease) ([facility]), Dental disorder, Diverticulitis, Emphysema of lung ([facility]), Heart burn, Hiatus hernia syndrome, High  "cholesterol, Hypertension, Indigestion, Other specified disorder of intestines, Other specified symptom associated with female genital organs (), Oxygen dependent (2021), Psychiatric problem, and Spinal stenosis of lumbar region.    SURGICAL HISTORY   has a past surgical history that includes gyn surgery (); gyn surgery (); low anterior resection laparoscopic (2014); other abdominal surgery; bowel resection; colonoscopy (N/A, 7/3/2018); gastroscopy-endo (N/A, 7/3/2018); lumbar fusion anterior (N/A, 10/25/2019); gastroscopy (N/A, 2019); colonoscopy (N/A, 2019); sigmoidoscopy flex (N/A, 2019); sigmoidoscopy flex (N/A, 2019); surg diagnostic exam, anorectal (2019); hemorrhoidectomy (N/A, 2019); cervical fusion posterior (N/A, 2021); cervical laminectomy posterior (N/A, 2021); and laminotomy,lumbar disk,1 intrsp (2021).    SOCIAL HISTORY  Social History     Tobacco Use    Smoking status: Former     Current packs/day: 0.00     Average packs/day: 0.5 packs/day for 50.0 years (25.0 ttl pk-yrs)     Types: Cigarettes     Start date: 3/22/1969     Quit date: 3/22/2019     Years since quittin.9    Smokeless tobacco: Never   Vaping Use    Vaping status: Some Days   Substance Use Topics    Alcohol use: No    Drug use: Yes     Frequency: 7.0 times per week     Types: Marijuana, Inhaled     Comment: marijuana - daily      Social History     Substance and Sexual Activity   Drug Use Yes    Frequency: 7.0 times per week    Types: Marijuana, Inhaled    Comment: marijuana - daily       PHYSICAL EXAM  VITAL SIGNS: BP (!) 172/94   Pulse (!) 55   Temp (!) 35.8 °C (96.4 °F) (Temporal)   Resp 20   Ht 1.676 m (5' 6\")   Wt 101 kg (222 lb)   SpO2 100%   BMI 35.83 kg/m²   Constitutional: Intubated, mechanically ventilated, chewing on the tube but otherwise no purposeful movement.  HENT: Normocephalic, Bilateral external ears normal. Nose normal.   Eyes: Pupils 3 " mm and reactive  Thorax & Lungs: Ventilated, breath sounds equal  Cardiovascular: Tachycardic, No murmurs, rubs or gallops. Bilateral pulses symmetrical.   Abdomen: Nondistended  :    Skin: Visualized skin is  Dry, No erythema, No rash.   Musculoskeletal:   No cyanosis, clubbing or edema. No leg asymmetry.   Neurologic: GCS 4  Psychiatric: Unable to assess  Lymphatic:      EKG   12 lead Interpreted by me  Rhythm: Sinus bradycardia  Rate: 55  Axis: normal  Ectopy: none  Conduction: normal  ST Segments: no acute change  T Waves: no acute change  Clinical Impression: Sinus bradycardia, otherwise unremarkable EKG without acute changes       Differential diagnoses include but are not limited to: Hypercarbic arrest, ICH, sepsis, ACS    2:59 PM - Nursing notes reviewed, patient seen and examined at bedside.    3:30 PM lengthy discussion had with daughter who is the power of .  She states that the patient did not want to be intubated.  We discussed different treatment options.  I allowed the daughter to remain at bedside and think about how aggressive we would want to be with treatment.  I told her that we can offer CT, central line, IV pressors, continue intubation, laboratory evaluation.    3:46 PM after discussion and the daughter had time to process, she does not want the patient to be intubated but is agreeable to other forms of resuscitation, she does not want a central line.  Patient is having agonal breathing.  Daughter agreeable to some morphine for comfort measures.    3:58 PM patient is in asystole        Discussion of management with other medical personnel: Case management    Escalation of care considered, and ultimately not performed: after discussion with the patient / family, they have elected to decline an escalation in care.          Decision Making:  This is a 69-year-old female with history of COPD, on chronic oxygen therapy, previous hospice for COPD who presents after cardiac arrest.  I  suspect that she had cardiac arrest due to hypercarbia, acidosis although certainly other considerations such as intracranial hemorrhage, sepsis ACS etc. all could be invalid explain issues for the patient's sudden arrest.    I did extensive chart review and talk to the daughter at length.  We decided to extubate the patient and considered doing everything short of central lines and invasive mechanical ventilation.  Explained to the daughter that after extubation it is very likely that the patient will become unstable.  She understood the risk, this is in the patient's wishes and I agreed with a decision to extubate the patient.  She was extubated, shortly after this had deterioration, bradycardia, hypotension and agonal breathing.  She was given a 4 mg IV dose of morphine for comfort measures and  shortly thereafter.  Daughter at bedside.          Discharge Medications:  Discharge Medication List as of 3/15/2025  5:39 PM          FINAL IMPRESSION  1. Cardiac arrest (HCC)    2. History of COPD

## 2025-03-15 NOTE — ED TRIAGE NOTES
"..  Chief Complaint   Patient presents with    Cardiac Arrest       70 yo female brought in by JOANA from home. Per family, they heard a loud bang and found patient down. JOANA started CPR at 1423, They did two rounds of epi, and obtained ROSC at 1430. Blood glucose 328.     Patient arrives intubated, sinus freddy, GCS 8 on arrival.       Pulse (!) 56   Resp 16   Ht 1.676 m (5' 6\")   Wt 101 kg (222 lb)   BMI 35.83 kg/m²     "

## 2025-03-16 NOTE — ED NOTES
Pt transported to [facility] by transport team, no belongings noted. Pt in body bag with correct arm band and death tag in place.

## 2025-03-16 NOTE — ED NOTES
Per Dr. Jefferson and POV, pt to be extubated. No central line placement per family request. If pt , no efforts of lifesaving measures at this time per Dr. Jefferson per family request.

## (undated) DEVICE — MIDAS LUBRICATOR DIFFUSER PACK (4EA/CA)

## (undated) DEVICE — CELLSAVER PACK

## (undated) DEVICE — CATHETER IV 20 GA X 1-1/4 ---SURG.& SDS ONLY--- (50EA/BX)

## (undated) DEVICE — KIT SIGMOIDOSCOPE W/BULB AND - SUCTION (1/PK 10PK/CA)

## (undated) DEVICE — SEALER BIPOLAR 2.3 AQUAMANTYS

## (undated) DEVICE — LACTATED RINGERS INJ 1000 ML - (14EA/CA 60CA/PF)

## (undated) DEVICE — GLOVE SZ 6.5 BIOGEL PI MICRO - PF LF (50PR/BX)

## (undated) DEVICE — Device

## (undated) DEVICE — BIT DRILL 2.4MM INFINITY

## (undated) DEVICE — SUTURE GENERAL

## (undated) DEVICE — ART BMC PROCESSING KIT

## (undated) DEVICE — BIT DRILL MATCH HEAD MIDAS REX 15-A 2.2MM X 15CM

## (undated) DEVICE — ELECTRODE DUAL RETURN W/ CORD - (50/PK)

## (undated) DEVICE — BLADE SURGICAL #15 - (50/BX 3BX/CA)

## (undated) DEVICE — SODIUM CHL IRRIGATION 0.9% 1000ML (12EA/CA)

## (undated) DEVICE — GLOVE BIOGEL INDICATOR SZ 7SURGICAL PF LTX - (50/BX 4BX/CA)

## (undated) DEVICE — CLIP MED INTNL HRZN TI ESCP - (25/BX)

## (undated) DEVICE — STAPLER SKIN DISP - (6/BX 10BX/CA) VISISTAT

## (undated) DEVICE — CANISTER SUCTION RIGID RED 1500CC (40EA/CA)

## (undated) DEVICE — CHLORAPREP 26 ML APPLICATOR - ORANGE TINT(25/CA)

## (undated) DEVICE — SOD. CHL. INJ. 0.9% 1000 ML - (14EA/CA 60CA/PF)

## (undated) DEVICE — CLIP LG INTNL HRZN TI ESCP LGT - (20/BX)

## (undated) DEVICE — KIT CUSTOM PROCEDURE SINGLE FOR ENDO  (15/CA)

## (undated) DEVICE — PATTIES SURG NEURO X-RAY 1/4X1/4 (10EA/PK 20PK/CA)

## (undated) DEVICE — GOWN WARMING STANDARD FLEX - (30/CA)

## (undated) DEVICE — KIT ANESTHESIA W/CIRCUIT & 3/LT BAG W/FILTER (20EA/CA)

## (undated) DEVICE — BITE BLOCK ADULT 60FR (100EA/CA)

## (undated) DEVICE — SYRINGE ASEPTO - (50EA/CA

## (undated) DEVICE — HEAD HOLDER JUNIOR/ADULT

## (undated) DEVICE — DRAPE LAPAROTOMY T SHEET - (12EA/CA)

## (undated) DEVICE — GLOVE BIOGEL SZ 6.5 SURGICAL PF LTX (50PR/BX 4BX/CA)

## (undated) DEVICE — GOWN SURGEONS X-LARGE - DISP. (30/CA)

## (undated) DEVICE — KIT ROOM DECONTAMINATION

## (undated) DEVICE — PROTECTOR ULNA NERVE - (36PR/CA)

## (undated) DEVICE — TOOL DISSECT MATCH HEAD

## (undated) DEVICE — SLEEVE, VASO, THIGH, MED

## (undated) DEVICE — BOVIE BLADE COATED - (50/PK)

## (undated) DEVICE — TOWEL STOP TIMEOUT SAFETY FLAG (40EA/CA)

## (undated) DEVICE — JELLY SURGILUBE STERILE TUBE 4.25 OZ (1/EA)

## (undated) DEVICE — SOD. CHL 10CC SYRINGE PREFILL - W/10 CC (30/BX)

## (undated) DEVICE — SPONGE GAUZESTER 4 X 4 4PLY - (128PK/CA)

## (undated) DEVICE — NEPTUNE 4 PORT MANIFOLD - (20/PK)

## (undated) DEVICE — BRIEF STRETCH MATERNITY M/L - FITS 20-60IN (5EA/BG 20BG/CA)

## (undated) DEVICE — SUCTION INSTRUMENT YANKAUER BULBOUS TIP W/O VENT (50EA/CA)

## (undated) DEVICE — SET EXTENSION WITH 2 PORTS (48EA/CA) ***PART #2C8610 IS A SUBSTITUTE*****

## (undated) DEVICE — CLEANER ELECTRO-SURGICAL TIP - (25/BX 4BX/CA)

## (undated) DEVICE — SYRINGE 10 ML CONTROL LL (25EA/BX 4BX/CA)

## (undated) DEVICE — SUTURE 3-0 VICRYL PLUS RB-1 - 8 X 18 INCH (12/BX)

## (undated) DEVICE — PACK NEURO - (2EA/CA)

## (undated) DEVICE — GLOVE BIOGEL ECLIPSE  PF LATEX SIZE 6.5 (50PR/BX)

## (undated) DEVICE — SUTURE 4-0 MONOCRYL PLUS PS-1 - 27 INCH (36/BX)

## (undated) DEVICE — SURGIFOAM (12X7) - (12EA/CA)

## (undated) DEVICE — TUBING CLEARLINK DUO-VENT - C-FLO (48EA/CA)

## (undated) DEVICE — GLOVE SZ 7 BIOGEL PI MICRO - PF LF (50PR/BX 4BX/CA)

## (undated) DEVICE — TUBING C&T SET FLYING LEADS DRAIN TUBING (10EA/BX)

## (undated) DEVICE — CANISTER SUCTION 3000ML MECHANICAL FILTER AUTO SHUTOFF MEDI-VAC NONSTERILE LF DISP  (40EA/CA)

## (undated) DEVICE — ARMREST CRADLE FOAM - (2PR/PK 12PR/CA)

## (undated) DEVICE — SUTURE 2-0 VICRYL PLUS CT-1 - 8 X 18 INCH(12/BX)

## (undated) DEVICE — HEADREST PRONEVIEW LARGE - (10/CA)

## (undated) DEVICE — SPONGE GAUZE NON-STERILE 4X4 - (2000/CA 10PK/CA)

## (undated) DEVICE — PIN HEAD MAYFIELD DISP. (3EA/PK 12PK/BX)

## (undated) DEVICE — SET LEADWIRE 5 LEAD BEDSIDE DISPOSABLE ECG (1SET OF 5/EA)

## (undated) DEVICE — SPONGE KITTNER DISSECTORS - (5EA/PK 50PK/CA)

## (undated) DEVICE — SENSOR SPO2 NEO LNCS ADHESIVE (20/BX) SEE USER NOTES

## (undated) DEVICE — CLOSURE WOUND 1/4 X 4 (STERI - STRIP) (50/BX 4BX/CA)

## (undated) DEVICE — LACTATED RINGERS INJ. 500 ML - (24EA/CA)

## (undated) DEVICE — SYRINGE DISP. 50CC LS - (40/BX)

## (undated) DEVICE — BOVIE BLADE COATED &INSULATED - 25/PK

## (undated) DEVICE — BONE PRESS SPINAL EDITION HENSLER (10EA/CA)

## (undated) DEVICE — FILM CASSETTE ENDO

## (undated) DEVICE — MASK ANESTHESIA ADULT  - (100/CA)

## (undated) DEVICE — CON SEDATION/>5 YR 1ST 15 MIN

## (undated) DEVICE — TRAY SRGPRP PVP IOD WT PRP - (20/CA)

## (undated) DEVICE — DRAPE STRLE REG TOWEL 18X24 - (10/BX 4BX/CA)"

## (undated) DEVICE — KIT EVACUATER 3 SPRING PVC LF 1/8 DRAIN SIZE (10EA/CA)"

## (undated) DEVICE — ELECTRODE 850 FOAM ADHESIVE - HYDROGEL RADIOTRNSPRNT (50/PK)

## (undated) DEVICE — BOVIE  BLADE 6 EXTENDED - (50/PK)

## (undated) DEVICE — SUTURE 4-0 PROLENE PS-2 18 (36PK/BX)"

## (undated) DEVICE — STERI STRIP COMPOUND BENZOIN - TINCTURE 0.6ML WITH APPLICATOR (40EA/BX)

## (undated) DEVICE — KIT SURGIFLO W/OUT THROMBIN - (6EA/CA)

## (undated) DEVICE — GRAFT DELIVERY KIT

## (undated) DEVICE — BOVIE BLADE SHORT - (150EA/CT)

## (undated) DEVICE — TOWELS CLOTH SURGICAL - (4/PK 20PK/CA)

## (undated) DEVICE — SUTURE 0 VICRYL PLUS CT-1 - 8 X 18 INCH (12/BX)

## (undated) DEVICE — BASIN EMESIS DISP. - (250/CA)

## (undated) DEVICE — SUCTION TIP STRAIGHT ARGYLE - 50EA/CA

## (undated) DEVICE — CLIP MED LG INTNL HRZN TI ESCP - (20/BX)

## (undated) DEVICE — INTRAOP NEURO IN OR 1:1 PER 15 MIN

## (undated) DEVICE — KIT PROCEDURE DOUBLE ENDO ONLY (5/CA)

## (undated) DEVICE — GLOVE BIOGEL SZ 6 PF LATEX - (50EA/BX 4BX/CA)

## (undated) DEVICE — SPONGE PEANUT - (5/PK 50PK/CA)

## (undated) DEVICE — TUBE CONNECTING SUCTION - CLEAR PLASTIC STERILE 72 IN (50EA/CA)

## (undated) DEVICE — TRAY CATHETER FOLEY URINE METER W/STATLOCK 350ML (10EA/CA)

## (undated) DEVICE — BONE MILL BM210

## (undated) DEVICE — ENDO PEANUT 5MM DEVICE (12EA/BX)

## (undated) DEVICE — CORDS BIPOLAR COAGULATION - 12FT STERILE DISP. (10EA/BX)

## (undated) DEVICE — DETERGENT RENUZYME PLUS 10 OZ PACKET (50/BX)

## (undated) DEVICE — SUTURE 1 PDS II PLUS TP-1 - (12PK/BX)

## (undated) DEVICE — TUBE NG SALEM SUMP 14FR (50EA/BX)

## (undated) DEVICE — SYRINGE 3 CC 22 GA X 1-1/2 - NDL SAFETY (50/BX 8BX/CA)

## (undated) DEVICE — GLOVE BIOGEL PI ORTHO SZ 7.5 PF LF (40PR/BX)

## (undated) DEVICE — PAD LAP STERILE 18 X 18 - (5/PK 40PK/CA)

## (undated) DEVICE — SODIUM CHL. INJ. 0.9% 500ML (24EA/CA 50CA/PF)

## (undated) DEVICE — GLOVE BIOGEL ECLIPSE PF LATEX SIZE 8.0  (50PR/BX)

## (undated) DEVICE — SUTURE 0 SILK TIES (36PK/BX)

## (undated) DEVICE — PACK MINOR BASIN - (2EA/CA)

## (undated) DEVICE — GLOVE BIOGEL PI INDICATOR SZ 7.5 SURGICAL PF LF -(50/BX 4BX/CA)

## (undated) DEVICE — DRAPE UNDER BUTTOCK LRG (40/CA)

## (undated) DEVICE — GLOVE BIOGEL INDICATOR SZ 8.5 SURGICAL PF LTX - (50/BX 4BX/CA)

## (undated) DEVICE — CELLSAVER STAT

## (undated) DEVICE — TRAY SURESTEP FOLEY TEMP SENSING 16FR (10EA/CA) ORDER  #18764 FOR TEMP FOLEY ONLY

## (undated) DEVICE — GOWN SURGEONS LARGE - (32/CA)

## (undated) DEVICE — CONTAINER, SPECIMEN, STERILE

## (undated) DEVICE — HEMOSTAT SURG ABSORBABLE - 2 X 3 IN SURGICEL (24EA/CA)

## (undated) DEVICE — MANIFOLD NEPTUNE 1 PORT (20/PK)

## (undated) DEVICE — SYSTEM PREVENA INCISION MNGM - (1/EA)

## (undated) DEVICE — SUTURE 3-0 CHROMIC GUT SH 27 (36PK/BX)"

## (undated) DEVICE — GLOVE BIOGEL SZ 8.5 SURGICAL PF LTX - (50PR/BX 4BX/CA)

## (undated) DEVICE — SYRINGE 6 CC 20 GA X 1 1/2 - NDL SAFETY  (50/BX)

## (undated) DEVICE — GLOVE BIOGEL SZ 8 SURGICAL PF LTX - (50PR/BX 4BX/CA)

## (undated) DEVICE — DRESSING ABDOMINAL PAD STERILE 8 X 10" (360EA/CA)"

## (undated) DEVICE — SPEEDBAND SUPERVIEW SUPER 7

## (undated) DEVICE — SYRINGE NON SAFETY 10 CC 20 GA X 1-1/2 IN (100/BX 4BX/CA)

## (undated) DEVICE — TUBE CONNECT SUCTION CLEAR 120 X 1/4" (50EA/CA)"